# Patient Record
Sex: FEMALE | Race: OTHER | NOT HISPANIC OR LATINO
[De-identification: names, ages, dates, MRNs, and addresses within clinical notes are randomized per-mention and may not be internally consistent; named-entity substitution may affect disease eponyms.]

---

## 2017-02-06 ENCOUNTER — APPOINTMENT (OUTPATIENT)
Dept: CARDIOLOGY | Facility: CLINIC | Age: 82
End: 2017-02-06

## 2017-02-06 VITALS
WEIGHT: 121 LBS | OXYGEN SATURATION: 97 % | HEART RATE: 72 BPM | RESPIRATION RATE: 17 BRPM | DIASTOLIC BLOOD PRESSURE: 84 MMHG | TEMPERATURE: 97.9 F | SYSTOLIC BLOOD PRESSURE: 156 MMHG | BODY MASS INDEX: 22.86 KG/M2

## 2017-02-06 RX ORDER — DOCUSATE SODIUM 100 MG/1
100 CAPSULE ORAL
Qty: 90 | Refills: 5 | Status: ACTIVE | COMMUNITY
Start: 2017-02-06 | End: 1900-01-01

## 2017-02-22 ENCOUNTER — APPOINTMENT (OUTPATIENT)
Dept: CARDIOLOGY | Facility: CLINIC | Age: 82
End: 2017-02-22

## 2017-02-22 VITALS
HEART RATE: 70 BPM | SYSTOLIC BLOOD PRESSURE: 154 MMHG | RESPIRATION RATE: 18 BRPM | DIASTOLIC BLOOD PRESSURE: 79 MMHG | OXYGEN SATURATION: 95 % | TEMPERATURE: 97.4 F

## 2017-03-22 ENCOUNTER — APPOINTMENT (OUTPATIENT)
Dept: CARDIOLOGY | Facility: CLINIC | Age: 82
End: 2017-03-22

## 2017-03-27 ENCOUNTER — APPOINTMENT (OUTPATIENT)
Dept: CARDIOLOGY | Facility: CLINIC | Age: 82
End: 2017-03-27

## 2017-03-27 VITALS
HEART RATE: 68 BPM | DIASTOLIC BLOOD PRESSURE: 76 MMHG | RESPIRATION RATE: 18 BRPM | WEIGHT: 125 LBS | SYSTOLIC BLOOD PRESSURE: 138 MMHG | BODY MASS INDEX: 23.62 KG/M2 | OXYGEN SATURATION: 96 %

## 2017-06-05 ENCOUNTER — APPOINTMENT (OUTPATIENT)
Dept: CARDIOLOGY | Facility: CLINIC | Age: 82
End: 2017-06-05

## 2017-06-05 VITALS
OXYGEN SATURATION: 96 % | SYSTOLIC BLOOD PRESSURE: 134 MMHG | HEART RATE: 81 BPM | TEMPERATURE: 97.8 F | RESPIRATION RATE: 17 BRPM | BODY MASS INDEX: 24.19 KG/M2 | DIASTOLIC BLOOD PRESSURE: 77 MMHG | WEIGHT: 128 LBS

## 2017-06-06 ENCOUNTER — FORM ENCOUNTER (OUTPATIENT)
Age: 82
End: 2017-06-06

## 2017-06-09 ENCOUNTER — RESULT REVIEW (OUTPATIENT)
Age: 82
End: 2017-06-09

## 2017-07-07 ENCOUNTER — MEDICATION RENEWAL (OUTPATIENT)
Age: 82
End: 2017-07-07

## 2017-08-09 ENCOUNTER — MEDICATION RENEWAL (OUTPATIENT)
Age: 82
End: 2017-08-09

## 2017-08-10 ENCOUNTER — MEDICATION RENEWAL (OUTPATIENT)
Age: 82
End: 2017-08-10

## 2017-09-11 ENCOUNTER — APPOINTMENT (OUTPATIENT)
Dept: CARDIOLOGY | Facility: CLINIC | Age: 82
End: 2017-09-11
Payer: MEDICARE

## 2017-09-14 ENCOUNTER — NON-APPOINTMENT (OUTPATIENT)
Age: 82
End: 2017-09-14

## 2017-09-14 ENCOUNTER — APPOINTMENT (OUTPATIENT)
Dept: CARDIOLOGY | Facility: CLINIC | Age: 82
End: 2017-09-14
Payer: MEDICARE

## 2017-09-14 VITALS
DIASTOLIC BLOOD PRESSURE: 73 MMHG | SYSTOLIC BLOOD PRESSURE: 137 MMHG | TEMPERATURE: 98.4 F | OXYGEN SATURATION: 96 % | BODY MASS INDEX: 24.75 KG/M2 | HEART RATE: 73 BPM | RESPIRATION RATE: 17 BRPM | WEIGHT: 131 LBS

## 2017-09-14 PROCEDURE — 99214 OFFICE O/P EST MOD 30 MIN: CPT

## 2017-09-14 PROCEDURE — 93000 ELECTROCARDIOGRAM COMPLETE: CPT

## 2017-11-08 ENCOUNTER — APPOINTMENT (OUTPATIENT)
Dept: CARDIOLOGY | Facility: CLINIC | Age: 82
End: 2017-11-08
Payer: MEDICARE

## 2017-11-08 VITALS
DIASTOLIC BLOOD PRESSURE: 80 MMHG | RESPIRATION RATE: 18 BRPM | WEIGHT: 130 LBS | SYSTOLIC BLOOD PRESSURE: 145 MMHG | BODY MASS INDEX: 24.56 KG/M2 | HEART RATE: 71 BPM | OXYGEN SATURATION: 98 %

## 2017-11-08 PROCEDURE — 93000 ELECTROCARDIOGRAM COMPLETE: CPT

## 2017-11-08 PROCEDURE — 99214 OFFICE O/P EST MOD 30 MIN: CPT

## 2017-12-07 ENCOUNTER — APPOINTMENT (OUTPATIENT)
Dept: CARDIOLOGY | Facility: CLINIC | Age: 82
End: 2017-12-07
Payer: MEDICARE

## 2017-12-07 VITALS
TEMPERATURE: 98.3 F | BODY MASS INDEX: 24 KG/M2 | OXYGEN SATURATION: 96 % | HEART RATE: 74 BPM | SYSTOLIC BLOOD PRESSURE: 123 MMHG | WEIGHT: 127 LBS | RESPIRATION RATE: 18 BRPM | DIASTOLIC BLOOD PRESSURE: 73 MMHG

## 2017-12-07 PROCEDURE — 99214 OFFICE O/P EST MOD 30 MIN: CPT

## 2018-02-15 ENCOUNTER — APPOINTMENT (OUTPATIENT)
Dept: CARDIOLOGY | Facility: CLINIC | Age: 83
End: 2018-02-15
Payer: MEDICARE

## 2018-02-15 VITALS
HEART RATE: 77 BPM | WEIGHT: 127 LBS | BODY MASS INDEX: 24 KG/M2 | SYSTOLIC BLOOD PRESSURE: 147 MMHG | OXYGEN SATURATION: 97 % | DIASTOLIC BLOOD PRESSURE: 80 MMHG | TEMPERATURE: 97.4 F | RESPIRATION RATE: 18 BRPM

## 2018-02-15 PROCEDURE — 99214 OFFICE O/P EST MOD 30 MIN: CPT

## 2018-02-15 PROCEDURE — 93306 TTE W/DOPPLER COMPLETE: CPT

## 2018-05-17 ENCOUNTER — APPOINTMENT (OUTPATIENT)
Dept: CARDIOLOGY | Facility: CLINIC | Age: 83
End: 2018-05-17

## 2018-05-19 ENCOUNTER — APPOINTMENT (OUTPATIENT)
Dept: CARDIOLOGY | Facility: CLINIC | Age: 83
End: 2018-05-19
Payer: MEDICARE

## 2018-05-19 VITALS
RESPIRATION RATE: 18 BRPM | SYSTOLIC BLOOD PRESSURE: 145 MMHG | OXYGEN SATURATION: 96 % | BODY MASS INDEX: 24.75 KG/M2 | HEART RATE: 75 BPM | WEIGHT: 131 LBS | DIASTOLIC BLOOD PRESSURE: 77 MMHG

## 2018-05-19 PROCEDURE — 99214 OFFICE O/P EST MOD 30 MIN: CPT

## 2018-08-23 ENCOUNTER — NON-APPOINTMENT (OUTPATIENT)
Age: 83
End: 2018-08-23

## 2018-08-23 ENCOUNTER — APPOINTMENT (OUTPATIENT)
Dept: CARDIOLOGY | Facility: CLINIC | Age: 83
End: 2018-08-23
Payer: MEDICARE

## 2018-08-23 VITALS
RESPIRATION RATE: 16 BRPM | WEIGHT: 131 LBS | TEMPERATURE: 97.9 F | OXYGEN SATURATION: 98 % | HEIGHT: 61 IN | DIASTOLIC BLOOD PRESSURE: 65 MMHG | HEART RATE: 72 BPM | BODY MASS INDEX: 24.73 KG/M2 | SYSTOLIC BLOOD PRESSURE: 110 MMHG

## 2018-08-23 PROCEDURE — 99214 OFFICE O/P EST MOD 30 MIN: CPT

## 2018-08-23 PROCEDURE — 93000 ELECTROCARDIOGRAM COMPLETE: CPT

## 2018-09-29 ENCOUNTER — MOBILE ON CALL (OUTPATIENT)
Age: 83
End: 2018-09-29

## 2018-10-08 ENCOUNTER — APPOINTMENT (OUTPATIENT)
Dept: CARDIOLOGY | Facility: CLINIC | Age: 83
End: 2018-10-08

## 2018-10-11 ENCOUNTER — APPOINTMENT (OUTPATIENT)
Dept: CARDIOLOGY | Facility: CLINIC | Age: 83
End: 2018-10-11
Payer: MEDICARE

## 2018-10-11 VITALS
TEMPERATURE: 97.7 F | WEIGHT: 126 LBS | BODY MASS INDEX: 23.81 KG/M2 | HEART RATE: 84 BPM | DIASTOLIC BLOOD PRESSURE: 63 MMHG | SYSTOLIC BLOOD PRESSURE: 103 MMHG | OXYGEN SATURATION: 98 % | RESPIRATION RATE: 16 BRPM

## 2018-10-11 PROCEDURE — 99214 OFFICE O/P EST MOD 30 MIN: CPT

## 2018-10-11 PROCEDURE — 85610 PROTHROMBIN TIME: CPT | Mod: QW

## 2018-10-11 RX ORDER — APIXABAN 2.5 MG/1
2.5 TABLET, FILM COATED ORAL
Qty: 60 | Refills: 5 | Status: ACTIVE | COMMUNITY
Start: 2018-10-11 | End: 1900-01-01

## 2018-10-12 LAB
INR PPP: 1.9 RATIO
POCT-PROTHROMBIN TIME: 23 SECS
QUALITY CONTROL: YES

## 2018-10-25 ENCOUNTER — APPOINTMENT (OUTPATIENT)
Dept: CARDIOLOGY | Facility: CLINIC | Age: 83
End: 2018-10-25
Payer: MEDICARE

## 2018-10-25 VITALS
TEMPERATURE: 97.4 F | OXYGEN SATURATION: 98 % | SYSTOLIC BLOOD PRESSURE: 149 MMHG | DIASTOLIC BLOOD PRESSURE: 81 MMHG | RESPIRATION RATE: 16 BRPM | HEART RATE: 73 BPM | BODY MASS INDEX: 24.19 KG/M2 | WEIGHT: 128 LBS

## 2018-10-25 PROCEDURE — 99214 OFFICE O/P EST MOD 30 MIN: CPT

## 2018-11-08 ENCOUNTER — APPOINTMENT (OUTPATIENT)
Dept: CARDIOLOGY | Facility: CLINIC | Age: 83
End: 2018-11-08
Payer: MEDICARE

## 2018-11-08 ENCOUNTER — NON-APPOINTMENT (OUTPATIENT)
Age: 83
End: 2018-11-08

## 2018-11-08 VITALS
RESPIRATION RATE: 17 BRPM | TEMPERATURE: 97.6 F | WEIGHT: 127 LBS | DIASTOLIC BLOOD PRESSURE: 81 MMHG | HEART RATE: 79 BPM | BODY MASS INDEX: 24 KG/M2 | OXYGEN SATURATION: 98 % | SYSTOLIC BLOOD PRESSURE: 136 MMHG

## 2018-11-08 PROCEDURE — 93000 ELECTROCARDIOGRAM COMPLETE: CPT

## 2018-11-08 PROCEDURE — 99214 OFFICE O/P EST MOD 30 MIN: CPT

## 2018-11-15 ENCOUNTER — APPOINTMENT (OUTPATIENT)
Dept: CARDIOLOGY | Facility: CLINIC | Age: 83
End: 2018-11-15
Payer: MEDICARE

## 2018-11-15 VITALS
OXYGEN SATURATION: 95 % | TEMPERATURE: 97.6 F | BODY MASS INDEX: 24.75 KG/M2 | DIASTOLIC BLOOD PRESSURE: 79 MMHG | RESPIRATION RATE: 18 BRPM | HEART RATE: 73 BPM | WEIGHT: 131 LBS | SYSTOLIC BLOOD PRESSURE: 152 MMHG

## 2018-11-15 PROCEDURE — 99215 OFFICE O/P EST HI 40 MIN: CPT

## 2018-11-15 PROCEDURE — 93306 TTE W/DOPPLER COMPLETE: CPT

## 2018-11-26 ENCOUNTER — APPOINTMENT (OUTPATIENT)
Dept: CARDIOLOGY | Facility: CLINIC | Age: 83
End: 2018-11-26

## 2018-12-10 ENCOUNTER — APPOINTMENT (OUTPATIENT)
Dept: CARDIOLOGY | Facility: CLINIC | Age: 83
End: 2018-12-10
Payer: MEDICARE

## 2018-12-10 VITALS
TEMPERATURE: 98 F | OXYGEN SATURATION: 97 % | SYSTOLIC BLOOD PRESSURE: 152 MMHG | BODY MASS INDEX: 24.37 KG/M2 | DIASTOLIC BLOOD PRESSURE: 82 MMHG | RESPIRATION RATE: 17 BRPM | WEIGHT: 129 LBS | HEART RATE: 76 BPM

## 2018-12-10 PROCEDURE — 99214 OFFICE O/P EST MOD 30 MIN: CPT

## 2019-01-17 ENCOUNTER — APPOINTMENT (OUTPATIENT)
Dept: CARDIOLOGY | Facility: CLINIC | Age: 84
End: 2019-01-17
Payer: MEDICARE

## 2019-01-17 VITALS
HEART RATE: 72 BPM | DIASTOLIC BLOOD PRESSURE: 66 MMHG | BODY MASS INDEX: 23.81 KG/M2 | WEIGHT: 126 LBS | TEMPERATURE: 97.7 F | RESPIRATION RATE: 17 BRPM | SYSTOLIC BLOOD PRESSURE: 110 MMHG | OXYGEN SATURATION: 98 %

## 2019-01-17 PROCEDURE — 99214 OFFICE O/P EST MOD 30 MIN: CPT

## 2019-02-12 ENCOUNTER — APPOINTMENT (OUTPATIENT)
Dept: CARDIOLOGY | Facility: CLINIC | Age: 84
End: 2019-02-12
Payer: MEDICARE

## 2019-02-12 VITALS
HEART RATE: 58 BPM | OXYGEN SATURATION: 97 % | SYSTOLIC BLOOD PRESSURE: 145 MMHG | TEMPERATURE: 97.4 F | RESPIRATION RATE: 17 BRPM | WEIGHT: 122 LBS | DIASTOLIC BLOOD PRESSURE: 76 MMHG | BODY MASS INDEX: 23.05 KG/M2

## 2019-02-12 PROCEDURE — 99214 OFFICE O/P EST MOD 30 MIN: CPT

## 2019-02-12 RX ORDER — DRONEDARONE 400 MG/1
400 TABLET, FILM COATED ORAL TWICE DAILY
Qty: 60 | Refills: 5 | Status: DISCONTINUED | COMMUNITY
Start: 2018-11-08 | End: 2019-02-12

## 2019-02-12 RX ORDER — LOSARTAN POTASSIUM AND HYDROCHLOROTHIAZIDE 12.5; 5 MG/1; MG/1
50-12.5 TABLET ORAL
Qty: 30 | Refills: 5 | Status: DISCONTINUED | COMMUNITY
Start: 2017-02-22 | End: 2019-02-12

## 2019-02-27 ENCOUNTER — RESULT CHARGE (OUTPATIENT)
Age: 84
End: 2019-02-27

## 2019-03-01 ENCOUNTER — RESULT REVIEW (OUTPATIENT)
Age: 84
End: 2019-03-01

## 2019-03-03 RX ORDER — METOPROLOL SUCCINATE 25 MG/1
25 TABLET, EXTENDED RELEASE ORAL DAILY
Qty: 30 | Refills: 5 | Status: DISCONTINUED | COMMUNITY
Start: 2018-10-25 | End: 2019-03-03

## 2019-03-03 RX ORDER — GUAIFENESIN SYRUP AND DEXTROMETHORPHAN 100; 10 MG/5ML; MG/5ML
100-10 SYRUP ORAL
Qty: 1 | Refills: 0 | Status: DISCONTINUED | COMMUNITY
Start: 2017-12-07 | End: 2019-03-03

## 2019-03-03 RX ORDER — AZITHROMYCIN 250 MG/1
250 TABLET, FILM COATED ORAL
Qty: 6 | Refills: 0 | Status: DISCONTINUED | COMMUNITY
Start: 2017-12-07 | End: 2019-03-03

## 2019-03-03 RX ORDER — GUAIFENESIN 600 MG/1
600 TABLET, EXTENDED RELEASE ORAL
Qty: 60 | Refills: 5 | Status: DISCONTINUED | COMMUNITY
Start: 2017-02-06 | End: 2019-03-03

## 2019-03-03 NOTE — HISTORY OF PRESENT ILLNESS
[FreeTextEntry1] : 87-year-old female with CAD s/p LAD and Diag stents in 2009 at [facility] s/p 2 LAD stents in 9/2018, asthma, PASP of 72 mmHg following treadmill stress, PAF, HTN and HLD presents for followup.  Patient was last seen on 10/25/18.  I advised patient to increase Metoprolol ER to 75 mg daily.  She is on ASA 81 mg Plavix 75 mg and Atorvastatin for CAD.  She is on Eliquis 2.5 mg BID for PAF.  Patient reports palpitations.  Patient denies CP or SOB.

## 2019-03-03 NOTE — PHYSICAL EXAM
[General Appearance - Well Developed] : well developed [Normal Appearance] : normal appearance [Well Groomed] : well groomed [General Appearance - Well Nourished] : well nourished [No Deformities] : no deformities [General Appearance - In No Acute Distress] : no acute distress [Normal Conjunctiva] : the conjunctiva exhibited no abnormalities [Eyelids - No Xanthelasma] : the eyelids demonstrated no xanthelasmas [Normal Oral Mucosa] : normal oral mucosa [No Oral Pallor] : no oral pallor [No Oral Cyanosis] : no oral cyanosis [Normal Oropharynx] : normal oropharynx [Normal Jugular Venous A Waves Present] : normal jugular venous A waves present [Normal Jugular Venous V Waves Present] : normal jugular venous V waves present [No Jugular Venous Osullivan A Waves] : no jugular venous osullivan A waves [Respiration, Rhythm And Depth] : normal respiratory rhythm and effort [Exaggerated Use Of Accessory Muscles For Inspiration] : no accessory muscle use [Bibasilar Rales/Crackles] : bibasilar rales [Heart Sounds] : normal S1 and S2 [Arterial Pulses Normal] : the arterial pulses were normal [Edema] : no peripheral edema present [Irregularly Irregular] : the rhythm was irregularly irregular [Abdomen Soft] : soft [Abdomen Tenderness] : non-tender [Abdomen Mass (___ Cm)] : no abdominal mass palpated [FreeTextEntry1] : limited mobility.  [Nail Clubbing] : no clubbing of the fingernails [Cyanosis, Localized] : no localized cyanosis [Petechial Hemorrhages (___cm)] : no petechial hemorrhages [] : no ischemic changes [Oriented To Time, Place, And Person] : oriented to person, place, and time [Affect] : the affect was normal [Mood] : the mood was normal [No Anxiety] : not feeling anxious

## 2019-03-03 NOTE — DISCUSSION/SUMMARY
[FreeTextEntry1] : 87-year-old female with CAD [facility] s[facility]p 2 LAD stents in 9/2018, asthma, PASP of 72 mmHg following treadmill stress, PAF, HTN and HLD presents for followup.  Patient was last seen on 10/25/18.  I advised patient to increase Metoprolol ER to 75 mg daily.  She is on ASA 81 mg Plavix 75 mg and Atorvastatin for CAD.  She is on Eliquis 2.5 mg BID for PAF.  Patient reports palpitations.  Patient denies CP or SOB.  \par \par (1) CAD s/p LAD and Diag stents in 2009, s/p 2 LAD stents in 9/2018 - Patient has been stable.  I advised her to continue ASA, Plavix 75 mg, and Atorvastatin.\par \par (2) PAF - Patient is in AFIB today.  I advised patient to start Multaq 400 mg BID.  She should continue  Metoprolol ER 75 mg daily.  Her [id number] score is 5 (HTN, age>75, CAD, female gender).  She should continue Eliquis 2.5 mg BID for stroke prevention.  I will stop ASA or Plavix 6 months after stents.\par \par (3) HTN - Her BP was good today.  She should continue Metoprolol 75 mg.\par \par (4) AS - Patient underwent an echocardiogram on 2/15/18 and it showed normal LV function with moderate AS (1.0 cm2) and moderate pulmonary hypertension (57 mmHg).\par \par (5) Followup - 1 month.

## 2019-03-03 NOTE — REASON FOR VISIT
[Follow-Up - Clinic] : a clinic follow-up of [Atrial Fibrillation] : atrial fibrillation [Coronary Artery Disease] : coronary artery disease [Hypertension] : hypertension

## 2019-03-06 NOTE — HISTORY OF PRESENT ILLNESS
[FreeTextEntry1] : 87-year-old female with CAD s/p LAD and Diag stents in 2009 at [facility] s/p 2 LAD stents in 9/2018, asthma, PASP of 72 mmHg following treadmill stress, PAF, HTN and HLD presents for followup.  Patient was last seen on 11/8/18.  She was advised to to start Multaq 400 mg BID.  She is on ASA 81 mg Plavix 75 mg and Atorvastatin for CAD.  She is on Eliquis 2.5 mg BID and Metoprolol ER 75 mg for PAF.  Patient reports weakness.\par

## 2019-03-06 NOTE — HISTORY OF PRESENT ILLNESS
[FreeTextEntry1] : 88-year-old female with CAD s/p LAD and Diag stents in 2009 at [facility] s/p 2 LAD stents in 9/2018, severe AS, asthma, PAF, HTN and HLD presents for followup.  Patient was last seen on 12/10/18.  Patient underwent an echocardiogram on 11/15/18 and it showed normal LV function with severe  AS (0.9 cm2), moderate MR, mod-severe TR, and severe pulmonary hypertension (72 mmHg).  She is on ASA 81 mg Plavix 75 mg and Atorvastatin for CAD.  She is on Eliquis 2.5 mg BID, Amiodarone 200 mg QD, and Metoprolol ER 50 mg for PAF.  Patient reports occasional palpitations lasting seconds.  Patient denies CP.  Patient reports stable MOULTON.  Patient denies lightheadedness.

## 2019-03-06 NOTE — PHYSICAL EXAM
[General Appearance - Well Developed] : well developed [Normal Appearance] : normal appearance [Well Groomed] : well groomed [General Appearance - Well Nourished] : well nourished [No Deformities] : no deformities [General Appearance - In No Acute Distress] : no acute distress [Normal Conjunctiva] : the conjunctiva exhibited no abnormalities [Eyelids - No Xanthelasma] : the eyelids demonstrated no xanthelasmas [Normal Oral Mucosa] : normal oral mucosa [No Oral Pallor] : no oral pallor [No Oral Cyanosis] : no oral cyanosis [Normal Jugular Venous A Waves Present] : normal jugular venous A waves present [Normal Jugular Venous V Waves Present] : normal jugular venous V waves present [No Jugular Venous Osullivan A Waves] : no jugular venous osullivan A waves [Respiration, Rhythm And Depth] : normal respiratory rhythm and effort [Exaggerated Use Of Accessory Muscles For Inspiration] : no accessory muscle use [Auscultation Breath Sounds / Voice Sounds] : lungs were clear to auscultation bilaterally [Heart Rate And Rhythm] : heart rate and rhythm were normal [Heart Sounds] : normal S1 and S2 [Murmurs] : no murmurs present [Abdomen Soft] : soft [Abdomen Tenderness] : non-tender [Abdomen Mass (___ Cm)] : no abdominal mass palpated [Abnormal Walk] : normal gait [Gait - Sufficient For Exercise Testing] : the gait was sufficient for exercise testing [Nail Clubbing] : no clubbing of the fingernails [Cyanosis, Localized] : no localized cyanosis [Petechial Hemorrhages (___cm)] : no petechial hemorrhages [] : no ischemic changes [Oriented To Time, Place, And Person] : oriented to person, place, and time [Affect] : the affect was normal [Mood] : the mood was normal [No Anxiety] : not feeling anxious [Normal Oropharynx] : normal oropharynx [Bibasilar Rales/Crackles] : bibasilar rales [Arterial Pulses Normal] : the arterial pulses were normal [Edema] : no peripheral edema present [Regular-Premature Beats] : the rhythm was regular with premature beats [FreeTextEntry1] : limited mobility.

## 2019-03-06 NOTE — DISCUSSION/SUMMARY
[FreeTextEntry1] : 88-year-old female with CAD s/p LAD and Diag stents in 2009 at [facility] s/p 2 LAD stents in 9/2018, severe AS, asthma, PAF, HTN and HLD presents for followup.  Patient was last seen on 1/17/19.  She is on ASA 81 mg Plavix 75 mg and Atorvastatin for CAD.  She is on Eliquis 2.5 mg BID, Amiodarone 200 mg QD, and Metoprolol ER 50 mg for PAF.  On Sunday, patient had several episodes of dizziness with fuzzy vision.  Patient felt better when she lied down.  It felt a little like motion sickness. Patient denies other symptoms of stroke.\par \par (1) Dizziness - Patient underwent a brain MRI and it showed a stable old small stroke. No new stroke was noted.  Patient underwent a carotid Doppler and it showed no significant stenosis.  Patient was reassured.  Her dizziness is likely due to vestibular dysfunction.  \par \par (2) CAD s/p LAD and Diag stents in 2009, s/p 2 LAD stents in 9/2018 - Patient has been stable.  I advised her to continue ASA, Plavix 75 mg, and Atorvastatin.  I will stop Plavix in March 2018 (6 months post-stent).\par \par (3) PAF - Patient is now on Amiodarone 200 mg QD and Metoprolol ER 50 mg daily.  Her [id number] score is 5 (HTN, age>75, CAD, female gender).  She should continue Eliquis 2.5 mg BID for stroke prevention.  \par \par (4) HTN - Her BP was good today.  She should continue Metoprolol ER 50 mg. \par \par (5) AS - Patient underwent an echocardiogram on 11/15/18 and it showed normal LV function with severe  AS (0.9 cm2), moderate MR, mod-severe TR, and severe pulmonary hypertension (72 mmHg).\par \par (6) Followup - 1 month.

## 2019-03-06 NOTE — DISCUSSION/SUMMARY
[FreeTextEntry1] : 87-year-old female with CAD [facility] 2 LAD st[facility] in 9/2018, asthma, PASP of 72 mmHg following treadmill stress, PAF, HTN and HLD presents for followup.  Patient was last seen on 11/8/18.  She was advised to to start Multaq 400 mg BID.  She is on ASA 81 mg Plavix 75 mg and Atorvastatin for CAD.  She is on Eliquis 2.5 mg BID and Metoprolol ER 75 mg for PAF.  Patient reports weakness.\par \par (1) CAD s/p LAD and Diag stents in 2009, s/p 2 LAD stents in 9/2018 - Patient has been stable.  I advised her to continue ASA, Plavix 75 mg, and Atorvastatin.   I will stop ASA or Plavix 6 months after stents.\par \par (2) PAF - Patient remains symptomatic despite Multaq 400 mg BID.  I advised patient to see EP for possible ablation.  She should continue Metoprolol ER 75 mg daily.  Her [id number] score is 5 (HTN, age>75, CAD, female gender).  She should continue Eliquis 2.5 mg BID for stroke prevention. \par \par (3) HTN - Her BP was acceptable today.  She should continue Metoprolol 75 mg.\par \par (4) AS - Patient underwent an echocardiogram on 11/15/18 and it showed normal LV function with severe  AS (0.9 cm2), moderate MR, mod-severe TR, and severe pulmonary hypertension (72 mmHg).\par \par (5) Followup - pending EP evaluation; 1 month.

## 2019-03-06 NOTE — PHYSICAL EXAM
[General Appearance - Well Developed] : well developed [Normal Appearance] : normal appearance [Well Groomed] : well groomed [General Appearance - Well Nourished] : well nourished [No Deformities] : no deformities [General Appearance - In No Acute Distress] : no acute distress [Normal Conjunctiva] : the conjunctiva exhibited no abnormalities [Eyelids - No Xanthelasma] : the eyelids demonstrated no xanthelasmas [Normal Oral Mucosa] : normal oral mucosa [No Oral Pallor] : no oral pallor [No Oral Cyanosis] : no oral cyanosis [Normal Jugular Venous A Waves Present] : normal jugular venous A waves present [Normal Jugular Venous V Waves Present] : normal jugular venous V waves present [No Jugular Venous Osullivan A Waves] : no jugular venous osullivan A waves [Respiration, Rhythm And Depth] : normal respiratory rhythm and effort [Exaggerated Use Of Accessory Muscles For Inspiration] : no accessory muscle use [Auscultation Breath Sounds / Voice Sounds] : lungs were clear to auscultation bilaterally [Heart Rate And Rhythm] : heart rate and rhythm were normal [Heart Sounds] : normal S1 and S2 [Murmurs] : no murmurs present [Abdomen Soft] : soft [Abdomen Tenderness] : non-tender [Abdomen Mass (___ Cm)] : no abdominal mass palpated [Abnormal Walk] : normal gait [Gait - Sufficient For Exercise Testing] : the gait was sufficient for exercise testing [Nail Clubbing] : no clubbing of the fingernails [Cyanosis, Localized] : no localized cyanosis [Petechial Hemorrhages (___cm)] : no petechial hemorrhages [] : no ischemic changes [Oriented To Time, Place, And Person] : oriented to person, place, and time [Affect] : the affect was normal [Mood] : the mood was normal [No Anxiety] : not feeling anxious [Normal Oropharynx] : normal oropharynx [Bibasilar Rales/Crackles] : bibasilar rales [Arterial Pulses Normal] : the arterial pulses were normal [Edema] : no peripheral edema present [FreeTextEntry1] : limited mobility.

## 2019-03-06 NOTE — PHYSICAL EXAM
[General Appearance - Well Developed] : well developed [Normal Appearance] : normal appearance [Well Groomed] : well groomed [General Appearance - Well Nourished] : well nourished [No Deformities] : no deformities [General Appearance - In No Acute Distress] : no acute distress [Normal Conjunctiva] : the conjunctiva exhibited no abnormalities [Eyelids - No Xanthelasma] : the eyelids demonstrated no xanthelasmas [Normal Oral Mucosa] : normal oral mucosa [No Oral Pallor] : no oral pallor [No Oral Cyanosis] : no oral cyanosis [Normal Jugular Venous A Waves Present] : normal jugular venous A waves present [Normal Jugular Venous V Waves Present] : normal jugular venous V waves present [No Jugular Venous Osullivan A Waves] : no jugular venous osullivan A waves [Respiration, Rhythm And Depth] : normal respiratory rhythm and effort [Exaggerated Use Of Accessory Muscles For Inspiration] : no accessory muscle use [Heart Sounds] : normal S1 and S2 [Murmurs] : no murmurs present [Abdomen Soft] : soft [Abdomen Tenderness] : non-tender [Abdomen Mass (___ Cm)] : no abdominal mass palpated [Abnormal Walk] : normal gait [Gait - Sufficient For Exercise Testing] : the gait was sufficient for exercise testing [Nail Clubbing] : no clubbing of the fingernails [Cyanosis, Localized] : no localized cyanosis [Petechial Hemorrhages (___cm)] : no petechial hemorrhages [] : no ischemic changes [Oriented To Time, Place, And Person] : oriented to person, place, and time [Affect] : the affect was normal [Mood] : the mood was normal [No Anxiety] : not feeling anxious [Normal Oropharynx] : normal oropharynx [Bibasilar Rales/Crackles] : bibasilar rales [Arterial Pulses Normal] : the arterial pulses were normal [Edema] : no peripheral edema present [Regular-Premature Beats] : the rhythm was regular with premature beats [FreeTextEntry1] : limited mobility.

## 2019-03-06 NOTE — DISCUSSION/SUMMARY
[FreeTextEntry1] : 88-year-old female with CAD [facility] st[facility] in 9/2018, severe AS, asthma, PAF, HTN and HLD presents for followup.  Patient was last seen on 12/10/18.  Patient underwent an echocardiogram on 11/15/18 and it showed normal LV function with severe  AS (0.9 cm2), moderate MR, mod-severe TR, and severe pulmonary hypertension (72 mmHg).  She is on ASA 81 mg Plavix 75 mg and Atorvastatin for CAD.  She is on Eliquis 2.5 mg BID, Amiodarone 200 mg QD, and Metoprolol ER 50 mg for PAF.  Patient reports occasional palpitations lasting seconds.  Patient denies CP.  Patient reports stable MOULTON.  Patient denies lightheadedness.\par \par (1) CAD s/p LAD and Diag stents in 2009, s/p 2 LAD stents in 9/2018 - Patient has been stable.  I advised her to continue ASA, Plavix 75 mg, and Atorvastatin.  I will stop Plavix in March 2018 (6 months post-stent).\par \par (2) PAF - Patient is now on Amiodarone 200 mg QD and Metoprolol ER 50 mg daily.  Her [id number] score is 5 (HTN, age>75, CAD, female gender).  She should continue Eliquis 2.5 mg BID for stroke prevention.  \par \par (3) HTN - Her BP was good today.  She should continue Metoprolol ER 50 mg. \par \par (4) AS - Patient underwent an echocardiogram on 11/15/18 and it showed normal LV function with severe  AS (0.9 cm2), moderate MR, mod-severe TR, and severe pulmonary hypertension (72 mmHg).\par \par (5) Followup - 1 month.

## 2019-03-06 NOTE — PHYSICAL EXAM
[General Appearance - Well Developed] : well developed [Normal Appearance] : normal appearance [Well Groomed] : well groomed [General Appearance - Well Nourished] : well nourished [No Deformities] : no deformities [General Appearance - In No Acute Distress] : no acute distress [Normal Conjunctiva] : the conjunctiva exhibited no abnormalities [Eyelids - No Xanthelasma] : the eyelids demonstrated no xanthelasmas [Normal Oral Mucosa] : normal oral mucosa [No Oral Pallor] : no oral pallor [No Oral Cyanosis] : no oral cyanosis [Normal Oropharynx] : normal oropharynx [Normal Jugular Venous A Waves Present] : normal jugular venous A waves present [Normal Jugular Venous V Waves Present] : normal jugular venous V waves present [No Jugular Venous Osullivan A Waves] : no jugular venous osullivan A waves [Respiration, Rhythm And Depth] : normal respiratory rhythm and effort [Exaggerated Use Of Accessory Muscles For Inspiration] : no accessory muscle use [Bibasilar Rales/Crackles] : bibasilar rales [Heart Sounds] : normal S1 and S2 [Arterial Pulses Normal] : the arterial pulses were normal [Irregularly Irregular] : the rhythm was irregularly irregular [Abdomen Soft] : soft [Abdomen Tenderness] : non-tender [Abdomen Mass (___ Cm)] : no abdominal mass palpated [FreeTextEntry1] : limited mobility.  [Nail Clubbing] : no clubbing of the fingernails [Cyanosis, Localized] : no localized cyanosis [Petechial Hemorrhages (___cm)] : no petechial hemorrhages [] : no ischemic changes [Oriented To Time, Place, And Person] : oriented to person, place, and time [Affect] : the affect was normal [Mood] : the mood was normal [No Anxiety] : not feeling anxious

## 2019-03-06 NOTE — HISTORY OF PRESENT ILLNESS
[FreeTextEntry1] : 88-year-old female with CAD s/p LAD and Diag stents in 2009 at [facility] s/p 2 LAD stents in 9/2018, severe AS, asthma, PAF, HTN and HLD presents for followup.  Patient was last seen on 1/17/19.  She is on ASA 81 mg Plavix 75 mg and Atorvastatin for CAD.  She is on Eliquis 2.5 mg BID, Amiodarone 200 mg QD, and Metoprolol ER 50 mg for PAF.  On Sunday, patient had several episodes of dizziness with fuzzy vision.  Patient felt better when she lied down.  It felt a little like motion sickness. Patient denies other symptoms of stroke.

## 2019-03-06 NOTE — HISTORY OF PRESENT ILLNESS
[FreeTextEntry1] : 88-year-old female with CAD s/p LAD and Diag stents in 2009 at [facility] s/p 2 LAD stents in 9/2018, asthma, PASP of 72 mmHg following treadmill stress, PAF, HTN and HLD presents for followup.  Patient was last seen on 11/8/18.  She was advised to start Multaq 400 mg BID.  She is on ASA 81 mg Plavix 75 mg and Atorvastatin for CAD.  She is on Eliquis 2.5 mg BID and Metoprolol ER for PAF.  Patient is no longer taking Multaq 400 mg BID because of dizziness.  She saw EP Dr. Moreira and was started on Amiodarone 200 mg.  Atorvastatin dose was reduced to 20 mg and Metoprolol ER dose was reduced to 50 mg.  Patient has been stable.

## 2019-03-06 NOTE — DISCUSSION/SUMMARY
[FreeTextEntry1] : 88-year-old female with CAD [facility] 9/2018, asthma, PASP of 72 mmHg following treadmill stress, PAF, HTN and HLD presents for followup.  Patient was last seen on 11/8/18.  She was advised to start Multaq 400 mg BID.  She is on ASA 81 mg Plavix 75 mg and Atorvastatin for CAD.  She is on Eliquis 2.5 mg BID and Metoprolol ER for PAF.  Patient is no longer taking Multaq 400 mg BID because of dizziness.  She saw EP Dr. Moreira and was started on Amiodarone 200 mg.  Atorvastatin dose was reduced to 20 mg and Metoprolol ER dose was reduced to 50 mg.  Patient has been stable. \par \par (1) CAD s/p LAD and Diag stents in 2009, s/p 2 LAD stents in 9/2018 - Patient has been stable.  I advised her to continue ASA, Plavix 75 mg, and Atorvastatin.  I will stop Plavix in March 2018 (6 months post-stent).\par \par (2) PAF - Patient is now on Amiodarone 200 mg QD and Metoprolol ER 50 mg daily.  Her [id number] score is 5 (HTN, age>75, CAD, female gender).  She should continue Eliquis 2.5 mg BID for stroke prevention.  I will stop ASA or Plavix 6 months after stents.\par \par (3) HTN - Her BP was mildly elevated today.  She should continue Metoprolol ER 50 mg.  I will consider Losartan 25 mg if her BP remains elevated.\par \par (4) AS - Patient underwent an echocardiogram on 11/15/18 and it showed normal LV function with severe  AS (0.9 cm2), moderate MR, mod-severe TR, and severe pulmonary hypertension (72 mmHg).\par \par (5) Followup - 1 month.

## 2019-03-07 ENCOUNTER — APPOINTMENT (OUTPATIENT)
Dept: CARDIOLOGY | Facility: CLINIC | Age: 84
End: 2019-03-07
Payer: MEDICARE

## 2019-03-07 VITALS
BODY MASS INDEX: 23.62 KG/M2 | RESPIRATION RATE: 17 BRPM | HEART RATE: 63 BPM | WEIGHT: 125 LBS | OXYGEN SATURATION: 98 % | SYSTOLIC BLOOD PRESSURE: 156 MMHG | TEMPERATURE: 97.7 F | DIASTOLIC BLOOD PRESSURE: 67 MMHG

## 2019-03-07 PROCEDURE — 99214 OFFICE O/P EST MOD 30 MIN: CPT

## 2019-03-18 ENCOUNTER — APPOINTMENT (OUTPATIENT)
Dept: CARDIOLOGY | Facility: CLINIC | Age: 84
End: 2019-03-18
Payer: MEDICARE

## 2019-03-18 ENCOUNTER — NON-APPOINTMENT (OUTPATIENT)
Age: 84
End: 2019-03-18

## 2019-03-18 VITALS
HEART RATE: 60 BPM | DIASTOLIC BLOOD PRESSURE: 56 MMHG | OXYGEN SATURATION: 100 % | TEMPERATURE: 97.4 F | BODY MASS INDEX: 23.62 KG/M2 | WEIGHT: 125 LBS | RESPIRATION RATE: 18 BRPM | SYSTOLIC BLOOD PRESSURE: 95 MMHG

## 2019-03-18 VITALS — DIASTOLIC BLOOD PRESSURE: 60 MMHG | SYSTOLIC BLOOD PRESSURE: 160 MMHG

## 2019-03-18 PROCEDURE — 99214 OFFICE O/P EST MOD 30 MIN: CPT

## 2019-03-18 PROCEDURE — 93000 ELECTROCARDIOGRAM COMPLETE: CPT

## 2019-04-08 ENCOUNTER — INPATIENT (INPATIENT)
Facility: HOSPITAL | Age: 84
LOS: 2 days | Discharge: HOME | DRG: 243 | End: 2019-04-11
Attending: HOSPITALIST | Admitting: STUDENT IN AN ORGANIZED HEALTH CARE EDUCATION/TRAINING PROGRAM
Payer: MEDICARE

## 2019-04-08 VITALS
WEIGHT: 119.93 LBS | OXYGEN SATURATION: 97 % | HEIGHT: 62 IN | DIASTOLIC BLOOD PRESSURE: 56 MMHG | TEMPERATURE: 98 F | RESPIRATION RATE: 20 BRPM | HEART RATE: 105 BPM | SYSTOLIC BLOOD PRESSURE: 101 MMHG

## 2019-04-08 DIAGNOSIS — Z90.49 ACQUIRED ABSENCE OF OTHER SPECIFIED PARTS OF DIGESTIVE TRACT: Chronic | ICD-10-CM

## 2019-04-08 DIAGNOSIS — Z98.51 TUBAL LIGATION STATUS: Chronic | ICD-10-CM

## 2019-04-08 DIAGNOSIS — Z96.653 PRESENCE OF ARTIFICIAL KNEE JOINT, BILATERAL: Chronic | ICD-10-CM

## 2019-04-08 DIAGNOSIS — R00.1 BRADYCARDIA, UNSPECIFIED: ICD-10-CM

## 2019-04-08 DIAGNOSIS — Z98.49 CATARACT EXTRACTION STATUS, UNSPECIFIED EYE: Chronic | ICD-10-CM

## 2019-04-08 LAB
ALBUMIN SERPL ELPH-MCNC: 4.3 G/DL — SIGNIFICANT CHANGE UP (ref 3.3–5)
ALP SERPL-CCNC: 81 U/L — SIGNIFICANT CHANGE UP (ref 40–120)
ALT FLD-CCNC: 67 U/L — HIGH (ref 10–45)
ANION GAP SERPL CALC-SCNC: 15 MMOL/L — SIGNIFICANT CHANGE UP (ref 5–17)
APTT BLD: 36.9 SEC — HIGH (ref 27.5–36.3)
AST SERPL-CCNC: 78 U/L — HIGH (ref 10–40)
BASOPHILS # BLD AUTO: 0 K/UL — SIGNIFICANT CHANGE UP (ref 0–0.2)
BASOPHILS NFR BLD AUTO: 0.5 % — SIGNIFICANT CHANGE UP (ref 0–2)
BILIRUB SERPL-MCNC: 0.5 MG/DL — SIGNIFICANT CHANGE UP (ref 0.2–1.2)
BUN SERPL-MCNC: 33 MG/DL — HIGH (ref 7–23)
CALCIUM SERPL-MCNC: 10.6 MG/DL — HIGH (ref 8.4–10.5)
CHLORIDE SERPL-SCNC: 95 MMOL/L — LOW (ref 96–108)
CO2 SERPL-SCNC: 25 MMOL/L — SIGNIFICANT CHANGE UP (ref 22–31)
CREAT SERPL-MCNC: 1.83 MG/DL — HIGH (ref 0.5–1.3)
DIGOXIN SERPL-MCNC: <0.4 NG/ML — LOW (ref 0.8–2)
EOSINOPHIL # BLD AUTO: 0 K/UL — SIGNIFICANT CHANGE UP (ref 0–0.5)
EOSINOPHIL NFR BLD AUTO: 0.8 % — SIGNIFICANT CHANGE UP (ref 0–6)
GLUCOSE SERPL-MCNC: 123 MG/DL — HIGH (ref 70–99)
HCT VFR BLD CALC: 36.5 % — SIGNIFICANT CHANGE UP (ref 34.5–45)
HGB BLD-MCNC: 12.3 G/DL — SIGNIFICANT CHANGE UP (ref 11.5–15.5)
INR BLD: 1.25 RATIO — HIGH (ref 0.88–1.16)
LYMPHOCYTES # BLD AUTO: 1 K/UL — SIGNIFICANT CHANGE UP (ref 1–3.3)
LYMPHOCYTES # BLD AUTO: 17.4 % — SIGNIFICANT CHANGE UP (ref 13–44)
MCHC RBC-ENTMCNC: 31.6 PG — SIGNIFICANT CHANGE UP (ref 27–34)
MCHC RBC-ENTMCNC: 33.7 GM/DL — SIGNIFICANT CHANGE UP (ref 32–36)
MCV RBC AUTO: 93.8 FL — SIGNIFICANT CHANGE UP (ref 80–100)
MONOCYTES # BLD AUTO: 0.5 K/UL — SIGNIFICANT CHANGE UP (ref 0–0.9)
MONOCYTES NFR BLD AUTO: 8.3 % — SIGNIFICANT CHANGE UP (ref 2–14)
NEUTROPHILS # BLD AUTO: 4.1 K/UL — SIGNIFICANT CHANGE UP (ref 1.8–7.4)
NEUTROPHILS NFR BLD AUTO: 72.9 % — SIGNIFICANT CHANGE UP (ref 43–77)
PLATELET # BLD AUTO: 152 K/UL — SIGNIFICANT CHANGE UP (ref 150–400)
POTASSIUM SERPL-MCNC: 5 MMOL/L — SIGNIFICANT CHANGE UP (ref 3.5–5.3)
POTASSIUM SERPL-SCNC: 5 MMOL/L — SIGNIFICANT CHANGE UP (ref 3.5–5.3)
PROT SERPL-MCNC: 6.6 G/DL — SIGNIFICANT CHANGE UP (ref 6–8.3)
PROTHROM AB SERPL-ACNC: 14.5 SEC — HIGH (ref 10–12.9)
RBC # BLD: 3.9 M/UL — SIGNIFICANT CHANGE UP (ref 3.8–5.2)
RBC # FLD: 13.5 % — SIGNIFICANT CHANGE UP (ref 10.3–14.5)
SODIUM SERPL-SCNC: 135 MMOL/L — SIGNIFICANT CHANGE UP (ref 135–145)
TROPONIN T, HIGH SENSITIVITY RESULT: 15 NG/L — SIGNIFICANT CHANGE UP (ref 0–51)
TROPONIN T, HIGH SENSITIVITY RESULT: 19 NG/L — SIGNIFICANT CHANGE UP (ref 0–51)
WBC # BLD: 5.6 K/UL — SIGNIFICANT CHANGE UP (ref 3.8–10.5)
WBC # FLD AUTO: 5.6 K/UL — SIGNIFICANT CHANGE UP (ref 3.8–10.5)

## 2019-04-08 PROCEDURE — 71045 X-RAY EXAM CHEST 1 VIEW: CPT | Mod: 26

## 2019-04-08 PROCEDURE — 71045 X-RAY EXAM CHEST 1 VIEW: CPT | Mod: 26,77

## 2019-04-08 PROCEDURE — 99291 CRITICAL CARE FIRST HOUR: CPT | Mod: 25

## 2019-04-08 PROCEDURE — 36556 INSERT NON-TUNNEL CV CATH: CPT | Mod: 59

## 2019-04-08 PROCEDURE — 33210 INSERT ELECTRD/PM CATH SNGL: CPT

## 2019-04-08 PROCEDURE — 99223 1ST HOSP IP/OBS HIGH 75: CPT | Mod: GC

## 2019-04-08 PROCEDURE — 93010 ELECTROCARDIOGRAM REPORT: CPT

## 2019-04-08 RX ORDER — DOCUSATE SODIUM 100 MG
100 CAPSULE ORAL DAILY
Qty: 0 | Refills: 0 | Status: DISCONTINUED | OUTPATIENT
Start: 2019-04-08 | End: 2019-04-11

## 2019-04-08 RX ORDER — ASPIRIN/CALCIUM CARB/MAGNESIUM 324 MG
81 TABLET ORAL DAILY
Qty: 0 | Refills: 0 | Status: DISCONTINUED | OUTPATIENT
Start: 2019-04-08 | End: 2019-04-11

## 2019-04-08 RX ORDER — HEPARIN SODIUM 5000 [USP'U]/ML
INJECTION INTRAVENOUS; SUBCUTANEOUS
Qty: 25000 | Refills: 0 | Status: DISCONTINUED | OUTPATIENT
Start: 2019-04-08 | End: 2019-04-09

## 2019-04-08 RX ORDER — HEPARIN SODIUM 5000 [USP'U]/ML
3200 INJECTION INTRAVENOUS; SUBCUTANEOUS EVERY 6 HOURS
Qty: 0 | Refills: 0 | Status: DISCONTINUED | OUTPATIENT
Start: 2019-04-08 | End: 2019-04-09

## 2019-04-08 RX ORDER — SODIUM CHLORIDE 9 MG/ML
1000 INJECTION INTRAMUSCULAR; INTRAVENOUS; SUBCUTANEOUS ONCE
Qty: 0 | Refills: 0 | Status: COMPLETED | OUTPATIENT
Start: 2019-04-08 | End: 2019-04-08

## 2019-04-08 RX ORDER — SENNA PLUS 8.6 MG/1
1 TABLET ORAL DAILY
Qty: 0 | Refills: 0 | Status: DISCONTINUED | OUTPATIENT
Start: 2019-04-08 | End: 2019-04-08

## 2019-04-08 RX ORDER — ATORVASTATIN CALCIUM 80 MG/1
80 TABLET, FILM COATED ORAL AT BEDTIME
Qty: 0 | Refills: 0 | Status: DISCONTINUED | OUTPATIENT
Start: 2019-04-08 | End: 2019-04-11

## 2019-04-08 RX ORDER — HEPARIN SODIUM 5000 [USP'U]/ML
INJECTION INTRAVENOUS; SUBCUTANEOUS
Qty: 25000 | Refills: 0 | Status: DISCONTINUED | OUTPATIENT
Start: 2019-04-08 | End: 2019-04-08

## 2019-04-08 RX ORDER — HEPARIN SODIUM 5000 [USP'U]/ML
3200 INJECTION INTRAVENOUS; SUBCUTANEOUS ONCE
Qty: 0 | Refills: 0 | Status: DISCONTINUED | OUTPATIENT
Start: 2019-04-08 | End: 2019-04-09

## 2019-04-08 RX ORDER — SENNA PLUS 8.6 MG/1
3 TABLET ORAL DAILY
Qty: 0 | Refills: 0 | Status: DISCONTINUED | OUTPATIENT
Start: 2019-04-08 | End: 2019-04-11

## 2019-04-08 RX ORDER — CLOPIDOGREL BISULFATE 75 MG/1
75 TABLET, FILM COATED ORAL DAILY
Qty: 0 | Refills: 0 | Status: DISCONTINUED | OUTPATIENT
Start: 2019-04-08 | End: 2019-04-11

## 2019-04-08 RX ADMIN — ATORVASTATIN CALCIUM 80 MILLIGRAM(S): 80 TABLET, FILM COATED ORAL at 22:21

## 2019-04-08 RX ADMIN — SODIUM CHLORIDE 1000 MILLILITER(S): 9 INJECTION INTRAMUSCULAR; INTRAVENOUS; SUBCUTANEOUS at 15:45

## 2019-04-08 RX ADMIN — Medication 100 MILLIGRAM(S): at 22:21

## 2019-04-08 RX ADMIN — SENNA PLUS 3 TABLET(S): 8.6 TABLET ORAL at 22:21

## 2019-04-08 RX ADMIN — HEPARIN SODIUM 650 UNIT(S)/HR: 5000 INJECTION INTRAVENOUS; SUBCUTANEOUS at 22:28

## 2019-04-08 NOTE — CONSULT NOTE ADULT - SUBJECTIVE AND OBJECTIVE BOX
CHIEF COMPLAINT: Lightheaded    HISTORY OF PRESENT ILLNESS: 87 yo female w h/o CAD s/p 2 stent in 2018, afib, and TIA p/w intermittent episodes of lightheadedness for the past 2 months. Pt endorses that intermittently since February she has been having episodes of feeling dizzy with feeling almost like she would pass out. She denies any recent illnesses, fevers, sick contacts, nausea or vomiting. She states that these episodes happen suddenly and occur despite laying down. She endorses that over the past year she feels like she is having a harder time walking. She denies any chest pain or diaphoresis.    In the ED the patient was found to be bradycardic down to the 30s with a systolic BP in the 70s. She had a transvenous pacemaker placed.      Allergies    No Known Allergies    Intolerances    	    MEDICATIONS:  aspirin  chewable 81 milliGRAM(s) Oral daily  clopidogrel Tablet 75 milliGRAM(s) Oral daily  heparin  Infusion.  Unit(s)/Hr IV Continuous <Continuous>  heparin  Injectable 3200 Unit(s) IV Push once  heparin  Injectable 3200 Unit(s) IV Push every 6 hours PRN  heparin  Injectable 3200 Unit(s) IV Push every 6 hours PRN          docusate sodium 100 milliGRAM(s) Oral daily  senna 3 Tablet(s) Oral daily    atorvastatin 80 milliGRAM(s) Oral at bedtime        PAST MEDICAL & SURGICAL HISTORY:  Scoliosis  S/P cholecystectomy      FAMILY HISTORY:  No pertinent family history in first degree relatives      SOCIAL HISTORY:    Non-smoker      REVIEW OF SYSTEMS:  General: Lightheaded  Skin: no rashes.  Ophthalmologic: no blurred vision, no loss of vision. 	  ENT: no sore throat, rhinorrhea, sinus congestion.  Respiratory: no SOB, cough or wheeze.  Gastrointestinal:  no N/V/D, no melena/hematemesis/hematochezia.  Genitourinary: no dysuria/hesitancy or hematuria.  Musculoskeletal: no myalgias or arthralgias.  Neurological: no changes in vision or hearing, no lightheadedness/dizziness, no syncope/near syncope	  Psychiatric: no unusual stress/anxiety.   Hematology/Lymphatics: no unusual bleeding, bruising and no lymphadenopathy.  Endocrine: no unusual thirst.   All others negative except as stated above and in HPI.    PHYSICAL EXAM:  T(C): 36.5 (04-08-19 @ 21:00), Max: 36.8 (04-08-19 @ 19:15)  HR: 62 (04-08-19 @ 23:00) (34 - 105)  BP: 119/57 (04-08-19 @ 23:00) (72/36 - 164/68)  RR: 25 (04-08-19 @ 23:00) (16 - 25)  SpO2: 99% (04-08-19 @ 23:00) (96% - 99%)  Wt(kg): --  I&O's Summary    08 Apr 2019 07:01  -  08 Apr 2019 23:16  --------------------------------------------------------  IN: 66.5 mL / OUT: 350 mL / NET: -283.5 mL        Appearance: No distress	  HEENT:   Normal oral mucosa, PERRL, EOMI	  Lymphatic: No lymphadenopathy  Cardiovascular: Normal S1 S2, No JVD, 4/6 systolic murmur, No edema  Respiratory: Lungs clear to auscultation	  Psychiatry: A & O x 3, Mood & affect appropriate  Gastrointestinal:  Soft, Non-tender, + BS	  Skin: No rashes, No ecchymoses, No cyanosis	  Neurologic: Non-focal  Extremities: Normal range of motion, No clubbing, cyanosis or edema  Vascular: Peripheral pulses palpable 2+ bilaterally        LABS:	 	    CBC Full  -  ( 08 Apr 2019 15:53 )  WBC Count : 5.6 K/uL  Hemoglobin : 12.3 g/dL  Hematocrit : 36.5 %  Platelet Count - Automated : 152 K/uL  Mean Cell Volume : 93.8 fl  Mean Cell Hemoglobin : 31.6 pg  Mean Cell Hemoglobin Concentration : 33.7 gm/dL  Auto Neutrophil # : 4.1 K/uL  Auto Lymphocyte # : 1.0 K/uL  Auto Monocyte # : 0.5 K/uL  Auto Eosinophil # : 0.0 K/uL  Auto Basophil # : 0.0 K/uL  Auto Neutrophil % : 72.9 %  Auto Lymphocyte % : 17.4 %  Auto Monocyte % : 8.3 %  Auto Eosinophil % : 0.8 %  Auto Basophil % : 0.5 %    04-08    135  |  95<L>  |  33<H>  ----------------------------<  123<H>  5.0   |  25  |  1.83<H>    Ca    10.6<H>      08 Apr 2019 15:53    TPro  6.6  /  Alb  4.3  /  TBili  0.5  /  DBili  x   /  AST  78<H>  /  ALT  67<H>  /  AlkPhos  81  04-08      proBNP:   Lipid Profile:   HgA1c:   TSH:       CARDIAC MARKERS:            TELEMETRY: 	    ECG:  	A-fib HR 34  RADIOLOGY:  OTHER: 	    PREVIOUS DIAGNOSTIC TESTING:    [ ] Echocardiogram:   [ ]  Catheterization:  [ ] Stress Test:

## 2019-04-08 NOTE — ED PROCEDURE NOTE - CPROC ED INFUS LINE DETAIL1
The location was identified, and the area was draped and prepped./The guidewire was recovered./The catheter was placed using sterile technique./All lumen(s) aspirated and flushed without difficulty./Ultrasound guidance was used during placement.

## 2019-04-08 NOTE — ED PROVIDER NOTE - OBJECTIVE STATEMENT
87yo F hx CAD s/p 1 stent 1 year ago, afib, TIA, here with 1 month hx of lightheaded spells. In waiting room, found to have multiple syncopal episodes lasting for several minutes. Brought in to be evaluated and found to be bradycardic in 30s and hypotensive to 70s systolic but still mentating appropriate complaining of lightheadedness.    Cards: Dr. Humphrey

## 2019-04-08 NOTE — ED PROVIDER NOTE - NS ED ROS FT
CONSTITUTIONAL: No fevers, no chills  Eyes: no visual changes  Ears: no ear drainage, no ear pain  Nose: no nasal congestion  Mouth/Throat: no sore throat  CV: No chest pain, no palpitations  PULM: No SOB, no cough  GI: No n/v/d, no abd pain  : no dysuria, no hematuria  SKIN: no rashes.  NEURO: no headache, no focal weakness or numbness  PSYCHIATRIC: no known mental health issues.

## 2019-04-08 NOTE — ED PROVIDER NOTE - CRITICAL CARE PROVIDED
interpretation of diagnostic studies/consultation with other physicians/documentation/additional history taking/direct patient care (not related to procedure)/conducted a detailed discussion of DNR status/consult w/ pt's family directly relating to pts condition

## 2019-04-08 NOTE — ED PROCEDURE NOTE - PROCEDURE ADDITIONAL DETAILS
Pt pulse generator changed to demand mode ventricular  output set at 10 and rate 75 Good capture and sensing --Rice

## 2019-04-08 NOTE — ED PROVIDER NOTE - CARE PLAN
Principal Discharge DX:	Bradycardia Principal Discharge DX:	Symptomatic bradycardia  Secondary Diagnosis:	Syncope, unspecified syncope type

## 2019-04-08 NOTE — ED PROVIDER NOTE - PMH
Asthma    CAD (coronary artery disease)  coronary stents x 2, 2007  HLD (hyperlipidemia)    HTN (hypertension)    PAF (paroxysmal atrial fibrillation)    Scoliosis

## 2019-04-08 NOTE — PATIENT PROFILE ADULT - ABILITY TO HEAR (WITH HEARING AID OR HEARING APPLIANCE IF NORMALLY USED):
wears hearing aides (not with patient)/Mildly to Moderately Impaired: difficulty hearing in some environments or speaker may need to increase volume or speak distinctly

## 2019-04-08 NOTE — ED PROVIDER NOTE - PSH
H/O tubal ligation    History of bilateral knee replacement    History of cataract surgery  bilateral  History of cholecystectomy    S/P cholecystectomy

## 2019-04-08 NOTE — ED ADULT NURSE NOTE - OBJECTIVE STATEMENT
as per pt family, pt "has been feeling dizzy more often lately and has the feeling that she may fall. We would check her heart rate and it would be low but it was getting any better and the dizziness was getting worse. she also has been blacking out. In the waiting room she blacked out several times for a few seconds and would wake up again." pt Aox3 speaking in full complete sentences. Pt denies any chest pain, SOB, abd. pain, n/v/d, numbness/tingling at present. pt placed on cardiac monitor and HR 36 noted at present, with BP of 107/57.

## 2019-04-08 NOTE — H&P ADULT - ASSESSMENT
87yo F with a PMHx of CAD with a total of 4 stents in the past (2 in the past year and 2 in the past decade) p/w symptomatic bradycardia s/p transvenous pacer.    1) Neuro  - AAO*3 currently and with no acute issues.    2) Cardiac  - Symptomatic hypotension 2/2 bradycardia now s/p transvenous pacer.  - Will continue with aspirin, plavix, and statin currently given stents that were placed less than a year ago (10/1/18).  - Will hold metoprolol and amiodarone given sx. bradycardia.  - Will plan for eventual permanent pacemaker.  - Troponin negative and downtrending suggesting no ischemic event.  - Metoprolol decreased from 100mg BID to 50mg BID. Low suspicion for overdose.    3) GI  - Will continue a DASH/TLC diet.  - No other issues currently.    4) Renal  - No previous BUN/Creatinine to compare current. Unsure if CKD or NELY.  - Will continue to monitor at this time.    5) Heme  - No current abnormalities or issues.    6) ID  - Afebrile and without signs of infection.    7) DVT prophylaxis  - heparin ggt

## 2019-04-08 NOTE — H&P ADULT - NSHPPHYSICALEXAM_GEN_ALL_CORE
PHYSICAL EXAM:  GENERAL: NAD, well-developed  HEAD:  Atraumatic, Normocephalic  EYES: EOMI, PERRLA, conjunctiva and sclera clear  NECK: Supple, No JVD  CHEST/LUNG: Clear to auscultation bilaterally; No wheeze  HEART: 2/6 holosystolic murmur heard in all heart fields.  ABDOMEN: Soft, Nontender, Nondistended; Bowel sounds present  EXTREMITIES:  2+ Peripheral Pulses, No clubbing, cyanosis, or edema  PSYCH: AAOx3  NEUROLOGY: non-focal  SKIN: No rashes or lesions

## 2019-04-08 NOTE — ED PROVIDER NOTE - ATTENDING CONTRIBUTION TO CARE
I have seen and evaluated this patient with the resident.   I agree with the findings  unless other wise stated.  I have made appropriate changes in documentations where needed, After my face to face bedside evaluation, I am further  noting: Pt with recurrent episodes of syncopes last few days No fall no sz no active chest pain between attacks pt alert and coherent Heart irregular ranging from 30 to 50 lungs No renal failure Not on digoxin clear lungs abdomen soft non tender Pt has severe bradtcardia with syncopes and hypotension needs to raise heart rate likely tachy deneen syndrome will treat with external cardiac pacing while preparing for trans venous pacer - done to CCU for cardiac monitor and possible permanent pacer --coon

## 2019-04-08 NOTE — ED ADULT NURSE NOTE - CHPI ED NUR SYMPTOMS NEG
no back pain/no shortness of breath/no chills/no diaphoresis/no congestion/no nausea/no vomiting/no chest pain

## 2019-04-08 NOTE — H&P ADULT - HISTORY OF PRESENT ILLNESS
Pt is an 89yo F with a PMHx of CAD s/p 1 stent in 2018, afib, and TIA p/w intermittent episodes of lightheadedness for the past 1 month.    In the ED the patient was found to be bradycardic down to the 30s with a systolic BP in the 70s. She had a transvenous pacemaker placed along with a central line. Pt is an 89yo F with a PMHx of CAD s/p 2 stent in 2018, afib, and TIA p/w intermittent episodes of lightheadedness for the past 2 months. Pt endorses that intermittently since February she has been having episodes of feeling dizzy with feeling almost like she would pass out. She denies any recent illnesses, fevers, sick contacts, nausea or vomiting. She states that these episodes happen suddenly and occur despite laying down. She denies any history of walking in the woods. She endorses that over the past year she feels like she is having a harder time walking. She denies any chest pain or diaphoresis.    In the ED the patient was found to be bradycardic down to the 30s with a systolic BP in the 70s. She had a transvenous pacemaker placed along with a central line.

## 2019-04-08 NOTE — CONSULT NOTE ADULT - ASSESSMENT
89 yo female w h/o CAD s/p 2 stent in 2018, afib (on Metoprolol 50 daily, Amiodarone 200 daily), and TIA p/w intermittent episodes of lightheadedness for the past 2 months. In the ED the patient was found to be bradycardic down to the 30s with a systolic BP in the 70s. She had a transvenous pacemaker placed.    - A-fib with slow ventricular response on EKG  - Hemodynamically stable with TVP (rate 60, threshold 0.6)  - Hold home Metoprolol and Amiodarone  - Check TTE  - Obtain collateral from pt's cardiologist  - NPO for possible PPM tomorrow. Send coags, type and screen in AM.            Ar Arnold MD  Cardiology Fellow  [id number]

## 2019-04-08 NOTE — H&P ADULT - NSHPLABSRESULTS_GEN_ALL_CORE
(04-08 @ 15:53)                      12.3  5.6 )-----------( 152                 36.5    Neutrophils = 4.1 (72.9%)  Lymphocytes = 1.0 (17.4%)  Eosinophils = 0.0 (0.8%)  Basophils = 0.0 (0.5%)  Monocytes = 0.5 (8.3%)  Bands = --%    04-08    135  |  95<L>  |  33<H>  ----------------------------<  123<H>  5.0   |  25  |  1.83<H>    Ca    10.6<H>      08 Apr 2019 15:53    TPro  6.6  /  Alb  4.3  /  TBili  0.5  /  DBili  x   /  AST  78<H>  /  ALT  67<H>  /  AlkPhos  81  04-08    ( 08 Apr 2019 15:53 )   PT: 14.5 sec;   INR: 1.25 ratio;       PTT:36.9 sec

## 2019-04-08 NOTE — ED PROVIDER NOTE - PHYSICAL EXAMINATION
Gen: NAD  Head: NCAT  HEENT: PERRL, MMM, normal conjunctiva, anicteric, neck supple  Lung: CTAB, no adventitious sounds  CV: regular and bradycardic no murmurs  Abd: soft, NTND, no rebound or guarding, no CVAT  MSK: No edema, no visible deformities  Neuro: Moving all extremities grossly  Skin: Warm and dry, no evidence of rash  Psych: normal mood and affect Gen: NAD  Head: NCAT  HEENT: PERRL, MMM, normal conjunctiva, anicteric, neck supple  Lung: CTAB, no adventitious sounds  CV: regular and bradycardic no murmurs  Abd: soft, NTND, no rebound or guarding, no CVAT  MSK: No edema, no visible deformities  Neuro: Moving all extremities grossly  Skin: Warm and dry, no evidence of rash  Psych: normal mood and affect      Dr shaggy knowles

## 2019-04-08 NOTE — ED ADULT NURSE NOTE - NSIMPLEMENTINTERV_GEN_ALL_ED
Implemented All Fall with Harm Risk Interventions:  [place] to call system. Call bell, personal items and telephone within reach. Instruct patient to call for assistance. Room bathroom lighting operational. Non-slip footwear when patient is off stretcher. Physically safe environment: no spills, clutter or unnecessary equipment. Stretcher in lowest position, wheels locked, appropriate side rails in place. Provide visual cue, wrist band, yellow gown, etc. Monitor gait and stability. Monitor for mental status changes and reorient to person, place, and time. Review medications for side effects contributing to fall risk. Reinforce activity limits and safety measures with patient and family. Provide visual clues: red socks.

## 2019-04-08 NOTE — ED ADULT NURSE REASSESSMENT NOTE - NS ED NURSE REASSESS COMMENT FT1
19:10. Report received from JOSEFINA Aleman. Pt AAOx4, NAD, resp nonlabored, skin warm/dry, resting comfortably in bed with family at bedside. Paced rhythm on CM. VSS. Pt awaiting CCU bed. Safety maintained.

## 2019-04-09 DIAGNOSIS — I48.91 UNSPECIFIED ATRIAL FIBRILLATION: ICD-10-CM

## 2019-04-09 LAB
ALBUMIN SERPL ELPH-MCNC: 3.6 G/DL — SIGNIFICANT CHANGE UP (ref 3.3–5)
ALP SERPL-CCNC: 64 U/L — SIGNIFICANT CHANGE UP (ref 40–120)
ALT FLD-CCNC: 47 U/L — HIGH (ref 10–45)
ANION GAP SERPL CALC-SCNC: 14 MMOL/L — SIGNIFICANT CHANGE UP (ref 5–17)
APTT BLD: 101.6 SEC — HIGH (ref 27.5–36.3)
AST SERPL-CCNC: 32 U/L — SIGNIFICANT CHANGE UP (ref 10–40)
BASOPHILS # BLD AUTO: 0 K/UL — SIGNIFICANT CHANGE UP (ref 0–0.2)
BASOPHILS NFR BLD AUTO: 0.5 % — SIGNIFICANT CHANGE UP (ref 0–2)
BILIRUB SERPL-MCNC: 0.9 MG/DL — SIGNIFICANT CHANGE UP (ref 0.2–1.2)
BLD GP AB SCN SERPL QL: NEGATIVE — SIGNIFICANT CHANGE UP
BUN SERPL-MCNC: 25 MG/DL — HIGH (ref 7–23)
CALCIUM SERPL-MCNC: 9.8 MG/DL — SIGNIFICANT CHANGE UP (ref 8.4–10.5)
CHLORIDE SERPL-SCNC: 98 MMOL/L — SIGNIFICANT CHANGE UP (ref 96–108)
CO2 SERPL-SCNC: 25 MMOL/L — SIGNIFICANT CHANGE UP (ref 22–31)
CREAT SERPL-MCNC: 1.07 MG/DL — SIGNIFICANT CHANGE UP (ref 0.5–1.3)
EOSINOPHIL # BLD AUTO: 0.1 K/UL — SIGNIFICANT CHANGE UP (ref 0–0.5)
EOSINOPHIL NFR BLD AUTO: 2.9 % — SIGNIFICANT CHANGE UP (ref 0–6)
GLUCOSE SERPL-MCNC: 98 MG/DL — SIGNIFICANT CHANGE UP (ref 70–99)
HBA1C BLD-MCNC: 6.1 % — HIGH (ref 4–5.6)
HCT VFR BLD CALC: 34.2 % — LOW (ref 34.5–45)
HGB BLD-MCNC: 11.5 G/DL — SIGNIFICANT CHANGE UP (ref 11.5–15.5)
LYMPHOCYTES # BLD AUTO: 1.4 K/UL — SIGNIFICANT CHANGE UP (ref 1–3.3)
LYMPHOCYTES # BLD AUTO: 27.8 % — SIGNIFICANT CHANGE UP (ref 13–44)
MAGNESIUM SERPL-MCNC: 1.8 MG/DL — SIGNIFICANT CHANGE UP (ref 1.6–2.6)
MCHC RBC-ENTMCNC: 31.4 PG — SIGNIFICANT CHANGE UP (ref 27–34)
MCHC RBC-ENTMCNC: 33.7 GM/DL — SIGNIFICANT CHANGE UP (ref 32–36)
MCV RBC AUTO: 93.3 FL — SIGNIFICANT CHANGE UP (ref 80–100)
MONOCYTES # BLD AUTO: 0.5 K/UL — SIGNIFICANT CHANGE UP (ref 0–0.9)
MONOCYTES NFR BLD AUTO: 9.5 % — SIGNIFICANT CHANGE UP (ref 2–14)
NEUTROPHILS # BLD AUTO: 2.9 K/UL — SIGNIFICANT CHANGE UP (ref 1.8–7.4)
NEUTROPHILS NFR BLD AUTO: 59.3 % — SIGNIFICANT CHANGE UP (ref 43–77)
PHOSPHATE SERPL-MCNC: 2.5 MG/DL — SIGNIFICANT CHANGE UP (ref 2.5–4.5)
PLATELET # BLD AUTO: 123 K/UL — LOW (ref 150–400)
POTASSIUM SERPL-MCNC: 3.3 MMOL/L — LOW (ref 3.5–5.3)
POTASSIUM SERPL-SCNC: 3.3 MMOL/L — LOW (ref 3.5–5.3)
PROT SERPL-MCNC: 5.8 G/DL — LOW (ref 6–8.3)
RBC # BLD: 3.66 M/UL — LOW (ref 3.8–5.2)
RBC # FLD: 13.3 % — SIGNIFICANT CHANGE UP (ref 10.3–14.5)
RH IG SCN BLD-IMP: POSITIVE — SIGNIFICANT CHANGE UP
SODIUM SERPL-SCNC: 137 MMOL/L — SIGNIFICANT CHANGE UP (ref 135–145)
TSH SERPL-MCNC: 6.6 UIU/ML — HIGH (ref 0.27–4.2)
WBC # BLD: 5 K/UL — SIGNIFICANT CHANGE UP (ref 3.8–10.5)
WBC # FLD AUTO: 5 K/UL — SIGNIFICANT CHANGE UP (ref 3.8–10.5)

## 2019-04-09 PROCEDURE — 99291 CRITICAL CARE FIRST HOUR: CPT

## 2019-04-09 PROCEDURE — 93010 ELECTROCARDIOGRAM REPORT: CPT

## 2019-04-09 PROCEDURE — 99223 1ST HOSP IP/OBS HIGH 75: CPT | Mod: AI

## 2019-04-09 PROCEDURE — 33208 INSRT HEART PM ATRIAL & VENT: CPT | Mod: KX

## 2019-04-09 PROCEDURE — 71045 X-RAY EXAM CHEST 1 VIEW: CPT | Mod: 26

## 2019-04-09 PROCEDURE — 93306 TTE W/DOPPLER COMPLETE: CPT | Mod: 26

## 2019-04-09 PROCEDURE — 99152 MOD SED SAME PHYS/QHP 5/>YRS: CPT

## 2019-04-09 RX ORDER — HEPARIN SODIUM 5000 [USP'U]/ML
INJECTION INTRAVENOUS; SUBCUTANEOUS
Qty: 25000 | Refills: 0 | Status: DISCONTINUED | OUTPATIENT
Start: 2019-04-09 | End: 2019-04-10

## 2019-04-09 RX ORDER — CEFAZOLIN SODIUM 1 G
1000 VIAL (EA) INJECTION EVERY 8 HOURS
Qty: 0 | Refills: 0 | Status: COMPLETED | OUTPATIENT
Start: 2019-04-10 | End: 2019-04-10

## 2019-04-09 RX ORDER — HEPARIN SODIUM 5000 [USP'U]/ML
4000 INJECTION INTRAVENOUS; SUBCUTANEOUS EVERY 6 HOURS
Qty: 0 | Refills: 0 | Status: DISCONTINUED | OUTPATIENT
Start: 2019-04-09 | End: 2019-04-10

## 2019-04-09 RX ORDER — MAGNESIUM SULFATE 500 MG/ML
2 VIAL (ML) INJECTION ONCE
Qty: 0 | Refills: 0 | Status: COMPLETED | OUTPATIENT
Start: 2019-04-09 | End: 2019-04-09

## 2019-04-09 RX ORDER — HEPARIN SODIUM 5000 [USP'U]/ML
2000 INJECTION INTRAVENOUS; SUBCUTANEOUS EVERY 6 HOURS
Qty: 0 | Refills: 0 | Status: DISCONTINUED | OUTPATIENT
Start: 2019-04-09 | End: 2019-04-10

## 2019-04-09 RX ORDER — LOSARTAN POTASSIUM 100 MG/1
50 TABLET, FILM COATED ORAL DAILY
Qty: 0 | Refills: 0 | Status: DISCONTINUED | OUTPATIENT
Start: 2019-04-09 | End: 2019-04-11

## 2019-04-09 RX ORDER — CHLORHEXIDINE GLUCONATE 213 G/1000ML
1 SOLUTION TOPICAL
Qty: 0 | Refills: 0 | Status: DISCONTINUED | OUTPATIENT
Start: 2019-04-09 | End: 2019-04-11

## 2019-04-09 RX ORDER — POTASSIUM CHLORIDE 20 MEQ
40 PACKET (EA) ORAL EVERY 4 HOURS
Qty: 0 | Refills: 0 | Status: COMPLETED | OUTPATIENT
Start: 2019-04-09 | End: 2019-04-09

## 2019-04-09 RX ADMIN — Medication 100 MILLIGRAM(S): at 23:19

## 2019-04-09 RX ADMIN — Medication 40 MILLIEQUIVALENT(S): at 06:54

## 2019-04-09 RX ADMIN — CLOPIDOGREL BISULFATE 75 MILLIGRAM(S): 75 TABLET, FILM COATED ORAL at 13:03

## 2019-04-09 RX ADMIN — HEPARIN SODIUM 1000 UNIT(S)/HR: 5000 INJECTION INTRAVENOUS; SUBCUTANEOUS at 21:53

## 2019-04-09 RX ADMIN — Medication 50 GRAM(S): at 06:54

## 2019-04-09 RX ADMIN — Medication 81 MILLIGRAM(S): at 13:03

## 2019-04-09 RX ADMIN — HEPARIN SODIUM 500 UNIT(S)/HR: 5000 INJECTION INTRAVENOUS; SUBCUTANEOUS at 06:55

## 2019-04-09 RX ADMIN — HEPARIN SODIUM 0 UNIT(S)/HR: 5000 INJECTION INTRAVENOUS; SUBCUTANEOUS at 05:52

## 2019-04-09 RX ADMIN — ATORVASTATIN CALCIUM 80 MILLIGRAM(S): 80 TABLET, FILM COATED ORAL at 21:05

## 2019-04-09 RX ADMIN — CHLORHEXIDINE GLUCONATE 1 APPLICATION(S): 213 SOLUTION TOPICAL at 21:05

## 2019-04-09 RX ADMIN — LOSARTAN POTASSIUM 50 MILLIGRAM(S): 100 TABLET, FILM COATED ORAL at 19:49

## 2019-04-09 RX ADMIN — Medication 100 MILLIGRAM(S): at 13:03

## 2019-04-09 RX ADMIN — Medication 40 MILLIEQUIVALENT(S): at 13:02

## 2019-04-09 NOTE — PROGRESS NOTE ADULT - SUBJECTIVE AND OBJECTIVE BOX
HPI: V paced on telemetry over night via temporary wire     MEDICATIONS  (STANDING):  aspirin  chewable 81 milliGRAM(s) Oral daily  atorvastatin 80 milliGRAM(s) Oral at bedtime  clopidogrel Tablet 75 milliGRAM(s) Oral daily  docusate sodium 100 milliGRAM(s) Oral daily  senna 3 Tablet(s) Oral daily    Allergies No Known Allergies    REVIEW OF SYSTEM:    Constitutional: denies fever, chills, + fatigue  Neuro: denies headache, numbness, + weakness, dizziness   Resp: denies cough, wheezing, shortness of breath  CVS: denies chest pain, palpitations, leg swelling  GI: denies abdominal pain, nausea, vomiting, diarrhea   : denies dysuria, frequency, incontinence  Skin: denies itching, burning, rashes, or lesions   Msk: denies joint pain or swelling     Vital Signs Last 24 Hrs  T(C): 36.5 (08 Apr 2019 21:00), Max: 36.8 (08 Apr 2019 19:15)  T(F): 97.7 (08 Apr 2019 21:00), Max: 98.2 (08 Apr 2019 19:15)  HR: 61 (09 Apr 2019 13:00) (34 - 78)  BP: 122/71 (09 Apr 2019 13:00) (72/36 - 164/68)  BP(mean): 91 (09 Apr 2019 13:00) (73 - 91)  RR: 25 (09 Apr 2019 13:00) (15 - 33)  SpO2: 97% (09 Apr 2019 13:00) (96% - 100%)    Physical Exam:  General : well developed, well nourished,  and no acute distress  Neuro : Alert and oriented x 3, no focal deficits  HEENT : Sclera clear, no JVD, no Lymphadenopathy no carotid bruits, neck supple  Lungs: Clear to Ascultation, no wheezing , rales or rhonchi   Cardiovascular : + 1 +2, RRR, + YIMI 3/6   GI : abdomen soft, NT, ND, + BS   : no suprapubic tenderness  Extremities : No edema, , feet warm   Skin : warm dry     TELE: afib  V paced 60bpm   EKG:    LABS:                        11.5   5.0   )-----------( 123      ( 09 Apr 2019 05:32 )             34.2     04-09    137  |  98  |  25<H>  ----------------------------<  98  3.3<L>   |  25  |  1.07    Ca    9.8      09 Apr 2019 05:32  Phos  2.5     04-09  Mg     1.8     04-09    TPro  5.8<L>  /  Alb  3.6  /  TBili  0.9  /  DBili  x   /  AST  32  /  ALT  47<H>  /  AlkPhos  64  04-09  PT/INR - ( 08 Apr 2019 15:53 )   PT: 14.5 sec;   INR: 1.25 ratio    PTT - ( 09 Apr 2019 05:32 )  PTT:101.6 sec  RADIOLOGY & ADDITIONAL TESTS:  < from: TTE with Doppler (w/Cont) (04.09.19 @ 06:48) >  -----------------------------------------------------------------------  Dimensions:    Normal Values:  LA:     3.7    2.0 - 4.0 cm  Ao:     2.4    2.0 - 3.8 cm  SEPTUM: 0.9    0.6 - 1.2 cm  PWT:    1.2    0.6 - 1.1 cm  LVIDd:  3.83.0 - 5.6 cm  LVIDs:  2.1    1.8 - 4.0 cm  Derived variables:  LVMI: 82 g/m2  RWT: 0.63  EF (Visual Estimate): 75 %  Doppler Peak Velocity (m/sec): AoV=0.8    < end of copied text >  < from: TTE with Doppler (w/Cont) (04.09.19 @ 06:48) >  ------------------------------------------------------------------------  Observations:  Mitral Valve: Mitral annular calcification, otherwise  normal mitral valve.  Aortic Valve/Aorta: Aortic valve not well visualized;  appears calcified. Mild aortic regurgitation.  Normal aortic root size. (Ao: 2.4 cm at the sinuses of  Valsalva).  Left Atrium: Severely dilated left atrium.  LA volume index  = 53 cc/m2.  Left Ventricle: Endocardial visualization enhanced with  intravenous injection of Ultrasonic Enhancing Agent  (Definity). Hyperdynamic left ventricular systolic  function. Normal left ventricular internal dimensions and  wall thicknesses. Normal diastolic function  Right Heart: Normal right atrium. The right ventricle is  not well visualized. Normal tricuspid valve. Mild tricuspid  regurgitation. Normal pulmonic valve. Minimal pulmonic  regurgitation.  Pericardium/Pleura: Normal pericardium with no pericardial  effusion.  Hemodynamic: No evidence of pulmonary hypertension.  ------------------------------------------------------------------------  Conclusions:  Endocardial visualization enhanced with intravenous  injection of Ultrasonic Enhancing Agent (Definity).  Hyperdynamic left ventricular systolic function.  *** No previous Echo exam.

## 2019-04-09 NOTE — PROGRESS NOTE ADULT - ASSESSMENT
88 year old female with pmhx Asthma HTN, HLD, TIA, severe aortic stenosis, CAD s/p stents LAD and Diag, 2009 s/p 2 stent LAD in 9/2018, per Paul Humphrey MD cardiologist [phone] symptomatic Pafib was on metoprolol and Multag was seen by EP Dr Moreira in December 2018 and Multag was discontinued and Amiodarone was started. Eliquis for AC. p/w intermittent episodes of lightheadedness for the past 2 months. In the ED the patient was found to be bradycardic down to the 30s with a systolic BP in the 70s. S/p TVP placement. TTE EF 75%

## 2019-04-09 NOTE — PROGRESS NOTE ADULT - ASSESSMENT
87yo F with a PMHx of CAD with a total of 4 stents in the past (2 in the past year and 2 in the past decade) p/w symptomatic bradycardia s/p transvenous pacer.    1) Neuro  - AAO*3 currently and with no acute issues.    2) Cardiac  - Symptomatic hypotension 2/2 bradycardia now s/p transvenous pacer.  - Will continue with aspirin, plavix, and statin currently given stents that were placed less than a year ago (10/1/18).  - Will hold metoprolol and amiodarone given sx. bradycardia.  - Will plan for eventual permanent pacemaker.  - Troponin negative and downtrending suggesting no ischemic event.  - Metoprolol decreased from 100mg BID to 50mg BID. Low suspicion for overdose.    3) GI  - Will continue a DASH/TLC diet.  - No other issues currently.    4) Renal  - No previous BUN/Creatinine to compare current. Unsure if CKD or NELY.  - Will continue to monitor at this time.    5) Heme  - No current abnormalities or issues.    6) ID  - Afebrile and without signs of infection.    7) DVT prophylaxis  - heparin ggt 87yo F with a PMHx of CAD with a total of 4 stents in the past (2 in the past year and 2 in the past decade) p/w symptomatic bradycardia s/p transvenous pacer.    1) Neuro  - AAO*3 currently and with no acute issues.    2) Cardiac  - Symptomatic hypotension 2/2 bradycardia now s/p transvenous pacer.  - Will continue with aspirin, plavix, and statin currently given stents that were placed less than a year ago (10/1/18).  - Will hold metoprolol and amiodarone given sx. bradycardia.  - Will plan for eventual permanent pacemaker.  - Troponin negative and downtrending suggesting no ischemic event.  - Metoprolol decreased from 100mg BID to 50mg BID. Low suspicion for overdose.  - heparin gtt off @ 1000 for PPM    3) GI  - Will continue a DASH/TLC diet.  - No other issues currently.    4) Renal  - No previous BUN/Creatinine to compare current. Unsure if CKD or NELY.  - Will continue to monitor at this time.    5) Heme  - No current abnormalities or issues.    6) ID  - Afebrile and without signs of infection.    7) DVT prophylaxis  - heparin gtt off @ 1000 for PPM

## 2019-04-09 NOTE — PROGRESS NOTE ADULT - ATTENDING COMMENTS
Patient is seen and examined with fellow, NP and the CCU house-staff. I agree with the history, physical and the assessment and plan.  afib with slow ventricular response  NELY improving post the TVP  BB and amiodarone held  f/u with TTE  awaiting possible PPM

## 2019-04-09 NOTE — PROGRESS NOTE ADULT - SUBJECTIVE AND OBJECTIVE BOX
Admission date:  CHIEF COMPLAINT:  HPI:  Pt is an 89yo F with a PMHx of CAD s/p 2 stent in 2018, afib, and TIA p/w intermittent episodes of lightheadedness for the past 2 months. Pt endorses that intermittently since February she has been having episodes of feeling dizzy with feeling almost like she would pass out. She denies any recent illnesses, fevers, sick contacts, nausea or vomiting. She states that these episodes happen suddenly and occur despite laying down. She denies any history of walking in the woods. She endorses that over the past year she feels like she is having a harder time walking. She denies any chest pain or diaphoresis.    In the ED the patient was found to be bradycardic down to the 30s with a systolic BP in the 70s. She had a transvenous pacemaker placed along with a central line. (2019 19:00)    INTERVAL HISTORY:    REVIEW OF SYSTEMS:    CONSTITUTIONAL: No weakness, fevers or chills  EYES/ENT: No visual changes;  No vertigo or throat pain   NECK: No pain or stiffness  RESPIRATORY: No cough, wheezing, hemoptysis; No shortness of breath  CARDIOVASCULAR: No chest pain or palpitations  GASTROINTESTINAL: No abdominal or epigastric pain. No nausea, vomiting, or hematemesis; No diarrhea or constipation. No melena or hematochezia.  GENITOURINARY: No dysuria, frequency or hematuria  NEUROLOGICAL: No numbness or weakness  SKIN: No itching, rashes      MEDICATIONS  (STANDING):  aspirin  chewable 81 milliGRAM(s) Oral daily  atorvastatin 80 milliGRAM(s) Oral at bedtime  clopidogrel Tablet 75 milliGRAM(s) Oral daily  docusate sodium 100 milliGRAM(s) Oral daily  heparin  Infusion.  Unit(s)/Hr (6.5 mL/Hr) IV Continuous <Continuous>  heparin  Injectable 3200 Unit(s) IV Push once  potassium chloride    Tablet ER 40 milliEquivalent(s) Oral every 4 hours  senna 3 Tablet(s) Oral daily    MEDICATIONS  (PRN):  heparin  Injectable 3200 Unit(s) IV Push every 6 hours PRN For aPTT less than 40  heparin  Injectable 3200 Unit(s) IV Push every 6 hours PRN For aPTT less than 40      Objective:  ICU Vital Signs Last 24 Hrs  T(C): 36.5 (2019 21:00), Max: 36.8 (2019 19:15)  T(F): 97.7 (2019 21:00), Max: 98.2 (2019 19:15)  HR: 61 (2019 06:00) (34 - 105)  BP: 104/59 (2019 06:00) (72/36 - 164/68)  BP(mean): 76 (2019 06:00) (73 - 91)  ABP: --  ABP(mean): --  RR: 33 (2019 06:00) (15 - 33)  SpO2: 97% (2019 06:00) (96% - 100%)           @ 07:01  -   @ 07:00  --------------------------------------------------------  IN: 105.5 mL / OUT: 950 mL / NET: -844.5 mL      Daily Height in cm: 157.48 (2019 20:46)    Daily Weight in k.7 (2019 20:46)    PHYSICAL EXAM:    General: WN/WD NAD  Neurology: Awake, nonfocal, CABRERA x 4  Eyes: Scleras clear, PERRLA/ EOMI, Gross vision intact  ENT:Gross hearing intact, grossly patent pharynx, no stridor  Neck: Neck supple, trachea midline, No JVD,   Respiratory: CTA B/L, No wheezing, rales, rhonchi  CV: RRR, S1S2, no murmurs, rubs or gallops  Abdominal: Soft, NT, ND +BS,   Extremities: No edema, + peripheral pulses  Skin: No Rashes, Hematoma, Ecchymosis  Lymphatic: No Neck, axilla, groin LAD  Psych: Oriented x 3, normal affect  Incisions:   Tubes:      TELEMETRY:     EKG:     IMAGING:    Labs:                          11.5   5.0   )-----------( 123      ( 2019 05:32 )             34.2     04-09    137  |  98  |  25<H>  ----------------------------<  98  3.3<L>   |  25  |  1.07    Ca    9.8      2019 05:32  Phos  2.5     -  Mg     1.8     -    TPro  5.8<L>  /  Alb  3.6  /  TBili  0.9  /  DBili  x   /  AST  32  /  ALT  47<H>  /  AlkPhos  64  04-09    LIVER FUNCTIONS - ( 2019 05:32 )  Alb: 3.6 g/dL / Pro: 5.8 g/dL / ALK PHOS: 64 U/L / ALT: 47 U/L / AST: 32 U/L / GGT: x           PT/INR - ( 2019 15:53 )   PT: 14.5 sec;   INR: 1.25 ratio         PTT - ( 2019 05:32 )  PTT:101.6 sec        HEALTH ISSUES - PROBLEM Dx: Admission date:  CHIEF COMPLAINT:  HPI:  Pt is an 87yo F with a PMHx of CAD s/p 2 stent in 2018, afib, and TIA p/w intermittent episodes of lightheadedness for the past 2 months. Pt endorses that intermittently since February she has been having episodes of feeling dizzy with feeling almost like she would pass out. She denies any recent illnesses, fevers, sick contacts, nausea or vomiting. She states that these episodes happen suddenly and occur despite laying down. She denies any history of walking in the woods. She endorses that over the past year she feels like she is having a harder time walking. She denies any chest pain or diaphoresis.    In the ED the patient was found to be bradycardic down to the 30s with a systolic BP in the 70s. She had a transvenous pacemaker placed along with a central line. (2019 19:00)    INTERVAL HISTORY: Patient seen and examined, denies CP, dyspnea, orthopnea, PND, palpitations and able to lay flat. No acute events overnight.    REVIEW OF SYSTEMS:    CONSTITUTIONAL: No weakness, fevers or chills  EYES/ENT: No visual changes;  No vertigo or throat pain   NECK: No pain or stiffness  RESPIRATORY: No cough, wheezing, hemoptysis; No shortness of breath  CARDIOVASCULAR: No chest pain or palpitations  GASTROINTESTINAL: No abdominal or epigastric pain. No nausea, vomiting, or hematemesis; No diarrhea or constipation. No melena or hematochezia.  GENITOURINARY: No dysuria, frequency or hematuria  NEUROLOGICAL: No numbness or weakness  SKIN: No itching, rashes      MEDICATIONS  (STANDING):  aspirin  chewable 81 milliGRAM(s) Oral daily  atorvastatin 80 milliGRAM(s) Oral at bedtime  clopidogrel Tablet 75 milliGRAM(s) Oral daily  docusate sodium 100 milliGRAM(s) Oral daily  heparin  Infusion.  Unit(s)/Hr (6.5 mL/Hr) IV Continuous <Continuous>  heparin  Injectable 3200 Unit(s) IV Push once  potassium chloride    Tablet ER 40 milliEquivalent(s) Oral every 4 hours  senna 3 Tablet(s) Oral daily    MEDICATIONS  (PRN):  heparin  Injectable 3200 Unit(s) IV Push every 6 hours PRN For aPTT less than 40  heparin  Injectable 3200 Unit(s) IV Push every 6 hours PRN For aPTT less than 40      Objective:  ICU Vital Signs Last 24 Hrs  T(C): 36.5 (2019 21:00), Max: 36.8 (2019 19:15)  T(F): 97.7 (2019 21:00), Max: 98.2 (2019 19:15)  HR: 61 (2019 06:00) (34 - 105)  BP: 104/59 (2019 06:00) (72/36 - 164/68)  BP(mean): 76 (2019 06:00) (73 - 91)  ABP: --  ABP(mean): --  RR: 33 (2019 06:00) (15 - 33)  SpO2: 97% (2019 06:00) (96% - 100%)       @ 07:01  -   @ 07:00  --------------------------------------------------------  IN: 105.5 mL / OUT: 950 mL / NET: -844.5 mL      Daily Height in cm: 157.48 (2019 20:46)    Daily Weight in k.7 (2019 20:46)    PHYSICAL EXAM:    General: WN/WD NAD  Neurology: Awake, nonfocal, CABRERA x 4  Eyes: Scleras clear, PERRLA/ EOMI, Gross vision intact  ENT:Gross hearing intact, grossly patent pharynx, no stridor  Neck: Neck supple, trachea midline, No JVD,   Respiratory: CTA B/L, No wheezing, rales, rhonchi  CV: RRR, S1S2, no murmurs, rubs or gallops  Abdominal: Soft, NT, ND +BS,   Extremities: No edema, + peripheral pulses  Skin: No Rashes, Hematoma, Ecchymosis  Lymphatic: No Neck, axilla, groin LAD  Psych: Oriented x 3, normal affect  Line: right IJ Cordis TVP        TELEMETRY: SB / Paced    EKG:   < from: 12 Lead ECG (19 @ 21:16) >    Ventricular Rate 75 BPM    Atrial Rate 75 BPM    QRS Duration 75 ms    Q-T Interval 371 ms    QTC Calculation(Bezet) 414 ms    R Axis -2 degrees    T Axis 40 degrees    Diagnosis Line DEMAND PACEMAKER; INTERPRETATION IS BASED ON INTRINSIC RHYTHM  UNDETERMINED RHYTHM  OTHERWISE NORMAL ECG    < end of copied text >    IMAGING:    Labs:                          11.5   5.0   )-----------( 123      ( 2019 05:32 )             34.2         137  |  98  |  25<H>  ----------------------------<  98  3.3<L>   |  25  |  1.07    Ca    9.8      2019 05:32  Phos  2.5       Mg     1.8         TPro  5.8<L>  /  Alb  3.6  /  TBili  0.9  /  DBili  x   /  AST  32  /  ALT  47<H>  /  AlkPhos  64  -09    LIVER FUNCTIONS - ( 2019 05:32 )  Alb: 3.6 g/dL / Pro: 5.8 g/dL / ALK PHOS: 64 U/L / ALT: 47 U/L / AST: 32 U/L / GGT: x           PT/INR - ( 2019 15:53 )   PT: 14.5 sec;   INR: 1.25 ratio         PTT - ( 2019 05:32 )  PTT:101.6 sec        HEALTH ISSUES - PROBLEM Dx:

## 2019-04-09 NOTE — PROGRESS NOTE ADULT - PROBLEM SELECTOR PLAN 1
telemetry with afib V paced at 60, TVP (rate 60, threshold 0.6)  Keep K+>4,MG++>2  hold Amiodarone and BB , AVN blockers  NPO PPM today  discontinue heparin at 10am  type and screen sent this am  [id number]

## 2019-04-09 NOTE — CHART NOTE - NSCHARTNOTEFT_GEN_A_CORE
====================  CCU MIDNIGHT ROUNDS  ====================    ESME PARIKH  [id number]    ====================  SUMMARY: HPI:  Pt is an 87yo F with a PMHx of CAD s/p 2 stent in 2018, afib, and TIA p/w intermittent episodes of lightheadedness for the past 2 months. Pt endorses that intermittently since February she has been having episodes of feeling dizzy with feeling almost like she would pass out. She denies any recent illnesses, fevers, sick contacts, nausea or vomiting. She states that these episodes happen suddenly and occur despite laying down. She denies any history of walking in the woods. She endorses that over the past year she feels like she is having a harder time walking. She denies any chest pain or diaphoresis.    In the ED the patient was found to be bradycardic down to the 30s with a systolic BP in the 70s. She had a transvenous pacemaker placed along with a central line. (08 Apr 2019 19:00)    ====================        ====================  NEW EVENTS:  ====================  s/p PPM today, pacing at 60 bpm.      ====================  VITALS (Last 12 hrs):  ====================    T(C): 36.3 (04-09-19 @ 19:00), Max: 36.3 (04-09-19 @ 19:00)  HR: 60 (04-09-19 @ 20:00) (59 - 63)  BP: 129/62 (04-09-19 @ 20:00) (112/61 - 165/74)  BP(mean): 89 (04-09-19 @ 20:00) (80 - 106)  RR: 30 (04-09-19 @ 20:00) (16 - 34)  SpO2: 97% (04-09-19 @ 20:00) (96% - 99%)      TELEMETRY: Paced rhythm at 60 bpm      I&O's Summary    08 Apr 2019 07:01  -  09 Apr 2019 07:00  --------------------------------------------------------  IN: 105.5 mL / OUT: 950 mL / NET: -844.5 mL    09 Apr 2019 07:01  -  09 Apr 2019 21:25  --------------------------------------------------------  IN: 140 mL / OUT: 400 mL / NET: -260 mL        ====================  PLAN:  ====================    #AF w/ SVR  - s/p PPM, now SR paced at 60 bpm  - resumed heparin gtt, will restart Eliquis 4/11 AM as per EP note  - PA/Lat CXR in AM  - Cont ASA, plavix, lipitor, losartan    Alphonso Aguiar, CCU PA-C  #[phone]

## 2019-04-10 LAB
ALBUMIN SERPL ELPH-MCNC: 3.7 G/DL — SIGNIFICANT CHANGE UP (ref 3.3–5)
ALBUMIN SERPL ELPH-MCNC: 3.9 G/DL — SIGNIFICANT CHANGE UP (ref 3.3–5)
ALP SERPL-CCNC: 69 U/L — SIGNIFICANT CHANGE UP (ref 40–120)
ALP SERPL-CCNC: 76 U/L — SIGNIFICANT CHANGE UP (ref 40–120)
ALT FLD-CCNC: 24 U/L — SIGNIFICANT CHANGE UP (ref 10–45)
ALT FLD-CCNC: 36 U/L — SIGNIFICANT CHANGE UP (ref 10–45)
ANION GAP SERPL CALC-SCNC: 10 MMOL/L — SIGNIFICANT CHANGE UP (ref 5–17)
ANION GAP SERPL CALC-SCNC: 11 MMOL/L — SIGNIFICANT CHANGE UP (ref 5–17)
APTT BLD: 197.9 SEC — CRITICAL HIGH (ref 27.5–36.3)
APTT BLD: 30.3 SEC — SIGNIFICANT CHANGE UP (ref 27.5–36.3)
AST SERPL-CCNC: 23 U/L — SIGNIFICANT CHANGE UP (ref 10–40)
AST SERPL-CCNC: 31 U/L — SIGNIFICANT CHANGE UP (ref 10–40)
BASOPHILS # BLD AUTO: 0 K/UL — SIGNIFICANT CHANGE UP (ref 0–0.2)
BASOPHILS # BLD AUTO: 0.1 K/UL — SIGNIFICANT CHANGE UP (ref 0–0.2)
BASOPHILS NFR BLD AUTO: 0 % — SIGNIFICANT CHANGE UP (ref 0–2)
BASOPHILS NFR BLD AUTO: 0.7 % — SIGNIFICANT CHANGE UP (ref 0–2)
BILIRUB SERPL-MCNC: 0.4 MG/DL — SIGNIFICANT CHANGE UP (ref 0.2–1.2)
BILIRUB SERPL-MCNC: 0.7 MG/DL — SIGNIFICANT CHANGE UP (ref 0.2–1.2)
BUN SERPL-MCNC: 29 MG/DL — HIGH (ref 7–23)
BUN SERPL-MCNC: 30 MG/DL — HIGH (ref 7–23)
CALCIUM SERPL-MCNC: 9.5 MG/DL — SIGNIFICANT CHANGE UP (ref 8.4–10.5)
CALCIUM SERPL-MCNC: 9.5 MG/DL — SIGNIFICANT CHANGE UP (ref 8.4–10.5)
CHLORIDE SERPL-SCNC: 100 MMOL/L — SIGNIFICANT CHANGE UP (ref 96–108)
CHLORIDE SERPL-SCNC: 100 MMOL/L — SIGNIFICANT CHANGE UP (ref 96–108)
CO2 SERPL-SCNC: 25 MMOL/L — SIGNIFICANT CHANGE UP (ref 22–31)
CO2 SERPL-SCNC: 25 MMOL/L — SIGNIFICANT CHANGE UP (ref 22–31)
CREAT SERPL-MCNC: 1.18 MG/DL — SIGNIFICANT CHANGE UP (ref 0.5–1.3)
CREAT SERPL-MCNC: 1.2 MG/DL — SIGNIFICANT CHANGE UP (ref 0.5–1.3)
EOSINOPHIL # BLD AUTO: 0 K/UL — SIGNIFICANT CHANGE UP (ref 0–0.5)
EOSINOPHIL # BLD AUTO: 0.1 K/UL — SIGNIFICANT CHANGE UP (ref 0–0.5)
EOSINOPHIL NFR BLD AUTO: 0.5 % — SIGNIFICANT CHANGE UP (ref 0–6)
EOSINOPHIL NFR BLD AUTO: 1.1 % — SIGNIFICANT CHANGE UP (ref 0–6)
GLUCOSE SERPL-MCNC: 119 MG/DL — HIGH (ref 70–99)
GLUCOSE SERPL-MCNC: 142 MG/DL — HIGH (ref 70–99)
HCT VFR BLD CALC: 34.5 % — SIGNIFICANT CHANGE UP (ref 34.5–45)
HCT VFR BLD CALC: 37 % — SIGNIFICANT CHANGE UP (ref 34.5–45)
HGB BLD-MCNC: 11.9 G/DL — SIGNIFICANT CHANGE UP (ref 11.5–15.5)
HGB BLD-MCNC: 12.3 G/DL — SIGNIFICANT CHANGE UP (ref 11.5–15.5)
LYMPHOCYTES # BLD AUTO: 0.9 K/UL — LOW (ref 1–3.3)
LYMPHOCYTES # BLD AUTO: 1 K/UL — SIGNIFICANT CHANGE UP (ref 1–3.3)
LYMPHOCYTES # BLD AUTO: 12.7 % — LOW (ref 13–44)
LYMPHOCYTES # BLD AUTO: 13.1 % — SIGNIFICANT CHANGE UP (ref 13–44)
MAGNESIUM SERPL-MCNC: 2 MG/DL — SIGNIFICANT CHANGE UP (ref 1.6–2.6)
MAGNESIUM SERPL-MCNC: 2 MG/DL — SIGNIFICANT CHANGE UP (ref 1.6–2.6)
MCHC RBC-ENTMCNC: 31.1 PG — SIGNIFICANT CHANGE UP (ref 27–34)
MCHC RBC-ENTMCNC: 32.3 PG — SIGNIFICANT CHANGE UP (ref 27–34)
MCHC RBC-ENTMCNC: 33.2 GM/DL — SIGNIFICANT CHANGE UP (ref 32–36)
MCHC RBC-ENTMCNC: 34.4 GM/DL — SIGNIFICANT CHANGE UP (ref 32–36)
MCV RBC AUTO: 93.6 FL — SIGNIFICANT CHANGE UP (ref 80–100)
MCV RBC AUTO: 93.8 FL — SIGNIFICANT CHANGE UP (ref 80–100)
MONOCYTES # BLD AUTO: 0.6 K/UL — SIGNIFICANT CHANGE UP (ref 0–0.9)
MONOCYTES # BLD AUTO: 0.7 K/UL — SIGNIFICANT CHANGE UP (ref 0–0.9)
MONOCYTES NFR BLD AUTO: 8.8 % — SIGNIFICANT CHANGE UP (ref 2–14)
MONOCYTES NFR BLD AUTO: 8.9 % — SIGNIFICANT CHANGE UP (ref 2–14)
NEUTROPHILS # BLD AUTO: 5.3 K/UL — SIGNIFICANT CHANGE UP (ref 1.8–7.4)
NEUTROPHILS # BLD AUTO: 5.8 K/UL — SIGNIFICANT CHANGE UP (ref 1.8–7.4)
NEUTROPHILS NFR BLD AUTO: 76.3 % — SIGNIFICANT CHANGE UP (ref 43–77)
NEUTROPHILS NFR BLD AUTO: 78 % — HIGH (ref 43–77)
PHOSPHATE SERPL-MCNC: 2.1 MG/DL — LOW (ref 2.5–4.5)
PHOSPHATE SERPL-MCNC: 2.6 MG/DL — SIGNIFICANT CHANGE UP (ref 2.5–4.5)
PLATELET # BLD AUTO: 119 K/UL — LOW (ref 150–400)
PLATELET # BLD AUTO: 135 K/UL — LOW (ref 150–400)
POTASSIUM SERPL-MCNC: 4.4 MMOL/L — SIGNIFICANT CHANGE UP (ref 3.5–5.3)
POTASSIUM SERPL-MCNC: 4.4 MMOL/L — SIGNIFICANT CHANGE UP (ref 3.5–5.3)
POTASSIUM SERPL-SCNC: 4.4 MMOL/L — SIGNIFICANT CHANGE UP (ref 3.5–5.3)
POTASSIUM SERPL-SCNC: 4.4 MMOL/L — SIGNIFICANT CHANGE UP (ref 3.5–5.3)
PROT SERPL-MCNC: 6 G/DL — SIGNIFICANT CHANGE UP (ref 6–8.3)
PROT SERPL-MCNC: 6 G/DL — SIGNIFICANT CHANGE UP (ref 6–8.3)
RBC # BLD: 3.68 M/UL — LOW (ref 3.8–5.2)
RBC # BLD: 3.95 M/UL — SIGNIFICANT CHANGE UP (ref 3.8–5.2)
RBC # FLD: 13.2 % — SIGNIFICANT CHANGE UP (ref 10.3–14.5)
RBC # FLD: 13.3 % — SIGNIFICANT CHANGE UP (ref 10.3–14.5)
SODIUM SERPL-SCNC: 135 MMOL/L — SIGNIFICANT CHANGE UP (ref 135–145)
SODIUM SERPL-SCNC: 136 MMOL/L — SIGNIFICANT CHANGE UP (ref 135–145)
WBC # BLD: 6.8 K/UL — SIGNIFICANT CHANGE UP (ref 3.8–10.5)
WBC # BLD: 7.6 K/UL — SIGNIFICANT CHANGE UP (ref 3.8–10.5)
WBC # FLD AUTO: 6.8 K/UL — SIGNIFICANT CHANGE UP (ref 3.8–10.5)
WBC # FLD AUTO: 7.6 K/UL — SIGNIFICANT CHANGE UP (ref 3.8–10.5)

## 2019-04-10 PROCEDURE — 71046 X-RAY EXAM CHEST 2 VIEWS: CPT | Mod: 26

## 2019-04-10 PROCEDURE — 99233 SBSQ HOSP IP/OBS HIGH 50: CPT | Mod: GC

## 2019-04-10 PROCEDURE — 93010 ELECTROCARDIOGRAM REPORT: CPT

## 2019-04-10 RX ORDER — CEFAZOLIN SODIUM 1 G
1000 VIAL (EA) INJECTION EVERY 8 HOURS
Qty: 0 | Refills: 0 | Status: COMPLETED | OUTPATIENT
Start: 2019-04-10 | End: 2019-04-10

## 2019-04-10 RX ORDER — ONDANSETRON 8 MG/1
4 TABLET, FILM COATED ORAL ONCE
Qty: 0 | Refills: 0 | Status: COMPLETED | OUTPATIENT
Start: 2019-04-10 | End: 2019-04-10

## 2019-04-10 RX ORDER — ACETAMINOPHEN 500 MG
1000 TABLET ORAL ONCE
Qty: 0 | Refills: 0 | Status: DISCONTINUED | OUTPATIENT
Start: 2019-04-10 | End: 2019-04-11

## 2019-04-10 RX ORDER — ACETAMINOPHEN 500 MG
1000 TABLET ORAL ONCE
Qty: 0 | Refills: 0 | Status: COMPLETED | OUTPATIENT
Start: 2019-04-10 | End: 2019-04-10

## 2019-04-10 RX ADMIN — Medication 400 MILLIGRAM(S): at 07:59

## 2019-04-10 RX ADMIN — CHLORHEXIDINE GLUCONATE 1 APPLICATION(S): 213 SOLUTION TOPICAL at 23:29

## 2019-04-10 RX ADMIN — Medication 100 MILLIGRAM(S): at 07:31

## 2019-04-10 RX ADMIN — Medication 100 MILLIGRAM(S): at 11:33

## 2019-04-10 RX ADMIN — Medication 100 MILLIGRAM(S): at 14:15

## 2019-04-10 RX ADMIN — HEPARIN SODIUM 0 UNIT(S)/HR: 5000 INJECTION INTRAVENOUS; SUBCUTANEOUS at 03:43

## 2019-04-10 RX ADMIN — HEPARIN SODIUM 800 UNIT(S)/HR: 5000 INJECTION INTRAVENOUS; SUBCUTANEOUS at 04:46

## 2019-04-10 RX ADMIN — SENNA PLUS 3 TABLET(S): 8.6 TABLET ORAL at 11:34

## 2019-04-10 RX ADMIN — ONDANSETRON 4 MILLIGRAM(S): 8 TABLET, FILM COATED ORAL at 14:16

## 2019-04-10 RX ADMIN — CLOPIDOGREL BISULFATE 75 MILLIGRAM(S): 75 TABLET, FILM COATED ORAL at 11:34

## 2019-04-10 RX ADMIN — Medication 81 MILLIGRAM(S): at 11:34

## 2019-04-10 RX ADMIN — Medication 1000 MILLIGRAM(S): at 09:01

## 2019-04-10 RX ADMIN — ATORVASTATIN CALCIUM 80 MILLIGRAM(S): 80 TABLET, FILM COATED ORAL at 21:19

## 2019-04-10 RX ADMIN — LOSARTAN POTASSIUM 50 MILLIGRAM(S): 100 TABLET, FILM COATED ORAL at 05:16

## 2019-04-10 NOTE — DIETITIAN INITIAL EVALUATION ADULT. - OTHER INFO
Pt seen for Length of Stay on [facility] [place] [facility]. Pt reports  pounds. Endorses recent weight loss of about 5 pounds, which is consistent with current weight 120.3 pounds (4/8). Pt denies chewing/swallowing issues, nausea/vomiting, or diarrhea/constipation. Reports last BM today. NKFA. Denies micronutrient supplementation. Pt currently on DASH/TLC diet. Reports consuming ~50% of breakfast tray this morning. Pt agreeable to review components of DASH/TLC diet.

## 2019-04-10 NOTE — PROGRESS NOTE ADULT - ASSESSMENT
88 year old female with pmhx Asthma HTN, HLD, TIA, severe aortic stenosis, CAD s/p stents LAD and Diag, 2009 s/p 2 stent LAD in 9/2018, per Paul Humphrey MD cardiologist [phone] symptomatic Pafib was on metoprolol and Multag was seen by EP Dr Moreira in December 2018 and Multag was discontinued and Amiodarone was started. Eliquis for AC. p/w intermittent episodes of lightheadedness for the past 2 months. In the ED the patient was found to be bradycardic down to the 30s with a systolic BP in the 70s. S/p TVP placement. TTE EF 75%

## 2019-04-10 NOTE — PROGRESS NOTE ADULT - SUBJECTIVE AND OBJECTIVE BOX
Admission date:  CHIEF COMPLAINT:  HPI:  Pt is an 89yo F with a PMHx of CAD s/p 2 stent in 2018, afib, and TIA p/w intermittent episodes of lightheadedness for the past 2 months. Pt endorses that intermittently since February she has been having episodes of feeling dizzy with feeling almost like she would pass out. She denies any recent illnesses, fevers, sick contacts, nausea or vomiting. She states that these episodes happen suddenly and occur despite laying down. She denies any history of walking in the woods. She endorses that over the past year she feels like she is having a harder time walking. She denies any chest pain or diaphoresis.    In the ED the patient was found to be bradycardic down to the 30s with a systolic BP in the 70s. She had a transvenous pacemaker placed along with a central line. (08 Apr 2019 19:00)    INTERVAL HISTORY: Patient seen and examined, denies CP, dyspnea, orthopnea, PND, palpitations and able to lay flat. No acute events overnight. c/o pain at intervention site, analgesics offered.  REVIEW OF SYSTEMS:    CONSTITUTIONAL: No weakness, fevers or chills  EYES/ENT: No visual changes;  No vertigo or throat pain   NECK: No pain or stiffness  RESPIRATORY: No cough, wheezing, hemoptysis; No shortness of breath  CARDIOVASCULAR: No chest pain or palpitations  GASTROINTESTINAL: No abdominal or epigastric pain. No nausea, vomiting, or hematemesis; No diarrhea or constipation. No melena or hematochezia.  GENITOURINARY: No dysuria, frequency or hematuria  NEUROLOGICAL: No numbness or weakness  SKIN: No itching, rashes; left ACW incision c/d/i      MEDICATIONS  (STANDING):  aspirin  chewable 81 milliGRAM(s) Oral daily  atorvastatin 80 milliGRAM(s) Oral at bedtime  ceFAZolin   IVPB 1000 milliGRAM(s) IV Intermittent every 8 hours  chlorhexidine 4% Liquid 1 Application(s) Topical <User Schedule>  clopidogrel Tablet 75 milliGRAM(s) Oral daily  docusate sodium 100 milliGRAM(s) Oral daily  losartan 50 milliGRAM(s) Oral daily  senna 3 Tablet(s) Oral daily    MEDICATIONS  (PRN):      Objective:  ICU Vital Signs Last 24 Hrs  T(C): 36.8 (10 Apr 2019 05:00), Max: 36.8 (10 Apr 2019 05:00)  T(F): 98.3 (10 Apr 2019 05:00), Max: 98.3 (10 Apr 2019 05:00)  HR: 63 (10 Apr 2019 06:00) (59 - 66)  BP: 126/57 (10 Apr 2019 06:00) (100/51 - 165/74)  BP(mean): 82 (10 Apr 2019 06:00) (70 - 106)  ABP: --  ABP(mean): --  RR: 23 (10 Apr 2019 06:00) (16 - 39)  SpO2: 95% (10 Apr 2019 06:00) (95% - 99%)          04-09 @ 07:01  -  04-10 @ 07:00  --------------------------------------------------------  IN: 266 mL / OUT: 900 mL / NET: -634 mL    Daily       PHYSICAL EXAM:  General: WN/WD NAD  Neurology: Awake, nonfocal, CABRERA x 4  Eyes: Scleras clear, PERRLA/ EOMI, Gross vision intact  ENT:Gross hearing intact, grossly patent pharynx, no stridor  Neck: Neck supple, trachea midline, No JVD,   Respiratory: CTA B/L, No wheezing, rales, rhonchi  CV: RRR, S1S2, no murmurs, rubs or gallops  Abdominal: Soft, NT, ND +BS,   Extremities: No edema, + peripheral pulses  Skin: No Rashes, Hematoma, Ecchymosis  Lymphatic: No Neck, axilla, groin LAD  Psych: Oriented x 3, normal affect  Incisions: left ACW incision c/d/i        TELEMETRY:     EKG:   < from: 12 Lead ECG (04.08.19 @ 21:16) >  Ventricular Rate 75 BPM    Atrial Rate 75 BPM    QRS Duration 75 ms    Q-T Interval 371 ms    QTC Calculation(Bezet) 414 ms    R Axis -2 degrees    T Axis 40 degrees    Diagnosis Line DEMAND PACEMAKER; INTERPRETATION IS BASED ON INTRINSIC RHYTHM  UNDETERMINED RHYTHM  OTHERWISE NORMAL ECG    < end of copied text >    IMAGING:  < from: TTE with Doppler (w/Cont) (04.09.19 @ 06:48) >  Conclusions: EF 75%  Endocardial visualization enhanced with intravenous  injection of Ultrasonic Enhancing Agent (Definity).  Hyperdynamic left ventricular systolic function.  *** No previous Echo exam.    < end of copied text >    Labs:                          12.3   7.6   )-----------( 135      ( 10 Apr 2019 03:15 )             37.0     04-10    136  |  100  |  30<H>  ----------------------------<  142<H>  4.4   |  25  |  1.20    Ca    9.5      10 Apr 2019 03:15  Phos  2.1     04-10  Mg     2.0     04-10    TPro  6.0  /  Alb  3.7  /  TBili  0.4  /  DBili  x   /  AST  31  /  ALT  36  /  AlkPhos  76  04-10    LIVER FUNCTIONS - ( 10 Apr 2019 03:15 )  Alb: 3.7 g/dL / Pro: 6.0 g/dL / ALK PHOS: 76 U/L / ALT: 36 U/L / AST: 31 U/L / GGT: x           PT/INR - ( 08 Apr 2019 15:53 )   PT: 14.5 sec;   INR: 1.25 ratio         PTT - ( 10 Apr 2019 03:16 )  PTT:197.9 sec        HEALTH ISSUES - PROBLEM Dx:  Atrial fibrillation with slow ventricular response: Atrial fibrillation with slow ventricular response

## 2019-04-10 NOTE — DIETITIAN INITIAL EVALUATION ADULT. - NS AS NUTRI INTERV MEALS SNACK
Recommend continue current diet order of DASH/TLC. RD to add Health Shake twice daily to increase and optimize po intake. Encourage po intake and provide feeding assistance as needed. Obtain/honor preferences within diet restrictions. Monitor tolerance to diet prescription, nutritional intake, weight trends, labs and skin integrity. RD to remain available and follow up. Recommend liberalize diet to Low Sodium in setting of decreased appetite and intake. RD to add Health Shake twice daily to increase and optimize po intake. Encourage po intake and provide feeding assistance as needed. Obtain/honor preferences within diet restrictions. Monitor tolerance to diet prescription, nutritional intake, weight trends, labs and skin integrity. RD to remain available and follow up.

## 2019-04-10 NOTE — DIETITIAN INITIAL EVALUATION ADULT. - ENERGY NEEDS
Ht: 62 inches, Wt: 120.3 pounds (4/8), BMI: 22.0 kg/m2, IBW: 110 pounds (+/-10%), %IBW: 109%  Skin per nursing documentation: no pressure ulcers, left chest wall incision s/p PPM 4/9  Edema per nursing documentation: none noted  Other pertinent information: Pt is a 88 year old female with MPH of CAD s/p multiple stents (most recently  in 2018), afib, TIA with intermittent episodes of lightheadedness. Presents with symptomatic bradycardia. S/p transvenous pacer. On 4/9 PPM placed.

## 2019-04-10 NOTE — CHART NOTE - NSCHARTNOTEFT_GEN_A_CORE
MAR BRIEF ACCEPT NOTE    Patient was signed out to me and is already off CCU on medicine floors with no transfer note. Will review case and attempting to reach CCU2 team for clarification.    Abhishek Eason, PGY-3  -[phone] MAR BRIEF ACCEPT NOTE    Patient was signed out to me and is already off CCU on medicine floors with no transfer note. Will review case and attempting to reach CCU2 team for clarification.    In short, patient is a 88 year old female with pmhx Asthma HTN, HLD, TIA, severe aortic stenosis, CAD s/p stents LAD and Diag, 2009 s/p 2 stent LAD in 9/2018, symptomatic Pafib was on metoprolol and Multag was seen by EP Dr Moreira in December 2018 and Multag was discontinued and Amiodarone was started, on Eliquis for AC. She presented with intermittent episodes of lightheadedness for the past 2 months. She was bradycardic in the ED to the 30s with a systolic BP in the 70s. She is now s/p TVP placement then s/p STJ Abbott dual chamber PPM implant 4/9/19, doing well. Developed hematoma over PPM site with pressure dressing applied. Holding Eliquis until cleared to resume by EP.    [ ] f/u EP regarding resuming a/c. Tentative plan to resume heparin gtt 4/11 but will need EP clearance.   [ ] monitor hematoma site with pressure dressing applied.   [ ] vitals to q4h      Abhishek Eason, PGY-3  -[phone]

## 2019-04-10 NOTE — PROGRESS NOTE ADULT - SUBJECTIVE AND OBJECTIVE BOX
HPI: mild pocket hematoma     MEDICATIONS  (STANDING):  aspirin  chewable 81 milliGRAM(s) Oral daily  atorvastatin 80 milliGRAM(s) Oral at bedtime  ceFAZolin   IVPB 1000 milliGRAM(s) IV Intermittent every 8 hours  chlorhexidine 4% Liquid 1 Application(s) Topical <User Schedule>  clopidogrel Tablet 75 milliGRAM(s) Oral daily  docusate sodium 100 milliGRAM(s) Oral daily  losartan 50 milliGRAM(s) Oral daily  senna 3 Tablet(s) Oral daily    Allergies No Known Allergies    REVIEW OF SYSTEM:    Constitutional: denies fever, chills, + fatigue  Neuro: denies headache, numbness, + weakness, +dizziness with position change  Resp: denies cough, wheezing, shortness of breath  CVS: denies chest pain, palpitations, leg swelling  GI: denies abdominal pain, nausea, vomiting, diarrhea   : denies dysuria, frequency, incontinence  Skin: denies itching, burning, rashes, or lesions   Msk: implant pocket tenderness     Vital Signs Last 24 Hrs  T(C): 36.4 (10 Apr 2019 07:00), Max: 36.8 (10 Apr 2019 05:00)  T(F): 97.6 (10 Apr 2019 07:00), Max: 98.3 (10 Apr 2019 05:00)  HR: 60 (10 Apr 2019 12:00) (59 - 71)  BP: 118/56 (10 Apr 2019 12:00) (100/51 - 165/74)  BP(mean): 80 (10 Apr 2019 12:00) (70 - 106)  RR: 17 (10 Apr 2019 12:00) (17 - 39)  SpO2: 96% (10 Apr 2019 12:00) (95% - 99%)    Physical Exam:  General : well developed, well nourished,  and no acute distress  Neuro : Alert and oriented x 3, no focal deficits  HEENT : Sclera clear,  EOMI neck supple no JVD,  Lungs: Clear to Ascultation, no wheezing , rales or rhonchi   Cardiovascular : + 1 +2, RRR, no murmurs, no rubs  GI : abdomen soft, NT, ND, + BS   : no suprapubic tenderness  Extremities : No edema, + 2 DP and +2 PT, feet warm   Skin : Left infraclavicular implant site intact, small soft hematoma present  no active  bleeding,  no ecchymosis,     TELE:  EKG:    LABS:                        12.3   7.6   )-----------( 135      ( 10 Apr 2019 03:15 )             37.0     04-10    136  |  100  |  30<H>  ----------------------------<  142<H>  4.4   |  25  |  1.20    Ca    9.5      10 Apr 2019 03:15  Phos  2.1     04-10  Mg     2.0     04-10    TPro  6.0  /  Alb  3.7  /  TBili  0.4  /  DBili  x   /  AST  31  /  ALT  36  /  AlkPhos  76  04-10  PT/INR - ( 08 Apr 2019 15:53 )   PT: 14.5 sec;   INR: 1.25 ratio    PTT - ( 10 Apr 2019 03:16 )  PTT:197.9 sec    RADIOLOGY & ADDITIONAL TESTS:  Dimensions:    Normal Values:  LA:     3.7    2.0 - 4.0 cm  Ao:     2.4    2.0 - 3.8 cm  SEPTUM: 0.9    0.6 - 1.2 cm  PWT:    1.2    0.6 - 1.1 cm  LVIDd:  3.83.0 - 5.6 cm  LVIDs:  2.1    1.8 - 4.0 cm  Derived variables:  LVMI: 82 g/m2  RWT: 0.63  EF (Visual Estimate): 75 %  Doppler Peak Velocity (m/sec): AoV=0.8    < end of copied text >  < from: TTE with Doppler (w/Cont) (04.09.19 @ 06:48) >  ------------------------------------------------------------------------  Observations:  Mitral Valve: Mitral annular calcification, otherwise  normal mitral valve.  Aortic Valve/Aorta: Aortic valve not well visualized;  appears calcified. Mild aortic regurgitation.  Normal aortic root size. (Ao: 2.4 cm at the sinuses of  Valsalva).  Left Atrium: Severely dilated left atrium.  LA volume index  = 53 cc/m2.  Left Ventricle: Endocardial visualization enhanced with  intravenous injection of Ultrasonic Enhancing Agent  (Definity). Hyperdynamic left ventricular systolic  function. Normal left ventricular internal dimensions and  wall thicknesses. Normal diastolic function  Right Heart: Normal right atrium. The right ventricle is  not well visualized. Normal tricuspid valve. Mild tricuspid  regurgitation. Normal pulmonic valve. Minimal pulmonic  regurgitation.  Pericardium/Pleura: Normal pericardium with no pericardial  effusion.  Hemodynamic: No evidence of pulmonary hypertension.  ------------------------------------------------------------------------  Conclusions:  Endocardial visualization enhanced with intravenous  injection of Ultrasonic Enhancing Agent (Definity).  Hyperdynamic left ventricular systolic function.  *** No previous Echo exam.

## 2019-04-10 NOTE — PROGRESS NOTE ADULT - ATTENDING COMMENTS
Patient is seen and examined with fellow, NP and the CCU house-staff. I agree with the history, physical and the assessment and plan.  s/p PPM  awaiting CXR PA/lateral

## 2019-04-10 NOTE — DIETITIAN INITIAL EVALUATION ADULT. - ORAL INTAKE PTA
fair/Pt reports fair appetite and intake PTA. States recently decreased intake secondary to decreased appetite x1 month. Typical foods include chicken, homemade soups, vegetables, noodles and rice.

## 2019-04-10 NOTE — PROGRESS NOTE ADULT - ASSESSMENT
89yo F with a PMHx of CAD with a total of 4 stents in the past (2 in the past year and 2 in the past decade) p/w symptomatic bradycardia s/p transvenous pacer.    1) Neuro  - AAO*3 currently and with no acute issues.    2) Cardiac  - Symptomatic hypotension 2/2 bradycardia now s/p ppm 4/9  - Will continue with aspirin, plavix, and statin currently given stents that were placed less than a year ago (10/1/18).  - Troponin negative and downtrending suggesting no ischemic event.  - Metoprolol decreased from 100mg BID to 50mg BID. Low suspicion for overdose.  - heparin gtt once PTT is therapeutic and restart NOAC 4/11    3) GI  - Will continue a DASH/TLC diet.  - No other issues currently.    4) Renal  - No previous BUN/Creatinine to compare current. Unsure if CKD or NELY.  - Will continue to monitor at this time.    5) Heme  - No current abnormalities or issues.    6) ID  - Afebrile and without signs of infection.    7) DVT prophylaxis  -PAS 87yo F with a PMHx of CAD with a total of 4 stents in the past (2 in the past year and 2 in the past decade) p/w symptomatic bradycardia s/p transvenous pacer.    1) Neuro  - AAO*3 currently and with no acute issues.    2) Cardiac  - Symptomatic hypotension 2/2 bradycardia now s/p ppm 4/9  - Will continue with aspirin, plavix, and statin currently given stents that were placed less than a year ago (10/1/18).  - Troponin negative and downtrending suggesting no ischemic event.  - Metoprolol decreased from 100mg BID to 50mg BID. Low suspicion for overdose.  - heparin gtt d/c'd will restart NOAC 4/11    3) GI  - Will continue a DASH/TLC diet.  - No other issues currently.    4) Renal  - No previous BUN/Creatinine to compare current. Unsure if CKD or NELY.  - Will continue to monitor at this time.    5) Heme  - No current abnormalities or issues.    6) ID  - Afebrile and without signs of infection.    7) DVT prophylaxis  -PAS

## 2019-04-10 NOTE — DIETITIAN INITIAL EVALUATION ADULT. - ADHERENCE
fair/Pt denies following certain therapeutic diet. Pt admits to cooking with salt. Noted recent HgbA1c 6.1% (4/10), which is in prediabetes range, no history of DM.

## 2019-04-10 NOTE — DIETITIAN INITIAL EVALUATION ADULT. - NS AS NUTRI INTERV ED CONTENT
Reviewed components of DASH/TLC diet. Discussed choosing lean protein choices, foods high in sodium and alternatives, using spices in lieu of salt when cooking, increasing fruit and vegetable intake. Reinforced importance of po intake; emphasized consuming small, frequent meals to aid with decreased appetite. Briefly discussed avoiding sugar-sweetened beverages and watching carbohydrate portion sizes. Reviewed components of healthy diet. Discussed choosing lean protein choices, foods high in sodium and alternatives, using spices in lieu of salt when cooking, increasing fruit and vegetable intake. Reinforced importance of po intake; emphasized consuming small, frequent meals to aid with decreased appetite. Briefly discussed avoiding sugar-sweetened beverages and watching carbohydrate portion sizes.

## 2019-04-10 NOTE — PROGRESS NOTE ADULT - PROBLEM SELECTOR PLAN 1
s/p STJ Abbott dual chamber PPM implant 4/9/19  post device implant teaching done with patient  device checked by representative and paired with home monitor   chest xray PA/LAT check lead tip placement   Keep K+>4,MG++>2  monitor implant hematoma, pressure dressing applied   check EKG post PPM   dispo when cleared for discharge home follow up in [facility] for device and wound check appointment on 4/23 at 8:25 am s/p DALY Abbott dual chamber PPM implant 4/9/19  post device implant teaching done with patient  device checked by representative and paired with home monitor   chest xray PA/LAT check lead tip placement   Keep K+>4,MG++>2  monitor implant hematoma, pressure dressing applied   check EKG post PPM   dispo when cleared for discharge home follow up in EP linic for device and wound check appointment on 4/23 at 8:25 am    addendum 1700  assessment of implant pocket reveals small firm hematoma slightly larger, no active bleeding PPT normalized. Device teaching done with daughter at the bedside  Tylenol for mild pain, percocet x 1 for mod pain prn  pressure dressing reapplied to implant pocket   monitor for bleeding to site  plans to restart Eliquis after evaluation implant hematoma by EP team  NO HEPARIN PRODUCTS   [phone] s/p DALY Abbott dual chamber PPM implant 4/9/19  post device implant teaching done with patient  device checked by representative and paired with home monitor   chest xray PA/LAT check lead tip placement   Keep K+>4,MG++>2  monitor implant hematoma, pressure dressing applied   check EKG post PPM   dispo when cleared for discharge home follow up in EP linic for device and wound check appointment on 4/23 at 8:25 am    addendum 1700  assessment of implant pocket reveals small firm hematoma slightly larger, no active bleeding PTT normalized. Device teaching done with daughter at the bedside  Tylenol for mild pain, percocet x 1 for mod pain prn  pressure dressing reapplied to implant pocket   monitor for bleeding to site  plans to restart Eliquis after evaluation implant hematoma by EP team  NO HEPARIN PRODUCTS   [phone]

## 2019-04-11 ENCOUNTER — TRANSCRIPTION ENCOUNTER (OUTPATIENT)
Age: 84
End: 2019-04-11

## 2019-04-11 VITALS — DIASTOLIC BLOOD PRESSURE: 63 MMHG | HEART RATE: 72 BPM | SYSTOLIC BLOOD PRESSURE: 120 MMHG

## 2019-04-11 LAB
ALBUMIN SERPL ELPH-MCNC: 3.7 G/DL — SIGNIFICANT CHANGE UP (ref 3.3–5)
ALP SERPL-CCNC: 61 U/L — SIGNIFICANT CHANGE UP (ref 40–120)
ALT FLD-CCNC: 17 U/L — SIGNIFICANT CHANGE UP (ref 10–45)
ANION GAP SERPL CALC-SCNC: 11 MMOL/L — SIGNIFICANT CHANGE UP (ref 5–17)
APTT BLD: 31 SEC — SIGNIFICANT CHANGE UP (ref 27.5–36.3)
AST SERPL-CCNC: 20 U/L — SIGNIFICANT CHANGE UP (ref 10–40)
BASOPHILS # BLD AUTO: 0 K/UL — SIGNIFICANT CHANGE UP (ref 0–0.2)
BASOPHILS NFR BLD AUTO: 0.2 % — SIGNIFICANT CHANGE UP (ref 0–2)
BILIRUB SERPL-MCNC: 0.7 MG/DL — SIGNIFICANT CHANGE UP (ref 0.2–1.2)
BUN SERPL-MCNC: 22 MG/DL — SIGNIFICANT CHANGE UP (ref 7–23)
CALCIUM SERPL-MCNC: 9.3 MG/DL — SIGNIFICANT CHANGE UP (ref 8.4–10.5)
CHLORIDE SERPL-SCNC: 100 MMOL/L — SIGNIFICANT CHANGE UP (ref 96–108)
CO2 SERPL-SCNC: 24 MMOL/L — SIGNIFICANT CHANGE UP (ref 22–31)
CREAT SERPL-MCNC: 1.18 MG/DL — SIGNIFICANT CHANGE UP (ref 0.5–1.3)
EOSINOPHIL # BLD AUTO: 0.1 K/UL — SIGNIFICANT CHANGE UP (ref 0–0.5)
EOSINOPHIL NFR BLD AUTO: 1 % — SIGNIFICANT CHANGE UP (ref 0–6)
GLUCOSE SERPL-MCNC: 112 MG/DL — HIGH (ref 70–99)
HCT VFR BLD CALC: 33.6 % — LOW (ref 34.5–45)
HGB BLD-MCNC: 11.4 G/DL — LOW (ref 11.5–15.5)
LYMPHOCYTES # BLD AUTO: 0.9 K/UL — LOW (ref 1–3.3)
LYMPHOCYTES # BLD AUTO: 15.4 % — SIGNIFICANT CHANGE UP (ref 13–44)
MAGNESIUM SERPL-MCNC: 1.9 MG/DL — SIGNIFICANT CHANGE UP (ref 1.6–2.6)
MCHC RBC-ENTMCNC: 32.2 PG — SIGNIFICANT CHANGE UP (ref 27–34)
MCHC RBC-ENTMCNC: 34 GM/DL — SIGNIFICANT CHANGE UP (ref 32–36)
MCV RBC AUTO: 94.7 FL — SIGNIFICANT CHANGE UP (ref 80–100)
MONOCYTES # BLD AUTO: 0.5 K/UL — SIGNIFICANT CHANGE UP (ref 0–0.9)
MONOCYTES NFR BLD AUTO: 8.9 % — SIGNIFICANT CHANGE UP (ref 2–14)
NEUTROPHILS # BLD AUTO: 4.5 K/UL — SIGNIFICANT CHANGE UP (ref 1.8–7.4)
NEUTROPHILS NFR BLD AUTO: 74.4 % — SIGNIFICANT CHANGE UP (ref 43–77)
PHOSPHATE SERPL-MCNC: 2.3 MG/DL — LOW (ref 2.5–4.5)
PLATELET # BLD AUTO: 109 K/UL — LOW (ref 150–400)
POTASSIUM SERPL-MCNC: 4.5 MMOL/L — SIGNIFICANT CHANGE UP (ref 3.5–5.3)
POTASSIUM SERPL-SCNC: 4.5 MMOL/L — SIGNIFICANT CHANGE UP (ref 3.5–5.3)
PROT SERPL-MCNC: 5.9 G/DL — LOW (ref 6–8.3)
RBC # BLD: 3.55 M/UL — LOW (ref 3.8–5.2)
RBC # FLD: 13.3 % — SIGNIFICANT CHANGE UP (ref 10.3–14.5)
SODIUM SERPL-SCNC: 135 MMOL/L — SIGNIFICANT CHANGE UP (ref 135–145)
WBC # BLD: 6.1 K/UL — SIGNIFICANT CHANGE UP (ref 3.8–10.5)
WBC # FLD AUTO: 6.1 K/UL — SIGNIFICANT CHANGE UP (ref 3.8–10.5)

## 2019-04-11 PROCEDURE — 96376 TX/PRO/DX INJ SAME DRUG ADON: CPT | Mod: XU

## 2019-04-11 PROCEDURE — C1769: CPT

## 2019-04-11 PROCEDURE — C1892: CPT

## 2019-04-11 PROCEDURE — 96374 THER/PROPH/DIAG INJ IV PUSH: CPT | Mod: XU

## 2019-04-11 PROCEDURE — 96375 TX/PRO/DX INJ NEW DRUG ADDON: CPT | Mod: XU

## 2019-04-11 PROCEDURE — 93005 ELECTROCARDIOGRAM TRACING: CPT

## 2019-04-11 PROCEDURE — 33208 INSRT HEART PM ATRIAL & VENT: CPT

## 2019-04-11 PROCEDURE — 99153 MOD SED SAME PHYS/QHP EA: CPT

## 2019-04-11 PROCEDURE — 33210 INSERT ELECTRD/PM CATH SNGL: CPT

## 2019-04-11 PROCEDURE — 83735 ASSAY OF MAGNESIUM: CPT

## 2019-04-11 PROCEDURE — 85027 COMPLETE CBC AUTOMATED: CPT

## 2019-04-11 PROCEDURE — C1751: CPT

## 2019-04-11 PROCEDURE — 85610 PROTHROMBIN TIME: CPT

## 2019-04-11 PROCEDURE — C8929: CPT

## 2019-04-11 PROCEDURE — 71045 X-RAY EXAM CHEST 1 VIEW: CPT

## 2019-04-11 PROCEDURE — 84484 ASSAY OF TROPONIN QUANT: CPT

## 2019-04-11 PROCEDURE — 99239 HOSP IP/OBS DSCHRG MGMT >30: CPT

## 2019-04-11 PROCEDURE — 86850 RBC ANTIBODY SCREEN: CPT

## 2019-04-11 PROCEDURE — C1898: CPT

## 2019-04-11 PROCEDURE — 86901 BLOOD TYPING SEROLOGIC RH(D): CPT

## 2019-04-11 PROCEDURE — 84100 ASSAY OF PHOSPHORUS: CPT

## 2019-04-11 PROCEDURE — 80053 COMPREHEN METABOLIC PANEL: CPT

## 2019-04-11 PROCEDURE — 36555 INSERT NON-TUNNEL CV CATH: CPT | Mod: XU

## 2019-04-11 PROCEDURE — 83036 HEMOGLOBIN GLYCOSYLATED A1C: CPT

## 2019-04-11 PROCEDURE — 84443 ASSAY THYROID STIM HORMONE: CPT

## 2019-04-11 PROCEDURE — 85730 THROMBOPLASTIN TIME PARTIAL: CPT

## 2019-04-11 PROCEDURE — 80162 ASSAY OF DIGOXIN TOTAL: CPT

## 2019-04-11 PROCEDURE — 86900 BLOOD TYPING SEROLOGIC ABO: CPT

## 2019-04-11 PROCEDURE — C1785: CPT

## 2019-04-11 PROCEDURE — 99291 CRITICAL CARE FIRST HOUR: CPT | Mod: 25

## 2019-04-11 PROCEDURE — 71046 X-RAY EXAM CHEST 2 VIEWS: CPT

## 2019-04-11 RX ORDER — SODIUM,POTASSIUM PHOSPHATES 278-250MG
1 POWDER IN PACKET (EA) ORAL
Qty: 0 | Refills: 0 | Status: DISCONTINUED | OUTPATIENT
Start: 2019-04-11 | End: 2019-04-11

## 2019-04-11 RX ORDER — CLOPIDOGREL BISULFATE 75 MG/1
1 TABLET, FILM COATED ORAL
Qty: 0 | Refills: 0 | COMMUNITY

## 2019-04-11 RX ORDER — DOCUSATE SODIUM 100 MG
1 CAPSULE ORAL
Qty: 0 | Refills: 0 | COMMUNITY
Start: 2019-04-11

## 2019-04-11 RX ORDER — ACETAMINOPHEN 500 MG
650 TABLET ORAL ONCE
Qty: 0 | Refills: 0 | Status: COMPLETED | OUTPATIENT
Start: 2019-04-11 | End: 2019-04-11

## 2019-04-11 RX ORDER — ASPIRIN/CALCIUM CARB/MAGNESIUM 324 MG
1 TABLET ORAL
Qty: 0 | Refills: 0 | COMMUNITY

## 2019-04-11 RX ORDER — ATORVASTATIN CALCIUM 80 MG/1
1 TABLET, FILM COATED ORAL
Qty: 0 | Refills: 0 | COMMUNITY

## 2019-04-11 RX ORDER — SENNA PLUS 8.6 MG/1
3 TABLET ORAL
Qty: 0 | Refills: 0 | COMMUNITY

## 2019-04-11 RX ORDER — DOCUSATE SODIUM 100 MG
1 CAPSULE ORAL
Qty: 0 | Refills: 0 | COMMUNITY

## 2019-04-11 RX ORDER — SODIUM,POTASSIUM PHOSPHATES 278-250MG
1 POWDER IN PACKET (EA) ORAL
Qty: 8 | Refills: 0
Start: 2019-04-11 | End: 2019-04-12

## 2019-04-11 RX ORDER — METOPROLOL TARTRATE 50 MG
1 TABLET ORAL
Qty: 0 | Refills: 0 | COMMUNITY

## 2019-04-11 RX ORDER — AMIODARONE HYDROCHLORIDE 400 MG/1
1 TABLET ORAL
Qty: 0 | Refills: 0 | COMMUNITY

## 2019-04-11 RX ORDER — SENNA PLUS 8.6 MG/1
3 TABLET ORAL
Qty: 0 | Refills: 0 | COMMUNITY
Start: 2019-04-11

## 2019-04-11 RX ORDER — LOSARTAN POTASSIUM 100 MG/1
1 TABLET, FILM COATED ORAL
Qty: 30 | Refills: 0
Start: 2019-04-11 | End: 2019-05-10

## 2019-04-11 RX ORDER — ACETAMINOPHEN 500 MG
100 TABLET ORAL
Qty: 0 | Refills: 0 | COMMUNITY
Start: 2019-04-11

## 2019-04-11 RX ORDER — APIXABAN 2.5 MG/1
2.5 TABLET, FILM COATED ORAL EVERY 12 HOURS
Qty: 0 | Refills: 0 | Status: DISCONTINUED | OUTPATIENT
Start: 2019-04-11 | End: 2019-04-11

## 2019-04-11 RX ADMIN — Medication 100 MILLIGRAM(S): at 12:51

## 2019-04-11 RX ADMIN — Medication 650 MILLIGRAM(S): at 06:30

## 2019-04-11 RX ADMIN — LOSARTAN POTASSIUM 50 MILLIGRAM(S): 100 TABLET, FILM COATED ORAL at 05:55

## 2019-04-11 RX ADMIN — Medication 650 MILLIGRAM(S): at 06:04

## 2019-04-11 RX ADMIN — Medication 81 MILLIGRAM(S): at 12:51

## 2019-04-11 NOTE — DISCHARGE NOTE PROVIDER - CARE PROVIDER_API CALL
Alisha Humphrey)  Cardiovascular Disease; Nuclear Cardiology  [address] [place] [address], [place] [address]  Phone: [phone]  Fax: [phone]  Follow Up Time: 1 week    Cardiac Electrophysiology, [address] [address]  Phone: [phone]  Fax: (   )    -  Follow Up Time: 1 week    Dr. Abel, [facility] Doctor  Phone: (   )    -  Fax: (   )    -  Follow Up Time: 1 week

## 2019-04-11 NOTE — DISCHARGE NOTE PROVIDER - CARE PROVIDERS DIRECT ADDRESSES
[email or web address]_Unknown,DirectAddress_Unknown

## 2019-04-11 NOTE — PROGRESS NOTE ADULT - ASSESSMENT
88 year old female with pmhx Asthma HTN, HLD, TIA, severe aortic stenosis, CAD s/p stents LAD and Diag, 2009 s/p 2 stent LAD in 9/2018, per Paul Humphrey MD cardiologist [phone] symptomatic Pafib was on metoprolol and Multag was seen by EP Dr Moreira in December 2018 and Multag was discontinued and Amiodarone was started. Eliquis for AC. p/w intermittent episodes of lightheadedness for the past 2 months. In the ED the patient was found to be bradycardic down to the 30s with a systolic BP in the 70s. S/p TVP placement. TTE EF 75% s/p STJ PPM 4/9/19

## 2019-04-11 NOTE — DISCHARGE NOTE PROVIDER - NSDCCPCAREPLAN_GEN_ALL_CORE_FT
PRINCIPAL DISCHARGE DIAGNOSIS  Diagnosis: Symptomatic bradycardia  Assessment and Plan of Treatment: Do not lift/push/pull or exercise Left arm.  Please keep left side of patient dry.  Follow the directions that you received about arm use and mobility, driving, sex & sling use.Your pacemaker can send an electrical signal to your heart when it is necessary.  Call your doctor if you feel dizzy, lightheaded, shortness of breath, confused, more tired, faint you will follow up with your pacemaker doctor regularly to check your pacemaker.  Your pacemaker battery usually will last 5 - 8 years.      SECONDARY DISCHARGE DIAGNOSES  Diagnosis: Atrial fibrillation with slow ventricular response  Assessment and Plan of Treatment: Atrial fibrillation is a common heart rhythm problem which increases the risk of stroke and heat attack.  It helps if you control your blood pressure, avoid alcohol, cut down on caffeine, get treatment for your thyroid if it is overactive, and perform moderate exercise in consultation with your Primary Care Provider.  Call your doctor if you experience chest tightness/pain, lightheadedness, loss of consciousness, shortness of breath (especially with exercise), feel your heart racing or beating unusually, frequent or abnormal bleeding.  It is important to take all your heart medications as prescribed.    Diagnosis: CAD (coronary artery disease)  Assessment and Plan of Treatment: Coronary artery disease is a condition where the arteries the supply the heart muscle get clogged with fatty deposits & puts you at risk for a heart attack.  Call your doctor if you have any new pain, pressure, or discomfort in the center of your chest, pain, tingling or discomfort in arms, back, neck, jaw, or stomach, shortness of breath, nausea, vomiting, burping or heartburn, sweating, cold and clammy skin, racing or abnormal heartbeat for more than 10 minutes or if they keep coming & going.  Call 911 and do not try to get to hospital by car.  You can help yourself with lifestyle changes (quitting smoking if you smoke), eat lots of fruits & vegetables & low fat dairy products, not a lot of meat & fatty foods, walk or some form of physical activity most days of the week, lose weight if you are overweight.  Take your cardiac medication as prescribed to lower cholesterol, to lower blood pressure, and control your blood sugar.

## 2019-04-11 NOTE — DISCHARGE NOTE PROVIDER - PROVIDER TOKENS
PROVIDER:[TOKEN:[[id number]],FOLLOWUP:[1 week]],FREE:[LAST:[Cardiac Electrophysiology],FIRST:[[facility]],PHONE:[phone]],FAX:[(   )    -],ADDRESS:[[address] [place] [address]],FOLLOWUP:[1 week]],FREE:[LAST:[Dr. Abel],FIRST:[[facility] Doctor],PHONE:[(   )    -],FAX:[(   )    -],FOLLOWUP:[1 week]]

## 2019-04-11 NOTE — DISCHARGE NOTE PROVIDER - NSDCFUADDAPPT_GEN_ALL_CORE_FT
Follow up with your Primary Care Doctor within 1 week.  Follow up with your Cardiologist within 1 week - let him know that your Losartan dose was decreased to 25mG - have your blood pressure checked in the office.  Follow up in the EP Clinic at [facility] on 4/23 at 8:25 am for wound and device check.

## 2019-04-11 NOTE — DISCHARGE NOTE PROVIDER - NSDCCAREPROVSEEN_GEN_ALL_CORE_FT
[facility] Medicine, Advance PracticeTeam  [facility] Cardiac Electrophysiology, Team  Rosa Mcbride Evelina

## 2019-04-11 NOTE — DISCHARGE NOTE PROVIDER - HOSPITAL COURSE
In short, patient is a 88 year old female with pmhx Asthma HTN, HLD, TIA, severe aortic stenosis, CAD s/p stents LAD and Diag, 2009 s/p 2 stent LAD in 9/2018, symptomatic Pafib was on metoprolol and Multag was seen by EP Dr Moreira in December 2018 and Multag was discontinued and Amiodarone was started, on Eliquis for AC. She presented with intermittent episodes of lightheadedness for the past 2 months. She was bradycardic in the ED to the 30s with a systolic BP in the 70s. She is now s/p TVP placement then s/p STJ Abbott dual chamber PPM implant 4/9/19.  Device was checked by representative and paired with home monitor.  TTE EF 75%.  Developed a small hematoma over PPM site with pressure dressing applied. Hematoma was re-evaluated by EP - pressure dressing removed no further enlargement noted - area soft.  Eliquis was held until EP cleared it to resume.  BB and amiodarone have been resumed, per recommendation of EP, and patient was cleared for discharge to home.  Patient will follow up with Primary Care and Cardiology within 1 wee, and with EP on 4/23 for a wound and device check. In short, patient is a 88 year old female with pmhx Asthma HTN, HLD, TIA, severe aortic stenosis, CAD s/p stents LAD and Diag, 2009 s/p 2 stent LAD in 9/2018, symptomatic Pafib was on metoprolol and Multag was seen by EP Dr Moreira in December 2018 and Multag was discontinued and Amiodarone was started, on Eliquis for AC. She presented with intermittent episodes of lightheadedness for the past 2 months. She was bradycardic in the ED to the 30s with a systolic BP in the 70s. She is now s/p TVP placement then s/p STJ Abbott dual chamber PPM implant 4/9/19.  Device was checked by representative and paired with home monitor.  TTE EF 75%.  Developed a small hematoma over PPM site with pressure dressing applied. Hematoma was re-evaluated by EP - pressure dressing removed no further enlargement noted - area soft.  Eliquis was held until EP cleared it to resume.  BB and amiodarone have been resumed, per recommendation of EP, and patient was cleared for discharge to home.  Patient will follow up with Primary Care and Cardiology within 1 week, and with EP on 4/23 for a wound and device check.

## 2019-04-11 NOTE — CHART NOTE - NSCHARTNOTEFT_GEN_A_CORE
patient seen and examined. pain at ppm site improved with tylenol     no hematoma apprecated at PPM site - some soft tissue swelling and tenderness   Lungs CTA b/l patient seen and examined. pain at ppm site improved with tylenol     no hematoma apprecated at PPM site - some soft tissue swelling and tenderness   Lungs CTA b/l    bradycardia - s/p ppm   ?small hematoma post procedure - d/w EP - okay to discharge on metoprolol and amiodarone  will lower losartan to 25mg qd as patient bp intermittently slightly low.     discharge time - 42 minutes  Dr. M. Luke  [facility] Hospitalist  [phone]

## 2019-04-11 NOTE — PROGRESS NOTE ADULT - PROBLEM SELECTOR PLAN 1
s/p STJ Abbott dual chamber PPM implant 4/9/19  post device implant teaching reinforced with patient  Keep K+>4,MG++>2  monitor implant hematoma, pressure dressing removed no further enlargement soft today    resume Eliquis today  dispo cleared from EP perspective  for discharge home follow up in EP lin for device and wound check appointment on 4/23 at 8:25 am s/p STJERARDO Abbott dual chamber PPM implant 4/9/19  post device implant teaching reinforced with patient  Keep K+>4,MG++>2  monitor implant hematoma, pressure dressing removed no further enlargement soft today    resume Eliquis today  can resume Amiodarone for rhythm control   can resume metoprolol rate control and CAD   dispo cleared from EP perspective  for discharge home follow up in EP lin for device and wound check appointment on 4/23 at 8:25 am

## 2019-04-11 NOTE — DISCHARGE NOTE PROVIDER - NSFOLLOWUPCLINICS_GEN_ALL_ED_FT
[facility]  Cardiology  [address] [place] [id number]  Phone: [phone]  Fax:   Follow Up Time:

## 2019-04-11 NOTE — PROGRESS NOTE ADULT - SUBJECTIVE AND OBJECTIVE BOX
HPI: no over night events no further enlargement of implant site     MEDICATIONS  (STANDING):  acetaminophen  IVPB .. 1000 milliGRAM(s) IV Intermittent once  aspirin  chewable 81 milliGRAM(s) Oral daily  atorvastatin 80 milliGRAM(s) Oral at bedtime  chlorhexidine 4% Liquid 1 Application(s) Topical <User Schedule>  clopidogrel Tablet 75 milliGRAM(s) Oral daily  docusate sodium 100 milliGRAM(s) Oral daily  losartan 50 milliGRAM(s) Oral daily  senna 3 Tablet(s) Oral daily    Allergies No Known Allergies    REVIEW OF SYSTEM:    Constitutional: denies fever, chills, + fatigue  Neuro: denies headache, numbness, + weakness, no dizziness  Resp: denies cough, wheezing, shortness of breath  CVS: denies chest pain, palpitations, leg swelling  GI: denies abdominal pain, nausea, vomiting, diarrhea   : denies dysuria, frequency, incontinence  Skin: denies itching, burning, rashes, or lesions   Msk: implant pocket tenderness     Vital Signs Last 24 Hrs  T(C): 36.4 (11 Apr 2019 04:07), Max: 36.7 (10 Apr 2019 18:08)  T(F): 97.5 (11 Apr 2019 04:07), Max: 98.1 (10 Apr 2019 21:05)  HR: 64 (11 Apr 2019 04:07) (60 - 84)  BP: 151/71 (11 Apr 2019 04:07) (105/60 - 151/71)  BP(mean): 106 (10 Apr 2019 16:00) (80 - 106)  RR: 18 (11 Apr 2019 04:07) (17 - 34)  SpO2: 97% (11 Apr 2019 04:07) (95% - 99%)    Physical Exam:  General : well developed, well nourished,  and no acute distress  Neuro : Alert and oriented x 3, no focal deficits  HEENT : Sclera clear,  EOMI neck supple no JVD,  Lungs: Clear to Ascultation, no wheezing , rales or rhonchi   Cardiovascular : + 1 +2, RRR, no murmurs, no rubs  GI : abdomen soft, NT, ND, + BS   : no suprapubic tenderness  Extremities : No edema, + 2 DP and +2 PT, feet warm   Skin : Left infraclavicular implant site intact, small soft hematoma present  no active  bleeding,  +  ecchymosis to dilcia wound area and left axiliary area no bleeding     TELE: SR 60 -70's  no ectopy   EKG:< from: 12 Lead ECG (04.10.19 @ 07:22) >  Ventricular Rate 62 BPM    Atrial Rate 62 BPM    P-R Interval 160 ms    QRS Duration 74 ms    Q-T Interval 422 ms    QTC Calculation(Bezet) 428 ms    P Axis 80 degrees    R Axis -5 degrees    T Axis 44 degrees    Diagnosis Line NORMAL SINUS RHYTHM  NORMAL ECG    Confirmed by MD CORNELIUS ANDREW ([id number]) on 4/10/2019 9:20:11 PM    LABS:                        11.4   6.1   )-----------( 109      ( 11 Apr 2019 06:07 )             33.6     04-11    135  |  100  |  22  ----------------------------<  112<H>  4.5   |  24  |  1.18    Ca    9.3      11 Apr 2019 06:07  Phos  2.3     04-11  Mg     1.9     04-11    TPro  5.9<L>  /  Alb  3.7  /  TBili  0.7  /  DBili  x   /  AST  20  /  ALT  17  /  AlkPhos  61  04-11    PTT - ( 11 Apr 2019 06:07 )  PTT:31.0 sec  RADIOLOGY & ADDITIONAL TESTS:  < from: TTE with Doppler (w/Cont) (04.09.19 @ 06:48) >  Dimensions:    Normal Values:  LA:     3.7    2.0 - 4.0 cm  Ao:     2.4    2.0 - 3.8 cm  SEPTUM: 0.9    0.6 - 1.2 cm  PWT:    1.2    0.6 - 1.1 cm  LVIDd:  3.83.0 - 5.6 cm  LVIDs:  2.1    1.8 - 4.0 cm  Derived variables:  LVMI: 82 g/m2  RWT: 0.63  EF (Visual Estimate): 75 %  Doppler Peak Velocity (m/sec): AoV=0.8    < from: TTE with Doppler (w/Cont) (04.09.19 @ 06:48) >  Conclusions:  Endocardial visualization enhanced with intravenous  injection of Ultrasonic Enhancing Agent (Definity).  Hyperdynamic left ventricular systolic function.  *** No previous Echo exam.

## 2019-04-11 NOTE — DISCHARGE NOTE NURSING/CASE MANAGEMENT/SOCIAL WORK - NSDCFUADDAPPT_GEN_ALL_CORE_FT
Follow up with your Primary Care Doctor within 1 week.  Follow up with your Cardiologist within 1 week - let him know that your Losartan dose was decreased to 25mG - have your blood pressure checked in the office.  Follow up in the EP Clinic at [facility] on 4/23 at 8:25 am for wound and device check.

## 2019-04-17 NOTE — DISCUSSION/SUMMARY
[FreeTextEntry1] : 88-year-old female with [facility]2018, severe AS, asthma, PAF, HTN and HLD presents for followup.  Patient was last seen on 2/12/19 for dizziness.  Patient underwent a brain MRI and it showed a stable old small stroke. No new stroke was noted.  Patient underwent a carotid Doppler and it showed no significant stenosis.  Her dizziness was felt to be due to vestibular dysfunction.  She is on ASA 81 mg Plavix 75 mg and Atorvastatin for CAD.  She is on Eliquis 2.5 mg BID, Amiodarone 200 mg QD, and Metoprolol ER 50 mg for PAF.  Patient is feeling ok, not dizzy now.  She reports SOB on exertion.  Patient denies CP.  \par \par (1) Dizziness - Patient has been stable.  Her dizziness may be due to vestibular dysfunction.  \par \par (2) CAD s/p LAD and Diag stents in 2009, s/p 2 LAD stents in 9/2018 - Patient has been stable.  She may stop Plavix 75 mg now (6-month post-stent on Eliquis).  I advised her to continue ASA and Atorvastatin.  \par \par (3) PAF - Patient is stable on Amiodarone 200 mg QD and Metoprolol ER 50 mg daily.  Her [id number] score is 5 (HTN, age>75, CAD, female gender).  She should continue Eliquis 2.5 mg BID for stroke prevention.  \par \par (4) HTN - Her BP was mildly elevated today.  I advised patient to start HCTZ 12.5 mg QOD.  She should continue Metoprolol ER 50 mg. \par \par (5) AS - Patient underwent an echocardiogram on 11/15/18 and it showed normal LV function with severe  AS (0.9 cm2), moderate MR, mod-severe TR, and severe pulmonary hypertension (72 mmHg).\par \par (6) Followup - 1 month.

## 2019-04-17 NOTE — HISTORY OF PRESENT ILLNESS
[FreeTextEntry1] : 88-year-old female with CAD s/p LAD and Diag stents in 2009 at [facility] s/p 2 LAD stents in 9/2018, severe AS, asthma, PAF, HTN and HLD presents for followup.  Patient was last seen on 3/7/19.  She was advised to stop Plavix 75 mg (6-month post-stent on Eliquis).  She was advised to start HCTZ 12.5 mg for elevated BP and SOB.  Patient reports feeling dizzy 7-8 times since last night.  Her vision was fuzzy.  She felt nauseous on the bus coming to the office.  Patient denies CP.  Patient denies palpitations.  Patient reports MOULTON.

## 2019-04-17 NOTE — REASON FOR VISIT
[Follow-Up - Clinic] : a clinic follow-up of [Atrial Fibrillation] : atrial fibrillation [Hypertension] : hypertension [Coronary Artery Disease] : coronary artery disease

## 2019-04-17 NOTE — HISTORY OF PRESENT ILLNESS
[FreeTextEntry1] : 88-year-old female with CAD s/p LAD and Diag stents in 2009 at [facility] s/p 2 LAD stents in 9/2018, severe AS, asthma, PAF, HTN and HLD presents for followup.  Patient was last seen on 2/12/19 for dizziness.  Patient underwent a brain MRI and it showed a stable old small stroke. No new stroke was noted.  Patient underwent a carotid Doppler and it showed no significant stenosis.  Her dizziness was felt to be due to vestibular dysfunction.  She is on ASA 81 mg Plavix 75 mg and Atorvastatin for CAD.  She is on Eliquis 2.5 mg BID, Amiodarone 200 mg QD, and Metoprolol ER 50 mg for PAF.  Patient is feeling ok, not dizzy now.  She reports SOB on exertion.  Patient denies CP.

## 2019-04-17 NOTE — DISCUSSION/SUMMARY
[FreeTextEntry1] : 88-year-old female with CAD s/p LAD and Diag stents in 2009 at [facility] s/p 2 LAD stents in 9/2018, severe AS, asthma, PAF, HTN and HLD presents for followup.  Patient was last seen on 3/7/19.  She was advised to stop Plavix 75 mg (6-month post-stent on Eliquis).  She was advised to start HCTZ 12.5 mg for elevated BP and SOB.  Patient reports feeling dizzy 7-8 times since last night.  Her vision was fuzzy.  She felt nauseous on the bus coming to the office.  Patient denies CP.  Patient denies palpitations.  Patient reports MOULTON.\par \par (1) Dizziness - Her dizziness may be due to vestibular dysfunction.  I advised patient to followup with neurology.\par \par (2) CAD s/p LAD and Diag stents in 2009, s/p 2 LAD stents in 9/2018 - Patient has been stable.  She is off Plavix 75 mg now (6-month post-stent on Eliquis).  I advised her to continue ASA and Atorvastatin.  \par \par (3) PAF - Patient is stable on Amiodarone 200 mg QD and Metoprolol ER 50 mg daily.  Her [id number] score is 5 (HTN, age>75, CAD, female gender).  She should continue Eliquis 2.5 mg BID for stroke prevention.  \par \par (4) HTN - Her BP was mildly elevated today.  I advised patient to continue HCTZ 12.5 mg QOD and  Metoprolol ER 50 mg. \par \par (5) AS - Patient underwent an echocardiogram on 11/15/18 and it showed normal LV function with severe  AS (0.9 cm2), moderate MR, mod-severe TR, and severe pulmonary hypertension (72 mmHg).\par \par (6) Followup - 1 month.

## 2019-04-17 NOTE — PHYSICAL EXAM
[General Appearance - Well Developed] : well developed [Normal Appearance] : normal appearance [Well Groomed] : well groomed [General Appearance - Well Nourished] : well nourished [No Deformities] : no deformities [General Appearance - In No Acute Distress] : no acute distress [Normal Conjunctiva] : the conjunctiva exhibited no abnormalities [Eyelids - No Xanthelasma] : the eyelids demonstrated no xanthelasmas [Normal Oral Mucosa] : normal oral mucosa [No Oral Pallor] : no oral pallor [No Oral Cyanosis] : no oral cyanosis [Normal Jugular Venous A Waves Present] : normal jugular venous A waves present [Normal Jugular Venous V Waves Present] : normal jugular venous V waves present [No Jugular Venous Osullivan A Waves] : no jugular venous osullivna A waves [Respiration, Rhythm And Depth] : normal respiratory rhythm and effort [Exaggerated Use Of Accessory Muscles For Inspiration] : no accessory muscle use [Auscultation Breath Sounds / Voice Sounds] : lungs were clear to auscultation bilaterally [Heart Rate And Rhythm] : heart rate and rhythm were normal [Heart Sounds] : normal S1 and S2 [Murmurs] : no murmurs present [Abdomen Soft] : soft [Abdomen Tenderness] : non-tender [Abdomen Mass (___ Cm)] : no abdominal mass palpated [Abnormal Walk] : normal gait [Gait - Sufficient For Exercise Testing] : the gait was sufficient for exercise testing [Nail Clubbing] : no clubbing of the fingernails [Cyanosis, Localized] : no localized cyanosis [Petechial Hemorrhages (___cm)] : no petechial hemorrhages [] : no ischemic changes [Oriented To Time, Place, And Person] : oriented to person, place, and time [Affect] : the affect was normal [Mood] : the mood was normal [No Anxiety] : not feeling anxious [Normal Oropharynx] : normal oropharynx [Bibasilar Rales/Crackles] : bibasilar rales [Arterial Pulses Normal] : the arterial pulses were normal [Regular-Premature Beats] : the rhythm was regular with premature beats [FreeTextEntry1] : limited mobility.

## 2019-04-17 NOTE — PHYSICAL EXAM
[General Appearance - Well Developed] : well developed [Normal Appearance] : normal appearance [Well Groomed] : well groomed [General Appearance - Well Nourished] : well nourished [No Deformities] : no deformities [General Appearance - In No Acute Distress] : no acute distress [Normal Conjunctiva] : the conjunctiva exhibited no abnormalities [Eyelids - No Xanthelasma] : the eyelids demonstrated no xanthelasmas [Normal Oral Mucosa] : normal oral mucosa [No Oral Pallor] : no oral pallor [No Oral Cyanosis] : no oral cyanosis [Normal Oropharynx] : normal oropharynx [Normal Jugular Venous A Waves Present] : normal jugular venous A waves present [Normal Jugular Venous V Waves Present] : normal jugular venous V waves present [No Jugular Venous Osullivan A Waves] : no jugular venous osullivan A waves [Respiration, Rhythm And Depth] : normal respiratory rhythm and effort [Exaggerated Use Of Accessory Muscles For Inspiration] : no accessory muscle use [Auscultation Breath Sounds / Voice Sounds] : lungs were clear to auscultation bilaterally [Bibasilar Rales/Crackles] : bibasilar rales [Heart Rate And Rhythm] : heart rate and rhythm were normal [Heart Sounds] : normal S1 and S2 [Murmurs] : no murmurs present [Arterial Pulses Normal] : the arterial pulses were normal [Regular-Premature Beats] : the rhythm was regular with premature beats [Abdomen Soft] : soft [Abdomen Tenderness] : non-tender [Abdomen Mass (___ Cm)] : no abdominal mass palpated [Abnormal Walk] : normal gait [Gait - Sufficient For Exercise Testing] : the gait was sufficient for exercise testing [Nail Clubbing] : no clubbing of the fingernails [Cyanosis, Localized] : no localized cyanosis [Petechial Hemorrhages (___cm)] : no petechial hemorrhages [] : no ischemic changes [Oriented To Time, Place, And Person] : oriented to person, place, and time [Affect] : the affect was normal [Mood] : the mood was normal [No Anxiety] : not feeling anxious [FreeTextEntry1] : limited mobility.

## 2019-04-18 ENCOUNTER — APPOINTMENT (OUTPATIENT)
Dept: CARDIOLOGY | Facility: CLINIC | Age: 84
End: 2019-04-18
Payer: MEDICARE

## 2019-04-18 VITALS
SYSTOLIC BLOOD PRESSURE: 117 MMHG | HEART RATE: 67 BPM | WEIGHT: 120 LBS | BODY MASS INDEX: 22.67 KG/M2 | TEMPERATURE: 97.4 F | DIASTOLIC BLOOD PRESSURE: 71 MMHG | OXYGEN SATURATION: 98 % | RESPIRATION RATE: 18 BRPM

## 2019-04-18 PROBLEM — M41.9 SCOLIOSIS, UNSPECIFIED: Chronic | Status: ACTIVE | Noted: 2019-04-08

## 2019-04-18 PROCEDURE — 99214 OFFICE O/P EST MOD 30 MIN: CPT

## 2019-04-22 ENCOUNTER — APPOINTMENT (OUTPATIENT)
Dept: ELECTROPHYSIOLOGY | Facility: CLINIC | Age: 84
End: 2019-04-22
Payer: MEDICARE

## 2019-04-22 VITALS
DIASTOLIC BLOOD PRESSURE: 89 MMHG | HEIGHT: 61 IN | SYSTOLIC BLOOD PRESSURE: 152 MMHG | WEIGHT: 120 LBS | OXYGEN SATURATION: 98 % | BODY MASS INDEX: 22.66 KG/M2 | HEART RATE: 66 BPM

## 2019-04-22 PROCEDURE — 99024 POSTOP FOLLOW-UP VISIT: CPT

## 2019-04-22 PROCEDURE — 93280 PM DEVICE PROGR EVAL DUAL: CPT

## 2019-05-08 NOTE — PHYSICAL EXAM
[General Appearance - Well Developed] : well developed [Normal Appearance] : normal appearance [General Appearance - Well Nourished] : well nourished [Well Groomed] : well groomed [No Deformities] : no deformities [General Appearance - In No Acute Distress] : no acute distress [Normal Conjunctiva] : the conjunctiva exhibited no abnormalities [Eyelids - No Xanthelasma] : the eyelids demonstrated no xanthelasmas [Normal Oral Mucosa] : normal oral mucosa [No Oral Cyanosis] : no oral cyanosis [No Oral Pallor] : no oral pallor [Normal Oropharynx] : normal oropharynx [Normal Jugular Venous A Waves Present] : normal jugular venous A waves present [Normal Jugular Venous V Waves Present] : normal jugular venous V waves present [No Jugular Venous Osullivan A Waves] : no jugular venous osullivan A waves [Respiration, Rhythm And Depth] : normal respiratory rhythm and effort [Exaggerated Use Of Accessory Muscles For Inspiration] : no accessory muscle use [Auscultation Breath Sounds / Voice Sounds] : lungs were clear to auscultation bilaterally [Bibasilar Rales/Crackles] : bibasilar rales [Heart Rate And Rhythm] : heart rate and rhythm were normal [Heart Sounds] : normal S1 and S2 [Murmurs] : no murmurs present [Arterial Pulses Normal] : the arterial pulses were normal [Regular-Premature Beats] : the rhythm was regular with premature beats [Abdomen Tenderness] : non-tender [Abdomen Soft] : soft [Abdomen Mass (___ Cm)] : no abdominal mass palpated [Nail Clubbing] : no clubbing of the fingernails [Cyanosis, Localized] : no localized cyanosis [Petechial Hemorrhages (___cm)] : no petechial hemorrhages [] : no ischemic changes [Oriented To Time, Place, And Person] : oriented to person, place, and time [Affect] : the affect was normal [Mood] : the mood was normal [No Anxiety] : not feeling anxious [FreeTextEntry1] : limited mobility.

## 2019-05-08 NOTE — HISTORY OF PRESENT ILLNESS
[FreeTextEntry1] : 88-year-old female with CAD s/p LAD and Diag stents in 2009 at [facility] s/p 2 LAD stents in 9/2018, severe AS, asthma, PAF, HTN and HLD presents for followup.  Patient was last seen on 3/18/19.  Patient was subsequently admitted to [facility] with complete heart block requiring a PPM.  She still reports occasional dizziness.  She is no longer on ASA 81 mg.  She is on Atorvastatin for CAD.  She is on Amiodarone 200 mg QD and Metoprolol ER 50 mg for PAF.  She is on Eliquis 2.5 mg BID for stroke prevention.  She is on HCTZ 12.5 mg QOD for HTN.

## 2019-05-08 NOTE — DISCUSSION/SUMMARY
[FreeTextEntry1] : 88-year-old female with CAD s/p LAD and Diag stents in 2009 at [facility] s/p 2 LAD stents in 9/2018, severe AS, asthma, PAF, HTN and HLD presents for followup.  Patient was last seen on 3/18/19.  Patient was subsequently admitted to [facility] with complete heart block requiring a PPM.  She still reports occasional dizziness.  She is no longer on ASA 81 mg.  She is on Atorvastatin for CAD.  She is on Amiodarone 200 mg QD and Metoprolol ER 50 mg for PAF.  She is on Eliquis 2.5 mg BID for stroke prevention.  She is on HCTZ 12.5 mg QOD for HTN.\par \par (1) CHB s/p PPM - Patient is stable.  I advised patient to followup with EP.\par \par (2) Dizziness - Her dizziness may be due to vestibular dysfunction or low BP.  I advised patient to stop HCTZ 12.5 mg.\par \par (3) CAD s/p LAD and Diag stents in 2009, s/p 2 LAD stents in 9/2018 - She is off ASA 81 mg.  I advised patient to start Plavix 75 mg (8-months post-stent on Eliquis).  I advised her to continue Atorvastatin.  \par \par (4) PAF - Patient is stable on Amiodarone 200 mg QD and Metoprolol ER 50 mg daily.  Her [id number] score is 5 (HTN, age>75, CAD, female gender).  She should continue Eliquis 2.5 mg BID for stroke prevention.  \par \par (5) AS - Patient underwent an echocardiogram on 11/15/18 and it showed normal LV function with severe  AS (0.9 cm2), moderate MR, mod-severe TR, and severe pulmonary hypertension (72 mmHg).\par \par (6) Followup - 1 month.

## 2019-05-09 ENCOUNTER — APPOINTMENT (OUTPATIENT)
Dept: CARDIOLOGY | Facility: CLINIC | Age: 84
End: 2019-05-09
Payer: MEDICARE

## 2019-05-09 VITALS
DIASTOLIC BLOOD PRESSURE: 78 MMHG | TEMPERATURE: 97.6 F | BODY MASS INDEX: 23.43 KG/M2 | HEART RATE: 71 BPM | WEIGHT: 124 LBS | OXYGEN SATURATION: 97 % | SYSTOLIC BLOOD PRESSURE: 136 MMHG | RESPIRATION RATE: 17 BRPM

## 2019-05-09 DIAGNOSIS — R42 DIZZINESS AND GIDDINESS: ICD-10-CM

## 2019-05-09 PROCEDURE — 99214 OFFICE O/P EST MOD 30 MIN: CPT

## 2019-05-09 RX ORDER — NITROGLYCERIN 0.4 MG/1
0.4 TABLET SUBLINGUAL
Qty: 1 | Refills: 0 | Status: ACTIVE | COMMUNITY
Start: 2019-05-09 | End: 1900-01-01

## 2019-05-11 PROBLEM — R42 DIZZINESS: Status: ACTIVE | Noted: 2019-02-19

## 2019-05-16 NOTE — HISTORY OF PRESENT ILLNESS
[FreeTextEntry1] : Patient feels better, she is less dizzy. Patient denies CP, SOB, palpitations. Patient has no more palpitations since the pacemaker was placed. Her appetite is okay. She has no bleeding issues. Patient cannot be on Amiordarone or long term, will discuss in 2 months. \par \par 88-year-old female with CAD s/p LAD and Diag stents in 2009 at [facility] s/p 2 LAD stents in 9/2018, severe AS, asthma, PAF, HTN and HLD presents for followup.  Patient was last seen on 4/18/19.   I advised patient to stop HCTZ 12.5 mg for low BP.   I advised patient to start Plavix 75 mg (over ASA; safer with Eliquis) and continue Atorvastatin for CAD.  He is on Eliquis 2.5 mg BID,  Amiodarone 200 mg QD and Metoprolol ER 50 mg daily for PAF. \par \par (1) CHB s/p PPM - Patient is stable.  I advised patient to followup with EP.\par \par (2) Dizziness - Her dizziness may be due to vestibular dysfunction or low BP.  I advised patient to stop HCTZ 12.5 mg.\par \par (3) CAD s/p LAD and Diag stents in 2009, s/p 2 LAD stents in 9/2018 - She is off ASA 81 mg.  I advised patient to start Plavix 75 mg (8-months post-stent on Eliquis).  I advised her to continue Atorvastatin.  \par \par (4) PAF - Patient is stable on Amiodarone 200 mg QD and Metoprolol ER 50 mg daily.  Her [id number] score is 5 (HTN, age>75, CAD, female gender).  She should continue Eliquis 2.5 mg BID for stroke prevention.  \par \par (5) AS - Patient underwent an echocardiogram on 11/15/18 and it showed normal LV function with severe  AS (0.9 cm2), moderate MR, mod-severe TR, and severe pulmonary hypertension (72 mmHg).\par \par (6) Followup - 1 month.\par \par 3/18/19.  Patient was subsequently admitted to [facility] with complete heart block requiring a PPM.  She still reports occasional dizziness.  She is no longer on ASA 81 mg.  She is on Atorvastatin for CAD.  She is on Amiodarone 200 mg QD and Metoprolol ER 50 mg for PAF.  She is on Eliquis 2.5 mg BID for stroke prevention.  She is on HCTZ 12.5 mg QOD for HTN.

## 2019-06-27 ENCOUNTER — APPOINTMENT (OUTPATIENT)
Dept: CARDIOLOGY | Facility: CLINIC | Age: 84
End: 2019-06-27
Payer: MEDICAID

## 2019-06-27 ENCOUNTER — NON-APPOINTMENT (OUTPATIENT)
Age: 84
End: 2019-06-27

## 2019-06-27 VITALS
BODY MASS INDEX: 23.43 KG/M2 | RESPIRATION RATE: 17 BRPM | HEART RATE: 74 BPM | DIASTOLIC BLOOD PRESSURE: 70 MMHG | SYSTOLIC BLOOD PRESSURE: 132 MMHG | WEIGHT: 124 LBS | OXYGEN SATURATION: 99 % | TEMPERATURE: 97.7 F

## 2019-06-27 DIAGNOSIS — J34.89 OTHER SPECIFIED DISORDERS OF NOSE AND NASAL SINUSES: ICD-10-CM

## 2019-06-27 PROCEDURE — 93000 ELECTROCARDIOGRAM COMPLETE: CPT

## 2019-06-27 PROCEDURE — 99214 OFFICE O/P EST MOD 30 MIN: CPT

## 2019-06-27 NOTE — PHYSICAL EXAM
[General Appearance - Well Nourished] : well nourished [Normal Appearance] : normal appearance [General Appearance - Well Developed] : well developed [Well Groomed] : well groomed [No Deformities] : no deformities [Normal Conjunctiva] : the conjunctiva exhibited no abnormalities [General Appearance - In No Acute Distress] : no acute distress [Normal Oral Mucosa] : normal oral mucosa [Eyelids - No Xanthelasma] : the eyelids demonstrated no xanthelasmas [Normal Oropharynx] : normal oropharynx [No Oral Pallor] : no oral pallor [No Oral Cyanosis] : no oral cyanosis [Normal Jugular Venous A Waves Present] : normal jugular venous A waves present [No Jugular Venous Osullivan A Waves] : no jugular venous osullivan A waves [Normal Jugular Venous V Waves Present] : normal jugular venous V waves present [Exaggerated Use Of Accessory Muscles For Inspiration] : no accessory muscle use [Respiration, Rhythm And Depth] : normal respiratory rhythm and effort [Auscultation Breath Sounds / Voice Sounds] : lungs were clear to auscultation bilaterally [Bibasilar Rales/Crackles] : bibasilar rales [Heart Rate And Rhythm] : heart rate and rhythm were normal [Heart Sounds] : normal S1 and S2 [Murmurs] : no murmurs present [Arterial Pulses Normal] : the arterial pulses were normal [Regular-Premature Beats] : the rhythm was regular with premature beats [Abdomen Tenderness] : non-tender [Abdomen Soft] : soft [Abdomen Mass (___ Cm)] : no abdominal mass palpated [FreeTextEntry1] : limited mobility.  [Cyanosis, Localized] : no localized cyanosis [] : no ischemic changes [Nail Clubbing] : no clubbing of the fingernails [Petechial Hemorrhages (___cm)] : no petechial hemorrhages [Oriented To Time, Place, And Person] : oriented to person, place, and time [Mood] : the mood was normal [Affect] : the affect was normal [No Anxiety] : not feeling anxious

## 2019-06-27 NOTE — HISTORY OF PRESENT ILLNESS
[FreeTextEntry1] : Patient is feeling ok. She denies dizziness and gets occasional palpitations and SOB only when she is rushing. She has some back pain and her head. She was found to have thyroid issues and needs to see endocrinologist. It may be a side effect of Amiodarone.

## 2019-08-02 ENCOUNTER — OTHER (OUTPATIENT)
Age: 84
End: 2019-08-02

## 2019-09-13 ENCOUNTER — APPOINTMENT (OUTPATIENT)
Dept: ELECTROPHYSIOLOGY | Facility: CLINIC | Age: 84
End: 2019-09-13

## 2019-09-26 ENCOUNTER — APPOINTMENT (OUTPATIENT)
Dept: CARDIOLOGY | Facility: CLINIC | Age: 84
End: 2019-09-26
Payer: MEDICAID

## 2019-09-26 VITALS
WEIGHT: 121 LBS | TEMPERATURE: 98.1 F | HEART RATE: 59 BPM | OXYGEN SATURATION: 99 % | DIASTOLIC BLOOD PRESSURE: 76 MMHG | SYSTOLIC BLOOD PRESSURE: 138 MMHG | RESPIRATION RATE: 17 BRPM | BODY MASS INDEX: 22.86 KG/M2

## 2019-09-26 PROCEDURE — 99214 OFFICE O/P EST MOD 30 MIN: CPT

## 2019-09-26 NOTE — HISTORY OF PRESENT ILLNESS
[FreeTextEntry1] : Patient reports palpitations and SOB with exertion. Patient denies CP. She reports that she is not dizzy right now. Patient has not seen endocrinologist for hyperthyroid and has not have any blood draw since last visit. Patient may consider TAVR for her AS. I advised patient to try melatonin for insomnia. FU In 3 months.

## 2019-09-26 NOTE — PHYSICAL EXAM
[General Appearance - Well Developed] : well developed [Well Groomed] : well groomed [Normal Appearance] : normal appearance [General Appearance - Well Nourished] : well nourished [General Appearance - In No Acute Distress] : no acute distress [No Deformities] : no deformities [Eyelids - No Xanthelasma] : the eyelids demonstrated no xanthelasmas [Normal Conjunctiva] : the conjunctiva exhibited no abnormalities [Normal Oral Mucosa] : normal oral mucosa [No Oral Pallor] : no oral pallor [No Oral Cyanosis] : no oral cyanosis [Normal Oropharynx] : normal oropharynx [Normal Jugular Venous A Waves Present] : normal jugular venous A waves present [No Jugular Venous Osullivan A Waves] : no jugular venous osullivan A waves [Normal Jugular Venous V Waves Present] : normal jugular venous V waves present [Respiration, Rhythm And Depth] : normal respiratory rhythm and effort [Exaggerated Use Of Accessory Muscles For Inspiration] : no accessory muscle use [Auscultation Breath Sounds / Voice Sounds] : lungs were clear to auscultation bilaterally [Bibasilar Rales/Crackles] : bibasilar rales [Heart Sounds] : normal S1 and S2 [Heart Rate And Rhythm] : heart rate and rhythm were normal [Arterial Pulses Normal] : the arterial pulses were normal [Murmurs] : no murmurs present [Regular-Premature Beats] : the rhythm was regular with premature beats [Abdomen Soft] : soft [Abdomen Tenderness] : non-tender [Abdomen Mass (___ Cm)] : no abdominal mass palpated [FreeTextEntry1] : limited mobility.  [Nail Clubbing] : no clubbing of the fingernails [Cyanosis, Localized] : no localized cyanosis [] : no ischemic changes [Petechial Hemorrhages (___cm)] : no petechial hemorrhages [Oriented To Time, Place, And Person] : oriented to person, place, and time [Affect] : the affect was normal [Mood] : the mood was normal [No Anxiety] : not feeling anxious

## 2019-11-18 ENCOUNTER — APPOINTMENT (OUTPATIENT)
Dept: CARDIOLOGY | Facility: CLINIC | Age: 84
End: 2019-11-18
Payer: MEDICAID

## 2019-11-18 ENCOUNTER — APPOINTMENT (OUTPATIENT)
Dept: UROLOGY | Facility: CLINIC | Age: 84
End: 2019-11-18
Payer: MEDICAID

## 2019-11-18 ENCOUNTER — APPOINTMENT (OUTPATIENT)
Age: 84
End: 2019-11-18

## 2019-11-18 VITALS
HEART RATE: 67 BPM | DIASTOLIC BLOOD PRESSURE: 90 MMHG | BODY MASS INDEX: 22.67 KG/M2 | WEIGHT: 120 LBS | OXYGEN SATURATION: 98 % | SYSTOLIC BLOOD PRESSURE: 160 MMHG | TEMPERATURE: 97.7 F | RESPIRATION RATE: 17 BRPM

## 2019-11-18 LAB
BILIRUB UR QL STRIP: NORMAL
CLARITY UR: CLEAR
COLLECTION METHOD: NORMAL
GLUCOSE UR-MCNC: NEGATIVE
HCG UR QL: 0.2 EU/DL
HGB UR QL STRIP.AUTO: NORMAL
KETONES UR-MCNC: NEGATIVE
LEUKOCYTE ESTERASE UR QL STRIP: NEGATIVE
NITRITE UR QL STRIP: NEGATIVE
PH UR STRIP: 6
PROT UR STRIP-MCNC: 100
SP GR UR STRIP: >=1.03

## 2019-11-18 PROCEDURE — 99203 OFFICE O/P NEW LOW 30 MIN: CPT | Mod: 25

## 2019-11-18 PROCEDURE — 81003 URINALYSIS AUTO W/O SCOPE: CPT | Mod: QW

## 2019-11-18 PROCEDURE — 99214 OFFICE O/P EST MOD 30 MIN: CPT

## 2019-11-18 PROCEDURE — 51798 US URINE CAPACITY MEASURE: CPT

## 2019-11-18 NOTE — ASSESSMENT
[FreeTextEntry1] : Patient is a 87 yo F who presents with urinary frequency and nocturia.\par \par Udip today showed high spec grav, neg for gluc, nit/LE\par Will r/o polyuria with 24 hr urine testing\par Suspect symptoms are multifactorial - related to age, diuretic use.\par Given her age and comorbidities, would not be inclined to give mirabegron (due to BP) or anticholinergics.\par F/u for renal ULT

## 2019-11-18 NOTE — HISTORY OF PRESENT ILLNESS
[FreeTextEntry1] : Patient is 89 yo F who presents for increased nocturia and urinary frequency.  She reports many years of nocturia x3, more recently nocturia x5.  This has occurred over past 2 wks.  She denies any dysuria, gross hematuria, incontinence. \par At baseline she states she has urinary frequency, q1 hr at baseline.\par She denies hx of UTI.

## 2019-11-18 NOTE — PHYSICAL EXAM
[General Appearance - Well Developed] : well developed [General Appearance - Well Nourished] : well nourished [Normal Appearance] : normal appearance [General Appearance - In No Acute Distress] : no acute distress [Well Groomed] : well groomed [Edema] : no peripheral edema [Respiration, Rhythm And Depth] : normal respiratory rhythm and effort [Exaggerated Use Of Accessory Muscles For Inspiration] : no accessory muscle use [Abdomen Soft] : soft [Abdomen Tenderness] : non-tender [Costovertebral Angle Tenderness] : no ~M costovertebral angle tenderness [Urinary Bladder Findings] : the bladder was normal on palpation [Normal Station and Gait] : the gait and station were normal for the patient's age [] : no rash [Oriented To Time, Place, And Person] : oriented to person, place, and time [No Focal Deficits] : no focal deficits [Affect] : the affect was normal [Not Anxious] : not anxious [Mood] : the mood was normal

## 2019-11-21 ENCOUNTER — APPOINTMENT (OUTPATIENT)
Age: 84
End: 2019-11-21

## 2019-11-21 LAB
ANION GAP SERPL CALC-SCNC: 13 MMOL/L
APPEARANCE: ABNORMAL
BACTERIA UR CULT: NORMAL
BACTERIA: NEGATIVE
BILIRUBIN URINE: NEGATIVE
BLOOD URINE: NORMAL
BUN SERPL-MCNC: 27 MG/DL
CALCIUM OXALATE CRYSTALS: ABNORMAL
CALCIUM SERPL-MCNC: 10.4 MG/DL
CHLORIDE SERPL-SCNC: 100 MMOL/L
CO2 SERPL-SCNC: 28 MMOL/L
COLOR: YELLOW
CREAT SERPL-MCNC: 1.44 MG/DL
GLUCOSE QUALITATIVE U: NEGATIVE
GLUCOSE SERPL-MCNC: 111 MG/DL
HYALINE CASTS: 0 /LPF
KETONES URINE: NEGATIVE
LEUKOCYTE ESTERASE URINE: NEGATIVE
MICROSCOPIC-UA: NORMAL
NITRITE URINE: NEGATIVE
PH URINE: 6
POTASSIUM SERPL-SCNC: 5.1 MMOL/L
PROTEIN URINE: ABNORMAL
RED BLOOD CELLS URINE: 6 /HPF
SODIUM SERPL-SCNC: 141 MMOL/L
SPECIFIC GRAVITY URINE: 1.03
SQUAMOUS EPITHELIAL CELLS: 4 /HPF
UROBILINOGEN URINE: NORMAL
WHITE BLOOD CELLS URINE: 5 /HPF

## 2019-11-25 ENCOUNTER — APPOINTMENT (OUTPATIENT)
Dept: UROLOGY | Facility: CLINIC | Age: 84
End: 2019-11-25
Payer: MEDICAID

## 2019-11-25 ENCOUNTER — APPOINTMENT (OUTPATIENT)
Age: 84
End: 2019-11-25

## 2019-11-25 DIAGNOSIS — R35.1 NOCTURIA: ICD-10-CM

## 2019-11-25 LAB
CHLORIDE UR-SCNC: 126 MMOL/24HR
CREAT 24H UR-MCNC: 0.9 G/24 H
CREAT ?TM UR-MCNC: 72 MG/DL
GLUCOSE CALCULATION: <25 MG/24 H
OSMOLALITY 24H UR: 727 MOSM/KG
POTASSIUM 24H UR-MRATE: 54.6 MMOL/24HR
POTASSIUM ?TM SUB UR QN: 43.7 MMOL/L
PROT ?TM UR-MCNC: 24 HR
SODIUM 24H UR-SCNC: 98 MMOL/L
SODIUM 24H UR-SRATE: 122 MMOL/24HR
SPECIMEN VOL 24H UR: 1250 ML
U CL: 101 MMOL/L
U GLUC: <2 MG/DL
U UREA: 815 MG/DL
UUN 24H UR-MRATE: 10.2 G/24H

## 2019-11-25 PROCEDURE — 76775 US EXAM ABDO BACK WALL LIM: CPT

## 2019-11-25 PROCEDURE — 99213 OFFICE O/P EST LOW 20 MIN: CPT | Mod: 25

## 2019-11-25 NOTE — ASSESSMENT
[FreeTextEntry1] : Patient is a 87 yo F who presents with urinary frequency and nocturia.\par \par No evidence for UTI and 24 hr urine testing negative for polyuria\par Suspect symptoms are multifactorial - related to age, diuretic use.\par Given her age and comorbidities, would not be inclined to give mirabegron (due to BP) or anticholinergics.\par D/w pt that perhaps a nephrologist may be willing to consider DDAVP rx for pt.\par She will see her PCP for a referral to nephrologist\par F/u PRN\par \par

## 2019-11-25 NOTE — PHYSICAL EXAM
[General Appearance - Well Developed] : well developed [General Appearance - Well Nourished] : well nourished [Normal Appearance] : normal appearance [Well Groomed] : well groomed [Oriented To Time, Place, And Person] : oriented to person, place, and time [General Appearance - In No Acute Distress] : no acute distress [Mood] : the mood was normal [Not Anxious] : not anxious [Affect] : the affect was normal

## 2020-01-01 ENCOUNTER — APPOINTMENT (OUTPATIENT)
Dept: CARDIOLOGY | Facility: CLINIC | Age: 85
End: 2020-01-01
Payer: MEDICAID

## 2020-01-01 ENCOUNTER — NON-APPOINTMENT (OUTPATIENT)
Age: 85
End: 2020-01-01

## 2020-01-01 ENCOUNTER — APPOINTMENT (OUTPATIENT)
Dept: CARDIOLOGY | Facility: CLINIC | Age: 85
End: 2020-01-01

## 2020-01-01 VITALS
TEMPERATURE: 97.3 F | HEART RATE: 77 BPM | RESPIRATION RATE: 17 BRPM | SYSTOLIC BLOOD PRESSURE: 108 MMHG | BODY MASS INDEX: 22.3 KG/M2 | DIASTOLIC BLOOD PRESSURE: 63 MMHG | WEIGHT: 118 LBS | OXYGEN SATURATION: 97 %

## 2020-01-01 DIAGNOSIS — I48.0 PAROXYSMAL ATRIAL FIBRILLATION: ICD-10-CM

## 2020-01-01 DIAGNOSIS — Z95.0 PRESENCE OF CARDIAC PACEMAKER: ICD-10-CM

## 2020-01-01 PROCEDURE — 93000 ELECTROCARDIOGRAM COMPLETE: CPT

## 2020-01-01 PROCEDURE — 99214 OFFICE O/P EST MOD 30 MIN: CPT

## 2020-07-12 PROBLEM — Z95.0 PRESENCE OF CARDIAC PACEMAKER: Status: ACTIVE | Noted: 2019-04-18

## 2020-07-26 NOTE — REASON FOR VISIT
[FreeTextEntry1] : Patient wants to stop Amiodarone 200 mg.  I have no objection but warned pt that AFIB will likely return.\par Patient denies CP.  Patient reports MOULTON stable, occasional palpitations lasting 1 min.Patient reports cough, has h/o pulm nodules in 2018, check chest CT. I advised patient to continue Plavix 75 mg Eliquis 2.5 mg BID Metoprolol ER 50 mg. Patient had stopped taking  diuretics, which is okay, her BP is good. Fu in 3 months.

## 2020-07-26 NOTE — PHYSICAL EXAM
[Normal Appearance] : normal appearance [General Appearance - Well Developed] : well developed [General Appearance - Well Nourished] : well nourished [Well Groomed] : well groomed [No Deformities] : no deformities [Normal Conjunctiva] : the conjunctiva exhibited no abnormalities [General Appearance - In No Acute Distress] : no acute distress [Eyelids - No Xanthelasma] : the eyelids demonstrated no xanthelasmas [Normal Oral Mucosa] : normal oral mucosa [No Oral Cyanosis] : no oral cyanosis [No Oral Pallor] : no oral pallor [Normal Oropharynx] : normal oropharynx [Normal Jugular Venous A Waves Present] : normal jugular venous A waves present [No Jugular Venous Osullivan A Waves] : no jugular venous osullivan A waves [Normal Jugular Venous V Waves Present] : normal jugular venous V waves present [Exaggerated Use Of Accessory Muscles For Inspiration] : no accessory muscle use [Respiration, Rhythm And Depth] : normal respiratory rhythm and effort [Bibasilar Rales/Crackles] : bibasilar rales [Auscultation Breath Sounds / Voice Sounds] : lungs were clear to auscultation bilaterally [Heart Rate And Rhythm] : heart rate and rhythm were normal [Arterial Pulses Normal] : the arterial pulses were normal [Heart Sounds] : normal S1 and S2 [Murmurs] : no murmurs present [Regular-Premature Beats] : the rhythm was regular with premature beats [Abdomen Soft] : soft [Abdomen Tenderness] : non-tender [FreeTextEntry1] : limited mobility.  [Nail Clubbing] : no clubbing of the fingernails [Abdomen Mass (___ Cm)] : no abdominal mass palpated [Oriented To Time, Place, And Person] : oriented to person, place, and time [Cyanosis, Localized] : no localized cyanosis [Petechial Hemorrhages (___cm)] : no petechial hemorrhages [] : no ischemic changes [No Anxiety] : not feeling anxious [Mood] : the mood was normal [Affect] : the affect was normal

## 2021-01-01 ENCOUNTER — RESULT REVIEW (OUTPATIENT)
Age: 86
End: 2021-01-01

## 2021-01-01 ENCOUNTER — INPATIENT (INPATIENT)
Facility: HOSPITAL | Age: 86
LOS: 19 days | Discharge: HOME HEALTH | DRG: 286 | End: 2021-04-20
Attending: INTERNAL MEDICINE | Admitting: STUDENT IN AN ORGANIZED HEALTH CARE EDUCATION/TRAINING PROGRAM
Payer: COMMERCIAL

## 2021-01-01 ENCOUNTER — APPOINTMENT (OUTPATIENT)
Dept: CARDIOTHORACIC SURGERY | Facility: CLINIC | Age: 86
End: 2021-01-01
Payer: MEDICARE

## 2021-01-01 ENCOUNTER — INPATIENT (INPATIENT)
Facility: HOSPITAL | Age: 86
LOS: 5 days | Discharge: HOME HEALTH | End: 2021-05-05
Attending: THORACIC SURGERY (CARDIOTHORACIC VASCULAR SURGERY) | Admitting: THORACIC SURGERY (CARDIOTHORACIC VASCULAR SURGERY)
Payer: MEDICARE

## 2021-01-01 ENCOUNTER — TRANSCRIPTION ENCOUNTER (OUTPATIENT)
Age: 86
End: 2021-01-01

## 2021-01-01 ENCOUNTER — APPOINTMENT (OUTPATIENT)
Dept: CARDIOLOGY | Facility: CLINIC | Age: 86
End: 2021-01-01

## 2021-01-01 ENCOUNTER — APPOINTMENT (OUTPATIENT)
Dept: HEMATOLOGY ONCOLOGY | Facility: CLINIC | Age: 86
End: 2021-01-01

## 2021-01-01 ENCOUNTER — NON-APPOINTMENT (OUTPATIENT)
Age: 86
End: 2021-01-01

## 2021-01-01 ENCOUNTER — APPOINTMENT (OUTPATIENT)
Dept: THORACIC SURGERY | Facility: CLINIC | Age: 86
End: 2021-01-01
Payer: MEDICARE

## 2021-01-01 ENCOUNTER — APPOINTMENT (OUTPATIENT)
Dept: THORACIC SURGERY | Facility: HOSPITAL | Age: 86
End: 2021-01-01

## 2021-01-01 ENCOUNTER — APPOINTMENT (OUTPATIENT)
Dept: CARDIOTHORACIC SURGERY | Facility: CLINIC | Age: 86
End: 2021-01-01

## 2021-01-01 ENCOUNTER — APPOINTMENT (OUTPATIENT)
Dept: CARDIOLOGY | Facility: CLINIC | Age: 86
End: 2021-01-01
Payer: MEDICARE

## 2021-01-01 ENCOUNTER — OUTPATIENT (OUTPATIENT)
Dept: OUTPATIENT SERVICES | Facility: HOSPITAL | Age: 86
LOS: 1 days | Discharge: HOME | End: 2021-01-01

## 2021-01-01 ENCOUNTER — APPOINTMENT (OUTPATIENT)
Age: 86
End: 2021-01-01
Payer: MEDICARE

## 2021-01-01 ENCOUNTER — INPATIENT (INPATIENT)
Facility: HOSPITAL | Age: 86
LOS: 3 days | DRG: 291 | End: 2021-05-11
Attending: INTERNAL MEDICINE | Admitting: INTERNAL MEDICINE
Payer: MEDICARE

## 2021-01-01 ENCOUNTER — APPOINTMENT (OUTPATIENT)
Dept: CARDIOTHORACIC SURGERY | Facility: HOSPITAL | Age: 86
End: 2021-01-01

## 2021-01-01 VITALS
TEMPERATURE: 98 F | SYSTOLIC BLOOD PRESSURE: 112 MMHG | HEART RATE: 70 BPM | OXYGEN SATURATION: 98 % | DIASTOLIC BLOOD PRESSURE: 47 MMHG | RESPIRATION RATE: 18 BRPM

## 2021-01-01 VITALS
DIASTOLIC BLOOD PRESSURE: 51 MMHG | OXYGEN SATURATION: 97 % | HEIGHT: 62 IN | SYSTOLIC BLOOD PRESSURE: 123 MMHG | RESPIRATION RATE: 18 BRPM | TEMPERATURE: 98 F | HEART RATE: 72 BPM

## 2021-01-01 VITALS
DIASTOLIC BLOOD PRESSURE: 81 MMHG | WEIGHT: 110 LBS | RESPIRATION RATE: 17 BRPM | BODY MASS INDEX: 20.78 KG/M2 | OXYGEN SATURATION: 95 % | HEART RATE: 63 BPM | TEMPERATURE: 97.5 F | SYSTOLIC BLOOD PRESSURE: 150 MMHG

## 2021-01-01 VITALS
TEMPERATURE: 97 F | HEART RATE: 65 BPM | DIASTOLIC BLOOD PRESSURE: 51 MMHG | RESPIRATION RATE: 17 BRPM | SYSTOLIC BLOOD PRESSURE: 96 MMHG | OXYGEN SATURATION: 94 %

## 2021-01-01 VITALS
HEART RATE: 60 BPM | TEMPERATURE: 98 F | RESPIRATION RATE: 18 BRPM | DIASTOLIC BLOOD PRESSURE: 78 MMHG | OXYGEN SATURATION: 96 % | SYSTOLIC BLOOD PRESSURE: 152 MMHG | HEIGHT: 62 IN | WEIGHT: 110.89 LBS

## 2021-01-01 VITALS — OXYGEN SATURATION: 95 % | HEART RATE: 90 BPM

## 2021-01-01 VITALS — HEART RATE: 78 BPM | OXYGEN SATURATION: 99 %

## 2021-01-01 DIAGNOSIS — R06.02 SHORTNESS OF BREATH: ICD-10-CM

## 2021-01-01 DIAGNOSIS — I35.0 NONRHEUMATIC AORTIC (VALVE) STENOSIS: ICD-10-CM

## 2021-01-01 DIAGNOSIS — Z98.49 CATARACT EXTRACTION STATUS, UNSPECIFIED EYE: Chronic | ICD-10-CM

## 2021-01-01 DIAGNOSIS — Z90.49 ACQUIRED ABSENCE OF OTHER SPECIFIED PARTS OF DIGESTIVE TRACT: Chronic | ICD-10-CM

## 2021-01-01 DIAGNOSIS — R07.9 CHEST PAIN, UNSPECIFIED: ICD-10-CM

## 2021-01-01 DIAGNOSIS — C79.9 SECONDARY MALIGNANT NEOPLASM OF UNSPECIFIED SITE: ICD-10-CM

## 2021-01-01 DIAGNOSIS — I48.0 PAROXYSMAL ATRIAL FIBRILLATION: ICD-10-CM

## 2021-01-01 DIAGNOSIS — D64.9 ANEMIA, UNSPECIFIED: ICD-10-CM

## 2021-01-01 DIAGNOSIS — Z96.653 PRESENCE OF ARTIFICIAL KNEE JOINT, BILATERAL: Chronic | ICD-10-CM

## 2021-01-01 DIAGNOSIS — J90 PLEURAL EFFUSION, NOT ELSEWHERE CLASSIFIED: ICD-10-CM

## 2021-01-01 DIAGNOSIS — J91.0 MALIGNANT PLEURAL EFFUSION: ICD-10-CM

## 2021-01-01 DIAGNOSIS — E78.5 HYPERLIPIDEMIA, UNSPECIFIED: ICD-10-CM

## 2021-01-01 DIAGNOSIS — I95.1 ORTHOSTATIC HYPOTENSION: ICD-10-CM

## 2021-01-01 DIAGNOSIS — C25.9 SECONDARY MALIGNANT NEOPLASM OF UNSPECIFIED SITE: ICD-10-CM

## 2021-01-01 DIAGNOSIS — I25.10 ATHEROSCLEROTIC HEART DISEASE OF NATIVE CORONARY ARTERY WITHOUT ANGINA PECTORIS: ICD-10-CM

## 2021-01-01 DIAGNOSIS — Z98.51 TUBAL LIGATION STATUS: Chronic | ICD-10-CM

## 2021-01-01 DIAGNOSIS — Z87.898 PERSONAL HISTORY OF OTHER SPECIFIED CONDITIONS: ICD-10-CM

## 2021-01-01 DIAGNOSIS — Z71.89 OTHER SPECIFIED COUNSELING: ICD-10-CM

## 2021-01-01 DIAGNOSIS — N18.32 CHRONIC KIDNEY DISEASE, STAGE 3B: ICD-10-CM

## 2021-01-01 DIAGNOSIS — R11.2 NAUSEA WITH VOMITING, UNSPECIFIED: ICD-10-CM

## 2021-01-01 DIAGNOSIS — I10 ESSENTIAL (PRIMARY) HYPERTENSION: ICD-10-CM

## 2021-01-01 DIAGNOSIS — E03.9 HYPOTHYROIDISM, UNSPECIFIED: ICD-10-CM

## 2021-01-01 DIAGNOSIS — Z86.39 PERSONAL HISTORY OF OTHER ENDOCRINE, NUTRITIONAL AND METABOLIC DISEASE: ICD-10-CM

## 2021-01-01 DIAGNOSIS — Z98.890 OTHER SPECIFIED POSTPROCEDURAL STATES: Chronic | ICD-10-CM

## 2021-01-01 DIAGNOSIS — K59.00 CONSTIPATION, UNSPECIFIED: ICD-10-CM

## 2021-01-01 DIAGNOSIS — C25.9 MALIGNANT NEOPLASM OF PANCREAS, UNSPECIFIED: ICD-10-CM

## 2021-01-01 DIAGNOSIS — Z86.79 PERSONAL HISTORY OF OTHER DISEASES OF THE CIRCULATORY SYSTEM: ICD-10-CM

## 2021-01-01 DIAGNOSIS — I25.10 ATHEROSCLEROTIC HEART DISEASE OF NATIVE CORONARY ARTERY W/OUT ANGINA PECTORIS: ICD-10-CM

## 2021-01-01 DIAGNOSIS — Z87.09 PERSONAL HISTORY OF OTHER DISEASES OF THE RESPIRATORY SYSTEM: ICD-10-CM

## 2021-01-01 DIAGNOSIS — Z01.810 ENCOUNTER FOR PREPROCEDURAL CARDIOVASCULAR EXAMINATION: ICD-10-CM

## 2021-01-01 DIAGNOSIS — I72.3 ANEURYSM OF ILIAC ARTERY: ICD-10-CM

## 2021-01-01 DIAGNOSIS — Z95.5 PRESENCE OF CORONARY ANGIOPLASTY IMPLANT AND GRAFT: Chronic | ICD-10-CM

## 2021-01-01 DIAGNOSIS — J81.0 ACUTE PULMONARY EDEMA: ICD-10-CM

## 2021-01-01 DIAGNOSIS — Z29.9 ENCOUNTER FOR PROPHYLACTIC MEASURES, UNSPECIFIED: ICD-10-CM

## 2021-01-01 DIAGNOSIS — G89.3 NEOPLASM RELATED PAIN (ACUTE) (CHRONIC): ICD-10-CM

## 2021-01-01 DIAGNOSIS — Q89.09 CONGENITAL MALFORMATIONS OF SPLEEN: ICD-10-CM

## 2021-01-01 DIAGNOSIS — I48.20 CHRONIC ATRIAL FIBRILLATION, UNSPECIFIED: ICD-10-CM

## 2021-01-01 DIAGNOSIS — E27.8 OTHER SPECIFIED DISORDERS OF ADRENAL GLAND: ICD-10-CM

## 2021-01-01 DIAGNOSIS — Z95.820 PERIPHERAL VASCULAR ANGIOPLASTY STATUS WITH IMPLANTS AND GRAFTS: ICD-10-CM

## 2021-01-01 DIAGNOSIS — N17.9 ACUTE KIDNEY FAILURE, UNSPECIFIED: ICD-10-CM

## 2021-01-01 DIAGNOSIS — I48.91 UNSPECIFIED ATRIAL FIBRILLATION: ICD-10-CM

## 2021-01-01 DIAGNOSIS — M41.9 SCOLIOSIS, UNSPECIFIED: ICD-10-CM

## 2021-01-01 DIAGNOSIS — Z95.0 PRESENCE OF CARDIAC PACEMAKER: Chronic | ICD-10-CM

## 2021-01-01 DIAGNOSIS — G45.9 TRANSIENT CEREBRAL ISCHEMIC ATTACK, UNSPECIFIED: ICD-10-CM

## 2021-01-01 LAB
A1C WITH ESTIMATED AVERAGE GLUCOSE RESULT: 6.3 % — HIGH (ref 4–5.6)
ALBUMIN FLD-MCNC: 2.8 G/DL — SIGNIFICANT CHANGE UP
ALBUMIN SERPL ELPH-MCNC: 2.4 G/DL — LOW (ref 3.3–5)
ALBUMIN SERPL ELPH-MCNC: 2.5 G/DL — LOW (ref 3.3–5)
ALBUMIN SERPL ELPH-MCNC: 2.8 G/DL — LOW (ref 3.3–5)
ALBUMIN SERPL ELPH-MCNC: 3 G/DL — LOW (ref 3.3–5)
ALBUMIN SERPL ELPH-MCNC: 3 G/DL — LOW (ref 3.3–5)
ALBUMIN SERPL ELPH-MCNC: 3.5 G/DL — SIGNIFICANT CHANGE UP (ref 3.3–5)
ALBUMIN SERPL ELPH-MCNC: 3.6 G/DL — SIGNIFICANT CHANGE UP (ref 3.3–5)
ALBUMIN SERPL ELPH-MCNC: 3.6 G/DL — SIGNIFICANT CHANGE UP (ref 3.3–5)
ALBUMIN SERPL ELPH-MCNC: 3.8 G/DL — SIGNIFICANT CHANGE UP (ref 3.3–5)
ALBUMIN SERPL ELPH-MCNC: 3.9 G/DL — SIGNIFICANT CHANGE UP (ref 3.3–5)
ALBUMIN SERPL ELPH-MCNC: 4.1 G/DL — SIGNIFICANT CHANGE UP (ref 3.3–5)
ALBUMIN SERPL ELPH-MCNC: 4.1 G/DL — SIGNIFICANT CHANGE UP (ref 3.3–5)
ALBUMIN SERPL ELPH-MCNC: 4.2 G/DL — SIGNIFICANT CHANGE UP (ref 3.3–5)
ALDOST SERPL-MCNC: 6.1 NG/DL — SIGNIFICANT CHANGE UP
ALP SERPL-CCNC: 110 U/L — SIGNIFICANT CHANGE UP (ref 40–120)
ALP SERPL-CCNC: 74 U/L — SIGNIFICANT CHANGE UP (ref 40–120)
ALP SERPL-CCNC: 75 U/L — SIGNIFICANT CHANGE UP (ref 40–120)
ALP SERPL-CCNC: 77 U/L — SIGNIFICANT CHANGE UP (ref 40–120)
ALP SERPL-CCNC: 78 U/L — SIGNIFICANT CHANGE UP (ref 40–120)
ALP SERPL-CCNC: 80 U/L — SIGNIFICANT CHANGE UP (ref 40–120)
ALP SERPL-CCNC: 82 U/L — SIGNIFICANT CHANGE UP (ref 40–120)
ALP SERPL-CCNC: 82 U/L — SIGNIFICANT CHANGE UP (ref 40–120)
ALP SERPL-CCNC: 83 U/L — SIGNIFICANT CHANGE UP (ref 40–120)
ALP SERPL-CCNC: 86 U/L — SIGNIFICANT CHANGE UP (ref 40–120)
ALP SERPL-CCNC: 88 U/L — SIGNIFICANT CHANGE UP (ref 40–120)
ALP SERPL-CCNC: 96 U/L — SIGNIFICANT CHANGE UP (ref 40–120)
ALP SERPL-CCNC: 99 U/L — SIGNIFICANT CHANGE UP (ref 40–120)
ALT FLD-CCNC: 11 U/L — SIGNIFICANT CHANGE UP (ref 10–45)
ALT FLD-CCNC: 14 U/L — SIGNIFICANT CHANGE UP (ref 10–45)
ALT FLD-CCNC: 17 U/L — SIGNIFICANT CHANGE UP (ref 10–45)
ALT FLD-CCNC: 22 U/L — SIGNIFICANT CHANGE UP (ref 10–45)
ALT FLD-CCNC: 22 U/L — SIGNIFICANT CHANGE UP (ref 4–33)
ALT FLD-CCNC: 23 U/L — SIGNIFICANT CHANGE UP (ref 10–45)
ALT FLD-CCNC: 24 U/L — SIGNIFICANT CHANGE UP (ref 10–45)
ALT FLD-CCNC: 24 U/L — SIGNIFICANT CHANGE UP (ref 4–33)
ALT FLD-CCNC: 25 U/L — SIGNIFICANT CHANGE UP (ref 4–33)
ALT FLD-CCNC: 29 U/L — SIGNIFICANT CHANGE UP (ref 10–45)
ALT FLD-CCNC: 30 U/L — SIGNIFICANT CHANGE UP (ref 10–45)
ALT FLD-CCNC: 32 U/L — SIGNIFICANT CHANGE UP (ref 10–45)
ALT FLD-CCNC: 36 U/L — HIGH (ref 4–33)
ALT FLD-CCNC: 36 U/L — SIGNIFICANT CHANGE UP (ref 10–45)
ALT FLD-CCNC: 42 U/L — SIGNIFICANT CHANGE UP (ref 10–45)
ALT FLD-CCNC: 56 U/L — HIGH (ref 10–45)
ALT FLD-CCNC: 63 U/L — HIGH (ref 10–45)
AMYLASE P1 CFR SERPL: 61 U/L — SIGNIFICANT CHANGE UP (ref 25–125)
ANION GAP SERPL CALC-SCNC: 10 MMOL/L — SIGNIFICANT CHANGE UP (ref 5–17)
ANION GAP SERPL CALC-SCNC: 11 MMOL/L — SIGNIFICANT CHANGE UP (ref 5–17)
ANION GAP SERPL CALC-SCNC: 11 MMOL/L — SIGNIFICANT CHANGE UP (ref 7–14)
ANION GAP SERPL CALC-SCNC: 12 MMOL/L — SIGNIFICANT CHANGE UP (ref 5–17)
ANION GAP SERPL CALC-SCNC: 13 MMOL/L — SIGNIFICANT CHANGE UP (ref 5–17)
ANION GAP SERPL CALC-SCNC: 14 MMOL/L — SIGNIFICANT CHANGE UP (ref 5–17)
ANION GAP SERPL CALC-SCNC: 15 MMOL/L — HIGH (ref 7–14)
ANION GAP SERPL CALC-SCNC: 16 MMOL/L — HIGH (ref 7–14)
ANION GAP SERPL CALC-SCNC: 6 MMOL/L — SIGNIFICANT CHANGE UP (ref 5–17)
ANION GAP SERPL CALC-SCNC: 7 MMOL/L — SIGNIFICANT CHANGE UP (ref 5–17)
ANION GAP SERPL CALC-SCNC: 7 MMOL/L — SIGNIFICANT CHANGE UP (ref 5–17)
ANION GAP SERPL CALC-SCNC: 8 MMOL/L — SIGNIFICANT CHANGE UP (ref 5–17)
ANION GAP SERPL CALC-SCNC: 8 MMOL/L — SIGNIFICANT CHANGE UP (ref 5–17)
ANION GAP SERPL CALC-SCNC: 8 MMOL/L — SIGNIFICANT CHANGE UP (ref 7–14)
ANION GAP SERPL CALC-SCNC: 9 MMOL/L — SIGNIFICANT CHANGE UP (ref 7–14)
APPEARANCE UR: CLEAR — SIGNIFICANT CHANGE UP
APTT BLD: 104.3 SEC — HIGH (ref 27.5–35.5)
APTT BLD: 185.9 SEC — CRITICAL HIGH (ref 27.5–35.5)
APTT BLD: 187 SEC — CRITICAL HIGH (ref 27.5–35.5)
APTT BLD: 28.7 SEC — SIGNIFICANT CHANGE UP (ref 27.5–35.5)
APTT BLD: 29.6 SEC — SIGNIFICANT CHANGE UP (ref 27.5–35.5)
APTT BLD: 31.9 SEC — SIGNIFICANT CHANGE UP (ref 27.5–35.5)
APTT BLD: 33 SEC — SIGNIFICANT CHANGE UP (ref 27–36.3)
APTT BLD: 38.2 SEC — HIGH (ref 27.5–35.5)
APTT BLD: 43.8 SEC — HIGH (ref 27.5–35.5)
APTT BLD: 45.4 SEC — HIGH (ref 27.5–35.5)
APTT BLD: 46.9 SEC — HIGH (ref 27.5–35.5)
APTT BLD: 49.7 SEC — HIGH (ref 27.5–35.5)
APTT BLD: 53.9 SEC — HIGH (ref 27.5–35.5)
APTT BLD: 58.3 SEC — HIGH (ref 27.5–35.5)
APTT BLD: 59 SEC — HIGH (ref 27.5–35.5)
APTT BLD: 59.2 SEC — HIGH (ref 27.5–35.5)
APTT BLD: 59.9 SEC — HIGH (ref 27.5–35.5)
APTT BLD: 61 SEC — HIGH (ref 27.5–35.5)
APTT BLD: 61.6 SEC — HIGH (ref 27.5–35.5)
APTT BLD: 62.8 SEC — HIGH (ref 27.5–35.5)
APTT BLD: 63.3 SEC — HIGH (ref 27.5–35.5)
APTT BLD: 65.9 SEC — HIGH (ref 27.5–35.5)
APTT BLD: 66 SEC — HIGH (ref 27.5–35.5)
APTT BLD: 66 SEC — HIGH (ref 27.5–35.5)
APTT BLD: 67.3 SEC — HIGH (ref 27.5–35.5)
APTT BLD: 67.9 SEC — HIGH (ref 27.5–35.5)
APTT BLD: 69.2 SEC — HIGH (ref 27.5–35.5)
APTT BLD: 70 SEC — HIGH (ref 27.5–35.5)
APTT BLD: 72.5 SEC — HIGH (ref 27.5–35.5)
APTT BLD: 73 SEC — HIGH (ref 27.5–35.5)
APTT BLD: 73.9 SEC — HIGH (ref 27.5–35.5)
APTT BLD: 74.3 SEC — HIGH (ref 27.5–35.5)
APTT BLD: 74.7 SEC — HIGH (ref 27.5–35.5)
APTT BLD: 76.5 SEC — HIGH (ref 27.5–35.5)
APTT BLD: 77.5 SEC — HIGH (ref 27.5–35.5)
APTT BLD: 79.5 SEC — HIGH (ref 27.5–35.5)
APTT BLD: 79.8 SEC — HIGH (ref 27.5–35.5)
APTT BLD: 84.7 SEC — HIGH (ref 27.5–35.5)
APTT BLD: 89.9 SEC — HIGH (ref 27.5–35.5)
APTT BLD: 93.4 SEC — HIGH (ref 27.5–35.5)
APTT BLD: 93.6 SEC — HIGH (ref 27.5–35.5)
AST SERPL-CCNC: 14 U/L — SIGNIFICANT CHANGE UP (ref 10–40)
AST SERPL-CCNC: 19 U/L — SIGNIFICANT CHANGE UP (ref 4–32)
AST SERPL-CCNC: 23 U/L — SIGNIFICANT CHANGE UP (ref 10–40)
AST SERPL-CCNC: 24 U/L — SIGNIFICANT CHANGE UP (ref 10–40)
AST SERPL-CCNC: 24 U/L — SIGNIFICANT CHANGE UP (ref 4–32)
AST SERPL-CCNC: 26 U/L — SIGNIFICANT CHANGE UP (ref 10–40)
AST SERPL-CCNC: 28 U/L — SIGNIFICANT CHANGE UP (ref 4–32)
AST SERPL-CCNC: 29 U/L — SIGNIFICANT CHANGE UP (ref 10–40)
AST SERPL-CCNC: 29 U/L — SIGNIFICANT CHANGE UP (ref 4–32)
AST SERPL-CCNC: 32 U/L — SIGNIFICANT CHANGE UP (ref 10–40)
AST SERPL-CCNC: 34 U/L — SIGNIFICANT CHANGE UP (ref 10–40)
AST SERPL-CCNC: 37 U/L — SIGNIFICANT CHANGE UP (ref 10–40)
AST SERPL-CCNC: 42 U/L — HIGH (ref 10–40)
AST SERPL-CCNC: 44 U/L — HIGH (ref 10–40)
B PERT IGG+IGM PNL SER: ABNORMAL
BACTERIA # UR AUTO: NEGATIVE /HPF — SIGNIFICANT CHANGE UP
BACTERIA # UR AUTO: NEGATIVE — SIGNIFICANT CHANGE UP
BASOPHILS # BLD AUTO: 0.01 K/UL — SIGNIFICANT CHANGE UP (ref 0–0.2)
BASOPHILS # BLD AUTO: 0.02 K/UL — SIGNIFICANT CHANGE UP (ref 0–0.2)
BASOPHILS # BLD AUTO: 0.03 K/UL — SIGNIFICANT CHANGE UP (ref 0–0.2)
BASOPHILS # BLD AUTO: 0.04 K/UL — SIGNIFICANT CHANGE UP (ref 0–0.2)
BASOPHILS NFR BLD AUTO: 0.2 % — SIGNIFICANT CHANGE UP (ref 0–2)
BASOPHILS NFR BLD AUTO: 0.3 % — SIGNIFICANT CHANGE UP (ref 0–2)
BASOPHILS NFR BLD AUTO: 0.4 % — SIGNIFICANT CHANGE UP (ref 0–2)
BASOPHILS NFR BLD AUTO: 0.4 % — SIGNIFICANT CHANGE UP (ref 0–2)
BILIRUB DIRECT SERPL-MCNC: 0.2 MG/DL — SIGNIFICANT CHANGE UP (ref 0–0.2)
BILIRUB INDIRECT FLD-MCNC: 0.6 MG/DL — SIGNIFICANT CHANGE UP (ref 0.2–1)
BILIRUB SERPL-MCNC: 0.4 MG/DL — SIGNIFICANT CHANGE UP (ref 0.2–1.2)
BILIRUB SERPL-MCNC: 0.5 MG/DL — SIGNIFICANT CHANGE UP (ref 0.2–1.2)
BILIRUB SERPL-MCNC: 0.6 MG/DL — SIGNIFICANT CHANGE UP (ref 0.2–1.2)
BILIRUB SERPL-MCNC: 0.6 MG/DL — SIGNIFICANT CHANGE UP (ref 0.2–1.2)
BILIRUB SERPL-MCNC: 0.7 MG/DL — SIGNIFICANT CHANGE UP (ref 0.2–1.2)
BILIRUB SERPL-MCNC: 0.8 MG/DL — SIGNIFICANT CHANGE UP (ref 0.2–1.2)
BILIRUB SERPL-MCNC: 0.9 MG/DL — SIGNIFICANT CHANGE UP (ref 0.2–1.2)
BILIRUB SERPL-MCNC: 0.9 MG/DL — SIGNIFICANT CHANGE UP (ref 0.2–1.2)
BILIRUB UR-MCNC: NEGATIVE — SIGNIFICANT CHANGE UP
BLD GP AB SCN SERPL QL: NEGATIVE — SIGNIFICANT CHANGE UP
BLOOD GAS ARTERIAL - LYTES,HGB,ICA,LACT RESULT: SIGNIFICANT CHANGE UP
BLOOD GAS ARTERIAL COMPREHENSIVE RESULT: SIGNIFICANT CHANGE UP
BLOOD GAS ARTERIAL COMPREHENSIVE WITH CREATININE RESULT: SIGNIFICANT CHANGE UP
BLOOD GAS ARTERIAL COMPREHENSIVE WITH CREATININE RESULT: SIGNIFICANT CHANGE UP
BUN SERPL-MCNC: 15 MG/DL — SIGNIFICANT CHANGE UP (ref 7–23)
BUN SERPL-MCNC: 16 MG/DL — SIGNIFICANT CHANGE UP (ref 7–23)
BUN SERPL-MCNC: 18 MG/DL — SIGNIFICANT CHANGE UP (ref 7–23)
BUN SERPL-MCNC: 20 MG/DL — SIGNIFICANT CHANGE UP (ref 7–23)
BUN SERPL-MCNC: 21 MG/DL — SIGNIFICANT CHANGE UP (ref 7–23)
BUN SERPL-MCNC: 21 MG/DL — SIGNIFICANT CHANGE UP (ref 7–23)
BUN SERPL-MCNC: 22 MG/DL — SIGNIFICANT CHANGE UP (ref 7–23)
BUN SERPL-MCNC: 24 MG/DL — HIGH (ref 7–23)
BUN SERPL-MCNC: 27 MG/DL — HIGH (ref 7–23)
BUN SERPL-MCNC: 27 MG/DL — HIGH (ref 7–23)
BUN SERPL-MCNC: 28 MG/DL — HIGH (ref 7–23)
BUN SERPL-MCNC: 29 MG/DL — HIGH (ref 7–23)
BUN SERPL-MCNC: 30 MG/DL — HIGH (ref 7–23)
BUN SERPL-MCNC: 32 MG/DL — HIGH (ref 7–23)
BUN SERPL-MCNC: 32 MG/DL — HIGH (ref 7–23)
BUN SERPL-MCNC: 33 MG/DL — HIGH (ref 7–23)
BUN SERPL-MCNC: 34 MG/DL — HIGH (ref 7–23)
BUN SERPL-MCNC: 36 MG/DL — HIGH (ref 7–23)
BUN SERPL-MCNC: 49 MG/DL — HIGH (ref 7–23)
CALCIUM SERPL-MCNC: 10 MG/DL — SIGNIFICANT CHANGE UP (ref 8.4–10.5)
CALCIUM SERPL-MCNC: 8.2 MG/DL — LOW (ref 8.4–10.5)
CALCIUM SERPL-MCNC: 8.2 MG/DL — LOW (ref 8.4–10.5)
CALCIUM SERPL-MCNC: 8.6 MG/DL — SIGNIFICANT CHANGE UP (ref 8.4–10.5)
CALCIUM SERPL-MCNC: 8.7 MG/DL — SIGNIFICANT CHANGE UP (ref 8.4–10.5)
CALCIUM SERPL-MCNC: 8.8 MG/DL — SIGNIFICANT CHANGE UP (ref 8.4–10.5)
CALCIUM SERPL-MCNC: 8.9 MG/DL — SIGNIFICANT CHANGE UP (ref 8.4–10.5)
CALCIUM SERPL-MCNC: 8.9 MG/DL — SIGNIFICANT CHANGE UP (ref 8.4–10.5)
CALCIUM SERPL-MCNC: 9 MG/DL — SIGNIFICANT CHANGE UP (ref 8.4–10.5)
CALCIUM SERPL-MCNC: 9 MG/DL — SIGNIFICANT CHANGE UP (ref 8.4–10.5)
CALCIUM SERPL-MCNC: 9.2 MG/DL — SIGNIFICANT CHANGE UP (ref 8.4–10.5)
CALCIUM SERPL-MCNC: 9.3 MG/DL — SIGNIFICANT CHANGE UP (ref 8.4–10.5)
CALCIUM SERPL-MCNC: 9.4 MG/DL — SIGNIFICANT CHANGE UP (ref 8.4–10.5)
CALCIUM SERPL-MCNC: 9.5 MG/DL — SIGNIFICANT CHANGE UP (ref 8.4–10.5)
CALCIUM SERPL-MCNC: 9.6 MG/DL — SIGNIFICANT CHANGE UP (ref 8.4–10.5)
CALCIUM SERPL-MCNC: 9.7 MG/DL — SIGNIFICANT CHANGE UP (ref 8.4–10.5)
CALCIUM SERPL-MCNC: 9.7 MG/DL — SIGNIFICANT CHANGE UP (ref 8.4–10.5)
CALCIUM SERPL-MCNC: 9.9 MG/DL — SIGNIFICANT CHANGE UP (ref 8.4–10.5)
CALCIUM SERPL-MCNC: 9.9 MG/DL — SIGNIFICANT CHANGE UP (ref 8.4–10.5)
CANCER AG19-9 SERPL-ACNC: 6 U/ML — SIGNIFICANT CHANGE UP
CEA SERPL-MCNC: 145 NG/ML — HIGH (ref 0–3.8)
CGA FLD-MCNC: 44.3 NG/ML — SIGNIFICANT CHANGE UP (ref 0–101.8)
CHLORIDE SERPL-SCNC: 100 MMOL/L — SIGNIFICANT CHANGE UP (ref 96–108)
CHLORIDE SERPL-SCNC: 101 MMOL/L — SIGNIFICANT CHANGE UP (ref 96–108)
CHLORIDE SERPL-SCNC: 103 MMOL/L — SIGNIFICANT CHANGE UP (ref 96–108)
CHLORIDE SERPL-SCNC: 105 MMOL/L — SIGNIFICANT CHANGE UP (ref 96–108)
CHLORIDE SERPL-SCNC: 92 MMOL/L — LOW (ref 98–107)
CHLORIDE SERPL-SCNC: 94 MMOL/L — LOW (ref 96–108)
CHLORIDE SERPL-SCNC: 94 MMOL/L — LOW (ref 98–107)
CHLORIDE SERPL-SCNC: 95 MMOL/L — LOW (ref 96–108)
CHLORIDE SERPL-SCNC: 96 MMOL/L — LOW (ref 98–107)
CHLORIDE SERPL-SCNC: 96 MMOL/L — SIGNIFICANT CHANGE UP (ref 96–108)
CHLORIDE SERPL-SCNC: 97 MMOL/L — SIGNIFICANT CHANGE UP (ref 96–108)
CHLORIDE SERPL-SCNC: 97 MMOL/L — SIGNIFICANT CHANGE UP (ref 96–108)
CHLORIDE SERPL-SCNC: 98 MMOL/L — SIGNIFICANT CHANGE UP (ref 96–108)
CHLORIDE SERPL-SCNC: 98 MMOL/L — SIGNIFICANT CHANGE UP (ref 98–107)
CHLORIDE SERPL-SCNC: 99 MMOL/L — SIGNIFICANT CHANGE UP (ref 96–108)
CHLORIDE SERPL-SCNC: 99 MMOL/L — SIGNIFICANT CHANGE UP (ref 96–108)
CHLORIDE SERPL-SCNC: 99 MMOL/L — SIGNIFICANT CHANGE UP (ref 98–107)
CK MB CFR SERPL CALC: 1.4 NG/ML — SIGNIFICANT CHANGE UP (ref 0–3.8)
CK SERPL-CCNC: 49 U/L — SIGNIFICANT CHANGE UP (ref 25–170)
CO2 BLDA-SCNC: 29 MMOL/L — SIGNIFICANT CHANGE UP (ref 22–30)
CO2 SERPL-SCNC: 21 MMOL/L — LOW (ref 22–31)
CO2 SERPL-SCNC: 21 MMOL/L — LOW (ref 22–31)
CO2 SERPL-SCNC: 22 MMOL/L — SIGNIFICANT CHANGE UP (ref 22–31)
CO2 SERPL-SCNC: 23 MMOL/L — SIGNIFICANT CHANGE UP (ref 22–31)
CO2 SERPL-SCNC: 24 MMOL/L — SIGNIFICANT CHANGE UP (ref 22–31)
CO2 SERPL-SCNC: 25 MMOL/L — SIGNIFICANT CHANGE UP (ref 22–31)
CO2 SERPL-SCNC: 26 MMOL/L — SIGNIFICANT CHANGE UP (ref 22–31)
CO2 SERPL-SCNC: 27 MMOL/L — SIGNIFICANT CHANGE UP (ref 22–31)
CO2 SERPL-SCNC: 27 MMOL/L — SIGNIFICANT CHANGE UP (ref 22–31)
CO2 SERPL-SCNC: 29 MMOL/L — SIGNIFICANT CHANGE UP (ref 22–31)
CO2 SERPL-SCNC: 33 MMOL/L — HIGH (ref 22–31)
CO2 SERPL-SCNC: 33 MMOL/L — HIGH (ref 22–31)
COLOR FLD: SIGNIFICANT CHANGE UP
COLOR SPEC: SIGNIFICANT CHANGE UP
COLOR SPEC: YELLOW — SIGNIFICANT CHANGE UP
COLOR SPEC: YELLOW — SIGNIFICANT CHANGE UP
COMMENT - FLUIDS: SIGNIFICANT CHANGE UP
CORTIS AM PEAK SERPL-MCNC: 23.9 UG/DL — HIGH (ref 6–18.4)
COVID-19 SPIKE DOMAIN AB INTERP: POSITIVE
COVID-19 SPIKE DOMAIN ANTIBODY RESULT: 55.5 U/ML — HIGH
COVID-19 SPIKE DOMAIN ANTIBODY RESULT: >250 U/ML — HIGH
COVID-19 SPIKE DOMAIN ANTIBODY RESULT: >250 U/ML — HIGH
CREAT SERPL-MCNC: 1.05 MG/DL — SIGNIFICANT CHANGE UP (ref 0.5–1.3)
CREAT SERPL-MCNC: 1.12 MG/DL — SIGNIFICANT CHANGE UP (ref 0.5–1.3)
CREAT SERPL-MCNC: 1.15 MG/DL — SIGNIFICANT CHANGE UP (ref 0.5–1.3)
CREAT SERPL-MCNC: 1.15 MG/DL — SIGNIFICANT CHANGE UP (ref 0.5–1.3)
CREAT SERPL-MCNC: 1.19 MG/DL — SIGNIFICANT CHANGE UP (ref 0.5–1.3)
CREAT SERPL-MCNC: 1.21 MG/DL — SIGNIFICANT CHANGE UP (ref 0.5–1.3)
CREAT SERPL-MCNC: 1.26 MG/DL — SIGNIFICANT CHANGE UP (ref 0.5–1.3)
CREAT SERPL-MCNC: 1.26 MG/DL — SIGNIFICANT CHANGE UP (ref 0.5–1.3)
CREAT SERPL-MCNC: 1.27 MG/DL — SIGNIFICANT CHANGE UP (ref 0.5–1.3)
CREAT SERPL-MCNC: 1.27 MG/DL — SIGNIFICANT CHANGE UP (ref 0.5–1.3)
CREAT SERPL-MCNC: 1.31 MG/DL — HIGH (ref 0.5–1.3)
CREAT SERPL-MCNC: 1.32 MG/DL — HIGH (ref 0.5–1.3)
CREAT SERPL-MCNC: 1.35 MG/DL — HIGH (ref 0.5–1.3)
CREAT SERPL-MCNC: 1.35 MG/DL — HIGH (ref 0.5–1.3)
CREAT SERPL-MCNC: 1.36 MG/DL — HIGH (ref 0.5–1.3)
CREAT SERPL-MCNC: 1.36 MG/DL — HIGH (ref 0.5–1.3)
CREAT SERPL-MCNC: 1.37 MG/DL — HIGH (ref 0.5–1.3)
CREAT SERPL-MCNC: 1.38 MG/DL — HIGH (ref 0.5–1.3)
CREAT SERPL-MCNC: 1.4 MG/DL — HIGH (ref 0.5–1.3)
CREAT SERPL-MCNC: 1.42 MG/DL — HIGH (ref 0.5–1.3)
CREAT SERPL-MCNC: 1.42 MG/DL — HIGH (ref 0.5–1.3)
CREAT SERPL-MCNC: 1.44 MG/DL — HIGH (ref 0.5–1.3)
CREAT SERPL-MCNC: 1.45 MG/DL — HIGH (ref 0.5–1.3)
CREAT SERPL-MCNC: 1.49 MG/DL — HIGH (ref 0.5–1.3)
CREAT SERPL-MCNC: 1.61 MG/DL — HIGH (ref 0.5–1.3)
CREAT SERPL-MCNC: 1.61 MG/DL — HIGH (ref 0.5–1.3)
CREAT SERPL-MCNC: 1.62 MG/DL — HIGH (ref 0.5–1.3)
CREAT SERPL-MCNC: 1.67 MG/DL — HIGH (ref 0.5–1.3)
CREAT SERPL-MCNC: 1.7 MG/DL — HIGH (ref 0.5–1.3)
CREAT SERPL-MCNC: 1.86 MG/DL — HIGH (ref 0.5–1.3)
CRP SERPL-MCNC: 10 MG/L — HIGH (ref 0–4)
CULTURE RESULTS: SIGNIFICANT CHANGE UP
DIFF PNL FLD: ABNORMAL
DIFF PNL FLD: NEGATIVE — SIGNIFICANT CHANGE UP
DIFF PNL FLD: NEGATIVE — SIGNIFICANT CHANGE UP
EOSINOPHIL # BLD AUTO: 0.01 K/UL — SIGNIFICANT CHANGE UP (ref 0–0.5)
EOSINOPHIL # BLD AUTO: 0.02 K/UL — SIGNIFICANT CHANGE UP (ref 0–0.5)
EOSINOPHIL # BLD AUTO: 0.02 K/UL — SIGNIFICANT CHANGE UP (ref 0–0.5)
EOSINOPHIL # BLD AUTO: 0.05 K/UL — SIGNIFICANT CHANGE UP (ref 0–0.5)
EOSINOPHIL # BLD AUTO: 0.08 K/UL — SIGNIFICANT CHANGE UP (ref 0–0.5)
EOSINOPHIL # BLD AUTO: 0.09 K/UL — SIGNIFICANT CHANGE UP (ref 0–0.5)
EOSINOPHIL # BLD AUTO: 0.11 K/UL — SIGNIFICANT CHANGE UP (ref 0–0.5)
EOSINOPHIL # BLD AUTO: 0.18 K/UL — SIGNIFICANT CHANGE UP (ref 0–0.5)
EOSINOPHIL NFR BLD AUTO: 0.1 % — SIGNIFICANT CHANGE UP (ref 0–6)
EOSINOPHIL NFR BLD AUTO: 0.2 % — SIGNIFICANT CHANGE UP (ref 0–6)
EOSINOPHIL NFR BLD AUTO: 0.2 % — SIGNIFICANT CHANGE UP (ref 0–6)
EOSINOPHIL NFR BLD AUTO: 0.8 % — SIGNIFICANT CHANGE UP (ref 0–6)
EOSINOPHIL NFR BLD AUTO: 0.9 % — SIGNIFICANT CHANGE UP (ref 0–6)
EOSINOPHIL NFR BLD AUTO: 1.6 % — SIGNIFICANT CHANGE UP (ref 0–6)
EOSINOPHIL NFR BLD AUTO: 1.9 % — SIGNIFICANT CHANGE UP (ref 0–6)
EOSINOPHIL NFR BLD AUTO: 2.1 % — SIGNIFICANT CHANGE UP (ref 0–6)
EPI CELLS # UR: 2 /HPF — SIGNIFICANT CHANGE UP
EPI CELLS # UR: SIGNIFICANT CHANGE UP
ERYTHROCYTE [SEDIMENTATION RATE] IN BLOOD: 19 MM/HR — SIGNIFICANT CHANGE UP (ref 0–20)
ESTIMATED AVERAGE GLUCOSE: 134 MG/DL — HIGH (ref 68–114)
FIBRINOGEN PPP-MCNC: 628 MG/DL — HIGH (ref 290–520)
FLUAV AG NPH QL: SIGNIFICANT CHANGE UP
FLUBV AG NPH QL: SIGNIFICANT CHANGE UP
FLUID INTAKE SUBSTANCE CLASS: SIGNIFICANT CHANGE UP
FLUID SEGMENTED GRANULOCYTES: 6 % — SIGNIFICANT CHANGE UP
GAS PNL BLDA: SIGNIFICANT CHANGE UP
GLUCOSE BLDC GLUCOMTR-MCNC: 144 MG/DL — HIGH (ref 70–99)
GLUCOSE BLDC GLUCOMTR-MCNC: 189 MG/DL — HIGH (ref 70–99)
GLUCOSE FLD-MCNC: 151 MG/DL — SIGNIFICANT CHANGE UP
GLUCOSE SERPL-MCNC: 101 MG/DL — HIGH (ref 70–99)
GLUCOSE SERPL-MCNC: 103 MG/DL — HIGH (ref 70–99)
GLUCOSE SERPL-MCNC: 105 MG/DL — HIGH (ref 70–99)
GLUCOSE SERPL-MCNC: 110 MG/DL — HIGH (ref 70–99)
GLUCOSE SERPL-MCNC: 111 MG/DL — HIGH (ref 70–99)
GLUCOSE SERPL-MCNC: 112 MG/DL — HIGH (ref 70–99)
GLUCOSE SERPL-MCNC: 114 MG/DL — HIGH (ref 70–99)
GLUCOSE SERPL-MCNC: 115 MG/DL — HIGH (ref 70–99)
GLUCOSE SERPL-MCNC: 115 MG/DL — HIGH (ref 70–99)
GLUCOSE SERPL-MCNC: 116 MG/DL — HIGH (ref 70–99)
GLUCOSE SERPL-MCNC: 117 MG/DL — HIGH (ref 70–99)
GLUCOSE SERPL-MCNC: 117 MG/DL — HIGH (ref 70–99)
GLUCOSE SERPL-MCNC: 120 MG/DL — HIGH (ref 70–99)
GLUCOSE SERPL-MCNC: 121 MG/DL — HIGH (ref 70–99)
GLUCOSE SERPL-MCNC: 123 MG/DL — HIGH (ref 70–99)
GLUCOSE SERPL-MCNC: 123 MG/DL — HIGH (ref 70–99)
GLUCOSE SERPL-MCNC: 124 MG/DL — HIGH (ref 70–99)
GLUCOSE SERPL-MCNC: 125 MG/DL — HIGH (ref 70–99)
GLUCOSE SERPL-MCNC: 125 MG/DL — HIGH (ref 70–99)
GLUCOSE SERPL-MCNC: 126 MG/DL — HIGH (ref 70–99)
GLUCOSE SERPL-MCNC: 133 MG/DL — HIGH (ref 70–99)
GLUCOSE SERPL-MCNC: 136 MG/DL — HIGH (ref 70–99)
GLUCOSE SERPL-MCNC: 143 MG/DL — HIGH (ref 70–99)
GLUCOSE SERPL-MCNC: 153 MG/DL — HIGH (ref 70–99)
GLUCOSE SERPL-MCNC: 94 MG/DL — SIGNIFICANT CHANGE UP (ref 70–99)
GLUCOSE SERPL-MCNC: 95 MG/DL — SIGNIFICANT CHANGE UP (ref 70–99)
GLUCOSE UR QL: NEGATIVE — SIGNIFICANT CHANGE UP
GRAM STN FLD: SIGNIFICANT CHANGE UP
HCT VFR BLD CALC: 25.8 % — LOW (ref 34.5–45)
HCT VFR BLD CALC: 26.6 % — LOW (ref 34.5–45)
HCT VFR BLD CALC: 26.7 % — LOW (ref 34.5–45)
HCT VFR BLD CALC: 26.7 % — LOW (ref 34.5–45)
HCT VFR BLD CALC: 27 % — LOW (ref 34.5–45)
HCT VFR BLD CALC: 27.1 % — LOW (ref 34.5–45)
HCT VFR BLD CALC: 27.8 % — LOW (ref 34.5–45)
HCT VFR BLD CALC: 27.8 % — LOW (ref 34.5–45)
HCT VFR BLD CALC: 28.1 % — LOW (ref 34.5–45)
HCT VFR BLD CALC: 28.1 % — LOW (ref 34.5–45)
HCT VFR BLD CALC: 28.3 % — LOW (ref 34.5–45)
HCT VFR BLD CALC: 28.4 % — LOW (ref 34.5–45)
HCT VFR BLD CALC: 28.6 % — LOW (ref 34.5–45)
HCT VFR BLD CALC: 28.8 % — LOW (ref 34.5–45)
HCT VFR BLD CALC: 29 % — LOW (ref 34.5–45)
HCT VFR BLD CALC: 29 % — LOW (ref 34.5–45)
HCT VFR BLD CALC: 29.1 % — LOW (ref 34.5–45)
HCT VFR BLD CALC: 29.4 % — LOW (ref 34.5–45)
HCT VFR BLD CALC: 29.5 % — LOW (ref 34.5–45)
HCT VFR BLD CALC: 29.5 % — LOW (ref 34.5–45)
HCT VFR BLD CALC: 30.3 % — LOW (ref 34.5–45)
HCT VFR BLD CALC: 30.3 % — LOW (ref 34.5–45)
HCT VFR BLD CALC: 30.5 % — LOW (ref 34.5–45)
HCT VFR BLD CALC: 30.6 % — LOW (ref 34.5–45)
HCT VFR BLD CALC: 30.6 % — LOW (ref 34.5–45)
HCT VFR BLD CALC: 30.7 % — LOW (ref 34.5–45)
HCT VFR BLD CALC: 31.1 % — LOW (ref 34.5–45)
HCT VFR BLD CALC: 31.1 % — LOW (ref 34.5–45)
HCT VFR BLD CALC: 31.2 % — LOW (ref 34.5–45)
HCT VFR BLD CALC: 31.5 % — LOW (ref 34.5–45)
HCT VFR BLD CALC: 31.8 % — LOW (ref 34.5–45)
HCT VFR BLD CALC: 31.9 % — LOW (ref 34.5–45)
HCT VFR BLD CALC: 31.9 % — LOW (ref 34.5–45)
HCT VFR BLD CALC: 32.6 % — LOW (ref 34.5–45)
HCT VFR BLD CALC: 33 % — LOW (ref 34.5–45)
HGB BLD-MCNC: 10 G/DL — LOW (ref 11.5–15.5)
HGB BLD-MCNC: 10.3 G/DL — LOW (ref 11.5–15.5)
HGB BLD-MCNC: 10.4 G/DL — LOW (ref 11.5–15.5)
HGB BLD-MCNC: 10.4 G/DL — LOW (ref 11.5–15.5)
HGB BLD-MCNC: 10.5 G/DL — LOW (ref 11.5–15.5)
HGB BLD-MCNC: 8.2 G/DL — LOW (ref 11.5–15.5)
HGB BLD-MCNC: 8.5 G/DL — LOW (ref 11.5–15.5)
HGB BLD-MCNC: 8.7 G/DL — LOW (ref 11.5–15.5)
HGB BLD-MCNC: 8.8 G/DL — LOW (ref 11.5–15.5)
HGB BLD-MCNC: 8.8 G/DL — LOW (ref 11.5–15.5)
HGB BLD-MCNC: 8.9 G/DL — LOW (ref 11.5–15.5)
HGB BLD-MCNC: 9 G/DL — LOW (ref 11.5–15.5)
HGB BLD-MCNC: 9.1 G/DL — LOW (ref 11.5–15.5)
HGB BLD-MCNC: 9.1 G/DL — LOW (ref 11.5–15.5)
HGB BLD-MCNC: 9.2 G/DL — LOW (ref 11.5–15.5)
HGB BLD-MCNC: 9.3 G/DL — LOW (ref 11.5–15.5)
HGB BLD-MCNC: 9.4 G/DL — LOW (ref 11.5–15.5)
HGB BLD-MCNC: 9.6 G/DL — LOW (ref 11.5–15.5)
HGB BLD-MCNC: 9.7 G/DL — LOW (ref 11.5–15.5)
HGB BLD-MCNC: 9.8 G/DL — LOW (ref 11.5–15.5)
HGB BLD-MCNC: 9.9 G/DL — LOW (ref 11.5–15.5)
HGB BLD-MCNC: 9.9 G/DL — LOW (ref 11.5–15.5)
HOROWITZ INDEX BLDA+IHG-RTO: SIGNIFICANT CHANGE UP
HYALINE CASTS # UR AUTO: 1 /LPF — SIGNIFICANT CHANGE UP (ref 0–2)
IANC: 8.02 K/UL — SIGNIFICANT CHANGE UP (ref 1.5–8.5)
IMM GRANULOCYTES NFR BLD AUTO: 0.4 % — SIGNIFICANT CHANGE UP (ref 0–1.5)
IMM GRANULOCYTES NFR BLD AUTO: 0.5 % — SIGNIFICANT CHANGE UP (ref 0–1.5)
IMM GRANULOCYTES NFR BLD AUTO: 0.6 % — SIGNIFICANT CHANGE UP (ref 0–1.5)
IMM GRANULOCYTES NFR BLD AUTO: 0.7 % — SIGNIFICANT CHANGE UP (ref 0–1.5)
INR BLD: 0.95 RATIO — SIGNIFICANT CHANGE UP (ref 0.88–1.16)
INR BLD: 1.11 RATIO — SIGNIFICANT CHANGE UP (ref 0.88–1.16)
INR BLD: 1.13 RATIO — SIGNIFICANT CHANGE UP (ref 0.88–1.16)
INR BLD: 1.16 RATIO — SIGNIFICANT CHANGE UP (ref 0.88–1.16)
INR BLD: 1.18 RATIO — HIGH (ref 0.88–1.16)
INR BLD: 1.35 RATIO — HIGH (ref 0.88–1.16)
IRON SATN MFR SERPL: 14 % — SIGNIFICANT CHANGE UP (ref 14–50)
IRON SATN MFR SERPL: 33 UG/DL — SIGNIFICANT CHANGE UP (ref 30–160)
KETONES UR-MCNC: ABNORMAL
KETONES UR-MCNC: NEGATIVE — SIGNIFICANT CHANGE UP
KETONES UR-MCNC: NEGATIVE — SIGNIFICANT CHANGE UP
LACTATE SERPL-SCNC: 1 MMOL/L — SIGNIFICANT CHANGE UP (ref 0.7–2)
LEUKOCYTE ESTERASE UR-ACNC: NEGATIVE — SIGNIFICANT CHANGE UP
LIDOCAIN IGE QN: 25 U/L — SIGNIFICANT CHANGE UP (ref 7–60)
LYMPHOCYTES # BLD AUTO: 0.44 K/UL — LOW (ref 1–3.3)
LYMPHOCYTES # BLD AUTO: 0.45 K/UL — LOW (ref 1–3.3)
LYMPHOCYTES # BLD AUTO: 0.58 K/UL — LOW (ref 1–3.3)
LYMPHOCYTES # BLD AUTO: 0.58 K/UL — LOW (ref 1–3.3)
LYMPHOCYTES # BLD AUTO: 0.63 K/UL — LOW (ref 1–3.3)
LYMPHOCYTES # BLD AUTO: 1.02 K/UL — SIGNIFICANT CHANGE UP (ref 1–3.3)
LYMPHOCYTES # BLD AUTO: 1.03 K/UL — SIGNIFICANT CHANGE UP (ref 1–3.3)
LYMPHOCYTES # BLD AUTO: 1.12 K/UL — SIGNIFICANT CHANGE UP (ref 1–3.3)
LYMPHOCYTES # BLD AUTO: 18.1 % — SIGNIFICANT CHANGE UP (ref 13–44)
LYMPHOCYTES # BLD AUTO: 18.4 % — SIGNIFICANT CHANGE UP (ref 13–44)
LYMPHOCYTES # BLD AUTO: 19.5 % — SIGNIFICANT CHANGE UP (ref 13–44)
LYMPHOCYTES # BLD AUTO: 3.6 % — LOW (ref 13–44)
LYMPHOCYTES # BLD AUTO: 4.7 % — LOW (ref 13–44)
LYMPHOCYTES # BLD AUTO: 5.9 % — LOW (ref 13–44)
LYMPHOCYTES # BLD AUTO: 6.6 % — LOW (ref 13–44)
LYMPHOCYTES # BLD AUTO: 6.9 % — LOW (ref 13–44)
LYMPHOCYTES # FLD: 67 % — SIGNIFICANT CHANGE UP
MAGNESIUM SERPL-MCNC: 1.9 MG/DL — SIGNIFICANT CHANGE UP (ref 1.6–2.6)
MAGNESIUM SERPL-MCNC: 1.9 MG/DL — SIGNIFICANT CHANGE UP (ref 1.6–2.6)
MAGNESIUM SERPL-MCNC: 2 MG/DL — SIGNIFICANT CHANGE UP (ref 1.6–2.6)
MAGNESIUM SERPL-MCNC: 2.1 MG/DL — SIGNIFICANT CHANGE UP (ref 1.6–2.6)
MAGNESIUM SERPL-MCNC: 2.1 MG/DL — SIGNIFICANT CHANGE UP (ref 1.6–2.6)
MCHC RBC-ENTMCNC: 29.6 PG — SIGNIFICANT CHANGE UP (ref 27–34)
MCHC RBC-ENTMCNC: 29.6 PG — SIGNIFICANT CHANGE UP (ref 27–34)
MCHC RBC-ENTMCNC: 29.7 PG — SIGNIFICANT CHANGE UP (ref 27–34)
MCHC RBC-ENTMCNC: 29.7 PG — SIGNIFICANT CHANGE UP (ref 27–34)
MCHC RBC-ENTMCNC: 29.8 PG — SIGNIFICANT CHANGE UP (ref 27–34)
MCHC RBC-ENTMCNC: 29.8 PG — SIGNIFICANT CHANGE UP (ref 27–34)
MCHC RBC-ENTMCNC: 29.9 PG — SIGNIFICANT CHANGE UP (ref 27–34)
MCHC RBC-ENTMCNC: 30 PG — SIGNIFICANT CHANGE UP (ref 27–34)
MCHC RBC-ENTMCNC: 30.1 PG — SIGNIFICANT CHANGE UP (ref 27–34)
MCHC RBC-ENTMCNC: 30.1 PG — SIGNIFICANT CHANGE UP (ref 27–34)
MCHC RBC-ENTMCNC: 30.2 GM/DL — LOW (ref 32–36)
MCHC RBC-ENTMCNC: 30.2 PG — SIGNIFICANT CHANGE UP (ref 27–34)
MCHC RBC-ENTMCNC: 30.3 PG — SIGNIFICANT CHANGE UP (ref 27–34)
MCHC RBC-ENTMCNC: 30.4 PG — SIGNIFICANT CHANGE UP (ref 27–34)
MCHC RBC-ENTMCNC: 30.5 PG — SIGNIFICANT CHANGE UP (ref 27–34)
MCHC RBC-ENTMCNC: 30.5 PG — SIGNIFICANT CHANGE UP (ref 27–34)
MCHC RBC-ENTMCNC: 30.6 PG — SIGNIFICANT CHANGE UP (ref 27–34)
MCHC RBC-ENTMCNC: 30.7 PG — SIGNIFICANT CHANGE UP (ref 27–34)
MCHC RBC-ENTMCNC: 30.9 GM/DL — LOW (ref 32–36)
MCHC RBC-ENTMCNC: 31.3 GM/DL — LOW (ref 32–36)
MCHC RBC-ENTMCNC: 31.5 GM/DL — LOW (ref 32–36)
MCHC RBC-ENTMCNC: 31.6 GM/DL — LOW (ref 32–36)
MCHC RBC-ENTMCNC: 31.7 GM/DL — LOW (ref 32–36)
MCHC RBC-ENTMCNC: 31.8 GM/DL — LOW (ref 32–36)
MCHC RBC-ENTMCNC: 31.8 GM/DL — LOW (ref 32–36)
MCHC RBC-ENTMCNC: 32 GM/DL — SIGNIFICANT CHANGE UP (ref 32–36)
MCHC RBC-ENTMCNC: 32.1 GM/DL — SIGNIFICANT CHANGE UP (ref 32–36)
MCHC RBC-ENTMCNC: 32.2 GM/DL — SIGNIFICANT CHANGE UP (ref 32–36)
MCHC RBC-ENTMCNC: 32.2 GM/DL — SIGNIFICANT CHANGE UP (ref 32–36)
MCHC RBC-ENTMCNC: 32.3 GM/DL — SIGNIFICANT CHANGE UP (ref 32–36)
MCHC RBC-ENTMCNC: 32.4 GM/DL — SIGNIFICANT CHANGE UP (ref 32–36)
MCHC RBC-ENTMCNC: 32.5 GM/DL — SIGNIFICANT CHANGE UP (ref 32–36)
MCHC RBC-ENTMCNC: 32.5 GM/DL — SIGNIFICANT CHANGE UP (ref 32–36)
MCHC RBC-ENTMCNC: 32.6 GM/DL — SIGNIFICANT CHANGE UP (ref 32–36)
MCHC RBC-ENTMCNC: 32.7 GM/DL — SIGNIFICANT CHANGE UP (ref 32–36)
MCHC RBC-ENTMCNC: 32.7 GM/DL — SIGNIFICANT CHANGE UP (ref 32–36)
MCHC RBC-ENTMCNC: 33 GM/DL — SIGNIFICANT CHANGE UP (ref 32–36)
MCHC RBC-ENTMCNC: 33.1 GM/DL — SIGNIFICANT CHANGE UP (ref 32–36)
MCV RBC AUTO: 101.3 FL — HIGH (ref 80–100)
MCV RBC AUTO: 91.8 FL — SIGNIFICANT CHANGE UP (ref 80–100)
MCV RBC AUTO: 92.2 FL — SIGNIFICANT CHANGE UP (ref 80–100)
MCV RBC AUTO: 92.6 FL — SIGNIFICANT CHANGE UP (ref 80–100)
MCV RBC AUTO: 92.7 FL — SIGNIFICANT CHANGE UP (ref 80–100)
MCV RBC AUTO: 93 FL — SIGNIFICANT CHANGE UP (ref 80–100)
MCV RBC AUTO: 93.1 FL — SIGNIFICANT CHANGE UP (ref 80–100)
MCV RBC AUTO: 93.3 FL — SIGNIFICANT CHANGE UP (ref 80–100)
MCV RBC AUTO: 93.3 FL — SIGNIFICANT CHANGE UP (ref 80–100)
MCV RBC AUTO: 93.5 FL — SIGNIFICANT CHANGE UP (ref 80–100)
MCV RBC AUTO: 93.5 FL — SIGNIFICANT CHANGE UP (ref 80–100)
MCV RBC AUTO: 93.6 FL — SIGNIFICANT CHANGE UP (ref 80–100)
MCV RBC AUTO: 93.7 FL — SIGNIFICANT CHANGE UP (ref 80–100)
MCV RBC AUTO: 93.7 FL — SIGNIFICANT CHANGE UP (ref 80–100)
MCV RBC AUTO: 93.8 FL — SIGNIFICANT CHANGE UP (ref 80–100)
MCV RBC AUTO: 93.9 FL — SIGNIFICANT CHANGE UP (ref 80–100)
MCV RBC AUTO: 94 FL — SIGNIFICANT CHANGE UP (ref 80–100)
MCV RBC AUTO: 94 FL — SIGNIFICANT CHANGE UP (ref 80–100)
MCV RBC AUTO: 94.1 FL — SIGNIFICANT CHANGE UP (ref 80–100)
MCV RBC AUTO: 94.2 FL — SIGNIFICANT CHANGE UP (ref 80–100)
MCV RBC AUTO: 94.3 FL — SIGNIFICANT CHANGE UP (ref 80–100)
MCV RBC AUTO: 94.4 FL — SIGNIFICANT CHANGE UP (ref 80–100)
MCV RBC AUTO: 94.5 FL — SIGNIFICANT CHANGE UP (ref 80–100)
MCV RBC AUTO: 94.9 FL — SIGNIFICANT CHANGE UP (ref 80–100)
MCV RBC AUTO: 95.1 FL — SIGNIFICANT CHANGE UP (ref 80–100)
MCV RBC AUTO: 95.2 FL — SIGNIFICANT CHANGE UP (ref 80–100)
MCV RBC AUTO: 95.6 FL — SIGNIFICANT CHANGE UP (ref 80–100)
MCV RBC AUTO: 95.7 FL — SIGNIFICANT CHANGE UP (ref 80–100)
MCV RBC AUTO: 96 FL — SIGNIFICANT CHANGE UP (ref 80–100)
MCV RBC AUTO: 97.2 FL — SIGNIFICANT CHANGE UP (ref 80–100)
MCV RBC AUTO: 98.7 FL — SIGNIFICANT CHANGE UP (ref 80–100)
MESOTHL CELL # FLD: 21 % — SIGNIFICANT CHANGE UP
METANEPHRINE, PL: 42.5 PG/ML — SIGNIFICANT CHANGE UP (ref 0–88)
MONOCYTES # BLD AUTO: 0.49 K/UL — SIGNIFICANT CHANGE UP (ref 0–0.9)
MONOCYTES # BLD AUTO: 0.69 K/UL — SIGNIFICANT CHANGE UP (ref 0–0.9)
MONOCYTES # BLD AUTO: 0.73 K/UL — SIGNIFICANT CHANGE UP (ref 0–0.9)
MONOCYTES # BLD AUTO: 0.74 K/UL — SIGNIFICANT CHANGE UP (ref 0–0.9)
MONOCYTES # BLD AUTO: 0.81 K/UL — SIGNIFICANT CHANGE UP (ref 0–0.9)
MONOCYTES # BLD AUTO: 1 K/UL — HIGH (ref 0–0.9)
MONOCYTES # BLD AUTO: 1 K/UL — HIGH (ref 0–0.9)
MONOCYTES # BLD AUTO: 1.01 K/UL — HIGH (ref 0–0.9)
MONOCYTES NFR BLD AUTO: 10.2 % — SIGNIFICANT CHANGE UP (ref 2–14)
MONOCYTES NFR BLD AUTO: 10.5 % — SIGNIFICANT CHANGE UP (ref 2–14)
MONOCYTES NFR BLD AUTO: 12 % — SIGNIFICANT CHANGE UP (ref 2–14)
MONOCYTES NFR BLD AUTO: 13.2 % — SIGNIFICANT CHANGE UP (ref 2–14)
MONOCYTES NFR BLD AUTO: 8.2 % — SIGNIFICANT CHANGE UP (ref 2–14)
MONOCYTES NFR BLD AUTO: 8.5 % — SIGNIFICANT CHANGE UP (ref 2–14)
MONOCYTES NFR BLD AUTO: 8.6 % — SIGNIFICANT CHANGE UP (ref 2–14)
MONOCYTES NFR BLD AUTO: 8.8 % — SIGNIFICANT CHANGE UP (ref 2–14)
MONOS+MACROS # FLD: 3 % — SIGNIFICANT CHANGE UP
MRSA PCR RESULT.: SIGNIFICANT CHANGE UP
NEUTROPHILS # BLD AUTO: 10.73 K/UL — HIGH (ref 1.8–7.4)
NEUTROPHILS # BLD AUTO: 3.72 K/UL — SIGNIFICANT CHANGE UP (ref 1.8–7.4)
NEUTROPHILS # BLD AUTO: 3.79 K/UL — SIGNIFICANT CHANGE UP (ref 1.8–7.4)
NEUTROPHILS # BLD AUTO: 4.01 K/UL — SIGNIFICANT CHANGE UP (ref 1.8–7.4)
NEUTROPHILS # BLD AUTO: 6.83 K/UL — SIGNIFICANT CHANGE UP (ref 1.8–7.4)
NEUTROPHILS # BLD AUTO: 8.02 K/UL — HIGH (ref 1.8–7.4)
NEUTROPHILS # BLD AUTO: 8.04 K/UL — HIGH (ref 1.8–7.4)
NEUTROPHILS # BLD AUTO: 8.07 K/UL — HIGH (ref 1.8–7.4)
NEUTROPHILS NFR BLD AUTO: 66.1 % — SIGNIFICANT CHANGE UP (ref 43–77)
NEUTROPHILS NFR BLD AUTO: 66.9 % — SIGNIFICANT CHANGE UP (ref 43–77)
NEUTROPHILS NFR BLD AUTO: 70.5 % — SIGNIFICANT CHANGE UP (ref 43–77)
NEUTROPHILS NFR BLD AUTO: 81.3 % — HIGH (ref 43–77)
NEUTROPHILS NFR BLD AUTO: 82.4 % — HIGH (ref 43–77)
NEUTROPHILS NFR BLD AUTO: 83.9 % — HIGH (ref 43–77)
NEUTROPHILS NFR BLD AUTO: 84.1 % — HIGH (ref 43–77)
NEUTROPHILS NFR BLD AUTO: 87.2 % — HIGH (ref 43–77)
NITRITE UR-MCNC: NEGATIVE — SIGNIFICANT CHANGE UP
NON-GYNECOLOGICAL CYTOLOGY STUDY: SIGNIFICANT CHANGE UP
NORMETANEPHRINE, PL: 54.4 PG/ML — SIGNIFICANT CHANGE UP (ref 0–191.8)
NRBC # BLD: 0 /100 WBCS — SIGNIFICANT CHANGE UP
NRBC # BLD: 0 /100 WBCS — SIGNIFICANT CHANGE UP (ref 0–0)
NRBC # FLD: 0 K/UL — SIGNIFICANT CHANGE UP
NT-PROBNP SERPL-SCNC: 1820 PG/ML — HIGH (ref 0–300)
OB PNL STL: NEGATIVE — SIGNIFICANT CHANGE UP
OTHER CELLS FLD MANUAL: 3 % — SIGNIFICANT CHANGE UP
PCO2 BLDA: 59 MMHG — HIGH (ref 32–46)
PH BLDA: 7.28 — LOW (ref 7.35–7.45)
PH UR: 6 — SIGNIFICANT CHANGE UP (ref 5–8)
PH UR: 6 — SIGNIFICANT CHANGE UP (ref 5–8)
PH UR: 6.5 — SIGNIFICANT CHANGE UP (ref 5–8)
PHOSPHATE SERPL-MCNC: 2.3 MG/DL — LOW (ref 2.5–4.5)
PHOSPHATE SERPL-MCNC: 2.6 MG/DL — SIGNIFICANT CHANGE UP (ref 2.5–4.5)
PHOSPHATE SERPL-MCNC: 3.2 MG/DL — SIGNIFICANT CHANGE UP (ref 2.5–4.5)
PHOSPHATE SERPL-MCNC: 3.5 MG/DL — SIGNIFICANT CHANGE UP (ref 2.5–4.5)
PLATELET # BLD AUTO: 131 K/UL — LOW (ref 150–400)
PLATELET # BLD AUTO: 132 K/UL — LOW (ref 150–400)
PLATELET # BLD AUTO: 135 K/UL — LOW (ref 150–400)
PLATELET # BLD AUTO: 136 K/UL — LOW (ref 150–400)
PLATELET # BLD AUTO: 139 K/UL — LOW (ref 150–400)
PLATELET # BLD AUTO: 153 K/UL — SIGNIFICANT CHANGE UP (ref 150–400)
PLATELET # BLD AUTO: 159 K/UL — SIGNIFICANT CHANGE UP (ref 150–400)
PLATELET # BLD AUTO: 161 K/UL — SIGNIFICANT CHANGE UP (ref 150–400)
PLATELET # BLD AUTO: 162 K/UL — SIGNIFICANT CHANGE UP (ref 150–400)
PLATELET # BLD AUTO: 165 K/UL — SIGNIFICANT CHANGE UP (ref 150–400)
PLATELET # BLD AUTO: 167 K/UL — SIGNIFICANT CHANGE UP (ref 150–400)
PLATELET # BLD AUTO: 169 K/UL — SIGNIFICANT CHANGE UP (ref 150–400)
PLATELET # BLD AUTO: 178 K/UL — SIGNIFICANT CHANGE UP (ref 150–400)
PLATELET # BLD AUTO: 178 K/UL — SIGNIFICANT CHANGE UP (ref 150–400)
PLATELET # BLD AUTO: 181 K/UL — SIGNIFICANT CHANGE UP (ref 150–400)
PLATELET # BLD AUTO: 185 K/UL — SIGNIFICANT CHANGE UP (ref 150–400)
PLATELET # BLD AUTO: 185 K/UL — SIGNIFICANT CHANGE UP (ref 150–400)
PLATELET # BLD AUTO: 188 K/UL — SIGNIFICANT CHANGE UP (ref 150–400)
PLATELET # BLD AUTO: 190 K/UL — SIGNIFICANT CHANGE UP (ref 150–400)
PLATELET # BLD AUTO: 192 K/UL — SIGNIFICANT CHANGE UP (ref 150–400)
PLATELET # BLD AUTO: 193 K/UL — SIGNIFICANT CHANGE UP (ref 150–400)
PLATELET # BLD AUTO: 197 K/UL — SIGNIFICANT CHANGE UP (ref 150–400)
PLATELET # BLD AUTO: 199 K/UL — SIGNIFICANT CHANGE UP (ref 150–400)
PLATELET # BLD AUTO: 201 K/UL — SIGNIFICANT CHANGE UP (ref 150–400)
PLATELET # BLD AUTO: 205 K/UL — SIGNIFICANT CHANGE UP (ref 150–400)
PLATELET # BLD AUTO: 206 K/UL — SIGNIFICANT CHANGE UP (ref 150–400)
PLATELET # BLD AUTO: 208 K/UL — SIGNIFICANT CHANGE UP (ref 150–400)
PLATELET # BLD AUTO: 208 K/UL — SIGNIFICANT CHANGE UP (ref 150–400)
PLATELET # BLD AUTO: 209 K/UL — SIGNIFICANT CHANGE UP (ref 150–400)
PLATELET # BLD AUTO: 209 K/UL — SIGNIFICANT CHANGE UP (ref 150–400)
PLATELET # BLD AUTO: 214 K/UL — SIGNIFICANT CHANGE UP (ref 150–400)
PLATELET # BLD AUTO: 215 K/UL — SIGNIFICANT CHANGE UP (ref 150–400)
PLATELET # BLD AUTO: 216 K/UL — SIGNIFICANT CHANGE UP (ref 150–400)
PLATELET # BLD AUTO: 241 K/UL — SIGNIFICANT CHANGE UP (ref 150–400)
PLATELET # BLD AUTO: 272 K/UL — SIGNIFICANT CHANGE UP (ref 150–400)
PO2 BLDA: 137 MMHG — HIGH (ref 74–108)
POTASSIUM SERPL-MCNC: 3.3 MMOL/L — LOW (ref 3.5–5.3)
POTASSIUM SERPL-MCNC: 3.7 MMOL/L — SIGNIFICANT CHANGE UP (ref 3.5–5.3)
POTASSIUM SERPL-MCNC: 3.8 MMOL/L — SIGNIFICANT CHANGE UP (ref 3.5–5.3)
POTASSIUM SERPL-MCNC: 3.9 MMOL/L — SIGNIFICANT CHANGE UP (ref 3.5–5.3)
POTASSIUM SERPL-MCNC: 4 MMOL/L — SIGNIFICANT CHANGE UP (ref 3.5–5.3)
POTASSIUM SERPL-MCNC: 4.1 MMOL/L — SIGNIFICANT CHANGE UP (ref 3.5–5.3)
POTASSIUM SERPL-MCNC: 4.2 MMOL/L — SIGNIFICANT CHANGE UP (ref 3.5–5.3)
POTASSIUM SERPL-MCNC: 4.3 MMOL/L — SIGNIFICANT CHANGE UP (ref 3.5–5.3)
POTASSIUM SERPL-MCNC: 4.3 MMOL/L — SIGNIFICANT CHANGE UP (ref 3.5–5.3)
POTASSIUM SERPL-MCNC: 4.4 MMOL/L — SIGNIFICANT CHANGE UP (ref 3.5–5.3)
POTASSIUM SERPL-MCNC: 4.5 MMOL/L — SIGNIFICANT CHANGE UP (ref 3.5–5.3)
POTASSIUM SERPL-MCNC: 4.5 MMOL/L — SIGNIFICANT CHANGE UP (ref 3.5–5.3)
POTASSIUM SERPL-MCNC: 4.6 MMOL/L — SIGNIFICANT CHANGE UP (ref 3.5–5.3)
POTASSIUM SERPL-MCNC: 5.2 MMOL/L — SIGNIFICANT CHANGE UP (ref 3.5–5.3)
POTASSIUM SERPL-MCNC: 5.3 MMOL/L — SIGNIFICANT CHANGE UP (ref 3.5–5.3)
POTASSIUM SERPL-MCNC: 5.4 MMOL/L — HIGH (ref 3.5–5.3)
POTASSIUM SERPL-SCNC: 3.3 MMOL/L — LOW (ref 3.5–5.3)
POTASSIUM SERPL-SCNC: 3.7 MMOL/L — SIGNIFICANT CHANGE UP (ref 3.5–5.3)
POTASSIUM SERPL-SCNC: 3.8 MMOL/L — SIGNIFICANT CHANGE UP (ref 3.5–5.3)
POTASSIUM SERPL-SCNC: 3.9 MMOL/L — SIGNIFICANT CHANGE UP (ref 3.5–5.3)
POTASSIUM SERPL-SCNC: 4 MMOL/L — SIGNIFICANT CHANGE UP (ref 3.5–5.3)
POTASSIUM SERPL-SCNC: 4.1 MMOL/L — SIGNIFICANT CHANGE UP (ref 3.5–5.3)
POTASSIUM SERPL-SCNC: 4.2 MMOL/L — SIGNIFICANT CHANGE UP (ref 3.5–5.3)
POTASSIUM SERPL-SCNC: 4.3 MMOL/L — SIGNIFICANT CHANGE UP (ref 3.5–5.3)
POTASSIUM SERPL-SCNC: 4.3 MMOL/L — SIGNIFICANT CHANGE UP (ref 3.5–5.3)
POTASSIUM SERPL-SCNC: 4.4 MMOL/L — SIGNIFICANT CHANGE UP (ref 3.5–5.3)
POTASSIUM SERPL-SCNC: 4.5 MMOL/L — SIGNIFICANT CHANGE UP (ref 3.5–5.3)
POTASSIUM SERPL-SCNC: 4.5 MMOL/L — SIGNIFICANT CHANGE UP (ref 3.5–5.3)
POTASSIUM SERPL-SCNC: 4.6 MMOL/L — SIGNIFICANT CHANGE UP (ref 3.5–5.3)
POTASSIUM SERPL-SCNC: 5.2 MMOL/L — SIGNIFICANT CHANGE UP (ref 3.5–5.3)
POTASSIUM SERPL-SCNC: 5.3 MMOL/L — SIGNIFICANT CHANGE UP (ref 3.5–5.3)
POTASSIUM SERPL-SCNC: 5.4 MMOL/L — HIGH (ref 3.5–5.3)
PREALB SERPL-MCNC: 18 MG/DL — LOW (ref 20–40)
PROT FLD-MCNC: 3.7 G/DL — SIGNIFICANT CHANGE UP
PROT SERPL-MCNC: 5.3 G/DL — LOW (ref 6–8.3)
PROT SERPL-MCNC: 5.5 G/DL — LOW (ref 6–8.3)
PROT SERPL-MCNC: 5.5 G/DL — LOW (ref 6–8.3)
PROT SERPL-MCNC: 5.6 G/DL — LOW (ref 6–8.3)
PROT SERPL-MCNC: 5.7 G/DL — LOW (ref 6–8.3)
PROT SERPL-MCNC: 5.8 G/DL — LOW (ref 6–8.3)
PROT SERPL-MCNC: 5.9 G/DL — LOW (ref 6–8.3)
PROT SERPL-MCNC: 5.9 G/DL — LOW (ref 6–8.3)
PROT SERPL-MCNC: 6 G/DL — SIGNIFICANT CHANGE UP (ref 6–8.3)
PROT SERPL-MCNC: 6.1 G/DL — SIGNIFICANT CHANGE UP (ref 6–8.3)
PROT SERPL-MCNC: 6.1 G/DL — SIGNIFICANT CHANGE UP (ref 6–8.3)
PROT SERPL-MCNC: 6.2 G/DL — SIGNIFICANT CHANGE UP (ref 6–8.3)
PROT SERPL-MCNC: 6.2 G/DL — SIGNIFICANT CHANGE UP (ref 6–8.3)
PROT SERPL-MCNC: 6.3 G/DL — SIGNIFICANT CHANGE UP (ref 6–8.3)
PROT SERPL-MCNC: 6.4 G/DL — SIGNIFICANT CHANGE UP (ref 6–8.3)
PROT SERPL-MCNC: 6.7 G/DL — SIGNIFICANT CHANGE UP (ref 6–8.3)
PROT SERPL-MCNC: 6.8 G/DL — SIGNIFICANT CHANGE UP (ref 6–8.3)
PROT UR-MCNC: 30 MG/DL
PROT UR-MCNC: ABNORMAL
PROT UR-MCNC: NEGATIVE — SIGNIFICANT CHANGE UP
PROTHROM AB SERPL-ACNC: 11.4 SEC — SIGNIFICANT CHANGE UP (ref 10.6–13.6)
PROTHROM AB SERPL-ACNC: 13.3 SEC — SIGNIFICANT CHANGE UP (ref 10.6–13.6)
PROTHROM AB SERPL-ACNC: 13.4 SEC — SIGNIFICANT CHANGE UP (ref 10.6–13.6)
PROTHROM AB SERPL-ACNC: 13.5 SEC — SIGNIFICANT CHANGE UP (ref 10.6–13.6)
PROTHROM AB SERPL-ACNC: 13.8 SEC — HIGH (ref 10.6–13.6)
PROTHROM AB SERPL-ACNC: 16.1 SEC — HIGH (ref 10.6–13.6)
RAPID RVP RESULT: SIGNIFICANT CHANGE UP
RBC # BLD: 2.75 M/UL — LOW (ref 3.8–5.2)
RBC # BLD: 2.84 M/UL — LOW (ref 3.8–5.2)
RBC # BLD: 2.85 M/UL — LOW (ref 3.8–5.2)
RBC # BLD: 2.86 M/UL — LOW (ref 3.8–5.2)
RBC # BLD: 2.86 M/UL — LOW (ref 3.8–5.2)
RBC # BLD: 2.88 M/UL — LOW (ref 3.8–5.2)
RBC # BLD: 2.93 M/UL — LOW (ref 3.8–5.2)
RBC # BLD: 2.97 M/UL — LOW (ref 3.8–5.2)
RBC # BLD: 2.98 M/UL — LOW (ref 3.8–5.2)
RBC # BLD: 3.01 M/UL — LOW (ref 3.8–5.2)
RBC # BLD: 3.02 M/UL — LOW (ref 3.8–5.2)
RBC # BLD: 3.06 M/UL — LOW (ref 3.8–5.2)
RBC # BLD: 3.06 M/UL — LOW (ref 3.8–5.2)
RBC # BLD: 3.08 M/UL — LOW (ref 3.8–5.2)
RBC # BLD: 3.08 M/UL — LOW (ref 3.8–5.2)
RBC # BLD: 3.09 M/UL — LOW (ref 3.8–5.2)
RBC # BLD: 3.1 M/UL — LOW (ref 3.8–5.2)
RBC # BLD: 3.11 M/UL — LOW (ref 3.8–5.2)
RBC # BLD: 3.11 M/UL — LOW (ref 3.8–5.2)
RBC # BLD: 3.13 M/UL — LOW (ref 3.8–5.2)
RBC # BLD: 3.14 M/UL — LOW (ref 3.8–5.2)
RBC # BLD: 3.15 M/UL — LOW (ref 3.8–5.2)
RBC # BLD: 3.2 M/UL — LOW (ref 3.8–5.2)
RBC # BLD: 3.21 M/UL — LOW (ref 3.8–5.2)
RBC # BLD: 3.23 M/UL — LOW (ref 3.8–5.2)
RBC # BLD: 3.23 M/UL — LOW (ref 3.8–5.2)
RBC # BLD: 3.25 M/UL — LOW (ref 3.8–5.2)
RBC # BLD: 3.25 M/UL — LOW (ref 3.8–5.2)
RBC # BLD: 3.27 M/UL — LOW (ref 3.8–5.2)
RBC # BLD: 3.28 M/UL — LOW (ref 3.8–5.2)
RBC # BLD: 3.37 M/UL — LOW (ref 3.8–5.2)
RBC # BLD: 3.4 M/UL — LOW (ref 3.8–5.2)
RBC # BLD: 3.42 M/UL — LOW (ref 3.8–5.2)
RBC # BLD: 3.5 M/UL — LOW (ref 3.8–5.2)
RBC # BLD: 3.51 M/UL — LOW (ref 3.8–5.2)
RBC # FLD: 14 % — SIGNIFICANT CHANGE UP (ref 10.3–14.5)
RBC # FLD: 14.1 % — SIGNIFICANT CHANGE UP (ref 10.3–14.5)
RBC # FLD: 14.1 % — SIGNIFICANT CHANGE UP (ref 10.3–14.5)
RBC # FLD: 14.2 % — SIGNIFICANT CHANGE UP (ref 10.3–14.5)
RBC # FLD: 14.3 % — SIGNIFICANT CHANGE UP (ref 10.3–14.5)
RBC # FLD: 14.4 % — SIGNIFICANT CHANGE UP (ref 10.3–14.5)
RBC # FLD: 14.5 % — SIGNIFICANT CHANGE UP (ref 10.3–14.5)
RBC # FLD: 14.6 % — HIGH (ref 10.3–14.5)
RBC # FLD: 14.7 % — HIGH (ref 10.3–14.5)
RBC # FLD: 14.8 % — HIGH (ref 10.3–14.5)
RBC # FLD: 14.9 % — HIGH (ref 10.3–14.5)
RBC # FLD: 15 % — HIGH (ref 10.3–14.5)
RBC # FLD: 15.1 % — HIGH (ref 10.3–14.5)
RBC CASTS # UR COMP ASSIST: 2 /HPF — SIGNIFICANT CHANGE UP (ref 0–4)
RBC CASTS # UR COMP ASSIST: SIGNIFICANT CHANGE UP /HPF (ref 0–4)
RCV VOL RI: 4417 /UL — HIGH (ref 0–0)
RH IG SCN BLD-IMP: POSITIVE — SIGNIFICANT CHANGE UP
S AUREUS DNA NOSE QL NAA+PROBE: SIGNIFICANT CHANGE UP
SAO2 % BLDA: 98 % — HIGH (ref 92–96)
SARS-COV-2 IGG+IGM SERPL QL IA: 55.5 U/ML — HIGH
SARS-COV-2 IGG+IGM SERPL QL IA: >250 U/ML — HIGH
SARS-COV-2 IGG+IGM SERPL QL IA: >250 U/ML — HIGH
SARS-COV-2 IGG+IGM SERPL QL IA: POSITIVE
SARS-COV-2 RNA SPEC QL NAA+PROBE: SIGNIFICANT CHANGE UP
SODIUM SERPL-SCNC: 127 MMOL/L — LOW (ref 135–145)
SODIUM SERPL-SCNC: 129 MMOL/L — LOW (ref 135–145)
SODIUM SERPL-SCNC: 129 MMOL/L — LOW (ref 135–145)
SODIUM SERPL-SCNC: 130 MMOL/L — LOW (ref 135–145)
SODIUM SERPL-SCNC: 131 MMOL/L — LOW (ref 135–145)
SODIUM SERPL-SCNC: 132 MMOL/L — LOW (ref 135–145)
SODIUM SERPL-SCNC: 133 MMOL/L — LOW (ref 135–145)
SODIUM SERPL-SCNC: 134 MMOL/L — LOW (ref 135–145)
SODIUM SERPL-SCNC: 135 MMOL/L — SIGNIFICANT CHANGE UP (ref 135–145)
SODIUM SERPL-SCNC: 135 MMOL/L — SIGNIFICANT CHANGE UP (ref 135–145)
SODIUM SERPL-SCNC: 136 MMOL/L — SIGNIFICANT CHANGE UP (ref 135–145)
SODIUM SERPL-SCNC: 137 MMOL/L — SIGNIFICANT CHANGE UP (ref 135–145)
SODIUM SERPL-SCNC: 138 MMOL/L — SIGNIFICANT CHANGE UP (ref 135–145)
SODIUM SERPL-SCNC: 139 MMOL/L — SIGNIFICANT CHANGE UP (ref 135–145)
SODIUM SERPL-SCNC: 139 MMOL/L — SIGNIFICANT CHANGE UP (ref 135–145)
SP GR SPEC: 1.01 — LOW (ref 1.01–1.02)
SP GR SPEC: 1.02 — SIGNIFICANT CHANGE UP (ref 1.01–1.02)
SP GR SPEC: 1.02 — SIGNIFICANT CHANGE UP (ref 1.01–1.02)
SPECIMEN SOURCE: SIGNIFICANT CHANGE UP
T3 SERPL-MCNC: 80 NG/DL — SIGNIFICANT CHANGE UP (ref 80–200)
T4 AB SER-ACNC: 6.9 UG/DL — SIGNIFICANT CHANGE UP (ref 4.6–12)
T4 FREE SERPL-MCNC: 0.8 NG/DL — LOW (ref 0.9–1.8)
T4 FREE SERPL-MCNC: 1 NG/DL — SIGNIFICANT CHANGE UP (ref 0.9–1.8)
T4 FREE SERPL-MCNC: 1.1 NG/DL — SIGNIFICANT CHANGE UP (ref 0.9–1.8)
T4 FREE SERPL-MCNC: 1.2 NG/DL — SIGNIFICANT CHANGE UP (ref 0.9–1.8)
TIBC SERPL-MCNC: 232 UG/DL — SIGNIFICANT CHANGE UP (ref 220–430)
TOTAL NUCLEATED CELL COUNT, BODY FLUID: 217 /UL — SIGNIFICANT CHANGE UP
TROPONIN I SERPL-MCNC: 0.04 NG/ML — SIGNIFICANT CHANGE UP (ref 0.02–0.06)
TROPONIN T, HIGH SENSITIVITY RESULT: 21 NG/L — SIGNIFICANT CHANGE UP (ref 0–51)
TROPONIN T, HIGH SENSITIVITY RESULT: 24 NG/L — SIGNIFICANT CHANGE UP (ref 0–51)
TROPONIN T, HIGH SENSITIVITY RESULT: 26 NG/L — SIGNIFICANT CHANGE UP (ref 0–51)
TROPONIN T, HIGH SENSITIVITY RESULT: 28 NG/L — SIGNIFICANT CHANGE UP
TSH SERPL-MCNC: 27.3 UIU/ML — HIGH (ref 0.27–4.2)
TSH SERPL-MCNC: 5.77 UIU/ML — HIGH (ref 0.27–4.2)
TSH SERPL-MCNC: 7.44 UIU/ML — HIGH (ref 0.27–4.2)
TUBE TYPE: SIGNIFICANT CHANGE UP
UIBC SERPL-MCNC: 199 UG/DL — SIGNIFICANT CHANGE UP (ref 110–370)
UROBILINOGEN FLD QL: NEGATIVE — SIGNIFICANT CHANGE UP
WBC # BLD: 10.14 K/UL — SIGNIFICANT CHANGE UP (ref 3.8–10.5)
WBC # BLD: 10.74 K/UL — HIGH (ref 3.8–10.5)
WBC # BLD: 11.7 K/UL — HIGH (ref 3.8–10.5)
WBC # BLD: 12.31 K/UL — HIGH (ref 3.8–10.5)
WBC # BLD: 14.04 K/UL — HIGH (ref 3.8–10.5)
WBC # BLD: 14.22 K/UL — HIGH (ref 3.8–10.5)
WBC # BLD: 15.12 K/UL — HIGH (ref 3.8–10.5)
WBC # BLD: 4.41 K/UL — SIGNIFICANT CHANGE UP (ref 3.8–10.5)
WBC # BLD: 4.66 K/UL — SIGNIFICANT CHANGE UP (ref 3.8–10.5)
WBC # BLD: 4.8 K/UL — SIGNIFICANT CHANGE UP (ref 3.8–10.5)
WBC # BLD: 5.55 K/UL — SIGNIFICANT CHANGE UP (ref 3.8–10.5)
WBC # BLD: 5.69 K/UL — SIGNIFICANT CHANGE UP (ref 3.8–10.5)
WBC # BLD: 5.74 K/UL — SIGNIFICANT CHANGE UP (ref 3.8–10.5)
WBC # BLD: 5.96 K/UL — SIGNIFICANT CHANGE UP (ref 3.8–10.5)
WBC # BLD: 6.17 K/UL — SIGNIFICANT CHANGE UP (ref 3.8–10.5)
WBC # BLD: 6.43 K/UL — SIGNIFICANT CHANGE UP (ref 3.8–10.5)
WBC # BLD: 6.57 K/UL — SIGNIFICANT CHANGE UP (ref 3.8–10.5)
WBC # BLD: 7.1 K/UL — SIGNIFICANT CHANGE UP (ref 3.8–10.5)
WBC # BLD: 7.33 K/UL — SIGNIFICANT CHANGE UP (ref 3.8–10.5)
WBC # BLD: 7.47 K/UL — SIGNIFICANT CHANGE UP (ref 3.8–10.5)
WBC # BLD: 7.7 K/UL — SIGNIFICANT CHANGE UP (ref 3.8–10.5)
WBC # BLD: 8.07 K/UL — SIGNIFICANT CHANGE UP (ref 3.8–10.5)
WBC # BLD: 8.14 K/UL — SIGNIFICANT CHANGE UP (ref 3.8–10.5)
WBC # BLD: 8.32 K/UL — SIGNIFICANT CHANGE UP (ref 3.8–10.5)
WBC # BLD: 8.34 K/UL — SIGNIFICANT CHANGE UP (ref 3.8–10.5)
WBC # BLD: 8.41 K/UL — SIGNIFICANT CHANGE UP (ref 3.8–10.5)
WBC # BLD: 8.5 K/UL — SIGNIFICANT CHANGE UP (ref 3.8–10.5)
WBC # BLD: 8.81 K/UL — SIGNIFICANT CHANGE UP (ref 3.8–10.5)
WBC # BLD: 8.86 K/UL — SIGNIFICANT CHANGE UP (ref 3.8–10.5)
WBC # BLD: 9.13 K/UL — SIGNIFICANT CHANGE UP (ref 3.8–10.5)
WBC # BLD: 9.18 K/UL — SIGNIFICANT CHANGE UP (ref 3.8–10.5)
WBC # BLD: 9.28 K/UL — SIGNIFICANT CHANGE UP (ref 3.8–10.5)
WBC # BLD: 9.56 K/UL — SIGNIFICANT CHANGE UP (ref 3.8–10.5)
WBC # BLD: 9.56 K/UL — SIGNIFICANT CHANGE UP (ref 3.8–10.5)
WBC # BLD: 9.8 K/UL — SIGNIFICANT CHANGE UP (ref 3.8–10.5)
WBC # FLD AUTO: 10.14 K/UL — SIGNIFICANT CHANGE UP (ref 3.8–10.5)
WBC # FLD AUTO: 10.74 K/UL — HIGH (ref 3.8–10.5)
WBC # FLD AUTO: 11.7 K/UL — HIGH (ref 3.8–10.5)
WBC # FLD AUTO: 12.31 K/UL — HIGH (ref 3.8–10.5)
WBC # FLD AUTO: 14.04 K/UL — HIGH (ref 3.8–10.5)
WBC # FLD AUTO: 14.22 K/UL — HIGH (ref 3.8–10.5)
WBC # FLD AUTO: 15.12 K/UL — HIGH (ref 3.8–10.5)
WBC # FLD AUTO: 4.41 K/UL — SIGNIFICANT CHANGE UP (ref 3.8–10.5)
WBC # FLD AUTO: 4.66 K/UL — SIGNIFICANT CHANGE UP (ref 3.8–10.5)
WBC # FLD AUTO: 4.8 K/UL — SIGNIFICANT CHANGE UP (ref 3.8–10.5)
WBC # FLD AUTO: 5.55 K/UL — SIGNIFICANT CHANGE UP (ref 3.8–10.5)
WBC # FLD AUTO: 5.69 K/UL — SIGNIFICANT CHANGE UP (ref 3.8–10.5)
WBC # FLD AUTO: 5.74 K/UL — SIGNIFICANT CHANGE UP (ref 3.8–10.5)
WBC # FLD AUTO: 5.96 K/UL — SIGNIFICANT CHANGE UP (ref 3.8–10.5)
WBC # FLD AUTO: 6.17 K/UL — SIGNIFICANT CHANGE UP (ref 3.8–10.5)
WBC # FLD AUTO: 6.43 K/UL — SIGNIFICANT CHANGE UP (ref 3.8–10.5)
WBC # FLD AUTO: 6.57 K/UL — SIGNIFICANT CHANGE UP (ref 3.8–10.5)
WBC # FLD AUTO: 7.1 K/UL — SIGNIFICANT CHANGE UP (ref 3.8–10.5)
WBC # FLD AUTO: 7.33 K/UL — SIGNIFICANT CHANGE UP (ref 3.8–10.5)
WBC # FLD AUTO: 7.47 K/UL — SIGNIFICANT CHANGE UP (ref 3.8–10.5)
WBC # FLD AUTO: 7.7 K/UL — SIGNIFICANT CHANGE UP (ref 3.8–10.5)
WBC # FLD AUTO: 8.07 K/UL — SIGNIFICANT CHANGE UP (ref 3.8–10.5)
WBC # FLD AUTO: 8.14 K/UL — SIGNIFICANT CHANGE UP (ref 3.8–10.5)
WBC # FLD AUTO: 8.32 K/UL — SIGNIFICANT CHANGE UP (ref 3.8–10.5)
WBC # FLD AUTO: 8.34 K/UL — SIGNIFICANT CHANGE UP (ref 3.8–10.5)
WBC # FLD AUTO: 8.41 K/UL — SIGNIFICANT CHANGE UP (ref 3.8–10.5)
WBC # FLD AUTO: 8.5 K/UL — SIGNIFICANT CHANGE UP (ref 3.8–10.5)
WBC # FLD AUTO: 8.81 K/UL — SIGNIFICANT CHANGE UP (ref 3.8–10.5)
WBC # FLD AUTO: 8.86 K/UL — SIGNIFICANT CHANGE UP (ref 3.8–10.5)
WBC # FLD AUTO: 9.13 K/UL — SIGNIFICANT CHANGE UP (ref 3.8–10.5)
WBC # FLD AUTO: 9.18 K/UL — SIGNIFICANT CHANGE UP (ref 3.8–10.5)
WBC # FLD AUTO: 9.28 K/UL — SIGNIFICANT CHANGE UP (ref 3.8–10.5)
WBC # FLD AUTO: 9.56 K/UL — SIGNIFICANT CHANGE UP (ref 3.8–10.5)
WBC # FLD AUTO: 9.56 K/UL — SIGNIFICANT CHANGE UP (ref 3.8–10.5)
WBC # FLD AUTO: 9.8 K/UL — SIGNIFICANT CHANGE UP (ref 3.8–10.5)
WBC UR QL: 3 /HPF — SIGNIFICANT CHANGE UP (ref 0–5)
WBC UR QL: NEGATIVE /HPF — SIGNIFICANT CHANGE UP (ref 0–5)

## 2021-01-01 PROCEDURE — 82553 CREATINE MB FRACTION: CPT

## 2021-01-01 PROCEDURE — 93880 EXTRACRANIAL BILAT STUDY: CPT

## 2021-01-01 PROCEDURE — 99233 SBSQ HOSP IP/OBS HIGH 50: CPT | Mod: 57

## 2021-01-01 PROCEDURE — 71250 CT THORAX DX C-: CPT | Mod: 26

## 2021-01-01 PROCEDURE — 93458 L HRT ARTERY/VENTRICLE ANGIO: CPT | Mod: 26

## 2021-01-01 PROCEDURE — G0426: CPT | Mod: 95

## 2021-01-01 PROCEDURE — 99214 OFFICE O/P EST MOD 30 MIN: CPT

## 2021-01-01 PROCEDURE — 80048 BASIC METABOLIC PNL TOTAL CA: CPT

## 2021-01-01 PROCEDURE — 86850 RBC ANTIBODY SCREEN: CPT

## 2021-01-01 PROCEDURE — 99232 SBSQ HOSP IP/OBS MODERATE 35: CPT

## 2021-01-01 PROCEDURE — 99215 OFFICE O/P EST HI 40 MIN: CPT | Mod: 95

## 2021-01-01 PROCEDURE — 71045 X-RAY EXAM CHEST 1 VIEW: CPT | Mod: 26

## 2021-01-01 PROCEDURE — 99291 CRITICAL CARE FIRST HOUR: CPT

## 2021-01-01 PROCEDURE — 80053 COMPREHEN METABOLIC PANEL: CPT

## 2021-01-01 PROCEDURE — 99233 SBSQ HOSP IP/OBS HIGH 50: CPT

## 2021-01-01 PROCEDURE — 86316 IMMUNOASSAY TUMOR OTHER: CPT

## 2021-01-01 PROCEDURE — 84439 ASSAY OF FREE THYROXINE: CPT

## 2021-01-01 PROCEDURE — 86769 SARS-COV-2 COVID-19 ANTIBODY: CPT

## 2021-01-01 PROCEDURE — 93005 ELECTROCARDIOGRAM TRACING: CPT

## 2021-01-01 PROCEDURE — 99223 1ST HOSP IP/OBS HIGH 75: CPT

## 2021-01-01 PROCEDURE — 86301 IMMUNOASSAY TUMOR CA 19-9: CPT

## 2021-01-01 PROCEDURE — 85730 THROMBOPLASTIN TIME PARTIAL: CPT

## 2021-01-01 PROCEDURE — 84100 ASSAY OF PHOSPHORUS: CPT

## 2021-01-01 PROCEDURE — 89051 BODY FLUID CELL COUNT: CPT

## 2021-01-01 PROCEDURE — 88360 TUMOR IMMUNOHISTOCHEM/MANUAL: CPT | Mod: 26

## 2021-01-01 PROCEDURE — U0003: CPT

## 2021-01-01 PROCEDURE — 85027 COMPLETE CBC AUTOMATED: CPT

## 2021-01-01 PROCEDURE — 36415 COLL VENOUS BLD VENIPUNCTURE: CPT

## 2021-01-01 PROCEDURE — 97530 THERAPEUTIC ACTIVITIES: CPT

## 2021-01-01 PROCEDURE — 75574 CT ANGIO HRT W/3D IMAGE: CPT | Mod: 26

## 2021-01-01 PROCEDURE — 99231 SBSQ HOSP IP/OBS SF/LOW 25: CPT

## 2021-01-01 PROCEDURE — 84443 ASSAY THYROID STIM HORMONE: CPT

## 2021-01-01 PROCEDURE — 99223 1ST HOSP IP/OBS HIGH 75: CPT | Mod: GC

## 2021-01-01 PROCEDURE — 99233 SBSQ HOSP IP/OBS HIGH 50: CPT | Mod: 25

## 2021-01-01 PROCEDURE — 99291 CRITICAL CARE FIRST HOUR: CPT | Mod: 25

## 2021-01-01 PROCEDURE — 94010 BREATHING CAPACITY TEST: CPT

## 2021-01-01 PROCEDURE — 85610 PROTHROMBIN TIME: CPT

## 2021-01-01 PROCEDURE — 87040 BLOOD CULTURE FOR BACTERIA: CPT

## 2021-01-01 PROCEDURE — 93458 L HRT ARTERY/VENTRICLE ANGIO: CPT

## 2021-01-01 PROCEDURE — 73090 X-RAY EXAM OF FOREARM: CPT

## 2021-01-01 PROCEDURE — 75574 CT ANGIO HRT W/3D IMAGE: CPT

## 2021-01-01 PROCEDURE — 82962 GLUCOSE BLOOD TEST: CPT

## 2021-01-01 PROCEDURE — 84134 ASSAY OF PREALBUMIN: CPT

## 2021-01-01 PROCEDURE — 84484 ASSAY OF TROPONIN QUANT: CPT

## 2021-01-01 PROCEDURE — 97161 PT EVAL LOW COMPLEX 20 MIN: CPT

## 2021-01-01 PROCEDURE — 73090 X-RAY EXAM OF FOREARM: CPT | Mod: 26,LT

## 2021-01-01 PROCEDURE — 82550 ASSAY OF CK (CPK): CPT

## 2021-01-01 PROCEDURE — 71260 CT THORAX DX C+: CPT | Mod: 26

## 2021-01-01 PROCEDURE — 32550 INSERT PLEURAL CATH: CPT | Mod: RT

## 2021-01-01 PROCEDURE — 83605 ASSAY OF LACTIC ACID: CPT

## 2021-01-01 PROCEDURE — 85652 RBC SED RATE AUTOMATED: CPT

## 2021-01-01 PROCEDURE — 99285 EMERGENCY DEPT VISIT HI MDM: CPT

## 2021-01-01 PROCEDURE — 97116 GAIT TRAINING THERAPY: CPT

## 2021-01-01 PROCEDURE — 88305 TISSUE EXAM BY PATHOLOGIST: CPT

## 2021-01-01 PROCEDURE — 71045 X-RAY EXAM CHEST 1 VIEW: CPT | Mod: 26,76

## 2021-01-01 PROCEDURE — 83835 ASSAY OF METANEPHRINES: CPT

## 2021-01-01 PROCEDURE — 86901 BLOOD TYPING SEROLOGIC RH(D): CPT

## 2021-01-01 PROCEDURE — 97110 THERAPEUTIC EXERCISES: CPT

## 2021-01-01 PROCEDURE — 94010 BREATHING CAPACITY TEST: CPT | Mod: 26

## 2021-01-01 PROCEDURE — 74018 RADEX ABDOMEN 1 VIEW: CPT | Mod: 26

## 2021-01-01 PROCEDURE — 93306 TTE W/DOPPLER COMPLETE: CPT | Mod: 26

## 2021-01-01 PROCEDURE — 87637 SARSCOV2&INF A&B&RSV AMP PRB: CPT

## 2021-01-01 PROCEDURE — 99222 1ST HOSP IP/OBS MODERATE 55: CPT

## 2021-01-01 PROCEDURE — 99232 SBSQ HOSP IP/OBS MODERATE 35: CPT | Mod: GC

## 2021-01-01 PROCEDURE — 84157 ASSAY OF PROTEIN OTHER: CPT

## 2021-01-01 PROCEDURE — 87086 URINE CULTURE/COLONY COUNT: CPT

## 2021-01-01 PROCEDURE — 86900 BLOOD TYPING SEROLOGIC ABO: CPT

## 2021-01-01 PROCEDURE — 88112 CYTOPATH CELL ENHANCE TECH: CPT

## 2021-01-01 PROCEDURE — 81001 URINALYSIS AUTO W/SCOPE: CPT

## 2021-01-01 PROCEDURE — 32551 INSERTION OF CHEST TUBE: CPT | Mod: RT

## 2021-01-01 PROCEDURE — 85025 COMPLETE CBC W/AUTO DIFF WBC: CPT

## 2021-01-01 PROCEDURE — 99285 EMERGENCY DEPT VISIT HI MDM: CPT | Mod: 25

## 2021-01-01 PROCEDURE — C1894: CPT

## 2021-01-01 PROCEDURE — 93000 ELECTROCARDIOGRAM COMPLETE: CPT

## 2021-01-01 PROCEDURE — 82042 OTHER SOURCE ALBUMIN QUAN EA: CPT

## 2021-01-01 PROCEDURE — 83880 ASSAY OF NATRIURETIC PEPTIDE: CPT

## 2021-01-01 PROCEDURE — 71260 CT THORAX DX C+: CPT

## 2021-01-01 PROCEDURE — 83690 ASSAY OF LIPASE: CPT

## 2021-01-01 PROCEDURE — 99239 HOSP IP/OBS DSCHRG MGMT >30: CPT

## 2021-01-01 PROCEDURE — 93010 ELECTROCARDIOGRAM REPORT: CPT

## 2021-01-01 PROCEDURE — 83735 ASSAY OF MAGNESIUM: CPT

## 2021-01-01 PROCEDURE — 82945 GLUCOSE OTHER FLUID: CPT

## 2021-01-01 PROCEDURE — 0225U NFCT DS DNA&RNA 21 SARSCOV2: CPT

## 2021-01-01 PROCEDURE — 84436 ASSAY OF TOTAL THYROXINE: CPT

## 2021-01-01 PROCEDURE — 82533 TOTAL CORTISOL: CPT

## 2021-01-01 PROCEDURE — 82803 BLOOD GASES ANY COMBINATION: CPT

## 2021-01-01 PROCEDURE — C1887: CPT

## 2021-01-01 PROCEDURE — 88112 CYTOPATH CELL ENHANCE TECH: CPT | Mod: 26

## 2021-01-01 PROCEDURE — 99204 OFFICE O/P NEW MOD 45 MIN: CPT | Mod: 95

## 2021-01-01 PROCEDURE — 83540 ASSAY OF IRON: CPT

## 2021-01-01 PROCEDURE — 36620 INSERTION CATHETER ARTERY: CPT

## 2021-01-01 PROCEDURE — 88360 TUMOR IMMUNOHISTOCHEM/MANUAL: CPT

## 2021-01-01 PROCEDURE — 82088 ASSAY OF ALDOSTERONE: CPT

## 2021-01-01 PROCEDURE — 81003 URINALYSIS AUTO W/O SCOPE: CPT

## 2021-01-01 PROCEDURE — 96374 THER/PROPH/DIAG INJ IV PUSH: CPT

## 2021-01-01 PROCEDURE — 99497 ADVNCD CARE PLAN 30 MIN: CPT | Mod: 25

## 2021-01-01 PROCEDURE — 88305 TISSUE EXAM BY PATHOLOGIST: CPT | Mod: 26

## 2021-01-01 PROCEDURE — U0005: CPT

## 2021-01-01 PROCEDURE — 74230 X-RAY XM SWLNG FUNCJ C+: CPT | Mod: 26

## 2021-01-01 PROCEDURE — 83036 HEMOGLOBIN GLYCOSYLATED A1C: CPT

## 2021-01-01 PROCEDURE — 84480 ASSAY TRIIODOTHYRONINE (T3): CPT

## 2021-01-01 PROCEDURE — 87640 STAPH A DNA AMP PROBE: CPT

## 2021-01-01 PROCEDURE — 99072 ADDL SUPL MATRL&STAF TM PHE: CPT

## 2021-01-01 PROCEDURE — 71045 X-RAY EXAM CHEST 1 VIEW: CPT

## 2021-01-01 PROCEDURE — 86923 COMPATIBILITY TEST ELECTRIC: CPT

## 2021-01-01 PROCEDURE — 93306 TTE W/DOPPLER COMPLETE: CPT

## 2021-01-01 PROCEDURE — 71250 CT THORAX DX C-: CPT

## 2021-01-01 PROCEDURE — 88342 IMHCHEM/IMCYTCHM 1ST ANTB: CPT

## 2021-01-01 PROCEDURE — 82150 ASSAY OF AMYLASE: CPT

## 2021-01-01 PROCEDURE — 86140 C-REACTIVE PROTEIN: CPT

## 2021-01-01 PROCEDURE — 99291 CRITICAL CARE FIRST HOUR: CPT | Mod: CS

## 2021-01-01 PROCEDURE — 93880 EXTRACRANIAL BILAT STUDY: CPT | Mod: 26

## 2021-01-01 PROCEDURE — 74177 CT ABD & PELVIS W/CONTRAST: CPT | Mod: 26

## 2021-01-01 PROCEDURE — 87635 SARS-COV-2 COVID-19 AMP PRB: CPT

## 2021-01-01 PROCEDURE — 93010 ELECTROCARDIOGRAM REPORT: CPT | Mod: 76

## 2021-01-01 PROCEDURE — 93970 EXTREMITY STUDY: CPT | Mod: 26

## 2021-01-01 PROCEDURE — 88341 IMHCHEM/IMCYTCHM EA ADD ANTB: CPT

## 2021-01-01 PROCEDURE — 87641 MR-STAPH DNA AMP PROBE: CPT

## 2021-01-01 PROCEDURE — C1769: CPT

## 2021-01-01 PROCEDURE — 88342 IMHCHEM/IMCYTCHM 1ST ANTB: CPT | Mod: 26,59

## 2021-01-01 PROCEDURE — 85384 FIBRINOGEN ACTIVITY: CPT

## 2021-01-01 PROCEDURE — 82378 CARCINOEMBRYONIC ANTIGEN: CPT

## 2021-01-01 PROCEDURE — 80076 HEPATIC FUNCTION PANEL: CPT

## 2021-01-01 PROCEDURE — 83550 IRON BINDING TEST: CPT

## 2021-01-01 PROCEDURE — 71045 X-RAY EXAM CHEST 1 VIEW: CPT | Mod: 26,77

## 2021-01-01 PROCEDURE — 88341 IMHCHEM/IMCYTCHM EA ADD ANTB: CPT | Mod: 26,59

## 2021-01-01 PROCEDURE — 74018 RADEX ABDOMEN 1 VIEW: CPT

## 2021-01-01 PROCEDURE — 93970 EXTREMITY STUDY: CPT

## 2021-01-01 PROCEDURE — 74177 CT ABD & PELVIS W/CONTRAST: CPT

## 2021-01-01 RX ORDER — SODIUM CHLORIDE 9 MG/ML
250 INJECTION INTRAMUSCULAR; INTRAVENOUS; SUBCUTANEOUS ONCE
Refills: 0 | Status: COMPLETED | OUTPATIENT
Start: 2021-01-01 | End: 2021-01-01

## 2021-01-01 RX ORDER — APIXABAN 2.5 MG/1
2.5 TABLET, FILM COATED ORAL EVERY 12 HOURS
Refills: 0 | Status: DISCONTINUED | OUTPATIENT
Start: 2021-01-01 | End: 2021-01-01

## 2021-01-01 RX ORDER — LIDOCAINE 4 G/100G
1 CREAM TOPICAL DAILY
Refills: 0 | Status: DISCONTINUED | OUTPATIENT
Start: 2021-01-01 | End: 2021-01-01

## 2021-01-01 RX ORDER — SENNA PLUS 8.6 MG/1
2 TABLET ORAL
Qty: 0 | Refills: 0 | DISCHARGE

## 2021-01-01 RX ORDER — ASPIRIN/CALCIUM CARB/MAGNESIUM 324 MG
81 TABLET ORAL DAILY
Refills: 0 | Status: DISCONTINUED | OUTPATIENT
Start: 2021-01-01 | End: 2021-01-01

## 2021-01-01 RX ORDER — HEPARIN SODIUM 5000 [USP'U]/ML
750 INJECTION INTRAVENOUS; SUBCUTANEOUS
Qty: 25000 | Refills: 0 | Status: DISCONTINUED | OUTPATIENT
Start: 2021-01-01 | End: 2021-01-01

## 2021-01-01 RX ORDER — LEVOTHYROXINE SODIUM 125 MCG
1 TABLET ORAL
Qty: 0 | Refills: 0 | DISCHARGE
Start: 2021-01-01

## 2021-01-01 RX ORDER — ATORVASTATIN CALCIUM 80 MG/1
20 TABLET, FILM COATED ORAL AT BEDTIME
Refills: 0 | Status: DISCONTINUED | OUTPATIENT
Start: 2021-01-01 | End: 2021-01-01

## 2021-01-01 RX ORDER — APIXABAN 2.5 MG/1
1 TABLET, FILM COATED ORAL
Qty: 0 | Refills: 0 | DISCHARGE

## 2021-01-01 RX ORDER — SENNA PLUS 8.6 MG/1
2 TABLET ORAL AT BEDTIME
Refills: 0 | Status: DISCONTINUED | OUTPATIENT
Start: 2021-01-01 | End: 2021-01-01

## 2021-01-01 RX ORDER — FERROUS SULFATE 325(65) MG
1 TABLET ORAL
Qty: 0 | Refills: 0 | DISCHARGE

## 2021-01-01 RX ORDER — FAMOTIDINE 10 MG/ML
1 INJECTION INTRAVENOUS
Qty: 0 | Refills: 0 | DISCHARGE

## 2021-01-01 RX ORDER — FERROUS SULFATE 325(65) MG
325 TABLET ORAL DAILY
Refills: 0 | Status: DISCONTINUED | OUTPATIENT
Start: 2021-01-01 | End: 2021-01-01

## 2021-01-01 RX ORDER — AMLODIPINE BESYLATE 2.5 MG/1
5 TABLET ORAL DAILY
Refills: 0 | Status: DISCONTINUED | OUTPATIENT
Start: 2021-01-01 | End: 2021-01-01

## 2021-01-01 RX ORDER — MORPHINE SULFATE 10 MG/5ML
10 SOLUTION ORAL
Qty: 1 | Refills: 0 | Status: ACTIVE | COMMUNITY
Start: 2021-01-01 | End: 1900-01-01

## 2021-01-01 RX ORDER — LIDOCAINE 4 G/100G
1 CREAM TOPICAL
Qty: 10 | Refills: 0
Start: 2021-01-01 | End: 2021-01-01

## 2021-01-01 RX ORDER — SODIUM CHLORIDE 9 MG/ML
1000 INJECTION INTRAMUSCULAR; INTRAVENOUS; SUBCUTANEOUS
Refills: 0 | Status: DISCONTINUED | OUTPATIENT
Start: 2021-01-01 | End: 2021-01-01

## 2021-01-01 RX ORDER — CHLORHEXIDINE GLUCONATE 213 G/1000ML
1 SOLUTION TOPICAL ONCE
Refills: 0 | Status: COMPLETED | OUTPATIENT
Start: 2021-01-01 | End: 2021-01-01

## 2021-01-01 RX ORDER — AMIODARONE HYDROCHLORIDE 200 MG/1
200 TABLET ORAL
Qty: 90 | Refills: 1 | Status: ACTIVE | COMMUNITY
Start: 2019-08-02 | End: 1900-01-01

## 2021-01-01 RX ORDER — MEGESTROL ACETATE 40 MG/ML
20 SUSPENSION ORAL DAILY
Refills: 0 | Status: DISCONTINUED | OUTPATIENT
Start: 2021-01-01 | End: 2021-01-01

## 2021-01-01 RX ORDER — FUROSEMIDE 40 MG
40 TABLET ORAL ONCE
Refills: 0 | Status: COMPLETED | OUTPATIENT
Start: 2021-01-01 | End: 2021-01-01

## 2021-01-01 RX ORDER — OXYCODONE HYDROCHLORIDE 5 MG/1
5 TABLET ORAL ONCE
Refills: 0 | Status: DISCONTINUED | OUTPATIENT
Start: 2021-01-01 | End: 2021-01-01

## 2021-01-01 RX ORDER — HEPARIN SODIUM 5000 [USP'U]/ML
600 INJECTION INTRAVENOUS; SUBCUTANEOUS
Qty: 25000 | Refills: 0 | Status: DISCONTINUED | OUTPATIENT
Start: 2021-01-01 | End: 2021-01-01

## 2021-01-01 RX ORDER — OXYCODONE HYDROCHLORIDE 5 MG/1
5 TABLET ORAL EVERY 4 HOURS
Refills: 0 | Status: DISCONTINUED | OUTPATIENT
Start: 2021-01-01 | End: 2021-01-01

## 2021-01-01 RX ORDER — POLYETHYLENE GLYCOL 3350 17 G/17G
17 POWDER, FOR SOLUTION ORAL DAILY
Refills: 0 | Status: DISCONTINUED | OUTPATIENT
Start: 2021-01-01 | End: 2021-01-01

## 2021-01-01 RX ORDER — ACETAMINOPHEN 500 MG
2 TABLET ORAL
Qty: 0 | Refills: 0 | DISCHARGE

## 2021-01-01 RX ORDER — GLUCOSAMINE HCL/CHONDROITIN SU 500-400 MG
3 CAPSULE ORAL
Qty: 30 | Refills: 5 | Status: DISCONTINUED | COMMUNITY
Start: 2019-09-26 | End: 2021-01-01

## 2021-01-01 RX ORDER — AMIODARONE HYDROCHLORIDE 400 MG/1
200 TABLET ORAL DAILY
Refills: 0 | Status: DISCONTINUED | OUTPATIENT
Start: 2021-01-01 | End: 2021-01-01

## 2021-01-01 RX ORDER — ACETAMINOPHEN 500 MG
700 TABLET ORAL ONCE
Refills: 0 | Status: DISCONTINUED | OUTPATIENT
Start: 2021-01-01 | End: 2021-01-01

## 2021-01-01 RX ORDER — HEPARIN SODIUM 5000 [USP'U]/ML
700 INJECTION INTRAVENOUS; SUBCUTANEOUS
Qty: 25000 | Refills: 0 | Status: DISCONTINUED | OUTPATIENT
Start: 2021-01-01 | End: 2021-01-01

## 2021-01-01 RX ORDER — SENNA PLUS 8.6 MG/1
1 TABLET ORAL
Qty: 0 | Refills: 0 | DISCHARGE

## 2021-01-01 RX ORDER — ONDANSETRON 8 MG/1
4 TABLET, FILM COATED ORAL ONCE
Refills: 0 | Status: COMPLETED | OUTPATIENT
Start: 2021-01-01 | End: 2021-01-01

## 2021-01-01 RX ORDER — SODIUM CHLORIDE 9 MG/ML
4 INJECTION INTRAMUSCULAR; INTRAVENOUS; SUBCUTANEOUS EVERY 6 HOURS
Refills: 0 | Status: DISCONTINUED | OUTPATIENT
Start: 2021-01-01 | End: 2021-01-01

## 2021-01-01 RX ORDER — LEVOTHYROXINE SODIUM 125 MCG
100 TABLET ORAL DAILY
Refills: 0 | Status: DISCONTINUED | OUTPATIENT
Start: 2021-01-01 | End: 2021-01-01

## 2021-01-01 RX ORDER — METOPROLOL TARTRATE 50 MG
12.5 TABLET ORAL DAILY
Refills: 0 | Status: DISCONTINUED | OUTPATIENT
Start: 2021-01-01 | End: 2021-01-01

## 2021-01-01 RX ORDER — HYDROCHLOROTHIAZIDE 12.5 MG/1
12.5 CAPSULE ORAL
Qty: 15 | Refills: 5 | Status: DISCONTINUED | COMMUNITY
Start: 2019-03-07 | End: 2021-01-01

## 2021-01-01 RX ORDER — UREA 15 G
15 POWDER IN PACKET (EA) ORAL ONCE
Refills: 0 | Status: COMPLETED | OUTPATIENT
Start: 2021-01-01 | End: 2021-01-01

## 2021-01-01 RX ORDER — ONDANSETRON 8 MG/1
4 TABLET, FILM COATED ORAL EVERY 6 HOURS
Refills: 0 | Status: DISCONTINUED | OUTPATIENT
Start: 2021-01-01 | End: 2021-01-01

## 2021-01-01 RX ORDER — CHLORHEXIDINE GLUCONATE 213 G/1000ML
1 SOLUTION TOPICAL
Refills: 0 | Status: DISCONTINUED | OUTPATIENT
Start: 2021-01-01 | End: 2021-01-01

## 2021-01-01 RX ORDER — ALBUTEROL 90 UG/1
2.5 AEROSOL, METERED ORAL EVERY 6 HOURS
Refills: 0 | Status: DISCONTINUED | OUTPATIENT
Start: 2021-01-01 | End: 2021-01-01

## 2021-01-01 RX ORDER — POTASSIUM CHLORIDE 20 MEQ
10 PACKET (EA) ORAL
Refills: 0 | Status: COMPLETED | OUTPATIENT
Start: 2021-01-01 | End: 2021-01-01

## 2021-01-01 RX ORDER — ALPRAZOLAM 0.25 MG
0.5 TABLET ORAL EVERY 8 HOURS
Refills: 0 | Status: DISCONTINUED | OUTPATIENT
Start: 2021-01-01 | End: 2021-01-01

## 2021-01-01 RX ORDER — LEVOTHYROXINE SODIUM 125 MCG
1 TABLET ORAL
Qty: 0 | Refills: 0 | DISCHARGE

## 2021-01-01 RX ORDER — METOPROLOL TARTRATE 50 MG
25 TABLET ORAL DAILY
Refills: 0 | Status: DISCONTINUED | OUTPATIENT
Start: 2021-01-01 | End: 2021-01-01

## 2021-01-01 RX ORDER — ACETAMINOPHEN 500 MG
700 TABLET ORAL ONCE
Refills: 0 | Status: COMPLETED | OUTPATIENT
Start: 2021-01-01 | End: 2021-01-01

## 2021-01-01 RX ORDER — AMIODARONE HYDROCHLORIDE 400 MG/1
1 TABLET ORAL
Qty: 0 | Refills: 0 | DISCHARGE

## 2021-01-01 RX ORDER — ACETAMINOPHEN 500 MG
750 TABLET ORAL ONCE
Refills: 0 | Status: COMPLETED | OUTPATIENT
Start: 2021-01-01 | End: 2021-01-01

## 2021-01-01 RX ORDER — CEFUROXIME AXETIL 250 MG
1500 TABLET ORAL ONCE
Refills: 0 | Status: COMPLETED | OUTPATIENT
Start: 2021-01-01 | End: 2021-01-01

## 2021-01-01 RX ORDER — ASPIRIN/CALCIUM CARB/MAGNESIUM 324 MG
1 TABLET ORAL
Qty: 0 | Refills: 0 | DISCHARGE

## 2021-01-01 RX ORDER — LEVOTHYROXINE SODIUM 125 MCG
75 TABLET ORAL
Refills: 0 | Status: DISCONTINUED | OUTPATIENT
Start: 2021-01-01 | End: 2021-01-01

## 2021-01-01 RX ORDER — LEVOTHYROXINE SODIUM 125 MCG
50 TABLET ORAL AT BEDTIME
Refills: 0 | Status: DISCONTINUED | OUTPATIENT
Start: 2021-01-01 | End: 2021-01-01

## 2021-01-01 RX ORDER — SODIUM CHLORIDE 9 MG/ML
2 INJECTION INTRAMUSCULAR; INTRAVENOUS; SUBCUTANEOUS
Refills: 0 | Status: DISCONTINUED | OUTPATIENT
Start: 2021-01-01 | End: 2021-01-01

## 2021-01-01 RX ORDER — AMLODIPINE BESYLATE 2.5 MG/1
1 TABLET ORAL
Qty: 0 | Refills: 0 | DISCHARGE

## 2021-01-01 RX ORDER — ACETAMINOPHEN 500 MG
2 TABLET ORAL
Qty: 18 | Refills: 0
Start: 2021-01-01 | End: 2021-01-01

## 2021-01-01 RX ORDER — CLOPIDOGREL BISULFATE 75 MG/1
75 TABLET, FILM COATED ORAL DAILY
Qty: 30 | Refills: 5 | Status: DISCONTINUED | COMMUNITY
Start: 2018-10-11 | End: 2021-01-01

## 2021-01-01 RX ORDER — OXYCODONE HYDROCHLORIDE 5 MG/1
10 TABLET ORAL EVERY 4 HOURS
Refills: 0 | Status: DISCONTINUED | OUTPATIENT
Start: 2021-01-01 | End: 2021-01-01

## 2021-01-01 RX ORDER — DRONABINOL 2.5 MG
2.5 CAPSULE ORAL EVERY 6 HOURS
Refills: 0 | Status: DISCONTINUED | OUTPATIENT
Start: 2021-01-01 | End: 2021-01-01

## 2021-01-01 RX ORDER — METOPROLOL TARTRATE 50 MG
50 TABLET ORAL DAILY
Refills: 0 | Status: DISCONTINUED | OUTPATIENT
Start: 2021-01-01 | End: 2021-01-01

## 2021-01-01 RX ORDER — ACETAMINOPHEN 500 MG
650 TABLET ORAL EVERY 6 HOURS
Refills: 0 | Status: DISCONTINUED | OUTPATIENT
Start: 2021-01-01 | End: 2021-01-01

## 2021-01-01 RX ORDER — HEPARIN SODIUM 5000 [USP'U]/ML
550 INJECTION INTRAVENOUS; SUBCUTANEOUS
Qty: 25000 | Refills: 0 | Status: DISCONTINUED | OUTPATIENT
Start: 2021-01-01 | End: 2021-01-01

## 2021-01-01 RX ORDER — DILTIAZEM HYDROCHLORIDE 30 MG/1
30 TABLET ORAL
Qty: 60 | Refills: 5 | Status: DISCONTINUED | COMMUNITY
Start: 2021-01-01 | End: 2021-01-01

## 2021-01-01 RX ORDER — FUROSEMIDE 40 MG
40 TABLET ORAL ONCE
Refills: 0 | Status: DISCONTINUED | OUTPATIENT
Start: 2021-01-01 | End: 2021-01-01

## 2021-01-01 RX ORDER — LIDOCAINE 4 G/100G
1 CREAM TOPICAL ONCE
Refills: 0 | Status: COMPLETED | OUTPATIENT
Start: 2021-01-01 | End: 2021-01-01

## 2021-01-01 RX ORDER — METOPROLOL TARTRATE 50 MG
0.5 TABLET ORAL
Qty: 0 | Refills: 0 | DISCHARGE

## 2021-01-01 RX ORDER — ALBUTEROL 90 UG/1
1 AEROSOL, METERED ORAL
Qty: 1 | Refills: 0
Start: 2021-01-01 | End: 2021-05-19

## 2021-01-01 RX ORDER — HEPARIN SODIUM 5000 [USP'U]/ML
2500 INJECTION INTRAVENOUS; SUBCUTANEOUS EVERY 12 HOURS
Refills: 0 | Status: DISCONTINUED | OUTPATIENT
Start: 2021-01-01 | End: 2021-01-01

## 2021-01-01 RX ORDER — SUCRALFATE 1 G/1
1 TABLET ORAL
Qty: 30 | Refills: 0 | Status: DISCONTINUED | COMMUNITY
Start: 2018-05-19 | End: 2021-01-01

## 2021-01-01 RX ORDER — AMLODIPINE BESYLATE 2.5 MG/1
5 TABLET ORAL ONCE
Refills: 0 | Status: COMPLETED | OUTPATIENT
Start: 2021-01-01 | End: 2021-01-01

## 2021-01-01 RX ORDER — DORNASE ALFA 1 MG/ML
2.5 SOLUTION RESPIRATORY (INHALATION) DAILY
Refills: 0 | Status: DISCONTINUED | OUTPATIENT
Start: 2021-01-01 | End: 2021-01-01

## 2021-01-01 RX ORDER — ONDANSETRON 8 MG/1
8 TABLET, FILM COATED ORAL THREE TIMES A DAY
Refills: 0 | Status: DISCONTINUED | OUTPATIENT
Start: 2021-01-01 | End: 2021-01-01

## 2021-01-01 RX ORDER — HEPARIN SODIUM 5000 [USP'U]/ML
1000 INJECTION INTRAVENOUS; SUBCUTANEOUS
Qty: 25000 | Refills: 0 | Status: DISCONTINUED | OUTPATIENT
Start: 2021-01-01 | End: 2021-01-01

## 2021-01-01 RX ORDER — SORBITOL SOLUTION 70 %
30 SOLUTION, ORAL MISCELLANEOUS ONCE
Refills: 0 | Status: COMPLETED | OUTPATIENT
Start: 2021-01-01 | End: 2021-01-01

## 2021-01-01 RX ORDER — ERGOCALCIFEROL 1.25 MG/1
1 CAPSULE ORAL
Qty: 0 | Refills: 0 | DISCHARGE

## 2021-01-01 RX ORDER — POLYETHYLENE GLYCOL 3350 17 G/17G
17 POWDER, FOR SOLUTION ORAL ONCE
Refills: 0 | Status: COMPLETED | OUTPATIENT
Start: 2021-01-01 | End: 2021-01-01

## 2021-01-01 RX ORDER — ALBUTEROL 90 UG/1
1 AEROSOL, METERED ORAL EVERY 4 HOURS
Refills: 0 | Status: DISCONTINUED | OUTPATIENT
Start: 2021-01-01 | End: 2021-01-01

## 2021-01-01 RX ORDER — METOPROLOL TARTRATE 50 MG
0.5 TABLET ORAL
Qty: 15 | Refills: 0
Start: 2021-01-01 | End: 2021-05-19

## 2021-01-01 RX ORDER — CHLORHEXIDINE GLUCONATE 213 G/1000ML
5 SOLUTION TOPICAL ONCE
Refills: 0 | Status: COMPLETED | OUTPATIENT
Start: 2021-01-01 | End: 2021-01-01

## 2021-01-01 RX ORDER — LEVOTHYROXINE SODIUM 125 MCG
50 TABLET ORAL
Refills: 0 | Status: DISCONTINUED | OUTPATIENT
Start: 2021-01-01 | End: 2021-01-01

## 2021-01-01 RX ORDER — METOCLOPRAMIDE 10 MG/1
10 TABLET ORAL
Qty: 45 | Refills: 3 | Status: ACTIVE | COMMUNITY
Start: 2021-01-01 | End: 1900-01-01

## 2021-01-01 RX ORDER — LEVOTHYROXINE SODIUM 125 MCG
1 TABLET ORAL
Qty: 30 | Refills: 0
Start: 2021-01-01 | End: 2021-05-19

## 2021-01-01 RX ORDER — ONDANSETRON 8 MG/1
1 TABLET, FILM COATED ORAL
Qty: 0 | Refills: 0 | DISCHARGE

## 2021-01-01 RX ORDER — AMLODIPINE BESYLATE 2.5 MG/1
10 TABLET ORAL DAILY
Refills: 0 | Status: DISCONTINUED | OUTPATIENT
Start: 2021-01-01 | End: 2021-01-01

## 2021-01-01 RX ORDER — METOPROLOL TARTRATE 25 MG/1
25 TABLET, FILM COATED ORAL
Qty: 135 | Refills: 3 | Status: ACTIVE | COMMUNITY
Start: 2021-01-01

## 2021-01-01 RX ORDER — ATORVASTATIN CALCIUM 80 MG/1
1 TABLET, FILM COATED ORAL
Qty: 0 | Refills: 0 | DISCHARGE

## 2021-01-01 RX ORDER — HYDROMORPHONE HYDROCHLORIDE 2 MG/ML
0.5 INJECTION INTRAMUSCULAR; INTRAVENOUS; SUBCUTANEOUS
Refills: 0 | Status: DISCONTINUED | OUTPATIENT
Start: 2021-01-01 | End: 2021-01-01

## 2021-01-01 RX ORDER — SODIUM CHLORIDE 9 MG/ML
1000 INJECTION, SOLUTION INTRAVENOUS
Refills: 0 | Status: DISCONTINUED | OUTPATIENT
Start: 2021-01-01 | End: 2021-01-01

## 2021-01-01 RX ORDER — POLYETHYLENE GLYCOL 3350 17 G/17G
17 POWDER, FOR SOLUTION ORAL
Refills: 0 | Status: DISCONTINUED | OUTPATIENT
Start: 2021-01-01 | End: 2021-01-01

## 2021-01-01 RX ORDER — PIPERACILLIN AND TAZOBACTAM 4; .5 G/20ML; G/20ML
3.38 INJECTION, POWDER, LYOPHILIZED, FOR SOLUTION INTRAVENOUS EVERY 12 HOURS
Refills: 0 | Status: DISCONTINUED | OUTPATIENT
Start: 2021-01-01 | End: 2021-01-01

## 2021-01-01 RX ORDER — NITROGLYCERIN 6.5 MG
1 CAPSULE, EXTENDED RELEASE ORAL ONCE
Refills: 0 | Status: COMPLETED | OUTPATIENT
Start: 2021-01-01 | End: 2021-01-01

## 2021-01-01 RX ORDER — IPRATROPIUM BROMIDE 21 MCG
2 AEROSOL, SPRAY (ML) NASAL
Qty: 0 | Refills: 0 | DISCHARGE

## 2021-01-01 RX ORDER — SODIUM CHLORIDE 9 MG/ML
3 INJECTION INTRAMUSCULAR; INTRAVENOUS; SUBCUTANEOUS EVERY 8 HOURS
Refills: 0 | Status: DISCONTINUED | OUTPATIENT
Start: 2021-01-01 | End: 2021-01-01

## 2021-01-01 RX ORDER — SENNA PLUS 8.6 MG/1
2 TABLET ORAL
Qty: 60 | Refills: 0
Start: 2021-01-01 | End: 2021-05-19

## 2021-01-01 RX ORDER — ALENDRONATE SODIUM 70 MG/1
1 TABLET ORAL
Qty: 0 | Refills: 0 | DISCHARGE

## 2021-01-01 RX ORDER — LEVOTHYROXINE SODIUM 125 MCG
75 TABLET ORAL DAILY
Refills: 0 | Status: DISCONTINUED | OUTPATIENT
Start: 2021-01-01 | End: 2021-01-01

## 2021-01-01 RX ORDER — LEVOTHYROXINE SODIUM 0.1 MG/1
100 TABLET ORAL DAILY
Refills: 0 | Status: ACTIVE | COMMUNITY
Start: 2021-01-01

## 2021-01-01 RX ORDER — PIPERACILLIN AND TAZOBACTAM 4; .5 G/20ML; G/20ML
3.38 INJECTION, POWDER, LYOPHILIZED, FOR SOLUTION INTRAVENOUS ONCE
Refills: 0 | Status: COMPLETED | OUTPATIENT
Start: 2021-01-01 | End: 2021-01-01

## 2021-01-01 RX ORDER — ACETAMINOPHEN 500 MG
650 TABLET ORAL ONCE
Refills: 0 | Status: COMPLETED | OUTPATIENT
Start: 2021-01-01 | End: 2021-01-01

## 2021-01-01 RX ORDER — HYDROMORPHONE HYDROCHLORIDE 2 MG/ML
0.5 INJECTION INTRAMUSCULAR; INTRAVENOUS; SUBCUTANEOUS EVERY 4 HOURS
Refills: 0 | Status: DISCONTINUED | OUTPATIENT
Start: 2021-01-01 | End: 2021-01-01

## 2021-01-01 RX ORDER — CLOPIDOGREL BISULFATE 75 MG/1
1 TABLET, FILM COATED ORAL
Qty: 0 | Refills: 0 | DISCHARGE

## 2021-01-01 RX ORDER — MAGNESIUM SULFATE 500 MG/ML
1 VIAL (ML) INJECTION ONCE
Refills: 0 | Status: COMPLETED | OUTPATIENT
Start: 2021-01-01 | End: 2021-01-01

## 2021-01-01 RX ORDER — METOPROLOL TARTRATE 50 MG
1 TABLET ORAL
Qty: 0 | Refills: 0 | DISCHARGE

## 2021-01-01 RX ORDER — HYDROMORPHONE HYDROCHLORIDE 2 MG/ML
0.25 INJECTION INTRAMUSCULAR; INTRAVENOUS; SUBCUTANEOUS
Refills: 0 | Status: DISCONTINUED | OUTPATIENT
Start: 2021-01-01 | End: 2021-01-01

## 2021-01-01 RX ORDER — AMLODIPINE BESYLATE 5 MG/1
5 TABLET ORAL DAILY
Qty: 30 | Refills: 0 | Status: ACTIVE | COMMUNITY
Start: 2021-01-01

## 2021-01-01 RX ORDER — SODIUM CHLORIDE 9 MG/ML
500 INJECTION INTRAMUSCULAR; INTRAVENOUS; SUBCUTANEOUS ONCE
Refills: 0 | Status: COMPLETED | OUTPATIENT
Start: 2021-01-01 | End: 2021-01-01

## 2021-01-01 RX ORDER — TOLVAPTAN 15 MG/1
15 TABLET ORAL ONCE
Refills: 0 | Status: COMPLETED | OUTPATIENT
Start: 2021-01-01 | End: 2021-01-01

## 2021-01-01 RX ORDER — LOSARTAN POTASSIUM 100 MG/1
1 TABLET, FILM COATED ORAL
Qty: 0 | Refills: 0 | DISCHARGE

## 2021-01-01 RX ADMIN — SENNA PLUS 2 TABLET(S): 8.6 TABLET ORAL at 21:36

## 2021-01-01 RX ADMIN — Medication 25 MILLIGRAM(S): at 05:00

## 2021-01-01 RX ADMIN — Medication 750 MILLIGRAM(S): at 01:12

## 2021-01-01 RX ADMIN — SENNA PLUS 2 TABLET(S): 8.6 TABLET ORAL at 21:38

## 2021-01-01 RX ADMIN — Medication 81 MILLIGRAM(S): at 11:08

## 2021-01-01 RX ADMIN — AMLODIPINE BESYLATE 10 MILLIGRAM(S): 2.5 TABLET ORAL at 05:27

## 2021-01-01 RX ADMIN — Medication 75 MICROGRAM(S): at 05:56

## 2021-01-01 RX ADMIN — Medication 100 MICROGRAM(S): at 06:33

## 2021-01-01 RX ADMIN — SODIUM CHLORIDE 3 MILLILITER(S): 9 INJECTION INTRAMUSCULAR; INTRAVENOUS; SUBCUTANEOUS at 06:38

## 2021-01-01 RX ADMIN — Medication 30 MILLILITER(S): at 11:34

## 2021-01-01 RX ADMIN — HEPARIN SODIUM 2500 UNIT(S): 5000 INJECTION INTRAVENOUS; SUBCUTANEOUS at 16:55

## 2021-01-01 RX ADMIN — SODIUM CHLORIDE 3 MILLILITER(S): 9 INJECTION INTRAMUSCULAR; INTRAVENOUS; SUBCUTANEOUS at 13:49

## 2021-01-01 RX ADMIN — ATORVASTATIN CALCIUM 20 MILLIGRAM(S): 80 TABLET, FILM COATED ORAL at 21:50

## 2021-01-01 RX ADMIN — POLYETHYLENE GLYCOL 3350 17 GRAM(S): 17 POWDER, FOR SOLUTION ORAL at 11:34

## 2021-01-01 RX ADMIN — Medication 325 MILLIGRAM(S): at 12:54

## 2021-01-01 RX ADMIN — Medication 2.5 MILLIGRAM(S): at 23:21

## 2021-01-01 RX ADMIN — AMLODIPINE BESYLATE 10 MILLIGRAM(S): 2.5 TABLET ORAL at 05:07

## 2021-01-01 RX ADMIN — SENNA PLUS 2 TABLET(S): 8.6 TABLET ORAL at 21:08

## 2021-01-01 RX ADMIN — HEPARIN SODIUM 2500 UNIT(S): 5000 INJECTION INTRAVENOUS; SUBCUTANEOUS at 06:20

## 2021-01-01 RX ADMIN — Medication 325 MILLIGRAM(S): at 12:00

## 2021-01-01 RX ADMIN — AMLODIPINE BESYLATE 5 MILLIGRAM(S): 2.5 TABLET ORAL at 16:39

## 2021-01-01 RX ADMIN — SODIUM CHLORIDE 3 MILLILITER(S): 9 INJECTION INTRAMUSCULAR; INTRAVENOUS; SUBCUTANEOUS at 16:21

## 2021-01-01 RX ADMIN — AMIODARONE HYDROCHLORIDE 200 MILLIGRAM(S): 400 TABLET ORAL at 05:00

## 2021-01-01 RX ADMIN — Medication 50 MILLIGRAM(S): at 05:54

## 2021-01-01 RX ADMIN — Medication 63.75 MILLIMOLE(S): at 19:37

## 2021-01-01 RX ADMIN — CHLORHEXIDINE GLUCONATE 1 APPLICATION(S): 213 SOLUTION TOPICAL at 08:00

## 2021-01-01 RX ADMIN — SENNA PLUS 2 TABLET(S): 8.6 TABLET ORAL at 21:40

## 2021-01-01 RX ADMIN — SENNA PLUS 2 TABLET(S): 8.6 TABLET ORAL at 21:02

## 2021-01-01 RX ADMIN — PIPERACILLIN AND TAZOBACTAM 200 GRAM(S): 4; .5 INJECTION, POWDER, LYOPHILIZED, FOR SOLUTION INTRAVENOUS at 18:03

## 2021-01-01 RX ADMIN — Medication 25 MILLIGRAM(S): at 06:16

## 2021-01-01 RX ADMIN — SODIUM CHLORIDE 3 MILLILITER(S): 9 INJECTION INTRAMUSCULAR; INTRAVENOUS; SUBCUTANEOUS at 21:08

## 2021-01-01 RX ADMIN — Medication 100 MICROGRAM(S): at 06:19

## 2021-01-01 RX ADMIN — AMIODARONE HYDROCHLORIDE 200 MILLIGRAM(S): 400 TABLET ORAL at 06:34

## 2021-01-01 RX ADMIN — PIPERACILLIN AND TAZOBACTAM 25 GRAM(S): 4; .5 INJECTION, POWDER, LYOPHILIZED, FOR SOLUTION INTRAVENOUS at 17:21

## 2021-01-01 RX ADMIN — POLYETHYLENE GLYCOL 3350 17 GRAM(S): 17 POWDER, FOR SOLUTION ORAL at 15:18

## 2021-01-01 RX ADMIN — Medication 100 MICROGRAM(S): at 06:03

## 2021-01-01 RX ADMIN — ONDANSETRON 4 MILLIGRAM(S): 8 TABLET, FILM COATED ORAL at 16:45

## 2021-01-01 RX ADMIN — Medication 12.5 MILLIGRAM(S): at 06:33

## 2021-01-01 RX ADMIN — Medication 100 MICROGRAM(S): at 05:25

## 2021-01-01 RX ADMIN — SODIUM CHLORIDE 3 MILLILITER(S): 9 INJECTION INTRAMUSCULAR; INTRAVENOUS; SUBCUTANEOUS at 11:39

## 2021-01-01 RX ADMIN — HEPARIN SODIUM 2500 UNIT(S): 5000 INJECTION INTRAVENOUS; SUBCUTANEOUS at 19:39

## 2021-01-01 RX ADMIN — Medication 81 MILLIGRAM(S): at 12:24

## 2021-01-01 RX ADMIN — AMIODARONE HYDROCHLORIDE 200 MILLIGRAM(S): 400 TABLET ORAL at 06:24

## 2021-01-01 RX ADMIN — SODIUM CHLORIDE 3 MILLILITER(S): 9 INJECTION INTRAMUSCULAR; INTRAVENOUS; SUBCUTANEOUS at 21:18

## 2021-01-01 RX ADMIN — Medication 280 MILLIGRAM(S): at 22:10

## 2021-01-01 RX ADMIN — SENNA PLUS 2 TABLET(S): 8.6 TABLET ORAL at 21:03

## 2021-01-01 RX ADMIN — Medication 650 MILLIGRAM(S): at 05:31

## 2021-01-01 RX ADMIN — Medication 81 MILLIGRAM(S): at 12:46

## 2021-01-01 RX ADMIN — Medication 1 ENEMA: at 07:30

## 2021-01-01 RX ADMIN — Medication 100 MICROGRAM(S): at 05:00

## 2021-01-01 RX ADMIN — AMIODARONE HYDROCHLORIDE 200 MILLIGRAM(S): 400 TABLET ORAL at 06:20

## 2021-01-01 RX ADMIN — SODIUM CHLORIDE 3 MILLILITER(S): 9 INJECTION INTRAMUSCULAR; INTRAVENOUS; SUBCUTANEOUS at 21:23

## 2021-01-01 RX ADMIN — Medication 325 MILLIGRAM(S): at 11:46

## 2021-01-01 RX ADMIN — AMIODARONE HYDROCHLORIDE 200 MILLIGRAM(S): 400 TABLET ORAL at 05:16

## 2021-01-01 RX ADMIN — HEPARIN SODIUM 6 UNIT(S)/HR: 5000 INJECTION INTRAVENOUS; SUBCUTANEOUS at 08:30

## 2021-01-01 RX ADMIN — Medication 25 MILLIGRAM(S): at 06:19

## 2021-01-01 RX ADMIN — Medication 25 MILLIGRAM(S): at 05:55

## 2021-01-01 RX ADMIN — SODIUM CHLORIDE 3 MILLILITER(S): 9 INJECTION INTRAMUSCULAR; INTRAVENOUS; SUBCUTANEOUS at 22:00

## 2021-01-01 RX ADMIN — LIDOCAINE 1 PATCH: 4 CREAM TOPICAL at 13:26

## 2021-01-01 RX ADMIN — HEPARIN SODIUM 8 UNIT(S)/HR: 5000 INJECTION INTRAVENOUS; SUBCUTANEOUS at 22:05

## 2021-01-01 RX ADMIN — Medication 50 MILLIGRAM(S): at 05:24

## 2021-01-01 RX ADMIN — ONDANSETRON 4 MILLIGRAM(S): 8 TABLET, FILM COATED ORAL at 10:00

## 2021-01-01 RX ADMIN — SODIUM CHLORIDE 50 MILLILITER(S): 9 INJECTION INTRAMUSCULAR; INTRAVENOUS; SUBCUTANEOUS at 09:29

## 2021-01-01 RX ADMIN — SODIUM CHLORIDE 3 MILLILITER(S): 9 INJECTION INTRAMUSCULAR; INTRAVENOUS; SUBCUTANEOUS at 06:22

## 2021-01-01 RX ADMIN — PIPERACILLIN AND TAZOBACTAM 25 GRAM(S): 4; .5 INJECTION, POWDER, LYOPHILIZED, FOR SOLUTION INTRAVENOUS at 17:51

## 2021-01-01 RX ADMIN — Medication 12.5 MILLIGRAM(S): at 05:53

## 2021-01-01 RX ADMIN — HEPARIN SODIUM 5.5 UNIT(S)/HR: 5000 INJECTION INTRAVENOUS; SUBCUTANEOUS at 06:18

## 2021-01-01 RX ADMIN — Medication 100 MILLIEQUIVALENT(S): at 08:00

## 2021-01-01 RX ADMIN — HEPARIN SODIUM 7 UNIT(S)/HR: 5000 INJECTION INTRAVENOUS; SUBCUTANEOUS at 08:04

## 2021-01-01 RX ADMIN — HEPARIN SODIUM 6 UNIT(S)/HR: 5000 INJECTION INTRAVENOUS; SUBCUTANEOUS at 17:57

## 2021-01-01 RX ADMIN — SENNA PLUS 2 TABLET(S): 8.6 TABLET ORAL at 21:28

## 2021-01-01 RX ADMIN — ONDANSETRON 4 MILLIGRAM(S): 8 TABLET, FILM COATED ORAL at 14:15

## 2021-01-01 RX ADMIN — AMIODARONE HYDROCHLORIDE 200 MILLIGRAM(S): 400 TABLET ORAL at 05:56

## 2021-01-01 RX ADMIN — AMIODARONE HYDROCHLORIDE 200 MILLIGRAM(S): 400 TABLET ORAL at 06:23

## 2021-01-01 RX ADMIN — Medication 12.5 MILLIGRAM(S): at 05:10

## 2021-01-01 RX ADMIN — SODIUM CHLORIDE 3 MILLILITER(S): 9 INJECTION INTRAMUSCULAR; INTRAVENOUS; SUBCUTANEOUS at 12:17

## 2021-01-01 RX ADMIN — HEPARIN SODIUM 7 UNIT(S)/HR: 5000 INJECTION INTRAVENOUS; SUBCUTANEOUS at 18:49

## 2021-01-01 RX ADMIN — AMLODIPINE BESYLATE 10 MILLIGRAM(S): 2.5 TABLET ORAL at 05:51

## 2021-01-01 RX ADMIN — SODIUM CHLORIDE 3 MILLILITER(S): 9 INJECTION INTRAMUSCULAR; INTRAVENOUS; SUBCUTANEOUS at 13:44

## 2021-01-01 RX ADMIN — HYDROMORPHONE HYDROCHLORIDE 0.5 MILLIGRAM(S): 2 INJECTION INTRAMUSCULAR; INTRAVENOUS; SUBCUTANEOUS at 00:00

## 2021-01-01 RX ADMIN — Medication 100 GRAM(S): at 18:10

## 2021-01-01 RX ADMIN — AMLODIPINE BESYLATE 5 MILLIGRAM(S): 2.5 TABLET ORAL at 06:16

## 2021-01-01 RX ADMIN — MEGESTROL ACETATE 20 MILLIGRAM(S): 40 SUSPENSION ORAL at 12:33

## 2021-01-01 RX ADMIN — SODIUM CHLORIDE 3 MILLILITER(S): 9 INJECTION INTRAMUSCULAR; INTRAVENOUS; SUBCUTANEOUS at 06:40

## 2021-01-01 RX ADMIN — SENNA PLUS 2 TABLET(S): 8.6 TABLET ORAL at 21:16

## 2021-01-01 RX ADMIN — SODIUM CHLORIDE 3 MILLILITER(S): 9 INJECTION INTRAMUSCULAR; INTRAVENOUS; SUBCUTANEOUS at 19:51

## 2021-01-01 RX ADMIN — Medication 50 MICROGRAM(S): at 11:07

## 2021-01-01 RX ADMIN — Medication 25 MILLIGRAM(S): at 08:15

## 2021-01-01 RX ADMIN — OXYCODONE HYDROCHLORIDE 5 MILLIGRAM(S): 5 TABLET ORAL at 08:15

## 2021-01-01 RX ADMIN — SODIUM CHLORIDE 3 MILLILITER(S): 9 INJECTION INTRAMUSCULAR; INTRAVENOUS; SUBCUTANEOUS at 05:30

## 2021-01-01 RX ADMIN — ATORVASTATIN CALCIUM 20 MILLIGRAM(S): 80 TABLET, FILM COATED ORAL at 21:05

## 2021-01-01 RX ADMIN — Medication 650 MILLIGRAM(S): at 02:00

## 2021-01-01 RX ADMIN — AMLODIPINE BESYLATE 10 MILLIGRAM(S): 2.5 TABLET ORAL at 05:54

## 2021-01-01 RX ADMIN — Medication 100 MICROGRAM(S): at 06:22

## 2021-01-01 RX ADMIN — SODIUM CHLORIDE 3 MILLILITER(S): 9 INJECTION INTRAMUSCULAR; INTRAVENOUS; SUBCUTANEOUS at 13:16

## 2021-01-01 RX ADMIN — Medication 50 MILLIGRAM(S): at 05:50

## 2021-01-01 RX ADMIN — ATORVASTATIN CALCIUM 20 MILLIGRAM(S): 80 TABLET, FILM COATED ORAL at 22:41

## 2021-01-01 RX ADMIN — SODIUM CHLORIDE 3 MILLILITER(S): 9 INJECTION INTRAMUSCULAR; INTRAVENOUS; SUBCUTANEOUS at 21:20

## 2021-01-01 RX ADMIN — APIXABAN 2.5 MILLIGRAM(S): 2.5 TABLET, FILM COATED ORAL at 17:14

## 2021-01-01 RX ADMIN — Medication 81 MILLIGRAM(S): at 11:46

## 2021-01-01 RX ADMIN — Medication 81 MILLIGRAM(S): at 12:42

## 2021-01-01 RX ADMIN — CHLORHEXIDINE GLUCONATE 5 MILLILITER(S): 213 SOLUTION TOPICAL at 08:00

## 2021-01-01 RX ADMIN — ATORVASTATIN CALCIUM 20 MILLIGRAM(S): 80 TABLET, FILM COATED ORAL at 21:02

## 2021-01-01 RX ADMIN — Medication 100 MICROGRAM(S): at 06:20

## 2021-01-01 RX ADMIN — SODIUM CHLORIDE 3 MILLILITER(S): 9 INJECTION INTRAMUSCULAR; INTRAVENOUS; SUBCUTANEOUS at 06:35

## 2021-01-01 RX ADMIN — LIDOCAINE 1 PATCH: 4 CREAM TOPICAL at 23:53

## 2021-01-01 RX ADMIN — SODIUM CHLORIDE 3 MILLILITER(S): 9 INJECTION INTRAMUSCULAR; INTRAVENOUS; SUBCUTANEOUS at 21:44

## 2021-01-01 RX ADMIN — Medication 650 MILLIGRAM(S): at 06:31

## 2021-01-01 RX ADMIN — Medication 40 MILLIGRAM(S): at 22:25

## 2021-01-01 RX ADMIN — HEPARIN SODIUM 10 UNIT(S)/HR: 5000 INJECTION INTRAVENOUS; SUBCUTANEOUS at 13:49

## 2021-01-01 RX ADMIN — ATORVASTATIN CALCIUM 20 MILLIGRAM(S): 80 TABLET, FILM COATED ORAL at 21:30

## 2021-01-01 RX ADMIN — SODIUM CHLORIDE 500 MILLILITER(S): 9 INJECTION INTRAMUSCULAR; INTRAVENOUS; SUBCUTANEOUS at 11:00

## 2021-01-01 RX ADMIN — SENNA PLUS 2 TABLET(S): 8.6 TABLET ORAL at 21:48

## 2021-01-01 RX ADMIN — AMLODIPINE BESYLATE 5 MILLIGRAM(S): 2.5 TABLET ORAL at 05:11

## 2021-01-01 RX ADMIN — ATORVASTATIN CALCIUM 20 MILLIGRAM(S): 80 TABLET, FILM COATED ORAL at 22:05

## 2021-01-01 RX ADMIN — SODIUM CHLORIDE 4 MILLILITER(S): 9 INJECTION INTRAMUSCULAR; INTRAVENOUS; SUBCUTANEOUS at 10:08

## 2021-01-01 RX ADMIN — SODIUM CHLORIDE 50 MILLILITER(S): 9 INJECTION INTRAMUSCULAR; INTRAVENOUS; SUBCUTANEOUS at 15:58

## 2021-01-01 RX ADMIN — SODIUM CHLORIDE 3 MILLILITER(S): 9 INJECTION INTRAMUSCULAR; INTRAVENOUS; SUBCUTANEOUS at 13:58

## 2021-01-01 RX ADMIN — AMLODIPINE BESYLATE 5 MILLIGRAM(S): 2.5 TABLET ORAL at 17:35

## 2021-01-01 RX ADMIN — HEPARIN SODIUM 6 UNIT(S)/HR: 5000 INJECTION INTRAVENOUS; SUBCUTANEOUS at 08:59

## 2021-01-01 RX ADMIN — SODIUM CHLORIDE 3 MILLILITER(S): 9 INJECTION INTRAMUSCULAR; INTRAVENOUS; SUBCUTANEOUS at 13:55

## 2021-01-01 RX ADMIN — AMIODARONE HYDROCHLORIDE 200 MILLIGRAM(S): 400 TABLET ORAL at 06:04

## 2021-01-01 RX ADMIN — Medication 700 MILLIGRAM(S): at 22:40

## 2021-01-01 RX ADMIN — SODIUM CHLORIDE 3 MILLILITER(S): 9 INJECTION INTRAMUSCULAR; INTRAVENOUS; SUBCUTANEOUS at 22:45

## 2021-01-01 RX ADMIN — POLYETHYLENE GLYCOL 3350 17 GRAM(S): 17 POWDER, FOR SOLUTION ORAL at 17:44

## 2021-01-01 RX ADMIN — Medication 100 MICROGRAM(S): at 05:40

## 2021-01-01 RX ADMIN — SENNA PLUS 2 TABLET(S): 8.6 TABLET ORAL at 21:21

## 2021-01-01 RX ADMIN — SENNA PLUS 2 TABLET(S): 8.6 TABLET ORAL at 19:57

## 2021-01-01 RX ADMIN — SODIUM CHLORIDE 3 MILLILITER(S): 9 INJECTION INTRAMUSCULAR; INTRAVENOUS; SUBCUTANEOUS at 14:37

## 2021-01-01 RX ADMIN — SODIUM CHLORIDE 50 MILLILITER(S): 9 INJECTION INTRAMUSCULAR; INTRAVENOUS; SUBCUTANEOUS at 20:00

## 2021-01-01 RX ADMIN — Medication 650 MILLIGRAM(S): at 06:19

## 2021-01-01 RX ADMIN — AMIODARONE HYDROCHLORIDE 200 MILLIGRAM(S): 400 TABLET ORAL at 09:23

## 2021-01-01 RX ADMIN — SODIUM CHLORIDE 3 MILLILITER(S): 9 INJECTION INTRAMUSCULAR; INTRAVENOUS; SUBCUTANEOUS at 06:54

## 2021-01-01 RX ADMIN — HEPARIN SODIUM 2500 UNIT(S): 5000 INJECTION INTRAVENOUS; SUBCUTANEOUS at 05:54

## 2021-01-01 RX ADMIN — Medication 75 MICROGRAM(S): at 06:19

## 2021-01-01 RX ADMIN — SODIUM CHLORIDE 3 MILLILITER(S): 9 INJECTION INTRAMUSCULAR; INTRAVENOUS; SUBCUTANEOUS at 05:27

## 2021-01-01 RX ADMIN — HEPARIN SODIUM 2500 UNIT(S): 5000 INJECTION INTRAVENOUS; SUBCUTANEOUS at 06:04

## 2021-01-01 RX ADMIN — SODIUM CHLORIDE 3 MILLILITER(S): 9 INJECTION INTRAMUSCULAR; INTRAVENOUS; SUBCUTANEOUS at 14:44

## 2021-01-01 RX ADMIN — AMIODARONE HYDROCHLORIDE 200 MILLIGRAM(S): 400 TABLET ORAL at 06:19

## 2021-01-01 RX ADMIN — Medication 2.5 MILLIGRAM(S): at 12:33

## 2021-01-01 RX ADMIN — AMLODIPINE BESYLATE 5 MILLIGRAM(S): 2.5 TABLET ORAL at 05:00

## 2021-01-01 RX ADMIN — HEPARIN SODIUM 2500 UNIT(S): 5000 INJECTION INTRAVENOUS; SUBCUTANEOUS at 05:11

## 2021-01-01 RX ADMIN — HEPARIN SODIUM 2500 UNIT(S): 5000 INJECTION INTRAVENOUS; SUBCUTANEOUS at 06:06

## 2021-01-01 RX ADMIN — Medication 325 MILLIGRAM(S): at 12:05

## 2021-01-01 RX ADMIN — Medication 650 MILLIGRAM(S): at 23:20

## 2021-01-01 RX ADMIN — Medication 650 MILLIGRAM(S): at 21:47

## 2021-01-01 RX ADMIN — Medication 81 MILLIGRAM(S): at 11:25

## 2021-01-01 RX ADMIN — HEPARIN SODIUM 2500 UNIT(S): 5000 INJECTION INTRAVENOUS; SUBCUTANEOUS at 17:01

## 2021-01-01 RX ADMIN — Medication 81 MILLIGRAM(S): at 11:36

## 2021-01-01 RX ADMIN — Medication 325 MILLIGRAM(S): at 11:34

## 2021-01-01 RX ADMIN — OXYCODONE HYDROCHLORIDE 5 MILLIGRAM(S): 5 TABLET ORAL at 09:00

## 2021-01-01 RX ADMIN — Medication 100 MILLIEQUIVALENT(S): at 06:43

## 2021-01-01 RX ADMIN — Medication 0.5 MILLIGRAM(S): at 13:16

## 2021-01-01 RX ADMIN — Medication 10 MILLIGRAM(S): at 10:01

## 2021-01-01 RX ADMIN — POLYETHYLENE GLYCOL 3350 17 GRAM(S): 17 POWDER, FOR SOLUTION ORAL at 08:14

## 2021-01-01 RX ADMIN — AMIODARONE HYDROCHLORIDE 200 MILLIGRAM(S): 400 TABLET ORAL at 05:54

## 2021-01-01 RX ADMIN — SENNA PLUS 2 TABLET(S): 8.6 TABLET ORAL at 21:51

## 2021-01-01 RX ADMIN — HEPARIN SODIUM 6 UNIT(S)/HR: 5000 INJECTION INTRAVENOUS; SUBCUTANEOUS at 11:46

## 2021-01-01 RX ADMIN — Medication 81 MILLIGRAM(S): at 12:16

## 2021-01-01 RX ADMIN — LIDOCAINE 1 PATCH: 4 CREAM TOPICAL at 03:13

## 2021-01-01 RX ADMIN — SODIUM CHLORIDE 3 MILLILITER(S): 9 INJECTION INTRAMUSCULAR; INTRAVENOUS; SUBCUTANEOUS at 13:47

## 2021-01-01 RX ADMIN — ATORVASTATIN CALCIUM 20 MILLIGRAM(S): 80 TABLET, FILM COATED ORAL at 21:39

## 2021-01-01 RX ADMIN — SENNA PLUS 2 TABLET(S): 8.6 TABLET ORAL at 23:21

## 2021-01-01 RX ADMIN — SODIUM CHLORIDE 3 MILLILITER(S): 9 INJECTION INTRAMUSCULAR; INTRAVENOUS; SUBCUTANEOUS at 06:34

## 2021-01-01 RX ADMIN — Medication 325 MILLIGRAM(S): at 14:44

## 2021-01-01 RX ADMIN — AMLODIPINE BESYLATE 5 MILLIGRAM(S): 2.5 TABLET ORAL at 06:03

## 2021-01-01 RX ADMIN — SODIUM CHLORIDE 3 MILLILITER(S): 9 INJECTION INTRAMUSCULAR; INTRAVENOUS; SUBCUTANEOUS at 06:29

## 2021-01-01 RX ADMIN — HEPARIN SODIUM 5.5 UNIT(S)/HR: 5000 INJECTION INTRAVENOUS; SUBCUTANEOUS at 00:43

## 2021-01-01 RX ADMIN — Medication 650 MILLIGRAM(S): at 01:25

## 2021-01-01 RX ADMIN — Medication 2.5 MILLIGRAM(S): at 05:10

## 2021-01-01 RX ADMIN — SODIUM CHLORIDE 3 MILLILITER(S): 9 INJECTION INTRAMUSCULAR; INTRAVENOUS; SUBCUTANEOUS at 13:26

## 2021-01-01 RX ADMIN — SODIUM CHLORIDE 3 MILLILITER(S): 9 INJECTION INTRAMUSCULAR; INTRAVENOUS; SUBCUTANEOUS at 13:46

## 2021-01-01 RX ADMIN — DORNASE ALFA 2.5 MILLIGRAM(S): 1 SOLUTION RESPIRATORY (INHALATION) at 10:06

## 2021-01-01 RX ADMIN — SODIUM CHLORIDE 50 MILLILITER(S): 9 INJECTION, SOLUTION INTRAVENOUS at 19:38

## 2021-01-01 RX ADMIN — Medication 650 MILLIGRAM(S): at 23:50

## 2021-01-01 RX ADMIN — SODIUM CHLORIDE 3 MILLILITER(S): 9 INJECTION INTRAMUSCULAR; INTRAVENOUS; SUBCUTANEOUS at 05:59

## 2021-01-01 RX ADMIN — Medication 325 MILLIGRAM(S): at 12:12

## 2021-01-01 RX ADMIN — Medication 81 MILLIGRAM(S): at 14:30

## 2021-01-01 RX ADMIN — Medication 100 MICROGRAM(S): at 06:06

## 2021-01-01 RX ADMIN — SODIUM CHLORIDE 3 MILLILITER(S): 9 INJECTION INTRAMUSCULAR; INTRAVENOUS; SUBCUTANEOUS at 22:30

## 2021-01-01 RX ADMIN — SODIUM CHLORIDE 3 MILLILITER(S): 9 INJECTION INTRAMUSCULAR; INTRAVENOUS; SUBCUTANEOUS at 14:31

## 2021-01-01 RX ADMIN — Medication 100 MILLIEQUIVALENT(S): at 09:24

## 2021-01-01 RX ADMIN — Medication 12.5 MILLIGRAM(S): at 05:25

## 2021-01-01 RX ADMIN — APIXABAN 2.5 MILLIGRAM(S): 2.5 TABLET, FILM COATED ORAL at 16:22

## 2021-01-01 RX ADMIN — Medication 50 MILLIGRAM(S): at 06:24

## 2021-01-01 RX ADMIN — Medication 81 MILLIGRAM(S): at 14:44

## 2021-01-01 RX ADMIN — HEPARIN SODIUM 7.5 UNIT(S)/HR: 5000 INJECTION INTRAVENOUS; SUBCUTANEOUS at 18:10

## 2021-01-01 RX ADMIN — APIXABAN 2.5 MILLIGRAM(S): 2.5 TABLET, FILM COATED ORAL at 06:16

## 2021-01-01 RX ADMIN — POLYETHYLENE GLYCOL 3350 17 GRAM(S): 17 POWDER, FOR SOLUTION ORAL at 12:13

## 2021-01-01 RX ADMIN — Medication 650 MILLIGRAM(S): at 22:17

## 2021-01-01 RX ADMIN — Medication 1 INCH(S): at 01:00

## 2021-01-01 RX ADMIN — Medication 25 MILLIGRAM(S): at 10:23

## 2021-01-01 RX ADMIN — SENNA PLUS 2 TABLET(S): 8.6 TABLET ORAL at 21:39

## 2021-01-01 RX ADMIN — Medication 40 MILLIGRAM(S): at 12:42

## 2021-01-01 RX ADMIN — ATORVASTATIN CALCIUM 20 MILLIGRAM(S): 80 TABLET, FILM COATED ORAL at 23:21

## 2021-01-01 RX ADMIN — HEPARIN SODIUM 6 UNIT(S)/HR: 5000 INJECTION INTRAVENOUS; SUBCUTANEOUS at 10:35

## 2021-01-01 RX ADMIN — ATORVASTATIN CALCIUM 20 MILLIGRAM(S): 80 TABLET, FILM COATED ORAL at 21:12

## 2021-01-01 RX ADMIN — HEPARIN SODIUM 7 UNIT(S)/HR: 5000 INJECTION INTRAVENOUS; SUBCUTANEOUS at 08:52

## 2021-01-01 RX ADMIN — AMLODIPINE BESYLATE 5 MILLIGRAM(S): 2.5 TABLET ORAL at 06:39

## 2021-01-01 RX ADMIN — SENNA PLUS 2 TABLET(S): 8.6 TABLET ORAL at 21:31

## 2021-01-01 RX ADMIN — HEPARIN SODIUM 2500 UNIT(S): 5000 INJECTION INTRAVENOUS; SUBCUTANEOUS at 16:45

## 2021-01-01 RX ADMIN — ATORVASTATIN CALCIUM 20 MILLIGRAM(S): 80 TABLET, FILM COATED ORAL at 23:00

## 2021-01-01 RX ADMIN — ALBUTEROL 2.5 MILLIGRAM(S): 90 AEROSOL, METERED ORAL at 15:32

## 2021-01-01 RX ADMIN — Medication 81 MILLIGRAM(S): at 13:17

## 2021-01-01 RX ADMIN — SODIUM CHLORIDE 3 MILLILITER(S): 9 INJECTION INTRAMUSCULAR; INTRAVENOUS; SUBCUTANEOUS at 04:22

## 2021-01-01 RX ADMIN — Medication 325 MILLIGRAM(S): at 12:23

## 2021-01-01 RX ADMIN — Medication 325 MILLIGRAM(S): at 14:30

## 2021-01-01 RX ADMIN — HEPARIN SODIUM 7 UNIT(S)/HR: 5000 INJECTION INTRAVENOUS; SUBCUTANEOUS at 01:31

## 2021-01-01 RX ADMIN — MEGESTROL ACETATE 20 MILLIGRAM(S): 40 SUSPENSION ORAL at 14:15

## 2021-01-01 RX ADMIN — ATORVASTATIN CALCIUM 20 MILLIGRAM(S): 80 TABLET, FILM COATED ORAL at 21:29

## 2021-01-01 RX ADMIN — SODIUM CHLORIDE 2 GRAM(S): 9 INJECTION INTRAMUSCULAR; INTRAVENOUS; SUBCUTANEOUS at 06:23

## 2021-01-01 RX ADMIN — Medication 325 MILLIGRAM(S): at 11:36

## 2021-01-01 RX ADMIN — MEGESTROL ACETATE 20 MILLIGRAM(S): 40 SUSPENSION ORAL at 23:33

## 2021-01-01 RX ADMIN — Medication 0.5 MILLIGRAM(S): at 14:43

## 2021-01-01 RX ADMIN — Medication 81 MILLIGRAM(S): at 11:45

## 2021-01-01 RX ADMIN — HEPARIN SODIUM 7 UNIT(S)/HR: 5000 INJECTION INTRAVENOUS; SUBCUTANEOUS at 18:50

## 2021-01-01 RX ADMIN — ONDANSETRON 8 MILLIGRAM(S): 8 TABLET, FILM COATED ORAL at 10:58

## 2021-01-01 RX ADMIN — Medication 81 MILLIGRAM(S): at 12:05

## 2021-01-01 RX ADMIN — Medication 12.5 MILLIGRAM(S): at 06:22

## 2021-01-01 RX ADMIN — HEPARIN SODIUM 6 UNIT(S)/HR: 5000 INJECTION INTRAVENOUS; SUBCUTANEOUS at 14:30

## 2021-01-01 RX ADMIN — HEPARIN SODIUM 7 UNIT(S)/HR: 5000 INJECTION INTRAVENOUS; SUBCUTANEOUS at 20:05

## 2021-01-01 RX ADMIN — ATORVASTATIN CALCIUM 20 MILLIGRAM(S): 80 TABLET, FILM COATED ORAL at 21:40

## 2021-01-01 RX ADMIN — AMIODARONE HYDROCHLORIDE 200 MILLIGRAM(S): 400 TABLET ORAL at 06:07

## 2021-01-01 RX ADMIN — Medication 2.5 MILLIGRAM(S): at 18:34

## 2021-01-01 RX ADMIN — SODIUM CHLORIDE 2 GRAM(S): 9 INJECTION INTRAMUSCULAR; INTRAVENOUS; SUBCUTANEOUS at 05:55

## 2021-01-01 RX ADMIN — Medication 12.5 MILLIGRAM(S): at 06:19

## 2021-01-01 RX ADMIN — PIPERACILLIN AND TAZOBACTAM 25 GRAM(S): 4; .5 INJECTION, POWDER, LYOPHILIZED, FOR SOLUTION INTRAVENOUS at 16:45

## 2021-01-01 RX ADMIN — AMIODARONE HYDROCHLORIDE 200 MILLIGRAM(S): 400 TABLET ORAL at 06:16

## 2021-01-01 RX ADMIN — HEPARIN SODIUM 7 UNIT(S)/HR: 5000 INJECTION INTRAVENOUS; SUBCUTANEOUS at 08:25

## 2021-01-01 RX ADMIN — ALBUTEROL 2.5 MILLIGRAM(S): 90 AEROSOL, METERED ORAL at 10:03

## 2021-01-01 RX ADMIN — SODIUM CHLORIDE 3 MILLILITER(S): 9 INJECTION INTRAMUSCULAR; INTRAVENOUS; SUBCUTANEOUS at 05:34

## 2021-01-01 RX ADMIN — APIXABAN 2.5 MILLIGRAM(S): 2.5 TABLET, FILM COATED ORAL at 17:05

## 2021-01-01 RX ADMIN — AMIODARONE HYDROCHLORIDE 200 MILLIGRAM(S): 400 TABLET ORAL at 05:07

## 2021-01-01 RX ADMIN — SODIUM CHLORIDE 50 MILLILITER(S): 9 INJECTION INTRAMUSCULAR; INTRAVENOUS; SUBCUTANEOUS at 10:08

## 2021-01-01 RX ADMIN — Medication 1 INCH(S): at 22:25

## 2021-01-01 RX ADMIN — Medication 81 MILLIGRAM(S): at 12:54

## 2021-01-01 RX ADMIN — Medication 50 MILLIGRAM(S): at 05:42

## 2021-01-01 RX ADMIN — ATORVASTATIN CALCIUM 20 MILLIGRAM(S): 80 TABLET, FILM COATED ORAL at 21:48

## 2021-01-01 RX ADMIN — POLYETHYLENE GLYCOL 3350 17 GRAM(S): 17 POWDER, FOR SOLUTION ORAL at 17:35

## 2021-01-01 RX ADMIN — HEPARIN SODIUM 2500 UNIT(S): 5000 INJECTION INTRAVENOUS; SUBCUTANEOUS at 05:18

## 2021-01-01 RX ADMIN — HEPARIN SODIUM 5 UNIT(S)/HR: 5000 INJECTION INTRAVENOUS; SUBCUTANEOUS at 19:12

## 2021-01-01 RX ADMIN — SODIUM CHLORIDE 50 MILLILITER(S): 9 INJECTION INTRAMUSCULAR; INTRAVENOUS; SUBCUTANEOUS at 10:00

## 2021-01-01 RX ADMIN — APIXABAN 2.5 MILLIGRAM(S): 2.5 TABLET, FILM COATED ORAL at 05:32

## 2021-01-01 RX ADMIN — Medication 81 MILLIGRAM(S): at 12:23

## 2021-01-01 RX ADMIN — Medication 30 MILLILITER(S): at 12:23

## 2021-01-01 RX ADMIN — AMIODARONE HYDROCHLORIDE 200 MILLIGRAM(S): 400 TABLET ORAL at 05:40

## 2021-01-01 RX ADMIN — ATORVASTATIN CALCIUM 20 MILLIGRAM(S): 80 TABLET, FILM COATED ORAL at 21:31

## 2021-01-01 RX ADMIN — SENNA PLUS 2 TABLET(S): 8.6 TABLET ORAL at 21:05

## 2021-01-01 RX ADMIN — SODIUM CHLORIDE 3 MILLILITER(S): 9 INJECTION INTRAMUSCULAR; INTRAVENOUS; SUBCUTANEOUS at 13:18

## 2021-01-01 RX ADMIN — Medication 325 MILLIGRAM(S): at 12:24

## 2021-01-01 RX ADMIN — SODIUM CHLORIDE 4 MILLILITER(S): 9 INJECTION INTRAMUSCULAR; INTRAVENOUS; SUBCUTANEOUS at 21:10

## 2021-01-01 RX ADMIN — ATORVASTATIN CALCIUM 20 MILLIGRAM(S): 80 TABLET, FILM COATED ORAL at 21:03

## 2021-01-01 RX ADMIN — Medication 12.5 MILLIGRAM(S): at 09:22

## 2021-01-01 RX ADMIN — HEPARIN SODIUM 6 UNIT(S)/HR: 5000 INJECTION INTRAVENOUS; SUBCUTANEOUS at 07:00

## 2021-01-01 RX ADMIN — ATORVASTATIN CALCIUM 20 MILLIGRAM(S): 80 TABLET, FILM COATED ORAL at 23:41

## 2021-01-01 RX ADMIN — ONDANSETRON 8 MILLIGRAM(S): 8 TABLET, FILM COATED ORAL at 09:37

## 2021-01-01 RX ADMIN — Medication 0.5 MILLIGRAM(S): at 21:36

## 2021-01-01 RX ADMIN — AMIODARONE HYDROCHLORIDE 200 MILLIGRAM(S): 400 TABLET ORAL at 05:23

## 2021-01-01 RX ADMIN — Medication 325 MILLIGRAM(S): at 11:08

## 2021-01-01 RX ADMIN — Medication 30 MILLILITER(S): at 12:04

## 2021-01-01 RX ADMIN — ATORVASTATIN CALCIUM 20 MILLIGRAM(S): 80 TABLET, FILM COATED ORAL at 19:58

## 2021-01-01 RX ADMIN — Medication 325 MILLIGRAM(S): at 11:45

## 2021-01-01 RX ADMIN — AMLODIPINE BESYLATE 5 MILLIGRAM(S): 2.5 TABLET ORAL at 06:08

## 2021-01-01 RX ADMIN — Medication 50 MILLIGRAM(S): at 05:58

## 2021-01-01 RX ADMIN — SODIUM CHLORIDE 3 MILLILITER(S): 9 INJECTION INTRAMUSCULAR; INTRAVENOUS; SUBCUTANEOUS at 20:51

## 2021-01-01 RX ADMIN — SENNA PLUS 2 TABLET(S): 8.6 TABLET ORAL at 23:33

## 2021-01-01 RX ADMIN — AMLODIPINE BESYLATE 5 MILLIGRAM(S): 2.5 TABLET ORAL at 06:20

## 2021-01-01 RX ADMIN — AMIODARONE HYDROCHLORIDE 200 MILLIGRAM(S): 400 TABLET ORAL at 05:55

## 2021-01-01 RX ADMIN — Medication 100 MICROGRAM(S): at 05:33

## 2021-01-01 RX ADMIN — ATORVASTATIN CALCIUM 20 MILLIGRAM(S): 80 TABLET, FILM COATED ORAL at 21:36

## 2021-01-01 RX ADMIN — PIPERACILLIN AND TAZOBACTAM 25 GRAM(S): 4; .5 INJECTION, POWDER, LYOPHILIZED, FOR SOLUTION INTRAVENOUS at 04:30

## 2021-01-01 RX ADMIN — ONDANSETRON 4 MILLIGRAM(S): 8 TABLET, FILM COATED ORAL at 13:12

## 2021-01-01 RX ADMIN — Medication 50 MILLIGRAM(S): at 05:13

## 2021-01-01 RX ADMIN — Medication 50 MILLIGRAM(S): at 06:06

## 2021-01-01 RX ADMIN — SODIUM CHLORIDE 3 MILLILITER(S): 9 INJECTION INTRAMUSCULAR; INTRAVENOUS; SUBCUTANEOUS at 06:28

## 2021-01-01 RX ADMIN — Medication 30 MILLILITER(S): at 15:47

## 2021-01-01 RX ADMIN — Medication 325 MILLIGRAM(S): at 08:14

## 2021-01-01 RX ADMIN — Medication 75 MICROGRAM(S): at 11:50

## 2021-01-01 RX ADMIN — SODIUM CHLORIDE 3 MILLILITER(S): 9 INJECTION INTRAMUSCULAR; INTRAVENOUS; SUBCUTANEOUS at 23:34

## 2021-01-01 RX ADMIN — HEPARIN SODIUM 6 UNIT(S)/HR: 5000 INJECTION INTRAVENOUS; SUBCUTANEOUS at 17:31

## 2021-01-01 RX ADMIN — Medication 325 MILLIGRAM(S): at 11:25

## 2021-01-01 RX ADMIN — POLYETHYLENE GLYCOL 3350 17 GRAM(S): 17 POWDER, FOR SOLUTION ORAL at 06:22

## 2021-01-01 RX ADMIN — Medication 2.5 MILLIGRAM(S): at 06:20

## 2021-01-01 RX ADMIN — Medication 12.5 MILLIGRAM(S): at 06:03

## 2021-01-01 RX ADMIN — SODIUM CHLORIDE 3 MILLILITER(S): 9 INJECTION INTRAMUSCULAR; INTRAVENOUS; SUBCUTANEOUS at 05:56

## 2021-01-01 RX ADMIN — Medication 325 MILLIGRAM(S): at 13:17

## 2021-01-01 RX ADMIN — HYDROMORPHONE HYDROCHLORIDE 0.5 MILLIGRAM(S): 2 INJECTION INTRAMUSCULAR; INTRAVENOUS; SUBCUTANEOUS at 23:18

## 2021-01-01 RX ADMIN — HEPARIN SODIUM 7 UNIT(S)/HR: 5000 INJECTION INTRAVENOUS; SUBCUTANEOUS at 01:34

## 2021-01-01 RX ADMIN — SENNA PLUS 2 TABLET(S): 8.6 TABLET ORAL at 21:27

## 2021-01-01 RX ADMIN — CHLORHEXIDINE GLUCONATE 1 APPLICATION(S): 213 SOLUTION TOPICAL at 05:59

## 2021-01-01 RX ADMIN — SENNA PLUS 2 TABLET(S): 8.6 TABLET ORAL at 21:12

## 2021-01-01 RX ADMIN — POLYETHYLENE GLYCOL 3350 17 GRAM(S): 17 POWDER, FOR SOLUTION ORAL at 12:04

## 2021-01-01 RX ADMIN — Medication 81 MILLIGRAM(S): at 08:14

## 2021-01-01 RX ADMIN — TOLVAPTAN 15 MILLIGRAM(S): 15 TABLET ORAL at 16:33

## 2021-01-01 RX ADMIN — SENNA PLUS 2 TABLET(S): 8.6 TABLET ORAL at 22:41

## 2021-01-01 RX ADMIN — DORNASE ALFA 2.5 MILLIGRAM(S): 1 SOLUTION RESPIRATORY (INHALATION) at 10:08

## 2021-01-01 RX ADMIN — Medication 81 MILLIGRAM(S): at 12:12

## 2021-01-01 RX ADMIN — AMLODIPINE BESYLATE 5 MILLIGRAM(S): 2.5 TABLET ORAL at 05:58

## 2021-01-01 RX ADMIN — ATORVASTATIN CALCIUM 20 MILLIGRAM(S): 80 TABLET, FILM COATED ORAL at 23:33

## 2021-01-01 RX ADMIN — AMLODIPINE BESYLATE 10 MILLIGRAM(S): 2.5 TABLET ORAL at 05:41

## 2021-01-01 RX ADMIN — AMIODARONE HYDROCHLORIDE 200 MILLIGRAM(S): 400 TABLET ORAL at 05:58

## 2021-01-01 RX ADMIN — ATORVASTATIN CALCIUM 20 MILLIGRAM(S): 80 TABLET, FILM COATED ORAL at 21:08

## 2021-01-01 RX ADMIN — Medication 2.5 MILLIGRAM(S): at 14:15

## 2021-01-01 RX ADMIN — HEPARIN SODIUM 6 UNIT(S)/HR: 5000 INJECTION INTRAVENOUS; SUBCUTANEOUS at 12:00

## 2021-01-01 RX ADMIN — SODIUM CHLORIDE 3 MILLILITER(S): 9 INJECTION INTRAMUSCULAR; INTRAVENOUS; SUBCUTANEOUS at 05:14

## 2021-01-01 RX ADMIN — Medication 325 MILLIGRAM(S): at 14:29

## 2021-01-01 RX ADMIN — SODIUM CHLORIDE 500 MILLILITER(S): 9 INJECTION INTRAMUSCULAR; INTRAVENOUS; SUBCUTANEOUS at 12:00

## 2021-01-01 RX ADMIN — ATORVASTATIN CALCIUM 20 MILLIGRAM(S): 80 TABLET, FILM COATED ORAL at 21:21

## 2021-01-01 RX ADMIN — Medication 100 MICROGRAM(S): at 06:16

## 2021-01-01 RX ADMIN — SODIUM CHLORIDE 3 MILLILITER(S): 9 INJECTION INTRAMUSCULAR; INTRAVENOUS; SUBCUTANEOUS at 21:45

## 2021-01-01 RX ADMIN — HEPARIN SODIUM 6 UNIT(S)/HR: 5000 INJECTION INTRAVENOUS; SUBCUTANEOUS at 09:28

## 2021-01-01 RX ADMIN — AMIODARONE HYDROCHLORIDE 200 MILLIGRAM(S): 400 TABLET ORAL at 05:50

## 2021-01-01 RX ADMIN — ATORVASTATIN CALCIUM 20 MILLIGRAM(S): 80 TABLET, FILM COATED ORAL at 21:15

## 2021-01-01 RX ADMIN — Medication 30 MILLILITER(S): at 09:10

## 2021-01-01 RX ADMIN — SODIUM CHLORIDE 50 MILLILITER(S): 9 INJECTION, SOLUTION INTRAVENOUS at 17:49

## 2021-01-01 RX ADMIN — Medication 650 MILLIGRAM(S): at 06:49

## 2021-01-01 RX ADMIN — Medication 100 MICROGRAM(S): at 09:22

## 2021-01-01 RX ADMIN — MEGESTROL ACETATE 20 MILLIGRAM(S): 40 SUSPENSION ORAL at 12:17

## 2021-01-01 RX ADMIN — AMLODIPINE BESYLATE 5 MILLIGRAM(S): 2.5 TABLET ORAL at 05:53

## 2021-01-01 RX ADMIN — Medication 50 MILLIGRAM(S): at 05:07

## 2021-01-01 RX ADMIN — SODIUM CHLORIDE 50 MILLILITER(S): 9 INJECTION INTRAMUSCULAR; INTRAVENOUS; SUBCUTANEOUS at 08:30

## 2021-01-01 RX ADMIN — ATORVASTATIN CALCIUM 20 MILLIGRAM(S): 80 TABLET, FILM COATED ORAL at 21:38

## 2021-01-01 RX ADMIN — Medication 15 GRAM(S): at 17:28

## 2021-01-01 RX ADMIN — SODIUM CHLORIDE 3 MILLILITER(S): 9 INJECTION INTRAMUSCULAR; INTRAVENOUS; SUBCUTANEOUS at 06:33

## 2021-01-01 RX ADMIN — SODIUM CHLORIDE 2 GRAM(S): 9 INJECTION INTRAMUSCULAR; INTRAVENOUS; SUBCUTANEOUS at 17:28

## 2021-01-01 RX ADMIN — Medication 12.5 MILLIGRAM(S): at 05:40

## 2021-01-01 RX ADMIN — HEPARIN SODIUM 6 UNIT(S)/HR: 5000 INJECTION INTRAVENOUS; SUBCUTANEOUS at 19:00

## 2021-01-01 RX ADMIN — HEPARIN SODIUM 6 UNIT(S)/HR: 5000 INJECTION INTRAVENOUS; SUBCUTANEOUS at 02:04

## 2021-01-01 RX ADMIN — AMIODARONE HYDROCHLORIDE 200 MILLIGRAM(S): 400 TABLET ORAL at 05:25

## 2021-01-01 RX ADMIN — ALBUTEROL 2.5 MILLIGRAM(S): 90 AEROSOL, METERED ORAL at 15:28

## 2021-01-01 RX ADMIN — ONDANSETRON 4 MILLIGRAM(S): 8 TABLET, FILM COATED ORAL at 14:53

## 2021-01-01 RX ADMIN — SODIUM CHLORIDE 3 MILLILITER(S): 9 INJECTION INTRAMUSCULAR; INTRAVENOUS; SUBCUTANEOUS at 22:55

## 2021-01-01 RX ADMIN — SODIUM CHLORIDE 2 GRAM(S): 9 INJECTION INTRAMUSCULAR; INTRAVENOUS; SUBCUTANEOUS at 21:59

## 2021-01-01 RX ADMIN — PIPERACILLIN AND TAZOBACTAM 25 GRAM(S): 4; .5 INJECTION, POWDER, LYOPHILIZED, FOR SOLUTION INTRAVENOUS at 05:15

## 2021-01-01 RX ADMIN — Medication 12.5 MILLIGRAM(S): at 06:20

## 2021-01-01 RX ADMIN — AMIODARONE HYDROCHLORIDE 200 MILLIGRAM(S): 400 TABLET ORAL at 05:42

## 2021-01-01 RX ADMIN — ATORVASTATIN CALCIUM 20 MILLIGRAM(S): 80 TABLET, FILM COATED ORAL at 22:28

## 2021-01-01 RX ADMIN — HEPARIN SODIUM 7 UNIT(S)/HR: 5000 INJECTION INTRAVENOUS; SUBCUTANEOUS at 04:53

## 2021-01-01 RX ADMIN — Medication 81 MILLIGRAM(S): at 11:34

## 2021-01-01 RX ADMIN — SODIUM CHLORIDE 3 MILLILITER(S): 9 INJECTION INTRAMUSCULAR; INTRAVENOUS; SUBCUTANEOUS at 12:34

## 2021-01-01 RX ADMIN — Medication 0.5 MILLIGRAM(S): at 05:00

## 2021-01-01 RX ADMIN — SODIUM CHLORIDE 3 MILLILITER(S): 9 INJECTION INTRAMUSCULAR; INTRAVENOUS; SUBCUTANEOUS at 23:08

## 2021-01-01 RX ADMIN — Medication 100 MICROGRAM(S): at 05:53

## 2021-01-01 RX ADMIN — AMIODARONE HYDROCHLORIDE 200 MILLIGRAM(S): 400 TABLET ORAL at 06:22

## 2021-01-01 RX ADMIN — SODIUM CHLORIDE 3 MILLILITER(S): 9 INJECTION INTRAMUSCULAR; INTRAVENOUS; SUBCUTANEOUS at 05:52

## 2021-01-01 RX ADMIN — Medication 100 MICROGRAM(S): at 10:23

## 2021-01-01 RX ADMIN — Medication 300 MILLIGRAM(S): at 00:42

## 2021-01-01 RX ADMIN — AMIODARONE HYDROCHLORIDE 200 MILLIGRAM(S): 400 TABLET ORAL at 05:32

## 2021-01-01 RX ADMIN — AMIODARONE HYDROCHLORIDE 200 MILLIGRAM(S): 400 TABLET ORAL at 05:10

## 2021-01-01 RX ADMIN — SENNA PLUS 2 TABLET(S): 8.6 TABLET ORAL at 22:28

## 2021-01-01 RX ADMIN — SODIUM CHLORIDE 3 MILLILITER(S): 9 INJECTION INTRAMUSCULAR; INTRAVENOUS; SUBCUTANEOUS at 21:37

## 2021-01-01 RX ADMIN — SODIUM CHLORIDE 3 MILLILITER(S): 9 INJECTION INTRAMUSCULAR; INTRAVENOUS; SUBCUTANEOUS at 13:12

## 2021-01-01 RX ADMIN — SENNA PLUS 2 TABLET(S): 8.6 TABLET ORAL at 23:42

## 2021-01-01 RX ADMIN — SODIUM CHLORIDE 3 MILLILITER(S): 9 INJECTION INTRAMUSCULAR; INTRAVENOUS; SUBCUTANEOUS at 12:00

## 2021-01-01 RX ADMIN — SODIUM CHLORIDE 3 MILLILITER(S): 9 INJECTION INTRAMUSCULAR; INTRAVENOUS; SUBCUTANEOUS at 21:41

## 2021-01-01 RX ADMIN — ONDANSETRON 8 MILLIGRAM(S): 8 TABLET, FILM COATED ORAL at 23:18

## 2021-01-01 RX ADMIN — AMLODIPINE BESYLATE 5 MILLIGRAM(S): 2.5 TABLET ORAL at 06:06

## 2021-01-01 RX ADMIN — Medication 2.5 MILLIGRAM(S): at 16:45

## 2021-01-01 RX ADMIN — SODIUM CHLORIDE 50 MILLILITER(S): 9 INJECTION INTRAMUSCULAR; INTRAVENOUS; SUBCUTANEOUS at 16:15

## 2021-01-01 RX ADMIN — AMLODIPINE BESYLATE 10 MILLIGRAM(S): 2.5 TABLET ORAL at 05:13

## 2021-01-01 RX ADMIN — SODIUM CHLORIDE 3 MILLILITER(S): 9 INJECTION INTRAMUSCULAR; INTRAVENOUS; SUBCUTANEOUS at 05:26

## 2021-01-01 RX ADMIN — APIXABAN 2.5 MILLIGRAM(S): 2.5 TABLET, FILM COATED ORAL at 06:13

## 2021-01-01 RX ADMIN — AMLODIPINE BESYLATE 5 MILLIGRAM(S): 2.5 TABLET ORAL at 10:23

## 2021-01-01 RX ADMIN — AMIODARONE HYDROCHLORIDE 200 MILLIGRAM(S): 400 TABLET ORAL at 06:06

## 2021-01-01 RX ADMIN — SODIUM CHLORIDE 3 MILLILITER(S): 9 INJECTION INTRAMUSCULAR; INTRAVENOUS; SUBCUTANEOUS at 05:35

## 2021-01-01 RX ADMIN — POLYETHYLENE GLYCOL 3350 17 GRAM(S): 17 POWDER, FOR SOLUTION ORAL at 11:08

## 2021-01-01 RX ADMIN — CHLORHEXIDINE GLUCONATE 1 APPLICATION(S): 213 SOLUTION TOPICAL at 05:33

## 2021-01-01 RX ADMIN — AMLODIPINE BESYLATE 5 MILLIGRAM(S): 2.5 TABLET ORAL at 05:32

## 2021-01-01 RX ADMIN — HEPARIN SODIUM 2500 UNIT(S): 5000 INJECTION INTRAVENOUS; SUBCUTANEOUS at 18:34

## 2021-01-01 RX ADMIN — ONDANSETRON 8 MILLIGRAM(S): 8 TABLET, FILM COATED ORAL at 18:14

## 2021-01-01 RX ADMIN — SODIUM CHLORIDE 3 MILLILITER(S): 9 INJECTION INTRAMUSCULAR; INTRAVENOUS; SUBCUTANEOUS at 14:03

## 2021-01-01 RX ADMIN — AMIODARONE HYDROCHLORIDE 200 MILLIGRAM(S): 400 TABLET ORAL at 10:23

## 2021-01-01 RX ADMIN — SODIUM CHLORIDE 3 MILLILITER(S): 9 INJECTION INTRAMUSCULAR; INTRAVENOUS; SUBCUTANEOUS at 11:18

## 2021-01-01 RX ADMIN — APIXABAN 2.5 MILLIGRAM(S): 2.5 TABLET, FILM COATED ORAL at 05:00

## 2021-01-01 RX ADMIN — Medication 325 MILLIGRAM(S): at 12:42

## 2021-01-01 RX ADMIN — SODIUM CHLORIDE 3 MILLILITER(S): 9 INJECTION INTRAMUSCULAR; INTRAVENOUS; SUBCUTANEOUS at 22:46

## 2021-01-01 RX ADMIN — CHLORHEXIDINE GLUCONATE 1 APPLICATION(S): 213 SOLUTION TOPICAL at 05:00

## 2021-01-01 RX ADMIN — Medication 75 MICROGRAM(S): at 09:16

## 2021-01-01 RX ADMIN — Medication 81 MILLIGRAM(S): at 12:00

## 2021-01-01 RX ADMIN — SODIUM CHLORIDE 500 MILLILITER(S): 9 INJECTION INTRAMUSCULAR; INTRAVENOUS; SUBCUTANEOUS at 12:36

## 2021-01-01 RX ADMIN — POLYETHYLENE GLYCOL 3350 17 GRAM(S): 17 POWDER, FOR SOLUTION ORAL at 16:20

## 2021-01-01 RX ADMIN — PIPERACILLIN AND TAZOBACTAM 25 GRAM(S): 4; .5 INJECTION, POWDER, LYOPHILIZED, FOR SOLUTION INTRAVENOUS at 05:10

## 2021-01-01 RX ADMIN — Medication 325 MILLIGRAM(S): at 12:16

## 2021-01-01 RX ADMIN — Medication 12.5 MILLIGRAM(S): at 06:06

## 2021-01-01 RX ADMIN — POLYETHYLENE GLYCOL 3350 17 GRAM(S): 17 POWDER, FOR SOLUTION ORAL at 06:34

## 2021-01-01 RX ADMIN — Medication 25 MILLIGRAM(S): at 06:23

## 2021-01-01 RX ADMIN — SENNA PLUS 2 TABLET(S): 8.6 TABLET ORAL at 23:00

## 2021-03-30 NOTE — REASON FOR VISIT
[FreeTextEntry1] : 3/26/21 - Patient reports worsening MOULTON. Patient reports palpitations at night.  I advised patient to undergo an echocardiogram.  I advised patient to start Diltiazem 30 mg BID.  I advised patient to consider TAVR.\par \par 7/9/20 - Patient wants to stop Amiodarone 200 mg.  I have no objection but warned pt that AFIB will likely return.  Patient denies CP.  Patient reports MOULTON stable, occasional palpitations lasting 1 min.  Patient reports cough, has h/o pulm nodules in 2018, check chest CT.  I advised patient to continue Plavix 75 mg Eliquis 2.5 mg BID Metoprolol ER 50 mg.  Patient had stopped taking  diuretics, which is okay, her BP is good. Fu in 3 months.

## 2021-03-30 NOTE — HISTORY OF PRESENT ILLNESS
[FreeTextEntry1] : 89-year-old female with CAD s/p LAD and Diag stents in 2009 at [facility] s/p 2 LAD stents in 9/2018, severe AS (0.9 cm2), asthma, PAF, HTN and HLD presents for followup. \par \par Patient was last seen on 7/9/20.  Patient wished to stop Amiodarone 200 mg. She is on Plavix 75 mg and Atorvastatin for CAD. She is on Eliquis 2.5 mg BID for stroke prevention and Metoprolol ER 50 mg QD for PAF.

## 2021-03-31 NOTE — ED PROVIDER NOTE - OBJECTIVE STATEMENT
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 Denies any other symptoms including fever, cough, N/V/D, LE edema/pain. Of note, pt is currently being evaluated for TAVR. Sent in for admission to CT surgery.

## 2021-03-31 NOTE — ED PROVIDER NOTE - CARE PLAN
Principal Discharge DX:	Chest pain   Principal Discharge DX:	Chest pain  Secondary Diagnosis:	NELY (acute kidney injury)

## 2021-03-31 NOTE — ASSESSMENT
[FreeTextEntry1] : 89 y/o female h/o afib s/p ppm, CRI, CAD s/p PCI with severe symptomatic aortic stenosis.  Had discussion with pt, pt's son and pt's daughter regarding the natural progression of aortic stenosis and the risks and benefits of tavr with a specific focus on the risk of stroke and vascular injury.  Pt and family understands risk of TAVR and wants to proceed.  Pt will need CTA and coronary angiogram which will need to be timed appropriately given CRI.  Pt will be scheduled to see myself and Dr. Sommers prior to TAVR.  Of note, pt's family stated that they are contemplating sending the pt to the [facility] as her SOB is keeping her up a night.  Should the pt be admitted the TAVR w/u will be performed as an inpt.

## 2021-03-31 NOTE — ED PROVIDER NOTE - ATTENDING CONTRIBUTION TO CARE
Pt scheduled for TAVR with chest pain/epigastric pain. No fever, appears well, nontoxic, clear lungs, abdomen soft, nt.

## 2021-03-31 NOTE — H&P ADULT - NSICDXPASTSURGICALHX_GEN_ALL_CORE_FT
PAST SURGICAL HISTORY:  H/O tubal ligation     History of bilateral knee replacement     History of cataract surgery bilateral    History of cholecystectomy     S/P cholecystectomy

## 2021-03-31 NOTE — H&P ADULT - NSHPREVIEWOFSYSTEMS_GEN_ALL_CORE
REVIEW OF SYSTEMS:  CONSTITUTIONAL: No fever, weight loss, or fatigue  EYES: No eye pain, visual disturbances, or discharge  ENMT: No difficulty hearing, tinnitus, vertigo; No sinus or throat pain  NECK: No pain or stiffness  RESPIRATORY: Has non productive cough, No wheezing, chills or hemoptysis; +shortness of breath  CARDIOVASCULAR: +chest pain, No palpitations, dizziness, or leg swelling  GASTROINTESTINAL: No abdominal or epigastric pain. No nausea, vomiting, or hematemesis; No diarrhea ,has No melena  GENITOURINARY: No dysuria, frequency, hematuria, or incontinence  NEUROLOGICAL: No headaches, memory loss, loss of strength, numbness, or tremors  SKIN: No itching, burning, rashes, or lesions   LYMPH NODES: No enlarged glands  ENDOCRINE: No heat or cold intolerance; No hair loss  MUSCULOSKELETAL: No joint pain or swelling; No muscle, back, or extremity pain  PSYCHIATRIC: No depression, anxiety, mood swings, or difficulty sleeping  HEME/LYMPH: No easy bruising, or bleeding gums  ALLERGY: No hives or eczema

## 2021-03-31 NOTE — H&P ADULT - NSICDXPASTMEDICALHX_GEN_ALL_CORE_FT
PAST MEDICAL HISTORY:  Asthma     CAD (coronary artery disease) coronary stents x 2, 2007    HLD (hyperlipidemia)     HTN (hypertension)     PAF (paroxysmal atrial fibrillation)     Scoliosis

## 2021-03-31 NOTE — H&P ADULT - PROBLEM SELECTOR PLAN 1
Admit to CTS under Dr. Lai   Preop TAVR evaluation   Plan for TTE, Cardiac CT Angio, Cardiac cath to be scheduled   Continue with ASA 81, Ator20 Admit to CTS under Dr. Lai   Preop TAVR evaluation   Plan for TTE, Cardiac CT Angio   Cardiac cath to be scheduled   Continue with ASA 81, Ator20  Plan to medically optimize patient - discharge home then return for TAVR

## 2021-03-31 NOTE — ED PROVIDER NOTE - CLINICAL SUMMARY MEDICAL DECISION MAKING FREE TEXT BOX
SOB/MOULTON + mild dull chest/epigastric pain x 4 days. No fever, cough, N/V/D. Pt with extensive hx including CAD and severe aortic stenosis being evaluated for TAVR. Symptoms seem c/w severe AS. Will also assess for ACS, CHF, PTX, PNA. Lower suspicion for GI pathology (abd completely benign, no N/V/D), dissection (pain is mild dull non-radiating), or PE (no RFs, no signs of DVT). Labs, XR, & admit to CT surgery.

## 2021-03-31 NOTE — H&P ADULT - NSHPPHYSICALEXAM_GEN_ALL_CORE
General: WN/WD NAD  Neurology: A&Ox3, nonfocal, CABRERA x 4  Head:  Normocephalic, atraumatic  ENT:  Mucosa moist, no ulcerations  Neck:  Supple, no sinuses or palpable masses  Lymphatic:  No palpable cervical, supraclavicular, axillary or inguinal adenopathy  Respiratory: CTA B/L  CV: +III/VI systolic murmur   Abdominal: Soft, NT, ND no palpable mass  MSK: No edema, + peripheral pulses, FROM all 4 extremity

## 2021-03-31 NOTE — ED ADULT NURSE NOTE - OBJECTIVE STATEMENT
89 y female presents from home with mild epigastric pain and MOULTON x 4 days. Patient reports to following with Cardiologist; sent her for CT surgery. Denies fevers, chills, lightheadedness, dizziness. A&Ox3. Skin warm dry and intact. Breathing comfortably in bed- no distress. Abdomen soft nontender nondistended. Safety maintained- bed locked in lowest position.

## 2021-03-31 NOTE — DATA REVIEWED
[FreeTextEntry1] : TTE 3/26/2021: Calcified trileaflet aortic valve with decreased opening, PVG 27 mmHg, estimated MERCEDES 0.7 cm2, mild AR, EF 70%

## 2021-03-31 NOTE — HISTORY OF PRESENT ILLNESS
[FreeTextEntry1] : Lynette Barreto is a 89-year-old female followed and referred by Dr. Humphrey for aortic stenosis and possible TAVR evaluation.  To review, PMH includes spinal stenosis, OA s/p b/l knee replacement, CRI, [facility]2018, severe AS (0.9 cm2 in 2019), PPM ([facility]) placed 4/9/2019 with Dr. Day, asthma, PAF on Eliquis, HTN and HLD. She had TTE on 3/26/2021 that demonstrated calcified trileaflet aortic valve with decreased opening, PVG 27 mmHg, estimated MERCEDES 0.7 cm2, mild AR and EF 70%.  Pt states that she has been having increasing shortness of breath at night associated with chest heaviness.  Pt does not c/o chest pain/sob with exercise.  \par \par Pt is very functional and independent.  LIves with children.  \par \par \par \par  [Diabetes Mellitus] : no Diabetes Melllitus [Dyslipidemia] : no dyslipidemia [Home Oxygen] : no home oxygen use [Liver Disease] : no liver disease [Unresponsive Neurologic State] : not in a unresponsive neurologic state [Cerebrovascular Disease] : no cerebrovascular disease [V tach / V fib] :  but no V tach/V fib

## 2021-03-31 NOTE — H&P ADULT - ASSESSMENT
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 Patient to be admitted to [facility] under Dr. Lai for pre TAVR workup.       3/31 VSS; O2 sat 96% on RA, lungs clear, CXR: small R and Trace L pleural Effusion. Non-edematous. EKG normal, Trops 21.    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 Patient to be admitted to CTS under Dr. Lai for pre TAVR workup.       3/31 VSS; O2 sat 96% on RA, CXR: small R and Trace L pleural Effusion. Non-edematous. EKG normal, Trops 21.

## 2021-03-31 NOTE — CONSULT LETTER
[FreeTextEntry2] : Dr. Kam Guerra-kai \par Tel: [phone] [FreeTextEntry3] : Mk Lai MD\par Attending Surgeon\par Cardiovascular & Thoracic Surgery\par  \par [facility]

## 2021-03-31 NOTE — H&P ADULT - PROBLEM SELECTOR PLAN 2
Continue with [id number] QD for rate control   Continue with Eliquis 2.5BID for anticoagulation   No need to stop Eliquis as per Dr. Lai   continue with home dose Amio 200 daily   Monitor on tele

## 2021-03-31 NOTE — ED PROVIDER NOTE - NS ED ROS FT
ROS:  GENERAL: No fever, no chills  EYES: no change in vision  HEENT: no trouble swallowing, no trouble speaking  CARDIAC: +chest/epigastric pain  PULMONARY: +SOB/MOULTON. no cough   GI: no abdominal pain, no nausea, no vomiting, no diarrhea, no constipation  : No dysuria, no frequency, no change in appearance, or odor of urine  SKIN: no rashes  NEURO: no headache, no weakness  MSK: No joint pain    Mg De Leon PGY3

## 2021-03-31 NOTE — ED PROVIDER NOTE - PHYSICAL EXAMINATION
Gen: AAOx3, non-toxic  Head: NCAT  HEENT: EOMI, oral mucosa moist, normal conjunctiva  Lung: CTAB, no respiratory distress, no wheezes/rhonchi/rales B/L, speaking in full sentences  CV: RRR, +systolic murmur   Abd: soft, NTND, negative Alvarez's   MSK: no visible deformities  Neuro: No focal sensory or motor deficits  Skin: Warm, well perfused, no rash, no LE edema or calf tenderness   Psych: normal affect.     Mg De Leon PGY3

## 2021-04-01 NOTE — PROGRESS NOTE ADULT - PROBLEM SELECTOR PLAN 1
Admit to CTS under Dr. Lai   Preop TAVR evaluation   Plan for TTE, Cardiac CT Angio   Cardiac cath to be scheduled   Continue with ASA 81, Ator20  Plan to medically optimize patient - discharge home then return for TAVR

## 2021-04-01 NOTE — PROGRESS NOTE ADULT - ASSESSMENT
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 Patient to be admitted to CTS under Dr. Lai for pre TAVR workup.       3/31 VSS; O2 sat 96% on RA, CXR: small R and Trace L pleural Effusion. Non-edematous. EKG normal, Trops 21.   4/1 HD stable.  Pt had eliquis this am--hold for now and start hep gtt for cardiac cath.  CTA delayed by department hopefully will get done tomorrow

## 2021-04-01 NOTE — PROGRESS NOTE ADULT - PROBLEM SELECTOR PLAN 2
Continue with toprol 50xl QD for rate control   Eliquis on hole for cardiac cath  Hep Gtt started--monitor PTT  continue with home dose Amio 200 daily   Monitor on tele

## 2021-04-01 NOTE — PROGRESS NOTE ADULT - SUBJECTIVE AND OBJECTIVE BOX
S;  denies cp sob    Telemetry: AP 65     Vital Signs Last 24 Hrs  T(C): 36.3 (04-01-21 @ 11:03), Max: 36.8 (04-01-21 @ 00:29)  T(F): 97.4 (04-01-21 @ 11:03), Max: 98.2 (04-01-21 @ 00:29)  HR: 64 (04-01-21 @ 11:03) (60 - 66)  BP: 149/74 (04-01-21 @ 12:30) (136/77 - 170/90)  RR: 18 (04-01-21 @ 11:03) (18 - 20)  SpO2: 97% (04-01-21 @ 11:03) (95% - 97%)                   03-31 @ 07:01  -  04-01 @ 07:00  --------------------------------------------------------  IN: 180 mL / OUT: 500 mL / NET: -320 mL    04-01 @ 07:01  -  04-01 @ 15:54  --------------------------------------------------------  IN: 540 mL / OUT: 0 mL / NET: 540 mL          Daily Height in cm: 157.48 (01 Apr 2021 05:18)    Daily         CAPILLARY BLOOD GLUCOSE              Drains:     MS         [  ] Drainage:                 L Pleural  [  ]  Drainage:                R Pleural  [  ]  Drainage:    Pacing Wires        [  ]   Settings:                                  Isolated  [  ]    Coumadin    [ ] YES          [ x ]      NO         Reason:     eliquis                            10.8   3.69  )-----------( 134      ( 01 Apr 2021 06:46 )             33.4       04-01    139  |  106  |  28<H>  ----------------------------<  108<H>  4.1   |  23  |  1.27    Ca    9.6      01 Apr 2021 06:46    TPro  6.2  /  Alb  4.0  /  TBili  0.7  /  DBili  x   /  AST  18  /  ALT  13  /  AlkPhos  79  04-01      PT/INR - ( 31 Mar 2021 22:45 )   PT: 13.3 sec;   INR: 1.11 ratio         PTT - ( 31 Mar 2021 22:45 )  PTT:31.9 sec    PHYSICAL EXAM:    Neurology: alert and oriented x 3, nonfocal, no gross deficits    CV :  S1S2 heard RRR    Lungs:  clear to ausc.  no w/r/r    Abdomen: soft, nontender, nondistended, positive bowel sounds           Extremities: no edema              aMIOdarone    Tablet 200 milliGRAM(s) Oral daily  amLODIPine   Tablet 5 milliGRAM(s) Oral daily  aspirin enteric coated 81 milliGRAM(s) Oral daily  atorvastatin 20 milliGRAM(s) Oral at bedtime  ferrous    sulfate 325 milliGRAM(s) Oral daily  heparin  Infusion 1000 Unit(s)/Hr IV Continuous <Continuous>  metoprolol succinate ER 50 milliGRAM(s) Oral daily  sodium chloride 0.9% lock flush 3 milliLiter(s) IV Push every 8 hours                              Physical Therapy Rec:   Home  [ x ]   Home w/ PT  [  ]  Rehab  [  ]    Discussed with Cardiothoracic Team at AM rounds.

## 2021-04-01 NOTE — CONSULT NOTE ADULT - SUBJECTIVE AND OBJECTIVE BOX
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 Denies any other symptoms including fever, cough, N/V/D, LE edema/pain.     Today the patient was evaluated and interviewed using the  hotline ([email or web address] #[id number]). The patient states that over the past week, she has noted an increase in MOULTON, especially in the last 3 days, as well as what she describes as "chest heaviness". She denies any Dizziness or Pedal edema. She has noted the inability to lay flat at night, stating her chest feels more comfortable when she is upright. She gets profoundly SOB after walking 15 feet or so. She lives at home with her son, and is able to perform her own ADL's. She does have a concern that she has lost weight because of no appetite and low energy levels, feeling fatigued often. She was never a smoker, and does not use alcohol. She denies any hospitalizations for Heart Failure over the past year.          03-31 @ 07:01 - 04-01 @ 07:00  --------------------------------------------------------  IN: 180 mL / OUT: 500 mL / NET: -320 mL    04-01 @ 07:01 - 04-01 @ 11:38  --------------------------------------------------------  IN: 240 mL / OUT: 0 mL / NET: 240 mL        PAST MEDICAL & SURGICAL HISTORY:  HTN (hypertension)    CAD (coronary artery disease)  coronary stents x 2, 2007    HLD (hyperlipidemia)    Asthma    PAF (paroxysmal atrial fibrillation)    Scoliosis    History of cholecystectomy    H/O tubal ligation    History of bilateral knee replacement    History of cataract surgery  bilateral    S/P cholecystectomy        FAMILY HISTORY:  No pertinent family history in first degree relatives              No Known Allergies    aMIOdarone    Tablet 200 milliGRAM(s) Oral daily  amLODIPine   Tablet 5 milliGRAM(s) Oral daily  apixaban 2.5 milliGRAM(s) Oral every 12 hours  aspirin enteric coated 81 milliGRAM(s) Oral daily  atorvastatin 20 milliGRAM(s) Oral at bedtime  ferrous    sulfate 325 milliGRAM(s) Oral daily  metoprolol succinate ER 50 milliGRAM(s) Oral daily  sodium chloride 0.9% lock flush 3 milliLiter(s) IV Push every 8 hours      T(C): 36.3 (04-01-21 @ 05:18), Max: 36.8 (04-01-21 @ 00:29)  HR: 63 (04-01-21 @ 05:18) (60 - 66)  BP: 162/67 (04-01-21 @ 05:18) (136/77 - 168/73)  RR: 18 (04-01-21 @ 05:18) (18 - 20)  SpO2: 96% (04-01-21 @ 05:18) (95% - 96%)  Wt(kg): --      Review of Symptoms:  General: Alert, Follows commands  Respiratory:  + SOB, + MOULTON, No Cough  Cardiac: Denies CP, Denies Palpitations  Gastrointestinal: Denies Pain, Denies N/V  Extremities: Denies Pedal Edema, No Joint pain  Vascular: Negative  Neurological: Negative  Endocrine: negative      Physical Exam:  Gen: A/Ox3, NAD, Follows commands  HEENT: No JVD, Neck Supple, Trachea midline, No masses  Pulmonary: CTAB,  No accessory muscle use for respiration  Cardiac: S1S2, RRR, III/VI YIMI No Gallops/Rubs  ECG: SR  Gastrointestinal: Soft, NT/ND, + Bowel Sounds  Extremities: No Edema,  No joint pain or swelling +PMSx4  Vascular: 1+ pulses B/L, No Bruits  Neurological: Non Focal, = motor and sensory      Laboratory:                        10.8   3.69  )-----------( 134      ( 01 Apr 2021 06:46 )             33.4    04-01    139  |  106  |  28<H>  ----------------------------<  108<H>  4.1   |  23  |  1.27    Ca    9.6      01 Apr 2021 06:46    TPro  6.2  /  Alb  4.0  /  TBili  0.7  /  DBili  x   /  AST  18  /  ALT  13  /  AlkPhos  79  04-01   PT/INR - ( 31 Mar 2021 22:45 )   PT: 13.3 sec;   INR: 1.11 ratio         PTT - ( 31 Mar 2021 22:45 )  PTT:31.9 sec    Pro-BNP:Serum Pro-Brain Natriuretic Peptide (04.01.21 @ 06:46)    Serum Pro-Brain Natriuretic Peptide: 1545 pg/mL        Transthoracic Echo: Pending      Cardiac Cath: Pending

## 2021-04-02 NOTE — PROGRESS NOTE ADULT - ASSESSMENT
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 Patient to be admitted to CTS under Dr. Lai for pre TAVR workup.       3/31 VSS; O2 sat 96% on RA, CXR: small R and Trace L pleural Effusion. Non-edematous. EKG normal, Trops 21.   4/1 HD stable.  Pt had eliquis this am--hold for now and start hep gtt for cardiac cath.  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 Patient to be admitted to CTS under Dr. Lai for pre TAVR workup.       3/31 VSS; O2 sat 96% on RA, CXR: small R and Trace L pleural Effusion. Non-edematous. EKG normal, Trops 21.   4/1 HD stable.  Pt had eliquis this am--hold for now and start hep gtt for cardiac cath.  CTA delayed by department hopefully will get done tomorrow  4/2 VSS   PTT elevated heparin gtt held . repeat PTT. creatinine elevated 1.62 Dr Daigle consulted - nephrology  OK for CTA- gentle hydration  post CTA.

## 2021-04-02 NOTE — PROGRESS NOTE ADULT - SUBJECTIVE AND OBJECTIVE BOX
Subjective "hello my side hurts"    VITAL SIGNS    Telemetry: NSR /apaced 60-80       Vital Signs Last 24 Hrs  T(C): 36.7 (21 @ 05:25), Max: 36.7 (21 @ 05:25)  T(F): 98.1 (21 @ 05:25), Max: 98.1 (21 @ 05:25)  HR: 61 (21 @ 05:25) (61 - 64)  BP: 158/82 (21 @ 05:25) (125/69 - 170/90)  RR: 18 (21 @ 05:25) (18 - 18)  SpO2: 95% (21 @ 05:25) (95% - 97%)           04 @ 07:01  -   @ 07:00  --------------------------------------------------------  IN: 676 mL / OUT: 900 mL / NET: -224 mL    Daily Weight in k.8 (2021 08:06)  CAPILLARY BLOOD GLUCOSE  MEDICATIONS  (STANDING):  aMIOdarone    Tablet 200 milliGRAM(s) Oral daily  amLODIPine   Tablet 5 milliGRAM(s) Oral daily  aspirin enteric coated 81 milliGRAM(s) Oral daily  atorvastatin 20 milliGRAM(s) Oral at bedtime  ferrous    sulfate 325 milliGRAM(s) Oral daily  heparin  Infusion 1000 Unit(s)/Hr (6 mL/Hr) IV Continuous <Continuous>  metoprolol succinate ER 50 milliGRAM(s) Oral daily  sodium chloride 0.9% lock flush 3 milliLiter(s) IV Push every 8 hours                  PHYSICAL EXAM    Neurology: alert and oriented x 3, nonfocal, no gross deficits    CV :S1 S2 RRR  Lungs: B/l CTA on room air    Abdomen: soft, nontender, nondistended, positive bowel sounds, last bowel movement     :  Voids          Extremities: equal strength throughout    B/l le warm well perfused  + DP                                        Discussed with Cardiothoracic Team at AM rounds.

## 2021-04-02 NOTE — PROGRESS NOTE ADULT - PROBLEM SELECTOR PLAN 2
Continue with toprol 50xl QD for rate control   Eliquis on hold for cardiac cath  Hep Gtt started--monitor PTT  continue with home dose Amio 200 daily   Monitor on tele

## 2021-04-02 NOTE — CONSULT NOTE ADULT - ASSESSMENT
89F with PMHx of  asthma, HTN, HLD, TIA, severe aortic stenosis, CAD s/p stents LAD and Diag, 2009 s/p 2 stent LAD in 9/2018, symptomatic Pafib on Eliquis pw SOB MOULTON and chest pain   Admitted for TAVR work up  89F with PMHx of  asthma, HTN, HLD, TIA, severe aortic stenosis, CAD s/p stents LAD and Diag, 2009 s/p 2 stent LAD in 9/2018, symptomatic Pafib on Eliquis pw SOB MOULTON and chest pain   Admitted for TAVR work up     1 Renal-Renal azotemia vs hemodynamics from pre-renal stimulus from the stenotic valve.  Seems slightly dehydrated as well  Will hydrate for 8 hours now  NO RENAL OBJECTION TO CT SCAN.  She will be optimized for the procedure with IVF  Trend the serum creatinine    2CVS-CT coronaries today and then cath next week   SBP appropriate at present     3 Misc-Heparin gtt     DW CTS  [facility]   [facility]   [phone]

## 2021-04-02 NOTE — PROGRESS NOTE ADULT - SUBJECTIVE AND OBJECTIVE BOX
Structural Heart Team    Examination assisted by  videophone #[id number]  Ms Barreto is seen and examined lying in bed.  She is comfortable and without active complaints.  She denies chest pain/pressure, sob and dizziness but does report some pain last night down the left side of her chest and back.  There were no acute events overnight.        REVIEW OF SYSTEMS:    CONSTITUTIONAL: No weakness, fevers or chills  EYES/ENT: No visual changes;  No vertigo or throat pain   NECK: No pain or stiffness  RESPIRATORY: No cough, wheezing, hemoptysis; No shortness of breath  CARDIOVASCULAR: No chest pain or palpitations  GASTROINTESTINAL: No abdominal or epigastric pain. No nausea, vomiting, or hematemesis; No diarrhea or constipation. No melena or hematochezia.  GENITOURINARY: No dysuria, frequency or hematuria  NEUROLOGICAL: No numbness or weakness  SKIN: No itching, rashes      Allergies    No Known Allergies    Intolerances      Vital Signs Last 24 Hrs  T(C): 36.7 (02 Apr 2021 05:25), Max: 36.7 (02 Apr 2021 05:25)  T(F): 98.1 (02 Apr 2021 05:25), Max: 98.1 (02 Apr 2021 05:25)  HR: 61 (02 Apr 2021 05:25) (61 - 64)  BP: 158/82 (02 Apr 2021 05:25) (125/69 - 170/90)  BP(mean): --  RR: 18 (02 Apr 2021 05:25) (18 - 18)  SpO2: 95% (02 Apr 2021 05:25) (95% - 97%)    MEDICATIONS  (STANDING):  aMIOdarone    Tablet 200 milliGRAM(s) Oral daily  amLODIPine   Tablet 5 milliGRAM(s) Oral daily  aspirin enteric coated 81 milliGRAM(s) Oral daily  atorvastatin 20 milliGRAM(s) Oral at bedtime  ferrous    sulfate 325 milliGRAM(s) Oral daily  heparin  Infusion 1000 Unit(s)/Hr (6 mL/Hr) IV Continuous <Continuous>  metoprolol succinate ER 50 milliGRAM(s) Oral daily  sodium chloride 0.9% lock flush 3 milliLiter(s) IV Push every 8 hours  sodium chloride 0.9%. 1000 milliLiter(s) (50 mL/Hr) IV Continuous <Continuous>      Exam-  General: NAD, WDWN, appropriate affect  Cor: s1s2, RRR, II/VI systolic murmur   Tele: Apaced, SR 60's PVC's   Pulm: Clear, no wheezes, rales, or rhonchi, no use of accessory muscles  Gastrointestinal: soft, nontender, nondistended, +bowel sounds  Extremities:   Neuro: A&Ox3, nonfocal                          9.7    4.41  )-----------( 135      ( 02 Apr 2021 03:04 )             30.7   04-02    139  |  105  |  33<H>  ----------------------------<  120<H>  4.1   |  24  |  1.62<H>    Ca    9.2      02 Apr 2021 03:04    TPro  6.2  /  Alb  4.0  /  TBili  0.7  /  DBili  x   /  AST  18  /  ALT  13  /  AlkPhos  79  04-01  PT/INR - ( 31 Mar 2021 22:45 )   PT: 13.3 sec;   INR: 1.11 ratio         PTT - ( 02 Apr 2021 03:04 )  PTT:187.0 sec  I&O's Summary    01 Apr 2021 07:01  -  02 Apr 2021 07:00  --------------------------------------------------------  IN: 676 mL / OUT: 900 mL / NET: -224 mL              Assessment/Plan:  Ms Barreto is a 89 y/o Mandarin Speaking female, admitted with symptomatic severe Aortic Stenosis  - creatinine elevated today, renal recommending gentle IV hydration and agree with cardiac CTA today  - will monitor creatinine over the weekend and then plan for LHC on Monday is possible  - discussed diagnostic testing and procedure with Ms Barreto, along with possible complications  -- all questions answered  - after diagnostic testing is complete (carotids, PFT's done, CTA & LHC pending) she will be discharged home to return for TAVR as appropriate    LORY Cam  [phone]     Structural Heart Team    Examination assisted by  videophone #[id number]  Ms Barreto is seen and examined lying in bed.  She is comfortable and without active complaints.  She denies chest pain/pressure, sob and dizziness but does report some pain on her left chest and side which she attributes to the echocardiogram.  There were no acute events overnight.        REVIEW OF SYSTEMS:    CONSTITUTIONAL: No weakness, fevers or chills  EYES/ENT: No visual changes;  No vertigo or throat pain   NECK: No pain or stiffness  RESPIRATORY: No cough, wheezing, hemoptysis; No shortness of breath  CARDIOVASCULAR: No chest pain or palpitations  GASTROINTESTINAL: No abdominal or epigastric pain. No nausea, vomiting, or hematemesis; No diarrhea or constipation. No melena or hematochezia.  GENITOURINARY: No dysuria, frequency or hematuria  NEUROLOGICAL: No numbness or weakness  SKIN: No itching, rashes      Allergies    No Known Allergies    Intolerances      Vital Signs Last 24 Hrs  T(C): 36.7 (02 Apr 2021 05:25), Max: 36.7 (02 Apr 2021 05:25)  T(F): 98.1 (02 Apr 2021 05:25), Max: 98.1 (02 Apr 2021 05:25)  HR: 61 (02 Apr 2021 05:25) (61 - 64)  BP: 158/82 (02 Apr 2021 05:25) (125/69 - 170/90)  BP(mean): --  RR: 18 (02 Apr 2021 05:25) (18 - 18)  SpO2: 95% (02 Apr 2021 05:25) (95% - 97%)    MEDICATIONS  (STANDING):  aMIOdarone    Tablet 200 milliGRAM(s) Oral daily  amLODIPine   Tablet 5 milliGRAM(s) Oral daily  aspirin enteric coated 81 milliGRAM(s) Oral daily  atorvastatin 20 milliGRAM(s) Oral at bedtime  ferrous    sulfate 325 milliGRAM(s) Oral daily  heparin  Infusion 1000 Unit(s)/Hr (6 mL/Hr) IV Continuous <Continuous>  metoprolol succinate ER 50 milliGRAM(s) Oral daily  sodium chloride 0.9% lock flush 3 milliLiter(s) IV Push every 8 hours  sodium chloride 0.9%. 1000 milliLiter(s) (50 mL/Hr) IV Continuous <Continuous>      Exam-  General: NAD, WDWN, appropriate affect  Cor: s1s2, RRR, II/VI systolic murmur   Tele: Apaced, SR 60's PVC's   Pulm: Clear, no wheezes, rales, or rhonchi, no use of accessory muscles  Gastrointestinal: soft, nontender, nondistended, +bowel sounds  Extremities: no edema, 1+ DP pulses b/l  Neuro: A&Ox3, nonfocal                          9.7    4.41  )-----------( 135      ( 02 Apr 2021 03:04 )             30.7   04-02    139  |  105  |  33<H>  ----------------------------<  120<H>  4.1   |  24  |  1.62<H>    Ca    9.2      02 Apr 2021 03:04    TPro  6.2  /  Alb  4.0  /  TBili  0.7  /  DBili  x   /  AST  18  /  ALT  13  /  AlkPhos  79  04-01  PT/INR - ( 31 Mar 2021 22:45 )   PT: 13.3 sec;   INR: 1.11 ratio         PTT - ( 02 Apr 2021 03:04 )  PTT:187.0 sec  I&O's Summary    01 Apr 2021 07:01  -  02 Apr 2021 07:00  --------------------------------------------------------  IN: 676 mL / OUT: 900 mL / NET: -224 mL              Assessment/Plan:  Ms Barreto is a 89 y/o Mandarin Speaking female, admitted with symptomatic severe Aortic Stenosis  - creatinine elevated today, renal recommending gentle IV hydration and agree with cardiac CTA today  - will monitor creatinine over the weekend and then plan for LHC on Monday is possible  - discussed diagnostic testing and procedure with Ms Barreto, along with possible complications  -- all questions answered  - after diagnostic testing is complete (carotids, PFT's done, CTA & LHC pending) she will be discharged home to return for TAVR as appropriate  - also discussed plan with her son    Mg Elizabethtristan, PA  [phone]

## 2021-04-02 NOTE — CONSULT NOTE ADULT - SUBJECTIVE AND OBJECTIVE BOX
NEPHROLOGY - NSN    Patient seen and examined.    HPI:  89F with PMHx of  asthma, HTN, HLD, TIA, severe aortic stenosis, CAD s/p stents LAD and Diag, 2009 s/p 2 stent LAD in 2018, symptomatic Pafib on Eliquis, bradycardia s/p PPM in 2019 presents to the ER with constant mild dull chest/epigastric pain radiating to up to left neck, and SOB/MOULTON x 4 days. Denies any other symptoms including fever, cough, N/V/D, LE edema/pain.  (31 Mar 2021 23:26)    3/31 VSS; O2 sat 96% on RA, CXR: small R and Trace L pleural Effusion. Non-edematous. EKG normal, Trops 21.   4/1 HD stable.  Pt had eliquis this am--hold for now and start hep gtt for cardiac cath.  CTA delayed by department       PAST MEDICAL & SURGICAL HISTORY:  HTN (hypertension)    CAD (coronary artery disease)  coronary stents x 2,     HLD (hyperlipidemia)    Asthma    PAF (paroxysmal atrial fibrillation)    Scoliosis    History of cholecystectomy    H/O tubal ligation    History of bilateral knee replacement    History of cataract surgery  bilateral    S/P cholecystectomy        MEDICATIONS  (STANDING):  aMIOdarone    Tablet 200 milliGRAM(s) Oral daily  amLODIPine   Tablet 5 milliGRAM(s) Oral daily  aspirin enteric coated 81 milliGRAM(s) Oral daily  atorvastatin 20 milliGRAM(s) Oral at bedtime  ferrous    sulfate 325 milliGRAM(s) Oral daily  heparin  Infusion 1000 Unit(s)/Hr (6 mL/Hr) IV Continuous <Continuous>  metoprolol succinate ER 50 milliGRAM(s) Oral daily  sodium chloride 0.9% lock flush 3 milliLiter(s) IV Push every 8 hours      Allergies    No Known Allergies    Intolerances        SOCIAL HISTORY:  Denies alcohol abuse, drug abuse or tobacco usage.     FAMILY HISTORY:  No pertinent family history in first degree relatives        VITALS:  T(C): 36.7 (21 @ 05:25), Max: 36.7 (21 @ 05:25)  HR: 61 (21 @ 05:25) (61 - 64)  BP: 158/82 (21 @ 05:25) (125/69 - 170/90)  RR: 18 (21 @ 05:25) (18 - 18)  SpO2: 95% (21 @ 05:25) (95% - 97%)    REVIEW OF SYSTEMS:  Denies any nausea, vomiting, diarrhea, fever or chills.  Good oral intake and denies fatigue or weakness. All other pertinent systems are reviewed and are negative.    PHYSICAL EXAM:  Constitutional: NAD  HEENT: EOMI  Neck:  No JVD, supple   Respiratory: CTA B/L  Cardiovascular: S1 and S2, RRR  Gastrointestinal: + BS, soft, NT, ND  Extremities: No peripheral edema, + peripheral pulses  Neurological: A/O x 3, CN2-12 intact  Psychiatric: Normal mood, normal affect  : No Guzman  Skin: No rashes, C/D/I  Access: Not applicable    I and O's:     @ 07:01  -   @ 07:00  --------------------------------------------------------  IN: 180 mL / OUT: 500 mL / NET: -320 mL     @ 07:01  -   @ 07:00  --------------------------------------------------------  IN: 676 mL / OUT: 900 mL / NET: -224 mL          LABS:                        9.7    4.41  )-----------( 135      ( 2021 03:04 )             30.7     04    139  |  105  |  33<H>  ----------------------------<  120<H>  4.1   |  24  |  1.62<H>    Ca    9.2      2021 03:04    TPro  6.2  /  Alb  4.0  /  TBili  0.7  /  DBili  x   /  AST  18  /  ALT  13  /  AlkPhos  79  04-      URINE:  Urinalysis Basic - ( 2021 06:45 )    Color: Light Yellow / Appearance: Clear / S.012 / pH: x  Gluc: x / Ketone: Negative  / Bili: Negative / Urobili: Negative   Blood: x / Protein: Trace / Nitrite: Negative   Leuk Esterase: Negative / RBC: x / WBC x   Sq Epi: x / Non Sq Epi: x / Bacteria: x        RADIOLOGY & ADDITIONAL STUDIES:     NEPHROLOGY - NSN    Patient seen and examined.    HPI:  89F with PMHx of  asthma, HTN, HLD, TIA, severe aortic stenosis, CAD s/p stents LAD and Diag, 2009 s/p 2 stent LAD in 2018, symptomatic Pafib on Eliquis, bradycardia s/p PPM in 2019 presents to the ER with constant mild dull chest/epigastric pain radiating to up to left neck, and SOB/MOULTON x 4 days. Denies any other symptoms including fever, cough, N/V/D, LE edema/pain.  (31 Mar 2021 23:26)    3/31 VSS; O2 sat 96% on RA, CXR: small R and Trace L pleural Effusion. Non-edematous. EKG normal, Trops 21.   4/1 HD stable.  Pt had eliquis this am--hold for now and start hep gtt for cardiac cath.  CTA delayed by department   She was lying flat and offered no complaints.  Pt states that she has no issues related to her kidneys   There is no hematuria or bubbles in the urine.  No history of NSAIDS or nephrolithisis.  The patient urinates once or twice in the night and there is no incontinence.  No family hx or renal disease or back pain.    No recent abx use.  No alleviating or aggravating factors with respect to the kidneys.   She is not a diabetic   HTN present for years     PAST MEDICAL & SURGICAL HISTORY:  HTN (hypertension)    CAD (coronary artery disease)  coronary stents x 2,     HLD (hyperlipidemia)    Asthma    PAF (paroxysmal atrial fibrillation)    Scoliosis    History of cholecystectomy    H/O tubal ligation    History of bilateral knee replacement    History of cataract surgery  bilateral    S/P cholecystectomy        MEDICATIONS  (STANDING):  aMIOdarone    Tablet 200 milliGRAM(s) Oral daily  amLODIPine   Tablet 5 milliGRAM(s) Oral daily  aspirin enteric coated 81 milliGRAM(s) Oral daily  atorvastatin 20 milliGRAM(s) Oral at bedtime  ferrous    sulfate 325 milliGRAM(s) Oral daily  heparin  Infusion 1000 Unit(s)/Hr (6 mL/Hr) IV Continuous <Continuous>  metoprolol succinate ER 50 milliGRAM(s) Oral daily  sodium chloride 0.9% lock flush 3 milliLiter(s) IV Push every 8 hours      Allergies    No Known Allergies    Intolerances        SOCIAL HISTORY:  Denies alcohol abuse, drug abuse or tobacco usage.     FAMILY HISTORY:  No pertinent family history in first degree relatives        VITALS:  T(C): 36.7 (21 @ 05:25), Max: 36.7 (21 @ 05:25)  HR: 61 (21 @ 05:25) (61 - 64)  BP: 158/82 (21 @ 05:25) (125/69 - 170/90)  RR: 18 (21 @ 05:25) (18 - 18)  SpO2: 95% (21 @ 05:25) (95% - 97%)    REVIEW OF SYSTEMS:  Denies any nausea, vomiting, diarrhea, fever or chills.  Good oral intake and denies fatigue or weakness. All other pertinent systems are reviewed and are negative.    PHYSICAL EXAM:  Constitutional: NAD  HEENT: EOMI  Neck:  No JVD, supple   Respiratory: CTA B/L  Cardiovascular: S1 and S2, RRR 3/6sem  Gastrointestinal: + BS, soft, NT, ND  Extremities: No peripheral edema, + peripheral pulses  Neurological: A/O x 3, CN2-12 intact  Psychiatric: Normal mood, normal affect  : No Guzman  Skin: No rashes, C/D/I  Access: Not applicable    I and O's:     @ 07:  -   @ 07:00  --------------------------------------------------------  IN: 180 mL / OUT: 500 mL / NET: -320 mL     @ 07:  -   @ 07:00  --------------------------------------------------------  IN: 676 mL / OUT: 900 mL / NET: -224 mL          LABS:                        9.7    4.41  )-----------( 135      ( 2021 03:04 )             30.7         139  |  105  |  33<H>  ----------------------------<  120<H>  4.1   |  24  |  1.62<H>    Ca    9.2      2021 03:04    TPro  6.2  /  Alb  4.0  /  TBili  0.7  /  DBili  x   /  AST  18  /  ALT  13  /  AlkPhos  79        URINE:  Urinalysis Basic - ( 2021 06:45 )    Color: Light Yellow / Appearance: Clear / S.012 / pH: x  Gluc: x / Ketone: Negative  / Bili: Negative / Urobili: Negative   Blood: x / Protein: Trace / Nitrite: Negative   Leuk Esterase: Negative / RBC: x / WBC x   Sq Epi: x / Non Sq Epi: x / Bacteria: x        RADIOLOGY & ADDITIONAL STUDIES:    < from: [facility] Duplex Carotid, [place] (21 @ 09:25) >    EXAM:  CAROTID DUPLEX BILATERAL                            PROCEDURE DATE:  2021            INTERPRETATION:  CLINICAL INFORMATION: Hypertension, aortic stenosis. Preop valve repair.    COMPARISON: None available.    TECHNIQUE: Grayscale, color and spectral Doppler examination of both carotid arteries was performed.    FINDINGS:    Multifocal calcific plaque is present in both common carotid arteries and in the right carotid bulb. No disturbed color-flow or elevated blood flow velocities are detected.    Peak systolic velocities are as follows:    RIGHT:  PROX CCA = 70 cm/s  DIST CCA = 44 cm/s  PROX ICA = 47 cm/s  DIST ICA = 75 cm/s  ECA = 52 cm/s    LEFT:  PROX CCA = 66 cm/s  DIST CCA = 54 cm/s  PROX ICA = 29 cm/s  DIST ICA = 61 cm/s  ECA = 56 cm/s    Antegrade flow is noted within both vertebral arteries.    IMPRESSION: Calcified plaque without duplex evidence of hemodynamically significant stenosis of either carotid artery.    Measurement of carotid stenosis is based on velocityparameters that correlate the residual internal carotid diameter with that of the more distal vessel in accordance with a method such as the [place] [employer] Symptomatic Carotid Endarterectomy Trial (NASCET).                    VALDEMAR DIXON MD; Attending Radiologist  This document has been electronically signed. 2021  9:33AM    < end of copied text >

## 2021-04-03 NOTE — PROGRESS NOTE ADULT - ASSESSMENT
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 Patient to be admitted to CTS under Dr. Lai for pre TAVR workup.       3/31 VSS; O2 sat 96% on RA, CXR: small R and Trace L pleural Effusion. Non-edematous. EKG normal, Trops 21.   4/1 HD stable.  Pt had eliquis this am--hold for now and start hep gtt for cardiac cath.  CTA delayed by department hopefully will get done tomorrow  4/2 VSS   PTT elevated heparin gtt held . repeat PTT. creatinine elevated 1.62 Dr Daigle consulted - nephrology  OK for CTA- gentle hydration  post CTA.   4/3 Pending CTA this Monday. Compression dressing to right forearm

## 2021-04-03 NOTE — PROGRESS NOTE ADULT - SUBJECTIVE AND OBJECTIVE BOX
VITAL SIGNS    Telemetry:  SR 60  Vital Signs Last 24 Hrs  T(C): 36.6 (21 @ 05:00), Max: 36.6 (21 @ 20:36)  T(F): 97.9 (21 @ 05:00), Max: 97.9 (21 @ 20:36)  HR: 61 (21 @ 05:00) (60 - 63)  BP: 100/47 (21 @ 05:00) (100/47 - 177/94)  RR: 20 (21 @ 05:00) (18 - 20)  SpO2: 99% (21 @ 05:00) (95% - 99%)             @ 07:01  -   @ 07:00  --------------------------------------------------------  IN: 1295 mL / OUT: 1675 mL / NET: -380 mL       Daily     Daily Weight in k.9 (2021 08:14)  Admit Wt: Drug Dosing Weight  Height (cm): 157.5 (2021 05:18)  Weight (kg): 49.8 (2021 08:00)  BMI (kg/m2): 20.1 (2021 08:00)  BSA (m2): 1.48 (2021 08:00)    Bilirubin Total, Serum: 0.5 mg/dL ( @ 07:18)    CAPILLARY BLOOD GLUCOSE              MEDICATIONS  aMIOdarone    Tablet 200 milliGRAM(s) Oral daily  amLODIPine   Tablet 10 milliGRAM(s) Oral daily  aspirin enteric coated 81 milliGRAM(s) Oral daily  atorvastatin 20 milliGRAM(s) Oral at bedtime  ferrous    sulfate 325 milliGRAM(s) Oral daily  heparin  Infusion 1000 Unit(s)/Hr IV Continuous <Continuous>  metoprolol succinate ER 50 milliGRAM(s) Oral daily  senna 2 Tablet(s) Oral at bedtime  sodium chloride 0.9% lock flush 3 milliLiter(s) IV Push every 8 hours  sodium chloride 0.9%. 1000 milliLiter(s) IV Continuous <Continuous>      >>> <<<  PHYSICAL EXAM  Subjective: NAD  Neurology: alert and oriented x 3, nonfocal, no gross deficits  CV :s1s2  Lungs: CTA b/l  Abdomen: soft, NT,ND, (+ )BM  :  voiding  Extremities: -c/c/e  rue hematoma to prior IV site + radial/ ulnar pulses FROM to digits    LABS      137  |  101  |  30<H>  ----------------------------<  105<H>  4.2   |  24  |  1.38<H>    Ca    9.5      2021 07:18    TPro  5.9<L>  /  Alb  3.6  /  TBili  0.5  /  DBili  x   /  AST  14  /  ALT  11  /  AlkPhos  75                                   9.0    4.80  )-----------( 131      ( 2021 07:18 )             28.1          PT/INR - ( 2021 14:22 )   PT: 13.8 sec;   INR: 1.16 ratio         PTT - ( 2021 07:18 )  PTT:77.5 sec       PAST MEDICAL & SURGICAL HISTORY:  HTN (hypertension)    CAD (coronary artery disease)  coronary stents x 2007    HLD (hyperlipidemia)    Asthma    PAF (paroxysmal atrial fibrillation)    Scoliosis    History of cholecystectomy    H/O tubal ligation    History of bilateral knee replacement    History of cataract surgery  bilateral    S/P cholecystectomy

## 2021-04-03 NOTE — PROGRESS NOTE ADULT - ASSESSMENT
No pain, no shortness of breath    Review of systems: All 10 points ROS was obtained except as above.     aMIOdarone    Tablet 200 milliGRAM(s) Oral daily  amLODIPine   Tablet 10 milliGRAM(s) Oral daily  aspirin enteric coated 81 milliGRAM(s) Oral daily  atorvastatin 20 milliGRAM(s) Oral at bedtime  ferrous    sulfate 325 milliGRAM(s) Oral daily  heparin  Infusion 1000 Unit(s)/Hr IV Continuous <Continuous>  metoprolol succinate ER 50 milliGRAM(s) Oral daily  senna 2 Tablet(s) Oral at bedtime  sodium chloride 0.9% lock flush 3 milliLiter(s) IV Push every 8 hours  sodium chloride 0.9%. 1000 milliLiter(s) IV Continuous <Continuous>      VITAL:  T(C): , Max: 36.6 (04-02-21 @ 20:36)  T(F): , Max: 97.9 (04-02-21 @ 20:36)  HR: 61 (04-03-21 @ 05:00)  BP: 100/47 (04-03-21 @ 05:00)  BP(mean): 95 (04-02-21 @ 20:36)  RR: 20 (04-03-21 @ 05:00)  SpO2: 99% (04-03-21 @ 05:00)  Wt(kg): --    04-02-21 @ 07:01  -  04-03-21 @ 07:00  --------------------------------------------------------  IN: 1295 mL / OUT: 1675 mL / NET: -380 mL        PHYSICAL EXAM:  Constitutional: NAD  HEENT: EOMI  Neck:  No JVD, supple   Respiratory: CTA B/L  Cardiovascular: S1 and S2, RRR 3/6sem  Gastrointestinal: + BS, soft, NT, ND  Extremities: No peripheral edema, + peripheral pulses  Neurological: A/O x 3, CN2-12 intact  Psychiatric: Normal mood, normal affect  : No Guzman  Skin: No rashes, C/D/I  Access: Not applicable      LABS:                          9.0    4.80  )-----------( 131      ( 03 Apr 2021 07:18 )             28.1     Na(137)/K(4.2)/Cl(101)/HCO3(24)/BUN(30)/Cr(1.38)Glu(105)/Ca(9.5)/Mg(--)/PO4(--)    04-03 @ 07:18  Na(139)/K(4.1)/Cl(105)/HCO3(24)/BUN(33)/Cr(1.62)Glu(120)/Ca(9.2)/Mg(--)/PO4(--)    04-02 @ 03:04  Na(139)/K(4.1)/Cl(106)/HCO3(23)/BUN(28)/Cr(1.27)Glu(108)/Ca(9.6)/Mg(--)/PO4(--)    04-01 @ 06:46  Na(133)/K(4.2)/Cl(100)/HCO3(22)/BUN(34)/Cr(1.44)Glu(116)/Ca(9.9)/Mg(--)/PO4(--)    03-31 @ 23:05            ASSESSMENT/PLAN    89F with PMHx of  asthma, HTN, HLD, TIA, severe aortic stenosis, CAD s/p stents LAD and Diag, 2009 s/p 2 stent LAD in 9/2018, symptomatic Pafib on Eliquis pw SOB MOULTON and chest pain   Admitted for TAVR work up     1 Renal- Azotemia improving  with hydration ( Scr at 1.38  relative to yesterday of 1.62)  NO RENAL OBJECTION TO CT SCAN.  She will be optimized for the procedure with IVF  Trend the serum creatinine    2 Lytes- controlled    3 CVS- Cath tentatively for  next week .  BP soft at present    4 Anemia- hgb trending down but acceptable for now    3 Misc-Heparin gtt       Dominick Ross, NP-BC  [facility]  [phone]   Seen and examined at bedside.  Attests to constipation x 3days    aMIOdarone    Tablet 200 milliGRAM(s) Oral daily  amLODIPine   Tablet 10 milliGRAM(s) Oral daily  aspirin enteric coated 81 milliGRAM(s) Oral daily  atorvastatin 20 milliGRAM(s) Oral at bedtime  ferrous    sulfate 325 milliGRAM(s) Oral daily  heparin  Infusion 1000 Unit(s)/Hr IV Continuous <Continuous>  metoprolol succinate ER 50 milliGRAM(s) Oral daily  senna 2 Tablet(s) Oral at bedtime  sodium chloride 0.9% lock flush 3 milliLiter(s) IV Push every 8 hours  sodium chloride 0.9%. 1000 milliLiter(s) IV Continuous <Continuous>      VITAL:  T(C): , Max: 36.6 (04-02-21 @ 20:36)  T(F): , Max: 97.9 (04-02-21 @ 20:36)  HR: 61 (04-03-21 @ 05:00)  BP: 100/47 (04-03-21 @ 05:00)  BP(mean): 95 (04-02-21 @ 20:36)  RR: 20 (04-03-21 @ 05:00)  SpO2: 99% (04-03-21 @ 05:00)  Wt(kg): --    04-02-21 @ 07:01  -  04-03-21 @ 07:00  --------------------------------------------------------  IN: 1295 mL / OUT: 1675 mL / NET: -380 mL        PHYSICAL EXAM:  Constitutional: NAD  HEENT: EOMI  Neck:  No JVD, supple   Respiratory: CTA B/L  Cardiovascular: S1 and S2, RRR 3/6sem  Gastrointestinal: + BS, soft, NT, ND  Extremities: No peripheral edema, + peripheral pulses  Neurological: A/O x 3, CN2-12 intact  Psychiatric: Normal mood, normal affect  : No Guzman  Skin: No rashes, C/D/I  Access: Not applicable      LABS:                          9.0    4.80  )-----------( 131      ( 03 Apr 2021 07:18 )             28.1     Na(137)/K(4.2)/Cl(101)/HCO3(24)/BUN(30)/Cr(1.38)Glu(105)/Ca(9.5)/Mg(--)/PO4(--)    04-03 @ 07:18  Na(139)/K(4.1)/Cl(105)/HCO3(24)/BUN(33)/Cr(1.62)Glu(120)/Ca(9.2)/Mg(--)/PO4(--)    04-02 @ 03:04  Na(139)/K(4.1)/Cl(106)/HCO3(23)/BUN(28)/Cr(1.27)Glu(108)/Ca(9.6)/Mg(--)/PO4(--)    04-01 @ 06:46  Na(133)/K(4.2)/Cl(100)/HCO3(22)/BUN(34)/Cr(1.44)Glu(116)/Ca(9.9)/Mg(--)/PO4(--)    03-31 @ 23:05            ASSESSMENT/PLAN    89F with PMHx of  asthma, HTN, HLD, TIA, severe aortic stenosis, CAD s/p stents LAD and Diag, 2009 s/p 2 stent LAD in 9/2018, symptomatic Pafib on Eliquis pw SOB MOULTON and chest pain   Admitted for TAVR work up     1 Renal- Azotemia improving  with hydration ( Scr at 1.38  relative to yesterday of 1.62)  NO RENAL OBJECTION TO CT SCAN.  She will be optimized for the procedure with IVF  Trend the serum creatinine    2 Lytes- controlled    3 CVS- Cath tentatively for  next week .  BP soft at present    4 Anemia- hgb trending down but acceptable for now    5 GI- constipation x 3 days    6 Misc-Heparin gtt       Dominick Ross, NP-BC  [facility]  [phone]

## 2021-04-04 NOTE — PROGRESS NOTE ADULT - ASSESSMENT
No pain, no shortness of breath    Review of systems: All 10 points ROS was obtained except as above.     aMIOdarone    Tablet 200 milliGRAM(s) Oral daily  amLODIPine   Tablet 10 milliGRAM(s) Oral daily  aspirin enteric coated 81 milliGRAM(s) Oral daily  atorvastatin 20 milliGRAM(s) Oral at bedtime  ferrous    sulfate 325 milliGRAM(s) Oral daily  heparin  Infusion 1000 Unit(s)/Hr IV Continuous <Continuous>  metoprolol succinate ER 50 milliGRAM(s) Oral daily  senna 2 Tablet(s) Oral at bedtime  sodium chloride 0.9% lock flush 3 milliLiter(s) IV Push every 8 hours  sodium chloride 0.9%. 1000 milliLiter(s) IV Continuous <Continuous>      VITAL:  T(C): , Max: 36.6 (04-03-21 @ 19:42)  T(F): , Max: 97.9 (04-04-21 @ 05:22)  HR: 65 (04-04-21 @ 05:22)  BP: 125/52 (04-04-21 @ 05:22)  BP(mean): --  RR: 22 (04-04-21 @ 05:22)  SpO2: 93% (04-04-21 @ 05:22)  Wt(kg): --    04-03-21 @ 07:01  -  04-04-21 @ 07:00  --------------------------------------------------------  IN: 994 mL / OUT: 1850 mL / NET: -856 mL    04-04-21 @ 07:01  -  04-04-21 @ 12:28  --------------------------------------------------------  IN: 264 mL / OUT: 200 mL / NET: 64 mL        PHYSICAL EXAM:  Constitutional: NAD  HEENT: EOMI  Neck:  No JVD, supple   Respiratory: CTA B/L  Cardiovascular: S1 and S2, RRR 3/6sem  Gastrointestinal: + BS, soft, NT, ND  Extremities: No peripheral edema, + peripheral pulses  Neurological: A/O x 3, CN2-12 intact  Psychiatric: Normal mood, normal affect  : No Guzman  Skin: No rashes, C/D/I  Access: Not applicable    LABS:                          8.7    4.66  )-----------( 132      ( 04 Apr 2021 06:34 )             27.8     Na(136)/K(4.2)/Cl(103)/HCO3(21)/BUN(34)/Cr(1.42)Glu(111)/Ca(9.0)/Mg(--)/PO4(--)    04-04 @ 06:34  Na(137)/K(4.2)/Cl(101)/HCO3(24)/BUN(30)/Cr(1.38)Glu(105)/Ca(9.5)/Mg(--)/PO4(--)    04-03 @ 07:18  Na(139)/K(4.1)/Cl(105)/HCO3(24)/BUN(33)/Cr(1.62)Glu(120)/Ca(9.2)/Mg(--)/PO4(--)    04-02 @ 03:04            ASSESSMENT/PLAN  89F with PMHx of  asthma, HTN, HLD, TIA, severe aortic stenosis, CAD s/p stents LAD and Diag, 2009 s/p 2 stent LAD in 9/2018, symptomatic Pafib on Eliquis pw SOB MOULTON and chest pain   Admitted for TAVR work up     1 Renal- Azotemia up trending slightly of IVF ( Scr at 1.42  relative to yesterday of 1.38)  NO RENAL OBJECTION TO CT SCAN.  She will be optimized for the procedure with IVF  Trend the serum creatinine    2 Lytes- controlled    3 CVS- Cath tentatively for  next week .  BP acceptable  present    4 Anemia- hgb trending down but acceptable for now    5 GI- constipation x 3 days    6 Misc-Heparin gtt         Dominick Ross, NP-BC  [facility]  [phone]   Denies n/v/d, pain, constpation  on nasal cannula    aMIOdarone    Tablet 200 milliGRAM(s) Oral daily  amLODIPine   Tablet 10 milliGRAM(s) Oral daily  aspirin enteric coated 81 milliGRAM(s) Oral daily  atorvastatin 20 milliGRAM(s) Oral at bedtime  ferrous    sulfate 325 milliGRAM(s) Oral daily  heparin  Infusion 1000 Unit(s)/Hr IV Continuous <Continuous>  metoprolol succinate ER 50 milliGRAM(s) Oral daily  senna 2 Tablet(s) Oral at bedtime  sodium chloride 0.9% lock flush 3 milliLiter(s) IV Push every 8 hours  sodium chloride 0.9%. 1000 milliLiter(s) IV Continuous <Continuous>      VITAL:  T(C): , Max: 36.6 (04-03-21 @ 19:42)  T(F): , Max: 97.9 (04-04-21 @ 05:22)  HR: 65 (04-04-21 @ 05:22)  BP: 125/52 (04-04-21 @ 05:22)  BP(mean): --  RR: 22 (04-04-21 @ 05:22)  SpO2: 93% (04-04-21 @ 05:22)  Wt(kg): --    04-03-21 @ 07:01  -  04-04-21 @ 07:00  --------------------------------------------------------  IN: 994 mL / OUT: 1850 mL / NET: -856 mL    04-04-21 @ 07:01  -  04-04-21 @ 12:28  --------------------------------------------------------  IN: 264 mL / OUT: 200 mL / NET: 64 mL        PHYSICAL EXAM:  Constitutional: NAD  HEENT: EOMI  Neck:  No JVD, supple   Respiratory: decreased  breat sounds b/l  Cardiovascular: S1 and S2, RRR 3/6sem  Gastrointestinal: + BS, soft, NT, ND  Extremities: No peripheral edema, + peripheral pulses  Neurological: A/O x 3, CN2-12 intact  Psychiatric: Normal mood, normal affect  : No Guzman  Skin: No rashes, C/D/I  Access: Not applicable    LABS:                          8.7    4.66  )-----------( 132      ( 04 Apr 2021 06:34 )             27.8     Na(136)/K(4.2)/Cl(103)/HCO3(21)/BUN(34)/Cr(1.42)Glu(111)/Ca(9.0)/Mg(--)/PO4(--)    04-04 @ 06:34  Na(137)/K(4.2)/Cl(101)/HCO3(24)/BUN(30)/Cr(1.38)Glu(105)/Ca(9.5)/Mg(--)/PO4(--)    04-03 @ 07:18  Na(139)/K(4.1)/Cl(105)/HCO3(24)/BUN(33)/Cr(1.62)Glu(120)/Ca(9.2)/Mg(--)/PO4(--)    04-02 @ 03:04            ASSESSMENT/PLAN  89F with PMHx of  asthma, HTN, HLD, TIA, severe aortic stenosis, CAD s/p stents LAD and Diag, 2009 s/p 2 stent LAD in 9/2018, symptomatic Pafib on Eliquis pw SOB MOULTON and chest pain   Admitted for TAVR work up     1 Renal- Azotemia up trending slightly of IVF ( Scr at 1.42  relative to yesterday of 1.38)  NO RENAL OBJECTION TO CT SCAN.  She will be optimized for the procedure with IVF  Trend the serum creatinine    2 Lytes- controlled    3 CVS- Cath tentatively for  next week .  BP acceptable  present    4 Anemia- hgb trending down but acceptable for now    5 GI- BM last night s/p Tap enema yesterday    6 Misc-Heparin gtt         Dominick Ross, NP-BC  [facility]  [phone]

## 2021-04-04 NOTE — PROGRESS NOTE ADULT - ASSESSMENT
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 Patient to be admitted to CTS under Dr. Lai for pre TAVR workup.       3/31 VSS; O2 sat 96% on RA, CXR: small R and Trace L pleural Effusion. Non-edematous. EKG normal, Trops 21.   4/1 HD stable.  Pt had eliquis this am--hold for now and start hep gtt for cardiac cath.  CTA delayed by department hopefully will get done tomorrow  4/2 VSS   PTT elevated heparin gtt held . repeat PTT. creatinine elevated 1.62 Dr Daigle consulted - nephrology  OK for CTA- gentle hydration  post CTA.   4/3 Pending CTA this Monday. Compression dressing to right forearm  4/4 H/H 8/27. Will continue to trend. T&C sent . Anticipate CTA this Monday and coronary cath this week.

## 2021-04-04 NOTE — PROGRESS NOTE ADULT - SUBJECTIVE AND OBJECTIVE BOX
VITAL SIGNS    Telemetry:  AP 60's  Vital Signs Last 24 Hrs  T(C): 36.6 (21 @ 05:22), Max: 36.6 (21 @ 19:42)  T(F): 97.9 (21 @ 05:22), Max: 97.9 (21 @ 05:22)  HR: 65 (21 @ 05:22) (64 - 65)  BP: 125/52 (21 @ 05:22) (116/67 - 138/69)  RR: 22 (21 @ 05:22) (18 - 22)  SpO2: 93% (21 @ 05:22) (93% - 96%)             @ 07:01  -   @ 07:00  --------------------------------------------------------  IN: 994 mL / OUT: 1850 mL / NET: -856 mL     @ 07:01  -   @ 09:43  --------------------------------------------------------  IN: 240 mL / OUT: 200 mL / NET: 40 mL       Daily     Daily Weight in k (2021 08:04)  Admit Wt: Drug Dosing Weight  Height (cm): 157.5 (2021 05:18)  Weight (kg): 49.8 (2021 08:00)  BMI (kg/m2): 20.1 (2021 08:00)  BSA (m2): 1.48 (2021 08:00)      CAPILLARY BLOOD GLUCOSE              MEDICATIONS  aMIOdarone    Tablet 200 milliGRAM(s) Oral daily  amLODIPine   Tablet 10 milliGRAM(s) Oral daily  aspirin enteric coated 81 milliGRAM(s) Oral daily  atorvastatin 20 milliGRAM(s) Oral at bedtime  ferrous    sulfate 325 milliGRAM(s) Oral daily  heparin  Infusion 1000 Unit(s)/Hr IV Continuous <Continuous>  metoprolol succinate ER 50 milliGRAM(s) Oral daily  senna 2 Tablet(s) Oral at bedtime  sodium chloride 0.9% lock flush 3 milliLiter(s) IV Push every 8 hours  sodium chloride 0.9%. 1000 milliLiter(s) IV Continuous <Continuous>      >>> <<<  PHYSICAL EXAM  Subjective: NAD  Neurology: alert and oriented x 3, nonfocal, no gross deficits  CV : s1s2  Lungs: CTA b/l  Abdomen: soft, NT,ND, ( +)BM  :  voiding  Extremities: -c/c/e right forearm ecchymotic  hematoma from prior venipuncture site     LABS      136  |  103  |  34<H>  ----------------------------<  111<H>  4.2   |  21<L>  |  1.42<H>    Ca    9.0      2021 06:34    TPro  5.9<L>  /  Alb  3.6  /  TBili  0.5  /  DBili  x   /  AST  14  /  ALT  11  /  AlkPhos  75  -                                 8.7    4.66  )-----------( 132      ( 2021 06:34 )             27.8          PT/INR - ( 2021 14:22 )   PT: 13.8 sec;   INR: 1.16 ratio         PTT - ( 2021 06:34 )  PTT:70.0 sec       PAST MEDICAL & SURGICAL HISTORY:  HTN (hypertension)    CAD (coronary artery disease)  coronary stents x 2007    HLD (hyperlipidemia)    Asthma    PAF (paroxysmal atrial fibrillation)    Scoliosis    History of cholecystectomy    H/O tubal ligation    History of bilateral knee replacement    History of cataract surgery  bilateral    S/P cholecystectomy

## 2021-04-05 NOTE — PROGRESS NOTE ADULT - SUBJECTIVE AND OBJECTIVE BOX
VITAL SIGNS    Telemetry:   60  Vital Signs Last 24 Hrs  T(C): 36.6 (21 @ 11:17), Max: 36.9 (21 @ 20:59)  T(F): 97.9 (21 @ 11:17), Max: 98.5 (21 @ 20:59)  HR: 65 (21 @ 16:06) (61 - 66)  BP: 133/68 (21 @ 16:06) (131/73 - 156/84)  RR: 18 (21 @ 16:06) (18 - 18)  SpO2: 95% (21 @ 14:22) (95% - 100%)             @ 07:01  -   @ 07:00  --------------------------------------------------------  IN: 1500 mL / OUT: 1650 mL / NET: -150 mL     @ 07:01  -   @ 16:16  --------------------------------------------------------  IN: 0 mL / OUT: 900 mL / NET: -900 mL       Daily Height in cm: 157.48 (2021 11:17)    Daily Weight in k.2 (2021 08:24)  Admit Wt: Drug Dosing Weight  Height (cm): 157.5 (2021 11:17)  Weight (kg): 49 (2021 11:17)  BMI (kg/m2): 19.8 (2021 11:17)  BSA (m2): 1.47 (2021 11:17)    Bilirubin Total, Serum: 0.4 mg/dL ( @ 06:43)    CAPILLARY BLOOD GLUCOSE              MEDICATIONS  aMIOdarone    Tablet 200 milliGRAM(s) Oral daily  amLODIPine   Tablet 10 milliGRAM(s) Oral daily  aspirin enteric coated 81 milliGRAM(s) Oral daily  atorvastatin 20 milliGRAM(s) Oral at bedtime  ferrous    sulfate 325 milliGRAM(s) Oral daily  heparin  Infusion 1000 Unit(s)/Hr IV Continuous <Continuous>  metoprolol succinate ER 50 milliGRAM(s) Oral daily  senna 2 Tablet(s) Oral at bedtime  sodium chloride 0.9% lock flush 3 milliLiter(s) IV Push every 8 hours  sodium chloride 0.9%. 1000 milliLiter(s) IV Continuous <Continuous>      >>> <<<  PHYSICAL EXAM  Subjective: NAD  Neurology: alert and oriented x 3, nonfocal, no gross deficits  CV :s1s2  Lungs: CTA b/l  Abdomen: soft, NT,ND, (+ )BM  :  voiding  Extremities: -c/c/e right forearm ecchymotic hematoma  non circumferential   + radial ulnar pulses, FROM to digits    LABS      135  |  101  |  29<H>  ----------------------------<  115<H>  4.2   |  24  |  1.15    Ca    9.7      2021 06:43    TPro  6.1  /  Alb  3.6  /  TBili  0.4  /  DBili  x   /  AST  23  /  ALT  17  /  AlkPhos  80  -                                 8.5    5.96  )-----------( 139      ( 2021 06:43 )             26.7          PTT - ( 2021 06:43 )  PTT:67.3 sec       PAST MEDICAL & SURGICAL HISTORY:  HTN (hypertension)    CAD (coronary artery disease)  coronary stents x 2007    HLD (hyperlipidemia)    Asthma    PAF (paroxysmal atrial fibrillation)    Scoliosis    History of cholecystectomy    H/O tubal ligation    History of bilateral knee replacement    History of cataract surgery  bilateral    S/P cholecystectomy

## 2021-04-05 NOTE — PROGRESS NOTE ADULT - ASSESSMENT
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 Patient to be admitted to CTS under Dr. Lai for pre TAVR workup.       3/31 VSS; O2 sat 96% on RA, CXR: small R and Trace L pleural Effusion. Non-edematous. EKG normal, Trops 21.   4/1 HD stable.  Pt had eliquis this am--hold for now and start hep gtt for cardiac cath.  CTA delayed by department hopefully will get done tomorrow  4/2 VSS   PTT elevated heparin gtt held . repeat PTT. creatinine elevated 1.62 Dr Daigle consulted - nephrology  OK for CTA- gentle hydration  post CTA.   4/3 Pending CTA this Monday. Compression dressing to right forearm  4/4 H/H 8/27. Will continue to trend. T&C sent . Anticipate CTA this Monday and coronary cath this week.  4/5 CTA performed. IVF per and post procedure. trend creat. Right forearm hematoma stable. 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 Patient to be admitted to CTS under Dr. Lai for pre TAVR workup.       3/31 VSS; O2 sat 96% on RA, CXR: small R and Trace L pleural Effusion. Non-edematous. EKG normal, Trops 21.   4/1 HD stable.  Pt had eliquis this am--hold for now and start hep gtt for cardiac cath.  CTA delayed by department hopefully will get done tomorrow  4/2 VSS   PTT elevated heparin gtt held . repeat PTT. creatinine elevated 1.62 Dr Daigle consulted - nephrology  OK for CTA- gentle hydration  post CTA.   4/3 Pending CTA this Monday. Compression dressing to right forearm  4/4 H/H 8/27. Will continue to trend. T&C sent . Anticipate CTA this Monday and coronary cath this week.  4/5 dx cath right femoral groin access . IVF per and post procedure. trend creat. Right forearm hematoma stable. Reinstate heparin gtt  for afib at 1800

## 2021-04-05 NOTE — PROGRESS NOTE ADULT - ASSESSMENT
89F with PMHx of  asthma, HTN, HLD, TIA, severe aortic stenosis, CAD s/p stents LAD and Diag, 2009 s/p 2 stent LAD in 9/2018, symptomatic Pafib on Eliquis pw SOB MOULTON and chest pain   Admitted for TAVR work up     1 Renal- NO RENAL OBJECTION TO CT SCAN.  She will be optimized for the procedure with IVF.  DC IVF later today   Trend the serum creatinine    2 CVS- Cath tentatively for this  week .  BP acceptable  present    3 Anemia- hgb trending down but acceptable for now    4  Misc-Heparin gtt         [facility]  [phone]

## 2021-04-05 NOTE — PROGRESS NOTE ADULT - SUBJECTIVE AND OBJECTIVE BOX
[facility] [phone]        Patient seen and examined         MEDICATIONS  (STANDING):  aMIOdarone    Tablet 200 milliGRAM(s) Oral daily  amLODIPine   Tablet 10 milliGRAM(s) Oral daily  aspirin enteric coated 81 milliGRAM(s) Oral daily  atorvastatin 20 milliGRAM(s) Oral at bedtime  ferrous    sulfate 325 milliGRAM(s) Oral daily  heparin  Infusion 1000 Unit(s)/Hr (6 mL/Hr) IV Continuous <Continuous>  metoprolol succinate ER 50 milliGRAM(s) Oral daily  senna 2 Tablet(s) Oral at bedtime  sodium chloride 0.9% lock flush 3 milliLiter(s) IV Push every 8 hours  sodium chloride 0.9%. 1000 milliLiter(s) (50 mL/Hr) IV Continuous <Continuous>      VITAL:  T(C): , Max: 36.9 (04-04-21 @ 20:59)  T(F): , Max: 98.5 (04-04-21 @ 20:59)  HR: 64 (04-05-21 @ 14:22)  BP: 143/73 (04-05-21 @ 14:22)  BP(mean): 92 (04-05-21 @ 05:24)  RR: 18 (04-05-21 @ 14:22)  SpO2: 95% (04-05-21 @ 14:22)  Wt(kg): --    I and O's:    04-04 @ 07:01  -  04-05 @ 07:00  --------------------------------------------------------  IN: 1500 mL / OUT: 1650 mL / NET: -150 mL    04-05 @ 07:01  -  04-05 @ 14:42  --------------------------------------------------------  IN: 0 mL / OUT: 300 mL / NET: -300 mL      Height (cm): 157.5 (04-05 @ 11:17)  Weight (kg): 49 (04-05 @ 11:17)  BMI (kg/m2): 19.8 (04-05 @ 11:17)  BSA (m2): 1.47 (04-05 @ 11:17)    PHYSICAL EXAM:    Constitutional: NAD  Neck:  No JVD  Respiratory: CTAB/L  Cardiovascular: S1 and S2  Gastrointestinal: BS+, soft, NT/ND  Extremities: No peripheral edema  Neurological: A/O x 3, no focal deficits  Psychiatric: Normal mood, normal affect  : No Guzman  Skin: No rashes  Access: Not applicable    LABS:                        8.5    5.96  )-----------( 139      ( 05 Apr 2021 06:43 )             26.7     04-05    135  |  101  |  29<H>  ----------------------------<  115<H>  4.2   |  24  |  1.15    Ca    9.7      05 Apr 2021 06:43    TPro  6.1  /  Alb  3.6  /  TBili  0.4  /  DBili  x   /  AST  23  /  ALT  17  /  AlkPhos  80  04-05          Urine Studies:          RADIOLOGY & ADDITIONAL STUDIES:

## 2021-04-06 NOTE — CONSULT NOTE ADULT - SUBJECTIVE AND OBJECTIVE BOX
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 Denies any other symptoms including fever, cough, N/V/D, LE edema/pain.  (31 Mar 2021 23:26)    Hospital Course:  CXR with snall r and trace left pleural effusion. patient started on heparin drip for Afib with eleiquis on hold. Nephrology consulted for elevated cre. 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        TODAY'S SUBJECTIVE HX:    REVIEW OF SYSTEMS  Constitutional - No fever, No weight loss, No fatigue  HEENT - No eye pain, No visual disturbances, No difficulty hearing, No tinnitus, No vertigo, No neck pain  Respiratory - No cough, No wheezing, + dyspnea on exertion  Cardiovascular - No chest pain, No palpitations  Gastrointestinal - No abdominal pain, No nausea, No vomiting, No diarrhea, No constipation  Genitourinary - No dysuria, No frequency, No hematuria, No incontinence  Neurological - No headaches, No memory loss, No loss of strength, No numbness, No tremors  Skin - No itching, No rashes, No lesions   Endocrine - No temperature intolerance  Musculoskeletal - No joint pain, No joint swelling, No muscle pain  Psychiatric - No depression, No anxiety    VITALS  T(C): 36.3 (04-06-21 @ 10:57), Max: 36.4 (04-05-21 @ 19:24)  HR: 64 (04-06-21 @ 10:57) (60 - 65)  BP: 129/78 (04-06-21 @ 10:57) (96/54 - 143/73)  RR: 18 (04-06-21 @ 10:57) (16 - 18)  SpO2: 94% (04-06-21 @ 10:57) (92% - 97%)  Wt(kg): --    PAST MEDICAL & SURGICAL HISTORY  HTN (hypertension)    CAD (coronary artery disease)    HLD (hyperlipidemia)    Asthma    Palpitations    PAF (paroxysmal atrial fibrillation)    Scoliosis    History of cholecystectomy    H/O tubal ligation    History of bilateral knee replacement    History of cataract surgery    S/P cholecystectomy        SOCIAL HISTORY  Smoking - Denied  EtOH - Denied   Drugs - Denied    FUNCTIONAL HISTORY  Lives with son in . Prior to injury patient  was independent with adls, and ambulation and used cane at time    CURRENT FUNCTIONAL STATUS pending evaluation    FAMILY HISTORY   No pertinent family history in first degree relatives    RECENT LABS/IMAGING  CBC Full  -  ( 06 Apr 2021 01:05 )  WBC Count : 5.69 K/uL  RBC Count : 2.84 M/uL  Hemoglobin : 8.7 g/dL  Hematocrit : 26.7 %  Platelet Count - Automated : 136 K/uL  Mean Cell Volume : 94.0 fl  Mean Cell Hemoglobin : 30.6 pg  Mean Cell Hemoglobin Concentration : 32.6 gm/dL  Auto Neutrophil # : 4.01 K/uL  Auto Lymphocyte # : 1.03 K/uL  Auto Monocyte # : 0.49 K/uL  Auto Eosinophil # : 0.11 K/uL  Auto Basophil # : 0.02 K/uL  Auto Neutrophil % : 70.5 %  Auto Lymphocyte % : 18.1 %  Auto Monocyte % : 8.6 %  Auto Eosinophil % : 1.9 %  Auto Basophil % : 0.4 %    04-06    136  |  101  |  30<H>  ----------------------------<  117<H>  4.2   |  23  |  1.40<H>    Ca    9.7      06 Apr 2021 01:05    TPro  6.0  /  Alb  3.5  /  TBili  0.4  /  DBili  x   /  AST  32  /  ALT  24  /  AlkPhos  83  04-06        ALLERGIES  No Known Allergies      MEDICATIONS   aMIOdarone    Tablet 200 milliGRAM(s) Oral daily  amLODIPine   Tablet 10 milliGRAM(s) Oral daily  aspirin enteric coated 81 milliGRAM(s) Oral daily  atorvastatin 20 milliGRAM(s) Oral at bedtime  ferrous    sulfate 325 milliGRAM(s) Oral daily  heparin  Infusion 1000 Unit(s)/Hr IV Continuous <Continuous>  metoprolol succinate ER 50 milliGRAM(s) Oral daily  senna 2 Tablet(s) Oral at bedtime  sodium chloride 0.9% lock flush 3 milliLiter(s) IV Push every 8 hours      ----------------------------------------------------------------------------------------  PHYSICAL EXAM  Constitutional - NAD, Comfortable  HEENT - NCAT, EOMI  Neck - Supple, No limited ROM  Chest - Breathing comfortably, No wheezing  Abdomen - Soft   Extremities - No C/C/E, No calf tenderness   Neurologic Exam -                    Cognitive - Awake, Alert, AAO to self, place, date, year, situation     Communication - Fluent, No dysarthria     Motor - No focal deficits                    LEFT    UE - ShAB 5/5, EF 5/5, EE 5/5, WE 5/5,  5/5                    RIGHT UE - ShAB 5/5, EF 5/5, EE 5/5, WE 5/5,  5/5                    LEFT    LE - HF 5/5, KE 5/5, DF 5/5, PF 5/5                    RIGHT LE - HF 5/5, KE 5/5, DF 5/5, PF 5/5        Sensory - Intact to LT   Psychiatric - Mood stable, Affect WNL    ------------------------------------------------------------------------------------------------  ASSESSMENT/PLAN  89F with PMHx of  asthma, HTN, HLD, TIA, severe aortic stenosis, CAD s/p stents LAD and Diag, 2009 s/p 2 stent LAD in 9/2018, symptomatic Pafib on Eliquis, 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 Will continue to follow and make recommendations on need for rehabilitation services on discharge.    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 Denies any other symptoms including fever, cough, N/V/D, LE edema/pain.  (31 Mar 2021 23:26)    Hospital Course:  CXR with snall r and trace left pleural effusion. patient started on heparin drip for Afib with eleiquis on hold. Nephrology consulted for elevated cre. 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      TODAY'S SUBJECTIVE HX: Patient reports she has back pain, from upper to lower back, when walking, feels discomfort in legs, relieved by standing, sitting.     REVIEW OF SYSTEMS  Constitutional - No fever, No weight loss, No fatigue  HEENT - No eye pain, No visual disturbances, No difficulty hearing, No tinnitus, No vertigo, No neck pain  Respiratory - No cough, No wheezing, + dyspnea on exertion  Cardiovascular - No chest pain, No palpitations  Gastrointestinal - No abdominal pain, No nausea, No vomiting, No diarrhea, No constipation  Genitourinary - No dysuria, No frequency, No hematuria, No incontinence  Neurological - No headaches, No memory loss, No loss of strength, No numbness, No tremors  Skin - No itching, No rashes, No lesions   Endocrine - No temperature intolerance  Musculoskeletal - No joint pain, No joint swelling, No muscle pain  Psychiatric - No depression, No anxiety    VITALS  T(C): 36.3 (04-06-21 @ 10:57), Max: 36.4 (04-05-21 @ 19:24)  HR: 64 (04-06-21 @ 10:57) (60 - 65)  BP: 129/78 (04-06-21 @ 10:57) (96/54 - 143/73)  RR: 18 (04-06-21 @ 10:57) (16 - 18)  SpO2: 94% (04-06-21 @ 10:57) (92% - 97%)  Wt(kg): --    PAST MEDICAL & SURGICAL HISTORY  HTN (hypertension)    CAD (coronary artery disease)    HLD (hyperlipidemia)    Asthma    Palpitations    PAF (paroxysmal atrial fibrillation)    Scoliosis    History of cholecystectomy    H/O tubal ligation    History of bilateral knee replacement    History of cataract surgery    S/P cholecystectomy        SOCIAL HISTORY  Smoking - Denied  EtOH - Denied   Drugs - Denied    FUNCTIONAL HISTORY  Lives with son in . Prior to injury patient  was independent with adls, and ambulation and used cane at time    CURRENT FUNCTIONAL STATUS pending evaluation    FAMILY HISTORY   No pertinent family history in first degree relatives    RECENT LABS/IMAGING  CBC Full  -  ( 06 Apr 2021 01:05 )  WBC Count : 5.69 K/uL  RBC Count : 2.84 M/uL  Hemoglobin : 8.7 g/dL  Hematocrit : 26.7 %  Platelet Count - Automated : 136 K/uL  Mean Cell Volume : 94.0 fl  Mean Cell Hemoglobin : 30.6 pg  Mean Cell Hemoglobin Concentration : 32.6 gm/dL  Auto Neutrophil # : 4.01 K/uL  Auto Lymphocyte # : 1.03 K/uL  Auto Monocyte # : 0.49 K/uL  Auto Eosinophil # : 0.11 K/uL  Auto Basophil # : 0.02 K/uL  Auto Neutrophil % : 70.5 %  Auto Lymphocyte % : 18.1 %  Auto Monocyte % : 8.6 %  Auto Eosinophil % : 1.9 %  Auto Basophil % : 0.4 %    04-06    136  |  101  |  30<H>  ----------------------------<  117<H>  4.2   |  23  |  1.40<H>    Ca    9.7      06 Apr 2021 01:05    TPro  6.0  /  Alb  3.5  /  TBili  0.4  /  DBili  x   /  AST  32  /  ALT  24  /  AlkPhos  83  04-06        ALLERGIES  No Known Allergies      MEDICATIONS   aMIOdarone    Tablet 200 milliGRAM(s) Oral daily  amLODIPine   Tablet 10 milliGRAM(s) Oral daily  aspirin enteric coated 81 milliGRAM(s) Oral daily  atorvastatin 20 milliGRAM(s) Oral at bedtime  ferrous    sulfate 325 milliGRAM(s) Oral daily  heparin  Infusion 1000 Unit(s)/Hr IV Continuous <Continuous>  metoprolol succinate ER 50 milliGRAM(s) Oral daily  senna 2 Tablet(s) Oral at bedtime  sodium chloride 0.9% lock flush 3 milliLiter(s) IV Push every 8 hours      ----------------------------------------------------------------------------------------  PHYSICAL EXAM  Constitutional - NAD, Comfortable, in bed   HEENT - NCAT, EOMI  Neck - Supple, No limited ROM  Chest - Breathing comfortably, No wheezing  Abdomen - Soft   Extremities - No C/C/E, No calf tenderness   Neurologic Exam -                    Cognitive - Awake, Alert, AAO to self, place, date, year, situation     Communication - Fluent, No dysarthria     Motor - No focal deficits                    LEFT    UE - ShAB 5/5, EF 5/5, EE 5/5, WE 5/5,  5/5                    RIGHT UE - ShAB 5/5, EF 5/5, EE 5/5, WE 5/5,  5/5                    LEFT    LE - HF 5/5, KE 5/5, DF 5/5, PF 5/5                    RIGHT LE - HF 5/5, KE 5/5, DF 5/5, PF 5/5        Sensory - Intact to LT   Psychiatric - Mood stable, Affect WNL    ------------------------------------------------------------------------------------------------  ASSESSMENT/PLAN  89F with PMHx of  asthma, HTN, HLD, TIA, severe aortic stenosis, CAD s/p stents LAD and Diag, 2009 s/p 2 stent LAD in 9/2018, symptomatic Pafib on Eliquis, 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 Will continue to follow and make recommendations on need for rehabilitation services on discharge.

## 2021-04-06 NOTE — PROGRESS NOTE ADULT - PROBLEM SELECTOR PLAN 2
Continue with toprol 50xl QD for rate control   Eliquis on hold   Hep Gtt started--monitor PTT  continue with home dose Amio 200 daily   Monitor on tele

## 2021-04-06 NOTE — PROGRESS NOTE ADULT - ASSESSMENT
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 Patient to be admitted to CTS under Dr. Lai for pre TAVR workup.       3/31 VSS; O2 sat 96% on RA, CXR: small R and Trace L pleural Effusion. Non-edematous. EKG normal, Trops 21.   4/1 HD stable.  Pt had eliquis this am--hold for now and start hep gtt for cardiac cath.  CTA delayed by department hopefully will get done tomorrow  4/2 VSS   PTT elevated heparin gtt held . repeat PTT. creatinine elevated 1.62 Dr Daigle consulted - nephrology  OK for CTA- gentle hydration  post CTA.   4/3 Pending CTA this Monday. Compression dressing to right forearm  4/4 H/H 8/27. Will continue to trend. T&C sent . Anticipate CTA this Monday and coronary cath this week.  4/5 dx cath right femoral groin access . IVF per and post procedure. trend creat. Right forearm hematoma stable. Reinstate heparin gtt  for afib at 1800  4/6 VSS  PTT 61 needs CTA creatinine 1.40 this am. Followed by Nephrology

## 2021-04-06 NOTE — PROGRESS NOTE ADULT - SUBJECTIVE AND OBJECTIVE BOX
[facility] [phone]        Patient seen and examined in bed.  She was about the same         MEDICATIONS  (STANDING):  aMIOdarone    Tablet 200 milliGRAM(s) Oral daily  amLODIPine   Tablet 10 milliGRAM(s) Oral daily  aspirin enteric coated 81 milliGRAM(s) Oral daily  atorvastatin 20 milliGRAM(s) Oral at bedtime  ferrous    sulfate 325 milliGRAM(s) Oral daily  heparin  Infusion 1000 Unit(s)/Hr (6 mL/Hr) IV Continuous <Continuous>  metoprolol succinate ER 50 milliGRAM(s) Oral daily  senna 2 Tablet(s) Oral at bedtime  sodium chloride 0.9% lock flush 3 milliLiter(s) IV Push every 8 hours  sodium chloride 0.9%. 1000 milliLiter(s) (50 mL/Hr) IV Continuous <Continuous>      VITAL:  T(C): , Max: 36.4 (04-05-21 @ 19:24)  T(F): , Max: 97.6 (04-05-21 @ 19:24)  HR: 64 (04-06-21 @ 10:57)  BP: 129/78 (04-06-21 @ 10:57)  BP(mean): --  RR: 18 (04-06-21 @ 10:57)  SpO2: 94% (04-06-21 @ 10:57)  Wt(kg): --    I and O's:    04-05 @ 07:01  -  04-06 @ 07:00  --------------------------------------------------------  IN: 66 mL / OUT: 1725 mL / NET: -1659 mL    04-06 @ 07:01  -  04-06 @ 12:15  --------------------------------------------------------  IN: 200 mL / OUT: 0 mL / NET: 200 mL          PHYSICAL EXAM:    Constitutional: NAD  Neck:  No JVD  Respiratory: CTAB/L  Cardiovascular: S1 and S2  Gastrointestinal: BS+, soft, NT/ND  Extremities: No peripheral edema  Neurological: A/O x 3, no focal deficits  Psychiatric: Normal mood, normal affect  : No Guzman  Skin: No rashes  Access: Not applicable    LABS:                        8.7    5.69  )-----------( 136      ( 06 Apr 2021 01:05 )             26.7     04-06    136  |  101  |  30<H>  ----------------------------<  117<H>  4.2   |  23  |  1.40<H>    Ca    9.7      06 Apr 2021 01:05    TPro  6.0  /  Alb  3.5  /  TBili  0.4  /  DBili  x   /  AST  32  /  ALT  24  /  AlkPhos  83  04-06          Urine Studies:          RADIOLOGY & ADDITIONAL STUDIES:

## 2021-04-06 NOTE — PROGRESS NOTE ADULT - SUBJECTIVE AND OBJECTIVE BOX
Structural Heart Team    Ms Barreto is sitting up in a chair with 2L O2 via NC.  She appears comfortable but reports sob after walking to the bathroom and back.  She also reports constipation.  She denies chest pain/pressure and dizziness.  There were no acute events overnight.          REVIEW OF SYSTEMS:    CONSTITUTIONAL: No weakness, fevers or chills  EYES/ENT: No visual changes;  No vertigo or throat pain   NECK: No pain or stiffness  RESPIRATORY: No cough, wheezing, hemoptysis; No shortness of breath  CARDIOVASCULAR: No chest pain or palpitations  GASTROINTESTINAL: No abdominal or epigastric pain. No nausea, vomiting, or hematemesis; No diarrhea or constipation. No melena or hematochezia.  GENITOURINARY: No dysuria, frequency or hematuria  NEUROLOGICAL: No numbness or weakness  SKIN: No itching, rashes      Allergies    No Known Allergies    Intolerances      Vital Signs Last 24 Hrs  T(C): 36.3 (06 Apr 2021 10:57), Max: 36.4 (05 Apr 2021 19:24)  T(F): 97.4 (06 Apr 2021 10:57), Max: 97.6 (05 Apr 2021 19:24)  HR: 64 (06 Apr 2021 10:57) (60 - 66)  BP: 129/78 (06 Apr 2021 10:57) (96/54 - 146/59)  BP(mean): --  RR: 18 (06 Apr 2021 10:57) (16 - 18)  SpO2: 94% (06 Apr 2021 10:57) (92% - 100%)    MEDICATIONS  (STANDING):  aMIOdarone    Tablet 200 milliGRAM(s) Oral daily  amLODIPine   Tablet 10 milliGRAM(s) Oral daily  aspirin enteric coated 81 milliGRAM(s) Oral daily  atorvastatin 20 milliGRAM(s) Oral at bedtime  ferrous    sulfate 325 milliGRAM(s) Oral daily  heparin  Infusion 1000 Unit(s)/Hr (6 mL/Hr) IV Continuous <Continuous>  metoprolol succinate ER 50 milliGRAM(s) Oral daily  senna 2 Tablet(s) Oral at bedtime  sodium chloride 0.9% lock flush 3 milliLiter(s) IV Push every 8 hours  sodium chloride 0.9%. 1000 milliLiter(s) (50 mL/Hr) IV Continuous <Continuous>      Exam-  General: NAD, WDWN, appropriate affect  Cor: s1s2, RRR, II/VI systolic murmur   Tele: Apaced, SR 60's    Pulm: Clear, no wheezes, rales, or rhonchi, no use of accessory muscles  Gastrointestinal: soft, nontender, nondistended, +bowel sounds  Extremities: no edema, 1+ DP pulses b/l  Neuro: A&Ox3, nonfocal                          8.7    5.69  )-----------( 136      ( 06 Apr 2021 01:05 )             26.7   04-06    136  |  101  |  30<H>  ----------------------------<  117<H>  4.2   |  23  |  1.40<H>    Ca    9.7      06 Apr 2021 01:05    TPro  6.0  /  Alb  3.5  /  TBili  0.4  /  DBili  x   /  AST  32  /  ALT  24  /  AlkPhos  83  04-06  PTT - ( 06 Apr 2021 07:30 )  PTT:61.6 sec  I&O's Summary    05 Apr 2021 07:01  -  06 Apr 2021 07:00  --------------------------------------------------------  IN: 66 mL / OUT: 1725 mL / NET: -1659 mL    06 Apr 2021 07:01  -  06 Apr 2021 12:13  --------------------------------------------------------  IN: 200 mL / OUT: 0 mL / NET: 200 mL      < from: Cardiac Cath Lab - Adult (04.05.21 @ 11:59) >  CORONARY VESSELS: The coronary circulation is right dominant.  LM:   --  LM: Normal.  LAD:   --  Proximal LAD: There was a tubular 50 % stenosis.  --  Mid LAD: There was 30% to 40 % diffuse in-stent restenosis of the  middle left anterior artery stent.  --  Distal LAD: Normal.  --  D1: There was a tubular 30 % in-stent re-stenosis in the proximal  segment of the previously placed stent.  CX:   --  Proximal circumflex: Normal.  --  Mid circumflex: Normal.  --  OM1: Normal.  RCA:   --  Proximal RCA: There was a diffuse 60 % stenosis. The lesion was  moderately calcified.  --  Distal RCA: There wasa diffuse 20 % stenosis.  --  RPDA: There was a discrete 40 % stenosis.  --  RPLS: There was a tubular 30 % stenosis.  COMPLICATIONS: There were no complications.  DIAGNOSTIC IMPRESSIONS: Two vessel non-obstructive coronary artery disease  Mild to moderate in-stent restenosis of left anterior descending artery  /first diagonal artery stents  Normal left main coronary artery    < end of copied text >          Assessment/Plan:  Ms Barreto is a 89 y/o Mandarin Speaking female, admitted with symptomatic severe Aortic Stenosis  - LHC done yesterday, multi vessel CAD  - pending creatinine, plan for cardiac CTA tomorrow  - after CTA, will d/c home and schedule TAVR outpatient as appropriate    LORY Cam  [phone]

## 2021-04-06 NOTE — PROGRESS NOTE ADULT - ASSESSMENT
89F with PMHx of  asthma, HTN, HLD, TIA, severe aortic stenosis, CAD s/p stents LAD and Diag, 2009 s/p 2 stent LAD in 9/2018, symptomatic Pafib on Eliquis pw SOB MOULTON and chest pain   Admitted for TAVR work up     1 Renal-   CT SCAN hopefully in am.  She had the cath yesterday.  She will be optimized for the procedure with IVF.    Trend the serum creatinine--Creatinine is all hemodynamic   IVF in am prior to CT scan(pending a creatinine that doesn't shoot up)     2 CVS-  BP acceptable  present    3 Anemia- Retacrit 10000 x 1     4  Misc-Heparin gtt         [employer]  [phone]

## 2021-04-06 NOTE — PROGRESS NOTE ADULT - SUBJECTIVE AND OBJECTIVE BOX
Subjective ' hello I have  no appetite"    VITAL SIGNS    Telemetry:AP / NSR  60    Vital Signs Last 24 Hrs  T(C): 36.3 (21 @ 06:13), Max: 36.6 (21 @ 11:17)  T(F): 97.3 (21 @ 06:13), Max: 97.9 (21 @ 11:17)  HR: 62 (21 @ 06:13) (60 - 66)  BP: 122/74 (21 @ 06:13) (96/54 - 154/76)  RR: 16 (21 @ 06:13) (16 - 18)  SpO2: 96% (21 @ 06:13) (95% - 100%)              @ 07:01  -   @ 07:00  --------------------------------------------------------  IN: 66 mL / OUT: 1725 mL / NET: -1659 mL  Daily Height in cm: 157.48 (2021 11:17)    Daily Weight in k.2 (2021 08:24)      MEDICATIONS  (STANDING):  aMIOdarone    Tablet 200 milliGRAM(s) Oral daily  amLODIPine   Tablet 10 milliGRAM(s) Oral daily  aspirin enteric coated 81 milliGRAM(s) Oral daily  atorvastatin 20 milliGRAM(s) Oral at bedtime  ferrous    sulfate 325 milliGRAM(s) Oral daily  heparin  Infusion 1000 Unit(s)/Hr (6 mL/Hr) IV Continuous <Continuous>  metoprolol succinate ER 50 milliGRAM(s) Oral daily  senna 2 Tablet(s) Oral at bedtime  sodium chloride 0.9% lock flush 3 milliLiter(s) IV Push every 8 hours  sodium chloride 0.9%. 1000 milliLiter(s) (50 mL/Hr) IV Continuous <Continuous>    MEDICATIONS  (PRN):    < from: Cardiac Cath Lab - Adult (21 @ 11:59) >  CORONARY VESSELS: The coronary circulation is right dominant.  LM:   --  LM: Normal.  LAD:   --  Proximal LAD: There was a tubular 50 % stenosis.  --  Mid LAD: There was 30% to 40 % diffuse in-stent restenosis of the  middle left anterior artery stent.  --  Distal LAD: Normal.  --  D1: There was a tubular 30 % in-stent re-stenosis in the proximal  segment of the previously placed stent.  CX:   --  Proximal circumflex: Normal.  --  Mid circumflex: Normal.  --  OM1: Normal.  RCA:   --  Proximal RCA: There was a diffuse 60 % stenosis. The lesion was  moderately calcified.  --  Distal RCA: There wasa diffuse 20 % stenosis.  --  RPDA: There was a discrete 40 % stenosis.  --  RPLS: There was a tubular 30 % stenosis.  COMPLICATIONS: There were no complications.  DIAGNOSTIC IMPRESSIONS: Two vessel non-obstructive coronary artery disease  Mild to moderate in-stent restenosis of left anterior descending artery  /first diagonal artery stents  Normal left main coronary artery  Right dominant system    < end of copied text >                    PHYSICAL EXAM  Neurology: alert and oriented x 3, nonfocal, no gross deficits    CV :S1 S2 RRR  Lungs: B/l breath sounds on 2 L NC    Abdomen: soft, nontender, nondistended, positive bowel sounds, + Flatus  + BM    : Voids              Extremities:       Equal strength throughout  B/lle + DP no edema   RT groin stable CDI no hematoma                                        Discussed with Cardiothoracic Team at AM rounds.

## 2021-04-06 NOTE — PROGRESS NOTE ADULT - PROBLEM SELECTOR PLAN 1
Admit to CTS under Dr. Lai   Preop TAVR evaluation   Plan for TTE, Cardiac CT Angio   Cardiac cath completed  Continue with ASA 81, Ator20  Plan to medically optimize patient - discharge home then return for TAVR

## 2021-04-07 NOTE — PROGRESS NOTE ADULT - ASSESSMENT
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 Patient to be admitted to CTS under Dr. Lai for pre TAVR workup.       3/31 VSS; O2 sat 96% on RA, CXR: small R and Trace L pleural Effusion. Non-edematous. EKG normal, Trops 21.   4/1 HD stable.  Pt had eliquis this am--hold for now and start hep gtt for cardiac cath.  CTA delayed by department hopefully will get done tomorrow  4/2 VSS   PTT elevated heparin gtt held . repeat PTT. creatinine elevated 1.62 Dr Daigle consulted - nephrology  OK for CTA- gentle hydration  post CTA.   4/3 Pending CTA this Monday. Compression dressing to right forearm  4/4 H/H 8/27. Will continue to trend. T&C sent . Anticipate CTA this Monday and coronary cath this week.  4/5 dx cath right femoral groin access . IVF per and post procedure. trend creat. Right forearm hematoma stable. Reinstate heparin gtt  for afib at 1800  4/6 VSS  PTT 61 needs CTA creatinine 1.40 this am. Followed by Nephrology   4/7 VSS Creatine improved to 1.26 this am. Plan for CTA today. Plan to discharge home tomorrow then come back for TAVR.

## 2021-04-07 NOTE — PROGRESS NOTE ADULT - ASSESSMENT
89F with PMHx of  asthma, HTN, HLD, TIA, severe aortic stenosis, CAD s/p stents LAD and Diag, 2009 s/p 2 stent LAD in 9/2018, symptomatic Pafib on Eliquis pw SOB MOULTON and chest pain   Admitted for TAVR work up   Hyperkalemia(but had hemolysis)    1 Renal-   CT SCAN hopefully today..If this is confirmed will start very gentle IVF.    NO RENAL OBJECTION TO PROCEED  Trend the serum creatinine--Creatinine is all hemodynamic      2 CVS-  BP acceptable  present    3 Anemia- Retacrit 10000 x 1 in am     4  Misc-Heparin gtt       [employer]  [phone]   89F with PMHx of  asthma, HTN, HLD, TIA, severe aortic stenosis, CAD s/p stents LAD and Diag, 2009 s/p 2 stent LAD in 9/2018, symptomatic Pafib on Eliquis pw SOB MOULTON and chest pain   Admitted for TAVR work up   Hyperkalemia(but had hemolysis)    1 Renal-   CT SCAN hopefully today.  Given JVD no IVF.    NO RENAL OBJECTION TO PROCEED  Trend the serum creatinine--Creatinine is all hemodynamic      2 CVS-  BP acceptable  present    3 Anemia- Retacrit 10000 x 1 in am     4  Misc-Heparin gtt       [facility]  [phone]

## 2021-04-07 NOTE — PROGRESS NOTE ADULT - SUBJECTIVE AND OBJECTIVE BOX
VITAL SIGNS    Telemetry: Apaced 61   Vital Signs Last 24 Hrs  T(C): 36.4 (21 @ 09:00), Max: 36.4 (21 @ 20:30)  T(F): 97.5 (21 @ 09:00), Max: 97.6 (21 @ 20:30)  HR: 60 (21 @ 09:00) (60 - 65)  BP: 125/74 (21 @ 09:00) (111/69 - 129/78)  RR: 18 (21 @ 09:00) (16 - 18)  SpO2: 99% (21 @ 09:00) (92% - 99%)             @ 07:01  -   @ 07:00  --------------------------------------------------------  IN: 518 mL / OUT: 750 mL / NET: -232 mL     @ 07:01  -   @ 09:29  --------------------------------------------------------  IN: 212 mL / OUT: 200 mL / NET: 12 mL       Daily     Daily Weight in k.3 (2021 08:28)  Admit Wt: Drug Dosing Weight  Height (cm): 157.5 (2021 11:17)  Weight (kg): 49 (2021 11:17)  BMI (kg/m2): 19.8 (2021 11:17)  BSA (m2): 1.47 (2021 11:17)      CAPILLARY BLOOD GLUCOSE            MEDICATIONS  aMIOdarone    Tablet 200 milliGRAM(s) Oral daily  amLODIPine   Tablet 10 milliGRAM(s) Oral daily  aspirin enteric coated 81 milliGRAM(s) Oral daily  atorvastatin 20 milliGRAM(s) Oral at bedtime  ferrous    sulfate 325 milliGRAM(s) Oral daily  heparin  Infusion 1000 Unit(s)/Hr IV Continuous <Continuous>  metoprolol succinate ER 50 milliGRAM(s) Oral daily  senna 2 Tablet(s) Oral at bedtime  sodium chloride 0.9% lock flush 3 milliLiter(s) IV Push every 8 hours      >>> <<<  PHYSICAL EXAM  Subjective: " Hi, Im doing okay."  Neurology: alert and oriented x 3, nonfocal, no gross deficits  CV : IRR, +systolic murmur   Lungs: CTA B/L, No wheezing, rales, rhonchi. Non labored respirations   Abdomen: soft, NT, ND, (+ )BM  :  voiding  Extremities: No LE edema bilaterally, +DP, No calf tenderness       LABS      134<L>  |  98  |  36<H>  ----------------------------<  124<H>  5.4<H>   |  23  |  1.26    Ca    9.5      2021 07:09    TPro  6.0  /  Alb  3.5  /  TBili  0.4  /  DBili  x   /  AST  32  /  ALT  24  /  AlkPhos  83  04-06                                 9.3    6.57  )-----------( 161      ( 2021 07:09 )             28.6          PT/INR - ( 2021 07:09 )   PT: 11.4 sec;   INR: 0.95 ratio         PTT - ( 2021 07:09 )  PTT:45.4 sec       PAST MEDICAL & SURGICAL HISTORY:  HTN (hypertension)    CAD (coronary artery disease)  coronary stents x 2007    HLD (hyperlipidemia)    Asthma    PAF (paroxysmal atrial fibrillation)    Scoliosis    History of cholecystectomy    H/O tubal ligation    History of bilateral knee replacement    History of cataract surgery  bilateral    S/P cholecystectomy

## 2021-04-07 NOTE — PHYSICAL THERAPY INITIAL EVALUATION ADULT - PLANNED THERAPY INTERVENTIONS, PT EVAL
LTG 1: Stairs - Pt will be independent with negotiation of 5 steps w/ unilateral handrail within 2 weeks./bed mobility training/gait training/transfer training

## 2021-04-07 NOTE — PROGRESS NOTE ADULT - SUBJECTIVE AND OBJECTIVE BOX
[facility] [phone]        Patient seen and examined in bed.  She was in good spirits         MEDICATIONS  (STANDING):  aMIOdarone    Tablet 200 milliGRAM(s) Oral daily  amLODIPine   Tablet 10 milliGRAM(s) Oral daily  aspirin enteric coated 81 milliGRAM(s) Oral daily  atorvastatin 20 milliGRAM(s) Oral at bedtime  ferrous    sulfate 325 milliGRAM(s) Oral daily  heparin  Infusion 1000 Unit(s)/Hr (7 mL/Hr) IV Continuous <Continuous>  metoprolol succinate ER 50 milliGRAM(s) Oral daily  senna 2 Tablet(s) Oral at bedtime  sodium chloride 0.9% lock flush 3 milliLiter(s) IV Push every 8 hours      VITAL:  T(C): , Max: 36.4 (04-06-21 @ 20:30)  T(F): , Max: 97.6 (04-06-21 @ 20:30)  HR: 60 (04-07-21 @ 09:00)  BP: 125/74 (04-07-21 @ 09:00)  BP(mean): --  RR: 18 (04-07-21 @ 09:00)  SpO2: 99% (04-07-21 @ 09:00)  Wt(kg): --    I and O's:    04-06 @ 07:01  -  04-07 @ 07:00  --------------------------------------------------------  IN: 518 mL / OUT: 750 mL / NET: -232 mL    04-07 @ 07:01  -  04-07 @ 10:24  --------------------------------------------------------  IN: 212 mL / OUT: 200 mL / NET: 12 mL          PHYSICAL EXAM:    Constitutional: NAD  Neck:  No JVD  Respiratory: CTAB/L  Cardiovascular: S1 and S2  Gastrointestinal: BS+, soft, NT/ND  Extremities: No peripheral edema  Neurological: A/O x 3, no focal deficits  Psychiatric: Normal mood, normal affect  : No Guzman  Skin: No rashes  Access: Not applicable    LABS:                        9.3    6.57  )-----------( 161      ( 07 Apr 2021 07:09 )             28.6     04-07    134<L>  |  98  |  36<H>  ----------------------------<  124<H>  5.4<H>   |  23  |  1.26    Ca    9.5      07 Apr 2021 07:09    TPro  6.0  /  Alb  3.5  /  TBili  0.4  /  DBili  x   /  AST  32  /  ALT  24  /  AlkPhos  83  04-06          Urine Studies:          RADIOLOGY & ADDITIONAL STUDIES:             [facility] [phone]        Patient seen and examined in bed.  She was in good spirits         MEDICATIONS  (STANDING):  aMIOdarone    Tablet 200 milliGRAM(s) Oral daily  amLODIPine   Tablet 10 milliGRAM(s) Oral daily  aspirin enteric coated 81 milliGRAM(s) Oral daily  atorvastatin 20 milliGRAM(s) Oral at bedtime  ferrous    sulfate 325 milliGRAM(s) Oral daily  heparin  Infusion 1000 Unit(s)/Hr (7 mL/Hr) IV Continuous <Continuous>  metoprolol succinate ER 50 milliGRAM(s) Oral daily  senna 2 Tablet(s) Oral at bedtime  sodium chloride 0.9% lock flush 3 milliLiter(s) IV Push every 8 hours      VITAL:  T(C): , Max: 36.4 (04-06-21 @ 20:30)  T(F): , Max: 97.6 (04-06-21 @ 20:30)  HR: 60 (04-07-21 @ 09:00)  BP: 125/74 (04-07-21 @ 09:00)  BP(mean): --  RR: 18 (04-07-21 @ 09:00)  SpO2: 99% (04-07-21 @ 09:00)  Wt(kg): --    I and O's:    04-06 @ 07:01  -  04-07 @ 07:00  --------------------------------------------------------  IN: 518 mL / OUT: 750 mL / NET: -232 mL    04-07 @ 07:01  -  04-07 @ 10:24  --------------------------------------------------------  IN: 212 mL / OUT: 200 mL / NET: 12 mL          PHYSICAL EXAM:    Constitutional: NAD  Neck:  +  JVD  Respiratory: CTAB/L  Cardiovascular: S1 and S2  Gastrointestinal: BS+, soft, NT/ND  Extremities: No peripheral edema  Neurological: A/O x 3, no focal deficits  Psychiatric: Normal mood, normal affect  : No Guzman  Skin: No rashes  Access: Not applicable    LABS:                        9.3    6.57  )-----------( 161      ( 07 Apr 2021 07:09 )             28.6     04-07    134<L>  |  98  |  36<H>  ----------------------------<  124<H>  5.4<H>   |  23  |  1.26    Ca    9.5      07 Apr 2021 07:09    TPro  6.0  /  Alb  3.5  /  TBili  0.4  /  DBili  x   /  AST  32  /  ALT  24  /  AlkPhos  83  04-06          Urine Studies:          RADIOLOGY & ADDITIONAL STUDIES:

## 2021-04-07 NOTE — PHYSICAL THERAPY INITIAL EVALUATION ADULT - PERTINENT HX OF CURRENT PROBLEM, REHAB EVAL
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 Admitted for TAVR w/u. Plan for CTA, then d/c and pt will come back for TAVR.

## 2021-04-07 NOTE — PROGRESS NOTE ADULT - PROBLEM SELECTOR PLAN 1
Preop TAVR evaluation   Plan for Cardiac CT Angio   Cardiac cath completed  Continue with ASA 81, Ator20  Plan to medically optimize patient - discharge home then return for TAVR

## 2021-04-07 NOTE — PHYSICAL THERAPY INITIAL EVALUATION ADULT - ADDITIONAL COMMENTS
PTA pt independent w/ ambulation and transfers (occasionally uses straight cane). Son assists w/ ADLs.

## 2021-04-07 NOTE — PROGRESS NOTE ADULT - SUBJECTIVE AND OBJECTIVE BOX
*****Structural Heart Team*****    Subjective:    The patient is resting comfortably in bed, offering no complaints at this time.    PAST MEDICAL & SURGICAL HISTORY:  HTN (hypertension)    CAD (coronary artery disease)  coronary stents x 2, 2007    HLD (hyperlipidemia)    Asthma    PAF (paroxysmal atrial fibrillation)    Scoliosis    History of cholecystectomy    H/O tubal ligation    History of bilateral knee replacement    History of cataract surgery  bilateral    S/P cholecystectomy          T(C): 36.3 (04-07-21 @ 10:54), Max: 36.4 (04-06-21 @ 20:30)  HR: 60 (04-07-21 @ 10:54) (60 - 63)  BP: 127/76 (04-07-21 @ 10:54) (119/57 - 127/76)  RR: 18 (04-07-21 @ 10:54) (18 - 18)  SpO2: 96% (04-07-21 @ 10:54) (93% - 99%)  Wt(kg): --  04-06 @ 07:01  -  04-07 @ 07:00  --------------------------------------------------------  IN: 518 mL / OUT: 750 mL / NET: -232 mL    04-07 @ 07:01  -  04-07 @ 11:17  --------------------------------------------------------  IN: 227 mL / OUT: 300 mL / NET: -73 mL      MEDICATIONS  (STANDING):  aMIOdarone    Tablet 200 milliGRAM(s) Oral daily  amLODIPine   Tablet 10 milliGRAM(s) Oral daily  aspirin enteric coated 81 milliGRAM(s) Oral daily  atorvastatin 20 milliGRAM(s) Oral at bedtime  ferrous    sulfate 325 milliGRAM(s) Oral daily  heparin  Infusion 1000 Unit(s)/Hr (7 mL/Hr) IV Continuous <Continuous>  metoprolol succinate ER 50 milliGRAM(s) Oral daily  senna 2 Tablet(s) Oral at bedtime  sodium chloride 0.9% lock flush 3 milliLiter(s) IV Push every 8 hours    MEDICATIONS  (PRN):      Review of Symptoms:  Constitutional: Awake, Alert, Follows commands  Respiratory: Mild MOULTON  Cardiac: Denies CP, Denies Palpitations  Gastrointestinal: Denies Pain, Denies N/V, tolerating po intake  Vascular: Negative  Extremities: + Edema, No joint pain or swelling  Neurological: Negative  Endocrine: No heat or cold intolerance, No excessive thirst  Heme/Onc: Negative    Exam:  General: A/O x3, ROBERT, NAD  HEENT: Supple, No JVD, Trachea midline, no masses  Pulmonary: CTAB, = Chest Excursion, no accessory muscle use  Cor: S1S2, RRR, No murmur or rub noted  ECG: SR  Gastrointestinal: Soft, NT/ND, + Bowel Sounds  Neuro: = motor and sensory B/L, No focal deficits  Vascular: 1+ Pulses B/L, No edema noted  Extremities: No joint   Skin: Warm/Dry/Normal color, Normal turgor, no rashes                          9.3    6.57  )-----------( 161      ( 07 Apr 2021 07:09 )             28.6   04-07    134<L>  |  98  |  36<H>  ----------------------------<  124<H>  5.4<H>   |  23  |  1.26    Ca    9.5      07 Apr 2021 07:09    TPro  6.0  /  Alb  3.5  /  TBili  0.4  /  DBili  x   /  AST  32  /  ALT  24  /  AlkPhos  83  04-06  PT/INR - ( 07 Apr 2021 07:09 )   PT: 11.4 sec;   INR: 0.95 ratio         PTT - ( 07 Apr 2021 07:09 )  PTT:45.4 sec    Imaging Reviewed:          Assesment/Plan:  89 y/o female With severe symptomatic Aortic Stenosis    1.) Severe Aortic Stenosis: Patient is undergoing workup for TAVR. She is set to get her Cardiac CT today, which would be the last step in the process. We will review the images and discuss with the SH team to come up with a plan. Her TAVR will be done as an out patient. Would recommend keeping her and checking her Creatinine tomorrow to look for VENU.  2.) Ambulate Patient  3.) Chronic Diastolic Heart Failure: Monitor I and O's, daily weight.   Discussed with  Team    LORY Miranda  [phone]

## 2021-04-08 NOTE — DIETITIAN NUTRITION RISK NOTIFICATION - TREATMENT: THE FOLLOWING DIET HAS BEEN RECOMMENDED
Diet, DASH/TLC:   Sodium & Cholesterol Restricted     Special Instructions for Nursing:  Sodium & Cholesterol Restricted (04-01-21 @ 04:22) [Active]

## 2021-04-08 NOTE — PROGRESS NOTE ADULT - SUBJECTIVE AND OBJECTIVE BOX
[facility] [phone]        Patient seen and examined in bed.  She was not SOB  Yesterday did get lasix         MEDICATIONS  (STANDING):  aMIOdarone    Tablet 200 milliGRAM(s) Oral daily  amLODIPine   Tablet 10 milliGRAM(s) Oral daily  aspirin enteric coated 81 milliGRAM(s) Oral daily  atorvastatin 20 milliGRAM(s) Oral at bedtime  ferrous    sulfate 325 milliGRAM(s) Oral daily  heparin  Infusion 1000 Unit(s)/Hr (6 mL/Hr) IV Continuous <Continuous>  metoprolol succinate ER 50 milliGRAM(s) Oral daily  senna 2 Tablet(s) Oral at bedtime  sodium chloride 0.9% lock flush 3 milliLiter(s) IV Push every 8 hours      VITAL:  T(C): , Max: 36.8 (04-07-21 @ 20:23)  T(F): , Max: 98.2 (04-07-21 @ 20:23)  HR: 61 (04-08-21 @ 05:41)  BP: 136/79 (04-08-21 @ 05:41)  BP(mean): 98 (04-08-21 @ 05:41)  RR: 18 (04-08-21 @ 05:41)  SpO2: 99% (04-08-21 @ 05:41)  Wt(kg): --    I and O's:    04-07 @ 07:01  -  04-08 @ 07:00  --------------------------------------------------------  IN: 849 mL / OUT: 2150 mL / NET: -1301 mL    04-08 @ 07:01  -  04-08 @ 10:31  --------------------------------------------------------  IN: 612 mL / OUT: 200 mL / NET: 412 mL          PHYSICAL EXAM:    Constitutional: looks stated age   Neck:  No JVD  Respiratory: CTAB/L  Cardiovascular: S1 and S2  Gastrointestinal: BS+, soft, NT/ND  Extremities: No peripheral edema  Neurological: A/O x 3, no focal deficits  Psychiatric: Normal mood, normal affect  : No Guzman  Skin: No rashes  Access: Not applicable    LABS:                        10.0   5.74  )-----------( 165      ( 08 Apr 2021 05:49 )             31.5     04-08    134<L>  |  97  |  34<H>  ----------------------------<  114<H>  4.0   |  23  |  1.61<H>    Ca    10.0      08 Apr 2021 05:49    TPro  6.3  /  Alb  3.9  /  TBili  0.7  /  DBili  x   /  AST  34  /  ALT  32  /  AlkPhos  88  04-08          Urine Studies:          RADIOLOGY & ADDITIONAL STUDIES:

## 2021-04-08 NOTE — DIETITIAN INITIAL EVALUATION ADULT. - ADD RECOMMEND
1. Recommend Liquid Protein Supplement x 2 packets daily (100cal, 15 Gm Prot per packet). 2. Continue to provide assistance and encouragement c all meals and snacks.

## 2021-04-08 NOTE — DIETITIAN INITIAL EVALUATION ADULT. - ORAL INTAKE PTA/DIET HISTORY
Pt reports decreased appetite and intake at times at home, did not want to provide specifics for what she usually eats. Reports at times using salt at home. Reports NKFA. States she was taking multivitamin and vitamin B complex at home.

## 2021-04-08 NOTE — DIETITIAN INITIAL EVALUATION ADULT. - PERTINENT MEDS FT
MEDICATIONS  (STANDING):  aMIOdarone    Tablet 200 milliGRAM(s) Oral daily  amLODIPine   Tablet 10 milliGRAM(s) Oral daily  aspirin enteric coated 81 milliGRAM(s) Oral daily  atorvastatin 20 milliGRAM(s) Oral at bedtime  ferrous    sulfate 325 milliGRAM(s) Oral daily  heparin  Infusion 1000 Unit(s)/Hr (6 mL/Hr) IV Continuous <Continuous>  metoprolol succinate ER 50 milliGRAM(s) Oral daily  polyethylene glycol 3350 17 Gram(s) Oral once  senna 2 Tablet(s) Oral at bedtime  sodium chloride 0.9% lock flush 3 milliLiter(s) IV Push every 8 hours    MEDICATIONS  (PRN):

## 2021-04-08 NOTE — DIETITIAN INITIAL EVALUATION ADULT. - PERTINENT LABORATORY DATA
04-08 @ 05:49: Na 134<L>, BUN 34<H>, Cr 1.61<H>, <H>, K+ 4.0, Phos --, Mg --, Alk Phos 88, ALT/SGPT 32, AST/SGOT 34, HbA1c --    4/1: A1C 6.3%-indicating pre-DM, no previous history noted, would continue to monitor

## 2021-04-08 NOTE — DIETITIAN INITIAL EVALUATION ADULT. - EDUCATION DIETARY MODIFICATIONS
Encouraged PO intake-small, frequent meals and nutrient dense snacks. In house, encouraged pt to order nutrient dense snacks c meals to consume in between meals to mimic small, frequent meals. Discussed protein rich foods available on menu. Discussed consuming protein first at meal times and then eating the other foods./(1) partially meets; needs review/practice/verbalization

## 2021-04-08 NOTE — PROGRESS NOTE ADULT - SUBJECTIVE AND OBJECTIVE BOX
VITAL SIGNS    Telemetry:  Ap 60  Vital Signs Last 24 Hrs  T(C): 36.3 (21 @ 05:41), Max: 36.8 (21 @ 20:23)  T(F): 97.4 (21 @ 05:41), Max: 98.2 (21 @ 20:23)  HR: 61 (21 @ 05:41) (60 - 76)  BP: 136/79 (21 @ 05:41) (121/53 - 136/79)  RR: 18 (21 @ 05:41) (18 - 18)  SpO2: 99% (21 @ 05:41) (96% - 99%)             @ 07:01  -   @ 07:00  --------------------------------------------------------  IN: 849 mL / OUT: 2150 mL / NET: -1301 mL     @ 07:01  -   @ 10:40  --------------------------------------------------------  IN: 612 mL / OUT: 200 mL / NET: 412 mL       Daily     Daily Weight in k.4 (2021 08:28)  Admit Wt: Drug Dosing Weight  Height (cm): 157.5 (2021 11:17)  Weight (kg): 49 (2021 11:17)  BMI (kg/m2): 19.8 (2021 11:17)  BSA (m2): 1.47 (2021 11:17)    Bilirubin Total, Serum: 0.7 mg/dL ( @ 05:49)    CAPILLARY BLOOD GLUCOSE      MEDICATIONS  aMIOdarone    Tablet 200 milliGRAM(s) Oral daily  amLODIPine   Tablet 10 milliGRAM(s) Oral daily  aspirin enteric coated 81 milliGRAM(s) Oral daily  atorvastatin 20 milliGRAM(s) Oral at bedtime  ferrous    sulfate 325 milliGRAM(s) Oral daily  heparin  Infusion 1000 Unit(s)/Hr IV Continuous <Continuous>  metoprolol succinate ER 50 milliGRAM(s) Oral daily  polyethylene glycol 3350 17 Gram(s) Oral once  senna 2 Tablet(s) Oral at bedtime  sodium chloride 0.9% lock flush 3 milliLiter(s) IV Push every 8 hours      >>> <<<  PHYSICAL EXAM  Subjective: " My belly feels tight"  Neurology: alert and oriented x 3, nonfocal, no gross deficits  CV : +murmur   Lungs: CTA B/L, No wheezing, rales, rhonchi. Non labored respirations   Abdomen: soft, NT,ND, (- )BM Last BM yesterday  :  voiding  Extremities: No LE edema bilaterally, +DP, No calf tenderness     LABS      134<L>  |  97  |  34<H>  ----------------------------<  114<H>  4.0   |  23  |  1.61<H>    Ca    10.0      2021 05:49    TPro  6.3  /  Alb  3.9  /  TBili  0.7  /  DBili  x   /  AST  34  /  ALT  32  /  AlkPhos  88                                   10.0   5.74  )-----------( 165      ( 2021 05:49 )             31.5          PT/INR - ( 2021 07:09 )   PT: 11.4 sec;   INR: 0.95 ratio         PTT - ( 2021 05:49 )  PTT:76.5 sec       PAST MEDICAL & SURGICAL HISTORY:  HTN (hypertension)    CAD (coronary artery disease)  coronary stents x 22007    HLD (hyperlipidemia)    Asthma    PAF (paroxysmal atrial fibrillation)    Scoliosis    History of cholecystectomy    H/O tubal ligation    History of bilateral knee replacement    History of cataract surgery  bilateral    S/P cholecystectomy

## 2021-04-08 NOTE — PROGRESS NOTE ADULT - ASSESSMENT
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 Patient to be admitted to CTS under Dr. Lai for pre TAVR workup.       3/31 VSS; O2 sat 96% on RA, CXR: small R and Trace L pleural Effusion. Non-edematous. EKG normal, Trops 21.   4/1 HD stable.  Pt had eliquis this am--hold for now and start hep gtt for cardiac cath.  CTA delayed by department hopefully will get done tomorrow  4/2 VSS   PTT elevated heparin gtt held . repeat PTT. creatinine elevated 1.62 Dr Daigle consulted - nephrology  OK for CTA- gentle hydration  post CTA.   4/3 Pending CTA this Monday. Compression dressing to right forearm  4/4 H/H 8/27. Will continue to trend. T&C sent . Anticipate CTA this Monday and coronary cath this week.  4/5 dx cath right femoral groin access . IVF per and post procedure. trend creat. Right forearm hematoma stable. Reinstate heparin gtt  for afib at 1800  4/6 VSS  PTT 61 needs CTA creatinine 1.40 this am. Followed by Nephrology   4/7 VSS Creatine improved to 1.26 this am. Plan for CTA today. Plan to discharge home tomorrow then come back for TAVR.   4/8 VSS; Belly discomfort/distention. Soft, slightly distended, No tenderness on palpation. BM yesterday, no BM today. +Miralax -will continue to monitor. CR 1.6 this am. CTA completed yesterday, pending results. Patient family is not comfortable with patient going back home so patient will stay till next friday for her TAVR.  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 Patient to be admitted to CTS under Dr. Lai for pre TAVR workup.       3/31 VSS; O2 sat 96% on RA, CXR: small R and Trace L pleural Effusion. Non-edematous. EKG normal, Trops 21.   4/1 HD stable.  Pt had eliquis this am--hold for now and start hep gtt for cardiac cath.  CTA delayed by department hopefully will get done tomorrow  4/2 VSS   PTT elevated heparin gtt held . repeat PTT. creatinine elevated 1.62 Dr Daigle consulted - nephrology  OK for CTA- gentle hydration  post CTA.   4/3 Pending CTA this Monday. Compression dressing to right forearm  4/4 H/H 8/27. Will continue to trend. T&C sent . Anticipate CTA this Monday and coronary cath this week.  4/5 dx cath right femoral groin access . IVF per and post procedure. trend creat. Right forearm hematoma stable. Reinstate heparin gtt  for afib at 1800  4/6 VSS  PTT 61 needs CTA creatinine 1.40 this am. Followed by Nephrology   4/7 VSS Creatine improved to 1.26 this am. Plan for CTA today. Plan to discharge home tomorrow then come back for TAVR.   4/8 VSS; Belly discomfort/distention. Soft, slightly distended, No tenderness on palpation. BM yesterday, no BM today. +Miralax -will continue to monitor. CR 1.6 this am. CTA completed yesterday, pending results. As per Family request and given inability to ambulate short distance without SOB patient will stay for medical optimization till next friday for her TAVR.

## 2021-04-08 NOTE — PROGRESS NOTE ADULT - PROBLEM SELECTOR PLAN 1
Take the following medications the morning of surgery with a small sip of water:    ZIAC    General Instructions:  • Do not eat solid food after midnight the night before surgery.  • You may drink clear liquids day of surgery but must stop at least one hour before your hospital arrival time.  • It is beneficial for you to have a clear drink that contains carbohydrates the day of surgery.  We suggest a 12 to 20 ounce bottle of Gatorade or Powerade for non-diabetic patients     Clear liquids are liquids you can see through.  Nothing red in color.     Plain water                               Sports drinks  Sodas                                   Gelatin (Jell-O)  Fruit juices without pulp such as white grape juice and apple juice  Popsicles that contain no fruit or yogurt  Tea or coffee (no cream or milk added)  Gatorade / Powerade  G2 / Powerade Zero    • Bring any papers given to you in the doctor’s office.  • Wear clean comfortable clothes.  • Do not wear contact lenses, false eyelashes or make-up.  Bring a case for your glasses.   • Remove all piercings.  Leave jewelry and any other valuables at home.  • Hair extensions with metal clips must be removed prior to surgery.  • The Pre-Admission Testing nurse will instruct you to bring medications if unable to obtain an accurate list in Pre-Admission Testing.    • REPORT TO MAIN SURGERY ON 11-29-19 AT 0800          Preventing a Surgical Site Infection:  • For 2 to 3 days before surgery, avoid shaving with a razor because the razor can irritate skin and make it easier to develop an infection.    • Any areas of open skin can increase the risk of a post-operative wound infection by allowing bacteria to enter and travel throughout the body.  Notify your surgeon if you have any skin wounds / rashes even if it is not near the expected surgical site.  The area will need assessed to determine if surgery should be delayed until it is healed.  • The night prior to surgery sleep  Preop TAVR evaluation   Plan for Cardiac CT Angio   Cardiac cath completed  Continue with ASA 81, Ator20  TAVR next friday in a clean bed with clean clothing.  Do not allow pets to sleep with you.  • Shower on the morning of surgery using a fresh bar of anti-bacterial soap (such as Dial) and clean washcloth.  Dry with a clean towel and dress in clean clothing.  • Ask your surgeon if you will be receiving antibiotics prior to surgery.  • Make sure you, your family, and all healthcare providers clean their hands with soap and water or an alcohol based hand  before caring for you or your wound.    Day of surgery:  Your arrival time is approximately two hours before your scheduled surgery time.  Upon arrival, a Pre-op nurse and Anesthesiologist will review your health history, obtain vital signs, and answer questions you may have.  The only belongings needed at this time will be a list of your home medications and if applicable your C-PAP/BI-PAP machine.  If you are staying overnight your family can leave the rest of your belongings in the car and bring them to your room later.  A Pre-op nurse will start an IV and you may receive medication in preparation for surgery, including something to help you relax.  Your family will be able to see you in the Pre-op area.  Two visitors at a time will be allowed in the Pre-op room.  While you are in surgery your family should notify the waiting room  if they leave the waiting room area and provide a contact phone number.    Please be aware that surgery does come with discomfort.  We want to make every effort to control your discomfort so please discuss any uncontrolled symptoms with your nurse.   Your doctor will most likely have prescribed pain medications.      If you are going home after surgery you will receive individualized written care instructions before being discharged.  A responsible adult must drive you to and from the hospital on the day of your surgery and stay with you for 24 hours.        You have received a list of surgical assistants for your reference.  If you  have any questions please call Pre-Admission Testing at [phone].  Deductibles and co-payments are collected on the day of service. Please be prepared to pay the required co-pay, deductible or deposit on the day of service as defined by your plan.

## 2021-04-08 NOTE — DIETITIAN NUTRITION RISK NOTIFICATION - ADDITIONAL COMMENTS/DIETITIAN RECOMMENDATIONS
Recommend liberalize diet to low sodium to allow for more options. Recommend Liquid Protein Supplement x 2 packets daily (100cal, 15 Gm Prot per packet).

## 2021-04-08 NOTE — DIETITIAN INITIAL EVALUATION ADULT. - PROBLEM SELECTOR PLAN 2
Continue with [id number] QD for rate control   Continue with Eliquis 2.5BID for anticoagulation   No need to stop Eliquis as per Dr. Lai   continue with home dose Amio 200 daily   Monitor on tele

## 2021-04-08 NOTE — DIETITIAN INITIAL EVALUATION ADULT. - FACTORS AFF FOOD INTAKE
reports some stomach discomfort this morning after drinking cold milk, states she feels it was too early to have a cold beverage; reports she has been eating some of her meals in house better than others-noted flow sheets indicate pt was eating 40-80% of her meals in house; reports no chewing/swallowing issues; reports last BM 4/8, noted pt previously constipated, states she feels she may have a BM later today

## 2021-04-08 NOTE — PROGRESS NOTE ADULT - ASSESSMENT
89F with PMHx of  asthma, HTN, HLD, TIA, severe aortic stenosis, CAD s/p stents LAD and Diag, 2009 s/p 2 stent LAD in 9/2018, symptomatic Pafib on Eliquis pw SOB MOULTON and chest pain   Admitted for TAVR work up   Hyperkalemia(but had hemolysis)    1 Renal-   CT SCAN hopefully today.     NO RENAL OBJECTION TO PROCEED  Trend the serum creatinine--Creatinine is all hemodynamic      2 CVS-  BP acceptable  present    3 Anemia- Retacrit 10000 x 1 in am     4  Misc-Heparin gtt       Sayed Iperia  [phone]   89F with PMHx of  asthma, HTN, HLD, TIA, severe aortic stenosis, CAD s/p stents LAD and Diag, 2009 s/p 2 stent LAD in 9/2018, symptomatic Pafib on Eliquis pw SOB MOULTON and chest pain   Admitted for TAVR work up   Hyperkalemia(but had hemolysis)    1 Renal-   CT SCAN done and SHD to assess gradients   Trend the serum creatinine--Creatinine is all hemodynamic    SP lasix yesterday     2 CVS-  BP acceptable  present  Tentatively sched for next friday but may be done sooner depending on sx     3 Anemia- Retacrit 10000 x 1 in am      4  Misc-Heparin gtt       Sayed Serious Parody  [phone]

## 2021-04-08 NOTE — PROGRESS NOTE ADULT - SUBJECTIVE AND OBJECTIVE BOX
Structural Heart Team    Ms Barreto is complaining of abdominal distension this morning.  She had a bowel movement yesterday and feels as though she needs to have another.  She denies chest pain/pressure, sob and dizziness and is comfortable on room air.  There were no acute events overnight.  She reportedly is only able to walk a short distance without stopping with sob.        REVIEW OF SYSTEMS:    CONSTITUTIONAL: No weakness, fevers or chills  EYES/ENT: No visual changes;  No vertigo or throat pain   NECK: No pain or stiffness  RESPIRATORY: No cough, wheezing, hemoptysis; No shortness of breath  CARDIOVASCULAR: No chest pain or palpitations  GASTROINTESTINAL: No abdominal or epigastric pain. No nausea, vomiting, or hematemesis; No diarrhea or constipation. No melena or hematochezia.  GENITOURINARY: No dysuria, frequency or hematuria  NEUROLOGICAL: No numbness or weakness  SKIN: No itching, rashes      Allergies    No Known Allergies    Intolerances      Vital Signs Last 24 Hrs  T(C): 36.4 (08 Apr 2021 11:09), Max: 36.8 (07 Apr 2021 20:23)  T(F): 97.5 (08 Apr 2021 11:09), Max: 98.2 (07 Apr 2021 20:23)  HR: 60 (08 Apr 2021 11:09) (60 - 76)  BP: 122/68 (08 Apr 2021 11:09) (121/63 - 136/79)  BP(mean): 98 (08 Apr 2021 05:41) (82 - 98)  RR: 18 (08 Apr 2021 11:09) (18 - 18)  SpO2: 94% (08 Apr 2021 11:09) (94% - 99%)    MEDICATIONS  (STANDING):  aMIOdarone    Tablet 200 milliGRAM(s) Oral daily  amLODIPine   Tablet 10 milliGRAM(s) Oral daily  aspirin enteric coated 81 milliGRAM(s) Oral daily  atorvastatin 20 milliGRAM(s) Oral at bedtime  ferrous    sulfate 325 milliGRAM(s) Oral daily  heparin  Infusion 1000 Unit(s)/Hr (6 mL/Hr) IV Continuous <Continuous>  metoprolol succinate ER 50 milliGRAM(s) Oral daily  polyethylene glycol 3350 17 Gram(s) Oral once  senna 2 Tablet(s) Oral at bedtime  sodium chloride 0.9% lock flush 3 milliLiter(s) IV Push every 8 hours      Exam-  General: NAD, WDWN, appropriate affect  Cor: s1s2, RRR, II/VI systolic murmur   Tele: Apaced, SR 60    Pulm: Clear, no wheezes, rales, or rhonchi, no use of accessory muscles  Gastrointestinal: soft, nontender, nondistended, +bowel sounds  Extremities: no edema, 1+ DP pulses b/l  Neuro: A&Ox3, nonfocal                          10.0   5.74  )-----------( 165      ( 08 Apr 2021 05:49 )             31.5   04-08    134<L>  |  97  |  34<H>  ----------------------------<  114<H>  4.0   |  23  |  1.61<H>    Ca    10.0      08 Apr 2021 05:49    TPro  6.3  /  Alb  3.9  /  TBili  0.7  /  DBili  x   /  AST  34  /  ALT  32  /  AlkPhos  88  04-08  PT/INR - ( 07 Apr 2021 07:09 )   PT: 11.4 sec;   INR: 0.95 ratio         PTT - ( 08 Apr 2021 05:49 )  PTT:76.5 sec  I&O's Summary    07 Apr 2021 07:01  -  08 Apr 2021 07:00  --------------------------------------------------------  IN: 849 mL / OUT: 2150 mL / NET: -1301 mL    08 Apr 2021 07:01  -  08 Apr 2021 11:50  --------------------------------------------------------  IN: 612 mL / OUT: 200 mL / NET: 412 mL              Assessment/Plan:  Ms Barreto is a 89 y/o Mandarin Speaking female, admitted with symptomatic severe Aortic Stenosis  - CTA done yesterday, will review measurements when available  - creatinine up to 1.6 today after contrast yesterday  - monitor creatinine, renal recs  - scheduled for TAVR next Friday (April 16, 2021)  - though pt would ideally be discharged and return next week for TAVR, she is reportedly having severe dyspnea with minimal exertion and her kidney function needs to be monitored.  Will continue inpt management for now.    LORY Cam  [phone]

## 2021-04-08 NOTE — DIETITIAN INITIAL EVALUATION ADULT. - OTHER INFO
Pt reports UBW of about 120-130 pounds previously, states since the last half of last year, her wt has been around 115 pounds, noted admit wt of about 110 pounds and most recent wt of 106.7 pounds earlier today. Pt unable to report whether wt loss has been recently or over the last 9 months.     Reports she would prefer to consume warm foods for lunch and dinner, provided pt c menu as well.     Pt agreeable to taking liquid protein supplement at this time since she did not want to have a shake-like supplement such as Ensure. Pt reports UBW of about 120-130 pounds previously, states since the last half of last year, her wt has been around 115 pounds, noted admit wt of about 110 pounds and most recent wt of 106.7 pounds earlier today. Pt unable to report whether wt loss has been recently or over the last 9 months.     Reports she would prefer to consume warm foods for lunch and dinner, provided pt c menu as well.     Pt agreeable to taking liquid protein supplement at this time since she did not want to have a shake-like supplement such as Ensure.    Pt spent the first 20 minutes of our conversation discussing other issues she had, team was able to visit during interview and did answer patient's questions.

## 2021-04-09 NOTE — PROGRESS NOTE ADULT - SUBJECTIVE AND OBJECTIVE BOX
[facility] [phone]        Patient seen and examined in bed,  She was not SOB this am  On heparin gtt at present         MEDICATIONS  (STANDING):  aMIOdarone    Tablet 200 milliGRAM(s) Oral daily  amLODIPine   Tablet 10 milliGRAM(s) Oral daily  aspirin enteric coated 81 milliGRAM(s) Oral daily  atorvastatin 20 milliGRAM(s) Oral at bedtime  ferrous    sulfate 325 milliGRAM(s) Oral daily  heparin  Infusion 1000 Unit(s)/Hr (6 mL/Hr) IV Continuous <Continuous>  metoprolol succinate ER 50 milliGRAM(s) Oral daily  senna 2 Tablet(s) Oral at bedtime  sodium chloride 0.9% lock flush 3 milliLiter(s) IV Push every 8 hours      VITAL:  T(C): , Max: 36.6 (04-08-21 @ 20:13)  T(F): , Max: 97.8 (04-08-21 @ 20:13)  HR: 60 (04-09-21 @ 11:40)  BP: 135/76 (04-09-21 @ 05:14)  BP(mean): 96 (04-09-21 @ 05:14)  RR: 18 (04-09-21 @ 11:40)  SpO2: 96% (04-09-21 @ 11:40)  Wt(kg): --    I and O's:    04-08 @ 07:01  -  04-09 @ 07:00  --------------------------------------------------------  IN: 1142 mL / OUT: 900 mL / NET: 242 mL    04-09 @ 07:01  -  04-09 @ 11:42  --------------------------------------------------------  IN: 360 mL / OUT: 400 mL / NET: -40 mL          PHYSICAL EXAM:    Constitutional: NAD  Neck:  No JVD  Respiratory: CTAB/L  Cardiovascular: S1 and S2  Gastrointestinal: BS+, soft, NT/ND  Extremities: No peripheral edema  Neurological: A/O x 3, no focal deficits  Psychiatric: Normal mood, normal affect  : No Guzman  Skin: No rashes  Access: Not applicable    LABS:                        9.3    7.10  )-----------( 167      ( 09 Apr 2021 05:34 )             28.1     04-09    130<L>  |  95<L>  |  36<H>  ----------------------------<  112<H>  4.2   |  25  |  1.67<H>    Ca    9.9      09 Apr 2021 05:34    TPro  6.2  /  Alb  3.8  /  TBili  0.8  /  DBili  x   /  AST  26  /  ALT  29  /  AlkPhos  88  04-09          Urine Studies:          RADIOLOGY & ADDITIONAL STUDIES:

## 2021-04-09 NOTE — PROGRESS NOTE ADULT - PROBLEM SELECTOR PLAN 1
Preop TAVR evaluation   Plan for Cardiac CT Angio   Cardiac cath completed  Continue with ASA 81, Ator20  TAVR next friday Preop TAVR evaluation   Continue with ASA 81, Ator20  TAVR next friday 4/16 as per Dr. Lai

## 2021-04-09 NOTE — PROGRESS NOTE ADULT - ASSESSMENT
89F with PMHx of  asthma, HTN, HLD, TIA, severe aortic stenosis, CAD s/p stents LAD and Diag, 2009 s/p 2 stent LAD in 9/2018, symptomatic Pafib on Eliquis pw SOB MOULTON and chest pain   Admitted for TAVR work up   Hyperkalemia(but had hemolysis)    1 Renal-   CT SCAN done and SHD to assess gradients   Trend the serum creatinine--Creatinine is all hemodynamic    SP lasix 2 days ago and no need for additional doses today     2 CVS-  BP acceptable  present  Tentatively sched for next friday and she will stay till the surgery     3 Anemia- Retacrit 10000 x 1 today       4  Misc-Heparin gtt       Sayed Skilljar  [phone]

## 2021-04-09 NOTE — PROGRESS NOTE ADULT - SUBJECTIVE AND OBJECTIVE BOX
*****Structural Heart Team*****    Subjective:    The patient is resting comfortably in bed, c/o abdominal tightness but states her breathing feels a little better today. She remains on oxygen via nasal cannula.    PAST MEDICAL & SURGICAL HISTORY:  HTN (hypertension)    CAD (coronary artery disease)  coronary stents x 2, 2007    HLD (hyperlipidemia)    Asthma    PAF (paroxysmal atrial fibrillation)    Scoliosis    History of cholecystectomy    H/O tubal ligation    History of bilateral knee replacement    History of cataract surgery  bilateral    S/P cholecystectomy          T(C): 36.5 (04-09-21 @ 05:14), Max: 36.6 (04-08-21 @ 20:13)  HR: 64 (04-09-21 @ 05:14) (60 - 72)  BP: 135/76 (04-09-21 @ 05:14) (122/68 - 135/76)  RR: 18 (04-09-21 @ 05:14) (18 - 18)  SpO2: 93% (04-09-21 @ 05:14) (93% - 97%)  Wt(kg): --  04-08 @ 07:01  -  04-09 @ 07:00  --------------------------------------------------------  IN: 1142 mL / OUT: 900 mL / NET: 242 mL    04-09 @ 07:01  -  04-09 @ 09:22  --------------------------------------------------------  IN: 360 mL / OUT: 200 mL / NET: 160 mL      MEDICATIONS  (STANDING):  aMIOdarone    Tablet 200 milliGRAM(s) Oral daily  amLODIPine   Tablet 10 milliGRAM(s) Oral daily  aspirin enteric coated 81 milliGRAM(s) Oral daily  atorvastatin 20 milliGRAM(s) Oral at bedtime  ferrous    sulfate 325 milliGRAM(s) Oral daily  heparin  Infusion 1000 Unit(s)/Hr (6 mL/Hr) IV Continuous <Continuous>  metoprolol succinate ER 50 milliGRAM(s) Oral daily  senna 2 Tablet(s) Oral at bedtime  sodium chloride 0.9% lock flush 3 milliLiter(s) IV Push every 8 hours    MEDICATIONS  (PRN):      Review of Symptoms:  Constitutional: Awake, Alert, Follows commands  Respiratory: + MOULTON  Cardiac: Denies CP, Denies Palpitations  Gastrointestinal: Denies Pain, Denies N/V, tolerating po intake  Vascular: Negative  Extremities: No Edema, No joint pain or swelling  Neurological: Negative  Endocrine: No heat or cold intolerance, No excessive thirst  Heme/Onc: Negative    Exam:  General: A/Ox3, ROBERT, NAD  HEENT: Supple, No JVD, Trachea midline, no masses  Pulmonary: CTAB, = Chest Excursion, no accessory muscle use  Cor: S1S2, III/VI YIMI, No rub  ECG: SR  Gastrointestinal: Soft, NT/ND, + Bowel Sounds  Neuro: = motor and sensory B/L, No focal deficits  Vascular: 1+ Pulses, No Edema Noted  Extremities: No joint pain or swelling  Skin: Warm/Dry/Normal color, Normal turgor, no rashes                          9.3    7.10  )-----------( 167      ( 09 Apr 2021 05:34 )             28.1   04-09    130<L>  |  95<L>  |  36<H>  ----------------------------<  112<H>  4.2   |  25  |  1.67<H>    Ca    9.9      09 Apr 2021 05:34    TPro  6.2  /  Alb  3.8  /  TBili  0.8  /  DBili  x   /  AST  26  /  ALT  29  /  AlkPhos  88  04-09  PTT - ( 09 Apr 2021 05:34 )  PTT:65.9 sec    Imaging Reviewed:          Assesment/Plan:    89 Female with Severe Aortic Stenosis, Chronic Diastolic Heart Failure, Acute on Chronic Kidney injury  1.) Severe Aortic Stenosis: Patient's work up is complete for TAVR. She states she is continually SOB, so after discussing with Dr. Lai, will keep the patient in house until her scheduled procedure next Friday.  2.) Chronic Diastolic Heart Failure: Monitor I and O's. Daily weight. Patient is euvolemic, no indication for diuresis, and would not start in light of renal dysfunction.  3.) Acute on Chronic Renal Injury: Continue to monitor Creatinine, which continues to slowly rise.  4.) Constipation: Patient on stool softeners. If still no BM, Consider laxative.  5.) Ambulate patient.  LORY Miranda  [phone]

## 2021-04-09 NOTE — PROGRESS NOTE ADULT - ASSESSMENT
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 Patient to be admitted to CTS under Dr. Lai for pre TAVR workup.       3/31 VSS; O2 sat 96% on RA, CXR: small R and Trace L pleural Effusion. Non-edematous. EKG normal, Trops 21.   4/1 HD stable.  Pt had eliquis this am--hold for now and start hep gtt for cardiac cath.  CTA delayed by department hopefully will get done tomorrow  4/2 VSS   PTT elevated heparin gtt held . repeat PTT. creatinine elevated 1.62 Dr Daigle consulted - nephrology  OK for CTA- gentle hydration  post CTA.   4/3 Pending CTA this Monday. Compression dressing to right forearm  4/4 H/H 8/27. Will continue to trend. T&C sent . Anticipate CTA this Monday and coronary cath this week.  4/5 dx cath right femoral groin access . IVF per and post procedure. trend creat. Right forearm hematoma stable. Reinstate heparin gtt  for afib at 1800  4/6 VSS  PTT 61 needs CTA creatinine 1.40 this am. Followed by Nephrology   4/7 VSS Creatine improved to 1.26 this am. Plan for CTA today. Plan to discharge home tomorrow then come back for TAVR.   4/8 VSS; Belly discomfort/distention. Soft, slightly distended, No tenderness on palpation. BM yesterday, no BM today. +Miralax -will continue to monitor. CR 1.6 this am. CTA completed yesterday, pending results. As per Family request and given inability to ambulate short distance without SOB patient will stay for medical optimization till next friday for her TAVR.  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 Patient to be admitted to CTS under Dr. Lai for pre TAVR workup.       3/31 VSS; O2 sat 96% on RA, CXR: small R and Trace L pleural Effusion. Non-edematous. EKG normal, Trops 21.   4/1 HD stable.  Pt had eliquis this am--hold for now and start hep gtt for cardiac cath.  CTA delayed by department hopefully will get done tomorrow  4/2 VSS   PTT elevated heparin gtt held . repeat PTT. creatinine elevated 1.62 Dr Daigle consulted - nephrology  OK for CTA- gentle hydration  post CTA.   4/3 Pending CTA this Monday. Compression dressing to right forearm  4/4 H/H 8/27. Will continue to trend. T&C sent . Anticipate CTA this Monday and coronary cath this week.  4/5 dx cath right femoral groin access . IVF per and post procedure. trend creat. Right forearm hematoma stable. Reinstate heparin gtt  for afib at 1800  4/6 VSS  PTT 61 needs CTA creatinine 1.40 this am. Followed by Nephrology   4/7 VSS Creatine improved to 1.26 this am. Plan for CTA today. Plan to discharge home tomorrow then come back for TAVR.   4/8 VSS; Belly discomfort/distention. Soft, slightly distended, No tenderness on palpation. BM yesterday, no BM today. +Miralax -will continue to monitor. CR 1.6 this am. CTA completed yesterday, pending results. As per Family request and given inability to ambulate short distance without SOB patient will stay for medical optimization till next friday for her TAVR.   4/9 + othrostasis noted; pt asymptomatic; 250 ns bolus given and norvasc d/c; continue to monitor closely; ck ua/ urine cx    TAVR 4/16 as per Dr. Lai

## 2021-04-09 NOTE — PROGRESS NOTE ADULT - SUBJECTIVE AND OBJECTIVE BOX
VITAL SIGNS    Telemetry:    Vital Signs Last 24 Hrs  T(C): 36.5 (21 @ 05:14), Max: 36.6 (21 @ 20:13)  T(F): 97.7 (21 @ 05:14), Max: 97.8 (21 @ 20:13)  HR: 64 (21 @ 05:14) (60 - 72)  BP: 135/76 (21 @ 05:14) (122/68 - 135/76)  RR: 18 (21 @ 05:14) (18 - 18)  SpO2: 93% (21 @ 05:14) (93% - 97%)             @ 07:01  -   @ 07:00  --------------------------------------------------------  IN: 1142 mL / OUT: 900 mL / NET: 242 mL       Daily     Daily Weight in k.4 (2021 08:28)  Admit Wt: Drug Dosing Weight  Height (cm): 157.5 (2021 11:17)  Weight (kg): 49 (2021 11:17)  BMI (kg/m2): 19.8 (2021 11:17)  BSA (m2): 1.47 (2021 11:17)    Bilirubin Total, Serum: 0.8 mg/dL ( @ 05:34)    CAPILLARY BLOOD GLUCOSE              aMIOdarone    Tablet 200 milliGRAM(s) Oral daily  amLODIPine   Tablet 10 milliGRAM(s) Oral daily  aspirin enteric coated 81 milliGRAM(s) Oral daily  atorvastatin 20 milliGRAM(s) Oral at bedtime  ferrous    sulfate 325 milliGRAM(s) Oral daily  heparin  Infusion 1000 Unit(s)/Hr IV Continuous <Continuous>  metoprolol succinate ER 50 milliGRAM(s) Oral daily  senna 2 Tablet(s) Oral at bedtime  sodium chloride 0.9% lock flush 3 milliLiter(s) IV Push every 8 hours      PHYSICAL EXAM    Subjective: "Hi.   Neurology: alert and oriented x 3, nonfocal, no gross deficits  CV : tele:  RSR  Sternal Wound :  CDI with dressing , Stable  Lungs: clear. RR easy, unlabored   Abdomen: soft, nontender, nondistended, positive bowel sounds, bowel movement   Neg N/V/D   :  pt voiding without difficulty   Extremities:   CABRERA; edema, neg calf tenderness.   PPP bilaterally      PW:  Chest tubes:                 VITAL SIGNS    Telemetry:  A.paced 60   Vital Signs Last 24 Hrs  T(C): 36.5 (21 @ 05:14), Max: 36.6 (21 @ 20:13)  T(F): 97.7 (21 @ 05:14), Max: 97.8 (21 @ 20:13)  HR: 64 (21 @ 05:14) (60 - 72)  BP: 135/76 (21 @ 05:14) (122/68 - 135/76)  RR: 18 (21 @ 05:14) (18 - 18)  SpO2: 93% (21 @ 05:14) (93% - 97%)             @ 07:01  -   @ 07:00  --------------------------------------------------------  IN: 1142 mL / OUT: 900 mL / NET: 242 mL       Daily     Daily Weight in k.4 (2021 08:28)  Admit Wt: Drug Dosing Weight  Height (cm): 157.5 (2021 11:17)  Weight (kg): 49 (2021 11:17)  BMI (kg/m2): 19.8 (2021 11:17)  BSA (m2): 1.47 (2021 11:17)    Bilirubin Total, Serum: 0.8 mg/dL ( @ 05:34)         aMIOdarone    Tablet 200 milliGRAM(s) Oral daily  amLODIPine   Tablet 10 milliGRAM(s) Oral daily  aspirin enteric coated 81 milliGRAM(s) Oral daily  atorvastatin 20 milliGRAM(s) Oral at bedtime  ferrous    sulfate 325 milliGRAM(s) Oral daily  heparin  Infusion 1000 Unit(s)/Hr IV Continuous <Continuous>  metoprolol succinate ER 50 milliGRAM(s) Oral daily  senna 2 Tablet(s) Oral at bedtime  sodium chloride 0.9% lock flush 3 milliLiter(s) IV Push every 8 hours      PHYSICAL EXAM    Subjective: "Hi.   Neurology: alert and oriented x 3, nonfocal, no gross deficits  CV : tele:  A. Paced 60   Lungs: clear. RR easy, unlabored   Abdomen: soft, nontender, nondistended, positive bowel sounds, bowel movement   Neg N/V/D   :  pt voiding without difficulty   Extremities:   CABRERA; edema, neg calf tenderness.   PPP bilaterally                   VITAL SIGNS    Telemetry:  A.paced 60   Vital Signs Last 24 Hrs  T(C): 36.5 (21 @ 05:14), Max: 36.6 (21 @ 20:13)  T(F): 97.7 (21 @ 05:14), Max: 97.8 (21 @ 20:13)  HR: 64 (21 @ 05:14) (60 - 72)  BP: 135/76 (21 @ 05:14) (122/68 - 135/76)  RR: 18 (21 @ 05:14) (18 - 18)  SpO2: 93% (21 @ 05:14) (93% - 97%)             @ 07:01  -   @ 07:00  --------------------------------------------------------  IN: 1142 mL / OUT: 900 mL / NET: 242 mL       Daily     Daily Weight in k.4 (2021 08:28)  Admit Wt: Drug Dosing Weight  Height (cm): 157.5 (2021 11:17)  Weight (kg): 49 (2021 11:17)  BMI (kg/m2): 19.8 (2021 11:17)  BSA (m2): 1.47 (2021 11:17)    Bilirubin Total, Serum: 0.8 mg/dL ( @ 05:34)         aMIOdarone    Tablet 200 milliGRAM(s) Oral daily  amLODIPine   Tablet 10 milliGRAM(s) Oral daily  aspirin enteric coated 81 milliGRAM(s) Oral daily  atorvastatin 20 milliGRAM(s) Oral at bedtime  ferrous    sulfate 325 milliGRAM(s) Oral daily  heparin  Infusion 1000 Unit(s)/Hr IV Continuous <Continuous>  metoprolol succinate ER 50 milliGRAM(s) Oral daily  senna 2 Tablet(s) Oral at bedtime  sodium chloride 0.9% lock flush 3 milliLiter(s) IV Push every 8 hours      PHYSICAL EXAM    Subjective: "I feel tired."   Neurology: alert and oriented x 3, nonfocal, no gross deficits  CV : tele:  A. Paced 60 ; + murmur   Lungs: clear diminished at the bases.  RR easy, unlabored   Abdomen: soft, nontender, nondistended, positive bowel sounds, + bowel movement   Neg N/V/D   :  pt voiding without difficulty   Extremities:   CABRERA; neg LE edema, neg calf tenderness.   PPP bilaterally

## 2021-04-09 NOTE — PROGRESS NOTE ADULT - PROBLEM SELECTOR PLAN 2
Continue with Toprol 50xl QD for rate control   Eliquis on hold   Hep Gtt started--monitor PTT  Continue with home dose Amio 200 daily   Monitor on tele Continue with Toprol 50xl QD for rate control   Eliquis on hold   Hep Gtt monitor PTT  Continue with home dose Amio 200 daily   continue tele

## 2021-04-10 NOTE — PROGRESS NOTE ADULT - ASSESSMENT
OOB to chair. On nasal cannula; SOB on exertion but much improved now   orthostatic positive    aluminum hydroxide/magnesium hydroxide/simethicone Suspension 30 milliLiter(s) Oral every 4 hours PRN  aMIOdarone    Tablet 200 milliGRAM(s) Oral daily  aspirin enteric coated 81 milliGRAM(s) Oral daily  atorvastatin 20 milliGRAM(s) Oral at bedtime  ferrous    sulfate 325 milliGRAM(s) Oral daily  heparin  Infusion 1000 Unit(s)/Hr IV Continuous <Continuous>  polyethylene glycol 3350 17 Gram(s) Oral daily  senna 2 Tablet(s) Oral at bedtime  sodium chloride 0.9% lock flush 3 milliLiter(s) IV Push every 8 hours      VITAL:  T(C): , Max: 36.7 (21 @ 19:05)  T(F): , Max: 98.1 (21 @ 19:05)  HR: 60 (04-10-21 @ 12:58)  BP: 145/76 (04-10-21 @ 12:58)  BP(mean): 89 (04-10-21 @ 05:22)  RR: 18 (04-10-21 @ 12:58)  SpO2: 98% (04-10-21 @ 12:58)  Wt(kg): --    21 @ 07:01  -  04-10-21 @ 07:00  --------------------------------------------------------  IN: 1346 mL / OUT: 1875 mL / NET: -529 mL    04-10-21 @ 07:01  -  04-10-21 @ 16:57  --------------------------------------------------------  IN: 828 mL / OUT: 550 mL / NET: 278 mL        PHYSICAL EXAM:  Constitutional: NAD  Neck:  No JVD  Respiratory: CTAB/L  Cardiovascular: S1 and S2  Gastrointestinal: BS+, soft, NT/ND  Extremities: No peripheral edema  Neurological: A/O x 3, no focal deficits  Psychiatric: Normal mood, normal affect  : No Guzman  Skin: No rashes  Access: Not applicable      LABS:                          9.4    7.47  )-----------( 162      ( 10 Apr 2021 05:34 )             28.8     Na(129)/K(4.6)/Cl(94)/HCO3(22)/BUN(36)/Cr(1.86)Glu(111)/Ca(9.4)/Mg(--)/PO4(--)    04-10 @ 05:34  Na(130)/K(4.2)/Cl(95)/HCO3(25)/BUN(36)/Cr(1.67)Glu(112)/Ca(9.9)/Mg(--)/PO4(--)     @ 05:34  Na(134)/K(4.0)/Cl(97)/HCO3(23)/BUN(34)/Cr(1.61)Glu(114)/Ca(10.0)/Mg(--)/PO4(--)     @ 05:49    Urinalysis Basic - ( 2021 11:45 )    Color: Yellow / Appearance: Clear / S.022 / pH: x  Gluc: x / Ketone: Negative  / Bili: Negative / Urobili: Negative   Blood: x / Protein: 30 mg/dL / Nitrite: Negative   Leuk Esterase: Negative / RBC: 2 /hpf / WBC 3 /HPF   Sq Epi: x / Non Sq Epi: 2 /hpf / Bacteria: Negative      Imaging      EXAM:  XR ABDOMEN 1 VIEW                            PROCEDURE DATE:  04/10/2021        INTERPRETATION:  DATE OF EXAM: 4/10/21    COMPARISON: None.    CLINICAL INDICATION: Preop TAVR. R/O obstruction .    TECHNIQUE: 1 flat abdominal film.    FINDINGS:  L3-L5 lumbar spinal fusion hardware in situ.  Cholecystectomy clips in the right upper quadrant.  Nonspecific, nonobstructive bowel gas pattern.  No gross free intraperitoneal air.  Bilateral hip osteoarthritic changes.    IMPRESSION:  Nonspecific, nonobstructive bowel gas pattern.        ASSESSMENT/PLAN  89F with PMHx of  asthma, HTN, HLD, TIA, severe aortic stenosis, CAD s/p stents LAD and Diag, 2009 s/p 2 stent LAD in 2018, symptomatic Pafib on Eliquis pw SOB MOULTON and chest pain   Admitted for TAVR work up   Hyperkalemia(but had hemolysis)    1 Renal- Scr trending up  relative to yesterday  Trend the serum creatinine--Creatinine is all hemodynamic    SP lasix 2 days ago and no need for additional doses today     2 Hyponatremia-   2 Hyperkalemia resolving/ resolved now    3 CVS- orthostatic earlier on this AM. given 250 saline bolus;    TAVR Tentatively sched for next friday and she will stay till the surgery     4 Anemia- hgb improving slightlt s/p Retacrit 10000 x 1 yesterday     5  Misc-Heparin gtt       Dominick Ross NP-BC  [facility]  [phone]   OOB to chair. On nasal cannula; SOB on exertion but much improved now   orthostatic positive    aluminum hydroxide/magnesium hydroxide/simethicone Suspension 30 milliLiter(s) Oral every 4 hours PRN  aMIOdarone    Tablet 200 milliGRAM(s) Oral daily  aspirin enteric coated 81 milliGRAM(s) Oral daily  atorvastatin 20 milliGRAM(s) Oral at bedtime  ferrous    sulfate 325 milliGRAM(s) Oral daily  heparin  Infusion 1000 Unit(s)/Hr IV Continuous <Continuous>  polyethylene glycol 3350 17 Gram(s) Oral daily  senna 2 Tablet(s) Oral at bedtime  sodium chloride 0.9% lock flush 3 milliLiter(s) IV Push every 8 hours      VITAL:  T(C): , Max: 36.7 (21 @ 19:05)  T(F): , Max: 98.1 (21 @ 19:05)  HR: 60 (04-10-21 @ 12:58)  BP: 145/76 (04-10-21 @ 12:58)  BP(mean): 89 (04-10-21 @ 05:22)  RR: 18 (04-10-21 @ 12:58)  SpO2: 98% (04-10-21 @ 12:58)  Wt(kg): --    21 @ 07:01  -  04-10-21 @ 07:00  --------------------------------------------------------  IN: 1346 mL / OUT: 1875 mL / NET: -529 mL    04-10-21 @ 07:01  -  04-10-21 @ 16:57  --------------------------------------------------------  IN: 828 mL / OUT: 550 mL / NET: 278 mL        PHYSICAL EXAM:  Constitutional: NAD  Neck:  No JVD  Respiratory: CTAB/L  Cardiovascular: S1 and S2  Gastrointestinal: BS+, soft, NT/ND  Extremities: No peripheral edema  Neurological: A/O x 3, no focal deficits  Psychiatric: Normal mood, normal affect  : No Guzman  Skin: No rashes  Access: Not applicable      LABS:                          9.4    7.47  )-----------( 162      ( 10 Apr 2021 05:34 )             28.8     Na(129)/K(4.6)/Cl(94)/HCO3(22)/BUN(36)/Cr(1.86)Glu(111)/Ca(9.4)/Mg(--)/PO4(--)    04-10 @ 05:34  Na(130)/K(4.2)/Cl(95)/HCO3(25)/BUN(36)/Cr(1.67)Glu(112)/Ca(9.9)/Mg(--)/PO4(--)     @ 05:34  Na(134)/K(4.0)/Cl(97)/HCO3(23)/BUN(34)/Cr(1.61)Glu(114)/Ca(10.0)/Mg(--)/PO4(--)     @ 05:49    Urinalysis Basic - ( 2021 11:45 )    Color: Yellow / Appearance: Clear / S.022 / pH: x  Gluc: x / Ketone: Negative  / Bili: Negative / Urobili: Negative   Blood: x / Protein: 30 mg/dL / Nitrite: Negative   Leuk Esterase: Negative / RBC: 2 /hpf / WBC 3 /HPF   Sq Epi: x / Non Sq Epi: 2 /hpf / Bacteria: Negative      Imaging      EXAM:  XR ABDOMEN 1 VIEW                            PROCEDURE DATE:  04/10/2021        INTERPRETATION:  DATE OF EXAM: 4/10/21    COMPARISON: None.    CLINICAL INDICATION: Preop TAVR. R/O obstruction .    TECHNIQUE: 1 flat abdominal film.    FINDINGS:  L3-L5 lumbar spinal fusion hardware in situ.  Cholecystectomy clips in the right upper quadrant.  Nonspecific, nonobstructive bowel gas pattern.  No gross free intraperitoneal air.  Bilateral hip osteoarthritic changes.    IMPRESSION:  Nonspecific, nonobstructive bowel gas pattern.        ASSESSMENT/PLAN  89F with PMHx of  asthma, HTN, HLD, TIA, severe aortic stenosis, CAD s/p stents LAD and Diag, 2009 s/p 2 stent LAD in 2018, symptomatic Pafib on Eliquis pw SOB MOULTON and chest pain   Admitted for TAVR work up   Hyperkalemia(but had hemolysis)    1 Renal- Scr trending up  relative to yesterday  Trend the serum creatinine--Creatinine is all hemodynamic    SP lasix 2 days ago and no need for additional doses today     2 Hyponatremia- salt tabs 2gms po BID x 2 doses  2 Hyperkalemia resolving/ resolved now    3 CVS- orthostatic earlier on this AM. given 250 saline bolus;    TAVR Tentatively sched for next friday and she will stay till the surgery     4 Anemia- hgb improving slightlt s/p Retacrit 10000 x 1 yesterday     5  Misc-Heparin gtt       Dominick Ross, NP-BC  [facility] [employer]  [phone]

## 2021-04-10 NOTE — PROGRESS NOTE ADULT - SUBJECTIVE AND OBJECTIVE BOX
History of present illness  No acute events reported overnight.  The patient reports that she is feeling unwell this morning.  She feels like she is going to die due to shortness of breath that she notes at rest and upon exertion.  Continues to have abdominal discomfort and complains of abdominal bloating.  No chest pain/tightness/discomfort.  No fevers, chills or sweats.  Currently getting 250 cc normal saline bolus for orthostatic hypotension for which she was asymptomatic.    Review of systems  Review of systems otherwise unremarkable except described above in the history of present illness    Telemetry  A paced with rates in the 60s    Physical examination  No apparent distress, alert oriented 3, appropriate affect  Pupils equal round reactive to light, extraocular is intact, moist mucous membranes, no erythema exudate tonsillar hypertrophy JVD is not elevated, supple, no lymphadenopathy  Clear to auscultation bilaterally with no wheezing rhonchi crackles  Regular rate and rhythm with 3 out of 6 crescendo decrescendo murmur lies at the right upper sternal border radiating to the carotids  Positive bowel sound, soft, mildly distended, no masses guarding or rebound  No clubbing cyanosis or edema  Moving all extremities spontaneously  2+ DP/PT pulses  T(C): 36.3 (04-10-21 @ 12:58), Max: 36.7 (04-09-21 @ 19:05)  HR: 60 (04-10-21 @ 12:58) (60 - 66)  BP: 145/76 (04-10-21 @ 12:58) (112/59 - 145/76)  RR: 18 (04-10-21 @ 12:58) (18 - 18)  SpO2: 98% (04-10-21 @ 12:58) (97% - 99%)  Wt(kg): --  04-09 @ 07:01  -  04-10 @ 07:00  --------------------------------------------------------  IN: 1346 mL / OUT: 1875 mL / NET: -529 mL  04-10 @ 07:01  -  04-10 @ 13:00  --------------------------------------------------------  IN: 730 mL / OUT: 0 mL / NET: 730 mL      MEDICATIONS  (STANDING):  aMIOdarone    Tablet 200 milliGRAM(s) Oral daily  aspirin enteric coated 81 milliGRAM(s) Oral daily  atorvastatin 20 milliGRAM(s) Oral at bedtime  ferrous    sulfate 325 milliGRAM(s) Oral daily  heparin  Infusion 1000 Unit(s)/Hr (6 mL/Hr) IV Continuous <Continuous>  metoprolol succinate ER 50 milliGRAM(s) Oral daily  polyethylene glycol 3350 17 Gram(s) Oral daily  senna 2 Tablet(s) Oral at bedtime  sodium chloride 0.9% lock flush 3 milliLiter(s) IV Push every 8 hours    MEDICATIONS  (PRN):  aluminum hydroxide/magnesium hydroxide/simethicone Suspension 30 milliLiter(s) Oral every 4 hours PRN Dyspepsia      Home Medications:  acetaminophen 325 mg oral tablet: 2 tab(s) orally every 6 hours, As Needed (11 Apr 2019 13:13)  amiodarone 200 mg oral tablet: 1 tab(s) orally once a day (11 Apr 2019 13:48)  aspirin 81 mg oral delayed release tablet: 1 tab(s) orally once a day (11 Apr 2019 13:13)  atorvastatin 20 mg oral tablet: 1 tab(s) orally once a day (11 Apr 2019 13:13)  Eliquis 2.5 mg oral tablet: 1 tab(s) orally 2 times a day (09 Apr 2019 10:13)  ferrous sulfate 325 mg (65 mg elemental iron) oral tablet: 1 tab(s) orally once a day (09 Apr 2019 10:13)  Metoprolol Succinate ER 50 mg oral tablet, extended release: 1 tab(s) orally once a day (11 Apr 2019 13:13)  Nitrostat 0.4 mg sublingual tablet:  sublingual , As Needed - please continue your home dosing  (26 Aug 2016 11:56)                       9.4    7.47  )-----------( 162      ( 10 Apr 2021 05:34 )             28.8   04-10    129<L>  |  94<L>  |  36<H>  ----------------------------<  111<H>  4.6   |  22  |  1.86<H>    Ca    9.4      10 Apr 2021 05:34    TPro  6.2  /  Alb  3.8  /  TBili  0.8  /  DBili  x   /  AST  26  /  ALT  29  /  AlkPhos  88  04-09  PTT - ( 10 Apr 2021 05:34 )  PTT:79.8 sec    Assessment/plan  89 Female with Severe Aortic Stenosis, Chronic Diastolic Heart Failure, Acute on Chronic Kidney injury  --Due to unexplained abdominal discomfort and x-ray will be ordered.  Continue senna.  Started on Miralax.  --Patient appears dry on examination.  Will get 250cc of NS times two.  Daily orthostatics will be checked.  Closely monitor kidney function.  --Chest x-ray ordered for shortness of breath.  --Continue heparin drip with close monitoring for signs and symptoms of bleeding.  --Severe Aortic Stenosis: Patient's work up is complete for TAVR. She states she is continually SOB, so after discussing with Dr. Lai, will keep the patient in house until her scheduled procedure next Friday.  --Chronic Diastolic Heart Failure: Monitor I and O's. Daily weight. Patient is euvolemic, no indication for diuresis, and would not start in light of renal dysfunction.  --Acute on Chronic Renal Injury: Continue to monitor Creatinine, which continues to slowly rise.  --Out of bed to chair.  Daily PT.    All questions and concerns of the patient were addressed.    Findings and plan were discussed with cardiac surgery team/Dr. Lai and structural heart team.

## 2021-04-10 NOTE — PROGRESS NOTE ADULT - ASSESSMENT
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 Patient to be admitted to CTS under Dr. Lai for pre TAVR workup.       3/31 VSS; O2 sat 96% on RA, CXR: small R and Trace L pleural Effusion. Non-edematous. EKG normal, Trops 21.   4/1 HD stable.  Pt had eliquis this am--hold for now and start hep gtt for cardiac cath.  CTA delayed by department hopefully will get done tomorrow  4/2 VSS   PTT elevated heparin gtt held . repeat PTT. creatinine elevated 1.62 Dr Daigle consulted - nephrology  OK for CTA- gentle hydration  post CTA.   4/3 Pending CTA this Monday. Compression dressing to right forearm  4/4 H/H 8/27. Will continue to trend. T&C sent . Anticipate CTA this Monday and coronary cath this week.  4/5 dx cath right femoral groin access . IVF per and post procedure. trend creat. Right forearm hematoma stable. Reinstate heparin gtt  for afib at 1800  4/6 VSS  PTT 61 needs CTA creatinine 1.40 this am. Followed by Nephrology   4/7 VSS Creatine improved to 1.26 this am. Plan for CTA today. Plan to discharge home tomorrow then come back for TAVR.   4/8 VSS; Belly discomfort/distention. Soft, slightly distended, No tenderness on palpation. BM yesterday, no BM today. +Miralax -will continue to monitor. CR 1.6 this am. CTA completed yesterday, pending results. As per Family request and given inability to ambulate short distance without SOB patient will stay for medical optimization till next friday for her TAVR.   4/9 + othrostasis noted; pt asymptomatic; 250 ns bolus given and norvasc d/c; continue to monitor closely; ck ua/ urine cx    TAVR 4/16 as per Dr. 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 Patient to be admitted to CTS under Dr. Lai for pre TAVR workup.       3/31 VSS; O2 sat 96% on RA, CXR: small R and Trace L pleural Effusion. Non-edematous. EKG normal, Trops 21.   4/1 HD stable.  Pt had eliquis this am--hold for now and start hep gtt for cardiac cath.  CTA delayed by department hopefully will get done tomorrow  4/2 VSS   PTT elevated heparin gtt held . repeat PTT. creatinine elevated 1.62 Dr Daigle consulted - nephrology  OK for CTA- gentle hydration  post CTA.   4/3 Pending CTA this Monday. Compression dressing to right forearm  4/4 H/H 8/27. Will continue to trend. T&C sent . Anticipate CTA this Monday and coronary cath this week.  4/5 dx cath right femoral groin access . IVF per and post procedure. trend creat. Right forearm hematoma stable. Reinstate heparin gtt  for afib at 1800  4/6 VSS  PTT 61 needs CTA creatinine 1.40 this am. Followed by Nephrology   4/7 VSS Creatine improved to 1.26 this am. Plan for CTA today. Plan to discharge home tomorrow then come back for TAVR.   4/8 VSS; Belly discomfort/distention. Soft, slightly distended, No tenderness on palpation. BM yesterday, no BM today. +Miralax -will continue to monitor. CR 1.6 this am. CTA completed yesterday, pending results. As per Family request and given inability to ambulate short distance without SOB patient will stay for medical optimization till next friday for her TAVR.   4/9 + othrostasis noted; pt asymptomatic; 250 ns bolus given and norvasc d/c; continue to monitor closely; ck ua negative/ urine cx  4/10 + orthostasis but improving; IVF given- total 500 cc; ck chest xray/ abdominal xray as per Dr. Sommers   TAVR 4/16 as per Dr. Lai

## 2021-04-10 NOTE — PROGRESS NOTE ADULT - PROBLEM SELECTOR PLAN 3
+ othrostasis noted; pt asymptomatic; 250 ns bolus given and norvasc d/c + othrostasis noted; pt asymptomatic; 500 cc given  monitor VS  norvasc d/c 4/9  ck chest xray/ abdominal xray

## 2021-04-10 NOTE — PROGRESS NOTE ADULT - SUBJECTIVE AND OBJECTIVE BOX
VITAL SIGNS    Telemetry:    Vital Signs Last 24 Hrs  T(C): 36.4 (04-10-21 @ 05:22), Max: 36.7 (21 @ 19:05)  T(F): 97.5 (04-10-21 @ 05:22), Max: 98.1 (21 @ 19:05)  HR: 62 (04-10-21 @ 05:22) (60 - 66)  BP: 128/70 (04-10-21 @ 05:22) (112/59 - 128/70)  RR: 18 (04-10-21 @ 05:22) (18 - 18)  SpO2: 99% (04-10-21 @ 05:22) (96% - 99%)             @ 07:01  -  04-10 @ 07:00  --------------------------------------------------------  IN: 1346 mL / OUT: 1875 mL / NET: -529 mL    04-10 @ 07:01  -  04-10 @ 09:29  --------------------------------------------------------  IN: 18 mL / OUT: 0 mL / NET: 18 mL       Daily     Daily Weight in k.1 (10 Apr 2021 07:16)  Admit Wt: Drug Dosing Weight  Height (cm): 157.5 (2021 11:17)  Weight (kg): 49 (2021 11:17)  BMI (kg/m2): 19.8 (2021 11:17)  BSA (m2): 1.47 (2021 11:17)      CAPILLARY BLOOD GLUCOSE              aluminum hydroxide/magnesium hydroxide/simethicone Suspension 30 milliLiter(s) Oral every 4 hours PRN  aMIOdarone    Tablet 200 milliGRAM(s) Oral daily  aspirin enteric coated 81 milliGRAM(s) Oral daily  atorvastatin 20 milliGRAM(s) Oral at bedtime  ferrous    sulfate 325 milliGRAM(s) Oral daily  heparin  Infusion 1000 Unit(s)/Hr IV Continuous <Continuous>  metoprolol succinate ER 50 milliGRAM(s) Oral daily  senna 2 Tablet(s) Oral at bedtime  sodium chloride 0.9% lock flush 3 milliLiter(s) IV Push every 8 hours      PHYSICAL EXAM    Subjective: "Hi.   Neurology: alert and oriented x 3, nonfocal, no gross deficits  CV : tele:  RSR  Sternal Wound :  CDI with dressing , Stable  Lungs: clear. RR easy, unlabored   Abdomen: soft, nontender, nondistended, positive bowel sounds, bowel movement   Neg N/V/D   :  pt voiding without difficulty   Extremities:   CABRERA; edema, neg calf tenderness.   PPP bilaterally      PW:  Chest tubes:                 VITAL SIGNS    Telemetry:  a. paced 60-80   Vital Signs Last 24 Hrs  T(C): 36.4 (04-10-21 @ 05:22), Max: 36.7 (21 @ 19:05)  T(F): 97.5 (04-10-21 @ 05:22), Max: 98.1 (21 @ 19:05)  HR: 62 (04-10-21 @ 05:22) (60 - 66)  BP: 128/70 (04-10-21 @ 05:22) (112/59 - 128/70)  RR: 18 (04-10-21 @ 05:22) (18 - 18)  SpO2: 99% (04-10-21 @ 05:22) (96% - 99%)             @ 07:01  -  04-10 @ 07:00  --------------------------------------------------------  IN: 1346 mL / OUT: 1875 mL / NET: -529 mL    04-10 @ 07:01  -  04-10 @ 09:29  --------------------------------------------------------  IN: 18 mL / OUT: 0 mL / NET: 18 mL       Daily     Daily Weight in k.1 (10 Apr 2021 07:16)  Admit Wt: Drug Dosing Weight  Height (cm): 157.5 (2021 11:17)  Weight (kg): 49 (2021 11:17)  BMI (kg/m2): 19.8 (2021 11:17)  BSA (m2): 1.47 (2021 11:17)           aluminum hydroxide/magnesium hydroxide/simethicone Suspension 30 milliLiter(s) Oral every 4 hours PRN  aMIOdarone    Tablet 200 milliGRAM(s) Oral daily  aspirin enteric coated 81 milliGRAM(s) Oral daily  atorvastatin 20 milliGRAM(s) Oral at bedtime  ferrous    sulfate 325 milliGRAM(s) Oral daily  heparin  Infusion 1000 Unit(s)/Hr IV Continuous <Continuous>  metoprolol succinate ER 50 milliGRAM(s) Oral daily  senna 2 Tablet(s) Oral at bedtime  sodium chloride 0.9% lock flush 3 milliLiter(s) IV Push every 8 hours      PHYSICAL EXAM    Subjective: "I don't feel well."   Neurology: alert and oriented x 3, nonfocal, no gross deficits  CV : tele:  APaced 60- 80; + murmur   Lungs: clear. RR easy, unlabored   Abdomen: soft, nontender, nondistended, positive bowel sounds, neg bowel movement;  Neg N/V/D   :  pt voiding without difficulty   Extremities:   CABRERA; neg LE edema, neg calf tenderness.   PPP bilaterally

## 2021-04-10 NOTE — PROGRESS NOTE ADULT - PROBLEM SELECTOR PLAN 2
Continue with Toprol 50xl QD for rate control   Eliquis on hold   Hep Gtt monitor PTT  Continue with home dose Amio 200 daily   continue tele

## 2021-04-11 NOTE — PROGRESS NOTE ADULT - PROBLEM SELECTOR PLAN 2
Continue with Toprol 50xl QD for rate control   Eliquis on hold   Hep Gtt monitor PTT-79  Continue with home dose Amio 200 daily   continue tele

## 2021-04-11 NOTE — PROGRESS NOTE ADULT - ASSESSMENT
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 Patient to be admitted to CTS under Dr. Lai for pre TAVR workup.       3/31 VSS; O2 sat 96% on RA, CXR: small R and Trace L pleural Effusion. Non-edematous. EKG normal, Trops 21.   4/1 HD stable.  Pt had eliquis this am--hold for now and start hep gtt for cardiac cath.  CTA delayed by department hopefully will get done tomorrow  4/2 VSS   PTT elevated heparin gtt held . repeat PTT. creatinine elevated 1.62 Dr Daigle consulted - nephrology  OK for CTA- gentle hydration  post CTA.   4/3 Pending CTA this Monday. Compression dressing to right forearm  4/4 H/H 8/27. Will continue to trend. T&C sent . Anticipate CTA this Monday and coronary cath this week.  4/5 dx cath right femoral groin access . IVF per and post procedure. trend creat. Right forearm hematoma stable. Reinstate heparin gtt  for afib at 1800  4/6 VSS  PTT 61 needs CTA creatinine 1.40 this am. Followed by Nephrology   4/7 VSS Creatine improved to 1.26 this am. Plan for CTA today. Plan to discharge home tomorrow then come back for TAVR.   4/8 VSS; Belly discomfort/distention. Soft, slightly distended, No tenderness on palpation. BM yesterday, no BM today. +Miralax -will continue to monitor. CR 1.6 this am. CTA completed yesterday, pending results. As per Family request and given inability to ambulate short distance without SOB patient will stay for medical optimization till next friday for her TAVR.   4/9 + othrostasis noted; pt asymptomatic; 250 ns bolus given and norvasc d/c; continue to monitor closely; ck ua negative/ urine cx  4/10 + orthostasis but improving; IVF given- total 500 cc; ck chest xray/ abdominal xray as per Dr. Sommers   TAVR 4/16 as per Dr. Lai   4/11 /66 feels better.  c/o belly distention--AXR 4/10 non specific bowel gas pattern.  +122cc 24hrs.  Na 130.  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 Patient to be admitted to CTS under Dr. Lai for pre TAVR workup.       3/31 VSS; O2 sat 96% on RA, CXR: small R and Trace L pleural Effusion. Non-edematous. EKG normal, Trops 21.   4/1 HD stable.  Pt had eliquis this am--hold for now and start hep gtt for cardiac cath.  CTA delayed by department hopefully will get done tomorrow  4/2 VSS   PTT elevated heparin gtt held . repeat PTT. creatinine elevated 1.62 Dr Daigle consulted - nephrology  OK for CTA- gentle hydration  post CTA.   4/3 Pending CTA this Monday. Compression dressing to right forearm  4/4 H/H 8/27. Will continue to trend. T&C sent . Anticipate CTA this Monday and coronary cath this week.  4/5 dx cath right femoral groin access . IVF per and post procedure. trend creat. Right forearm hematoma stable. Reinstate heparin gtt  for afib at 1800  4/6 VSS  PTT 61 needs CTA creatinine 1.40 this am. Followed by Nephrology   4/7 VSS Creatine improved to 1.26 this am. Plan for CTA today. Plan to discharge home tomorrow then come back for TAVR.   4/8 VSS; Belly discomfort/distention. Soft, slightly distended, No tenderness on palpation. BM yesterday, no BM today. +Miralax -will continue to monitor. CR 1.6 this am. CTA completed yesterday, pending results. As per Family request and given inability to ambulate short distance without SOB patient will stay for medical optimization till next friday for her TAVR.   4/9 + othrostasis noted; pt asymptomatic; 250 ns bolus given and norvasc d/c; continue to monitor closely; ck ua negative/ urine cx  4/10 + orthostasis but improving; IVF given- total 500 cc; ck chest xray/ abdominal xray as per Dr. Sommers   TAVR 4/16 as per Dr. Lai   4/11 /66 feels better.  c/o belly distention--AXR 4/10 non specific bowel gas pattern.  +122cc 24hrs.  Na 130.  PTT 79.  TSH 27  Free Thyroxine .8

## 2021-04-11 NOTE — PROGRESS NOTE ADULT - SUBJECTIVE AND OBJECTIVE BOX
S:  pt resting in bed.  +MOULTON    Telemetry:  AP 65    Vital Signs Last 24 Hrs  T(C): 36.3 (21 @ 10:51), Max: 36.6 (04-10-21 @ 20:06)  T(F): 97.4 (21 @ 10:51), Max: 97.9 (04-10-21 @ 20:06)  HR: 60 (21 @ 10:51) (59 - 62)  BP: 129/73 (21 @ 10:51) (126/66 - 143/80)  RR: 18 (21 @ 10:51) (18 - 18)  SpO2: 98% (21 @ 10:51) (94% - 98%)                   04-10 @ 07:01  -   @ 07:00  --------------------------------------------------------  IN: 1322 mL / OUT: 1200 mL / NET: 122 mL     @ 07:01  -   @ 16:38  --------------------------------------------------------  IN: 348 mL / OUT: 350 mL / NET: -2 mL        Daily Weight in k (2021 07:58)    Drains:     MS         [  ] Drainage:                 L Pleural  [  ]  Drainage:                R Pleural  [  ]  Drainage:    Pacing Wires        [  ]   Settings:                                  Isolated  [  ]    Coumadin    [ ] YES          [ x ]      NO         Reason:                                 9.8    8.50  )-----------( 190      ( 2021 07:08 )             30.6       0411    130<L>  |  95<L>  |  30<H>  ----------------------------<  112<H>  4.4   |  23  |  1.37<H>    Ca    9.2      2021 07:07        PTT - ( 2021 07:10 )  PTT:79.5 sec    PHYSICAL EXAM:    Neurology: alert and oriented x 3, nonfocal, no gross deficits    CV : S1S2    Lungs: clear to ausc.  No w/r/r    Abdomen: soft, nontender, mild distention , positive bowel sounds, last bowel movement             Extremities:   no edema            aluminum hydroxide/magnesium hydroxide/simethicone Suspension 30 milliLiter(s) Oral every 4 hours PRN  aMIOdarone    Tablet 200 milliGRAM(s) Oral daily  aspirin enteric coated 81 milliGRAM(s) Oral daily  atorvastatin 20 milliGRAM(s) Oral at bedtime  ferrous    sulfate 325 milliGRAM(s) Oral daily  heparin  Infusion 1000 Unit(s)/Hr IV Continuous <Continuous>  levothyroxine 75 MICROGram(s) Oral daily  metoprolol succinate ER 25 milliGRAM(s) Oral daily  polyethylene glycol 3350 17 Gram(s) Oral daily  senna 2 Tablet(s) Oral at bedtime  sodium chloride 2 Gram(s) Oral two times a day  sodium chloride 0.9% lock flush 3 milliLiter(s) IV Push every 8 hours                              Physical Therapy Rec:   Home  [  ]   Home w/ PT  [  ]  Rehab  [  ]  TBD    Discussed with Cardiothoracic Team at AM rounds.

## 2021-04-11 NOTE — CONSULT NOTE ADULT - PROBLEM SELECTOR RECOMMENDATION 9
Ms. Barreto comes to us for worsening symptoms due to her Aortic Stenosis. She has had a progressive worsening of her symptoms. We will repeat her TAVR TTE for a better look at the valve, as well as getting her Cardiac CT and Cardiac Catheterization while as an inpatient. We will discharge her, and TAVR will be brought back as an out patient for her TAVR. I explained about her disease process, as well as treatment options using the  phone.
Will start patient on synthroid 75mcg po daily.  Will continue monitoring TFTs and FU.  Discussed plan with patient and son Gino as .

## 2021-04-11 NOTE — CONSULT NOTE ADULT - SUBJECTIVE AND OBJECTIVE BOX
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 Denies any other symptoms including fever, cough, N/V/D, LE edema/pain.  (31 Mar 2021 23:26)    Endocrine history obtained with help from son Gino over phone.  Patient has history of hypothyroidism, was taking synthroid 50mcg po daily, compliant with intake, FU with PCP for management.  Endo was consulted for further management and recommendations.    PAST MEDICAL & SURGICAL HISTORY:  HTN (hypertension)    CAD (coronary artery disease)  coronary stents x 2, 2007    HLD (hyperlipidemia)    Asthma    PAF (paroxysmal atrial fibrillation)    Scoliosis    History of cholecystectomy    H/O tubal ligation    History of bilateral knee replacement    History of cataract surgery  bilateral    S/P cholecystectomy        FAMILY HISTORY:  No pertinent family history in first degree relatives        Social History:    Outpatient Medications:    MEDICATIONS  (STANDING):  aMIOdarone    Tablet 200 milliGRAM(s) Oral daily  aspirin enteric coated 81 milliGRAM(s) Oral daily  atorvastatin 20 milliGRAM(s) Oral at bedtime  ferrous    sulfate 325 milliGRAM(s) Oral daily  heparin  Infusion 1000 Unit(s)/Hr (6 mL/Hr) IV Continuous <Continuous>  metoprolol succinate ER 25 milliGRAM(s) Oral daily  polyethylene glycol 3350 17 Gram(s) Oral daily  senna 2 Tablet(s) Oral at bedtime  sodium chloride 2 Gram(s) Oral two times a day  sodium chloride 0.9% lock flush 3 milliLiter(s) IV Push every 8 hours    MEDICATIONS  (PRN):  aluminum hydroxide/magnesium hydroxide/simethicone Suspension 30 milliLiter(s) Oral every 4 hours PRN Dyspepsia      Allergies    No Known Allergies    Intolerances      Review of Systems:  Constitutional: No fever, no chills  Eyes: No blurry vision  Neuro: No tremors  HEENT: No pain, no neck swelling  Cardiovascular: No chest pain, no palpitations  Respiratory: Has SOB, no cough  GI: No nausea, vomiting, abdominal pain  : No dysuria  Skin: no rash  MSK: Has leg swelling.  Psych: no depression  Endocrine: no polyuria, polydipsia    ALL OTHER SYSTEMS REVIEWED AND NEGATIVE    UNABLE TO OBTAIN    PHYSICAL EXAM:  VITALS: T(C): 36.3 (04-11-21 @ 10:51)  T(F): 97.4 (04-11-21 @ 10:51), Max: 97.9 (04-10-21 @ 20:06)  HR: 60 (04-11-21 @ 10:51) (59 - 62)  BP: 129/73 (04-11-21 @ 10:51) (126/66 - 143/80)  RR:  (18 - 18)  SpO2:  (94% - 98%)  Wt(kg): --  GENERAL: NAD, well-groomed, well-developed  EYES: No proptosis, no lid lag  HEENT:  Atraumatic, Normocephalic  THYROID: Normal size, no palpable nodules  RESPIRATORY: Clear to auscultation bilaterally; No rales, rhonchi, wheezing  CARDIOVASCULAR: Si S2, No murmurs;  GI: Soft, non distended, normal bowel sounds  SKIN: Dry, intact, No rashes or lesions  MUSCULOSKELETAL: Has BL lower extremity edema.  NEURO:  no tremor, sensation decreased in feet BL,                              9.8    8.50  )-----------( 190      ( 11 Apr 2021 07:08 )             30.6       04-11    130<L>  |  95<L>  |  30<H>  ----------------------------<  112<H>  4.4   |  23  |  1.37<H>    EGFR if : 39<L>  EGFR if non : 34<L>    Ca    9.2      04-11    TPro  6.2  /  Alb  3.8  /  TBili  0.8  /  DBili  x   /  AST  26  /  ALT  29  /  AlkPhos  88  04-09      Thyroid Function Tests:  04-11 @ 08:25 TSH 27.30 FreeT4 0.8 T3 -- Anti TPO -- Anti Thyroglobulin Ab -- TSI --  04-02 @ 13:31 TSH -- FreeT4 -- T3 80 Anti TPO -- Anti Thyroglobulin Ab -- TSI --              Radiology:              HPI:  89F with PMHx of  asthma, HTN, HLD, TIA, severe aortic stenosis, CAD s/p stents LAD and Diag, 2009 s/p 2 stent LAD in 9/2018, symptomatic Pafib on Eliquis, 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 Denies any other symptoms including fever, cough, N/V/D, LE edema/pain.  (31 Mar 2021 23:26)    Endocrine history obtained with help from son Gino over phone.  Patient has history of hypothyroidism, was taking synthroid 50mcg po daily, compliant with intake, FU with PCP for management.  Endo was consulted for further management and recommendations.    PAST MEDICAL & SURGICAL HISTORY:  HTN (hypertension)    CAD (coronary artery disease)  coronary stents x 2, 2007    HLD (hyperlipidemia)    Asthma    PAF (paroxysmal atrial fibrillation)    Scoliosis    History of cholecystectomy    H/O tubal ligation    History of bilateral knee replacement    History of cataract surgery  bilateral    S/P cholecystectomy        FAMILY HISTORY:  No pertinent family history in first degree relatives        Social History:    Outpatient Medications:    MEDICATIONS  (STANDING):  aMIOdarone    Tablet 200 milliGRAM(s) Oral daily  aspirin enteric coated 81 milliGRAM(s) Oral daily  atorvastatin 20 milliGRAM(s) Oral at bedtime  ferrous    sulfate 325 milliGRAM(s) Oral daily  heparin  Infusion 1000 Unit(s)/Hr (6 mL/Hr) IV Continuous <Continuous>  metoprolol succinate ER 25 milliGRAM(s) Oral daily  polyethylene glycol 3350 17 Gram(s) Oral daily  senna 2 Tablet(s) Oral at bedtime  sodium chloride 2 Gram(s) Oral two times a day  sodium chloride 0.9% lock flush 3 milliLiter(s) IV Push every 8 hours    MEDICATIONS  (PRN):  aluminum hydroxide/magnesium hydroxide/simethicone Suspension 30 milliLiter(s) Oral every 4 hours PRN Dyspepsia      Allergies    No Known Allergies    Intolerances      Review of Systems:  Constitutional: No fever, no chills  Eyes: No blurry vision  Neuro: No tremors  HEENT: No pain, no neck swelling  Cardiovascular: No chest pain, no palpitations  Respiratory: Has SOB, no cough  GI: No nausea, vomiting, abdominal pain  : No dysuria  Skin: no rash  MSK: Has leg swelling.  Psych: no depression  Endocrine: no polyuria, polydipsia    ALL OTHER SYSTEMS REVIEWED AND NEGATIVE    UNABLE TO OBTAIN    PHYSICAL EXAM:  VITALS: T(C): 36.3 (04-11-21 @ 10:51)  T(F): 97.4 (04-11-21 @ 10:51), Max: 97.9 (04-10-21 @ 20:06)  HR: 60 (04-11-21 @ 10:51) (59 - 62)  BP: 129/73 (04-11-21 @ 10:51) (126/66 - 143/80)  RR:  (18 - 18)  SpO2:  (94% - 98%)  Wt(kg): --  GENERAL: NAD, well-groomed, well-developed  EYES: No proptosis, no lid lag  HEENT:  Atraumatic, Normocephalic  THYROID: Normal size, no palpable nodules  RESPIRATORY: Clear to auscultation bilaterally; No rales, rhonchi, wheezing  CARDIOVASCULAR: Si S2, No murmurs;  GI: Soft, non distended, normal bowel sounds  SKIN: Dry, intact, No rashes or lesions  MUSCULOSKELETAL: Has BL lower extremity edema.  NEURO:  no tremor, sensation decreased in feet BL,                              9.8    8.50  )-----------( 190      ( 11 Apr 2021 07:08 )             30.6       04-11    130<L>  |  95<L>  |  30<H>  ----------------------------<  112<H>  4.4   |  23  |  1.37<H>    EGFR if : 39<L>  EGFR if non : 34<L>    Ca    9.2      04-11    TPro  6.2  /  Alb  3.8  /  TBili  0.8  /  DBili  x   /  AST  26  /  ALT  29  /  AlkPhos  88  04-09      Thyroid Function Tests:  04-11 @ 08:25 TSH 27.30 FreeT4 0.8 T3 -- Anti TPO -- Anti Thyroglobulin Ab -- TSI --  04-02 @ 13:31 TSH -- FreeT4 -- T3 80 Anti TPO -- Anti Thyroglobulin Ab -- TSI --              Radiology:

## 2021-04-11 NOTE — CONSULT NOTE ADULT - ASSESSMENT
Assessment  Hypothyroidism: 89y Female with history of hypothyroidism, was taking synthroid 50mcg po daily per discussion with her son Gino, compliant with intake, found to be in hypothyroid state. Patient appears comfortable, reports eating partial meals, TAVR workup in progress.  CAD: on medications, no chest pain, stable, monitored.  HTN: Controlled,  on antihypertensive medications.  NELY: Monitor labs, BMP.      Trace Beatty MD  Cell: [phone]  Office: [phone]       Assessment  Hypothyroidism: 89y Female with history of hypothyroidism, was taking synthroid 50mcg po daily per discussion with her son Gino, compliant with intake,  found to be in hypothyroid state. Patient appears comfortable, reports eating partial meals, TAVR workup in progress.  CAD: on medications, no chest pain, stable, monitored.  HTN: Controlled,  on antihypertensive medications.  NELY: Monitor labs, BMP.      Trace Beatty MD  Cell: [phone]  Office: [phone]

## 2021-04-12 NOTE — CHART NOTE - NSCHARTNOTEFT_GEN_A_CORE
Pt seen for malnutrition f/u. Continues with fair po intake 40-50% of meals.     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    Source: Patient [x]    Family [ ]     other [x] EMR    Diet: DASH/TLC, Ensure Enlive x 2 (700 kcal, 40 grams protein)    Patient reports [ ] nausea  [ ] vomiting [ ] diarrhea [ ] constipation  [ ] chewing problems [ ] swallowing issues  [x] other: Denies GI distress    PO intake (meals):  < 50% [x] 50-75% [x]   % [ ]  other :  PO intake (supplements): ~1/2-1 container Ensure daily    Source for PO intake [x] Patient [ ] family [ ] chart [ ] staff [ ] other    Enteral/Parenteral Nutrition: n/a    Current Weight: 110.8 pounds. Admit wt: 111.9 pounds 4/2    Pertinent Medications: MEDICATIONS  (STANDING):  aMIOdarone    Tablet 200 milliGRAM(s) Oral daily  aspirin enteric coated 81 milliGRAM(s) Oral daily  atorvastatin 20 milliGRAM(s) Oral at bedtime  ferrous    sulfate 325 milliGRAM(s) Oral daily  heparin  Infusion 1000 Unit(s)/Hr (5 mL/Hr) IV Continuous <Continuous>  levothyroxine 75 MICROGram(s) Oral daily  metoprolol succinate ER 25 milliGRAM(s) Oral daily  polyethylene glycol 3350 17 Gram(s) Oral daily  senna 2 Tablet(s) Oral at bedtime  sodium chloride 0.9% lock flush 3 milliLiter(s) IV Push every 8 hours    MEDICATIONS  (PRN):  aluminum hydroxide/magnesium hydroxide/simethicone Suspension 30 milliLiter(s) Oral every 4 hours PRN Dyspepsia    Pertinent Labs:  04-12 Na132 mmol/L<L> Glu 111 mg/dL<H> K+ 4.5 mmol/L Cr  1.27 mg/dL BUN 27 mg/dL<H> 04-09 Alb 3.8 g/dL 04-11 PAB 18 mg/dL<L>      Skin: No pressure injuries noted      Estimated Needs:   [x] no change since previous assessment  [ ] recalculated:       Previous Nutrition Diagnosis: Moderate Malnutrition         Nutrition Diagnosis is [x] ongoing  [ ] resolved [ ] not applicable          New Nutrition Diagnosis: [x] not applicable        Interventions:     1) Recommend continue current diet order to promote heart health  -Continue Ensure Enlive x 2 daily for supplemental nutrition       Monitoring and Evaluation:     [x] PO intake [x] Tolerance to diet prescription [x] weights [x] follow up per protocol    [x] other: RD remains available: Kiarra Tomlinson MS, RDN, CDN, CDE, [facility]. #[phone]

## 2021-04-12 NOTE — PROGRESS NOTE ADULT - PROBLEM SELECTOR PLAN 2
Continue with Toprol 50xl QD for rate control   Eliquis on hold   Hep Gtt monitor PTT-79  Continue with home dose Amio 200 daily   continue tele Continue with Toprol 50xl QD for rate control   Eliquis on hold   Hep Gtt monitor PTT 93- heparin gttp decreased  Continue with home dose Amio 200 daily   continue tele

## 2021-04-12 NOTE — PROGRESS NOTE ADULT - SUBJECTIVE AND OBJECTIVE BOX
Chief complaint  Patient is a 89y old  Female who presents with a chief complaint of SOB/CP (12 Apr 2021 10:59)   Review of systems  Patient in bed, looks comfortable.    Medications  MEDICATIONS  (STANDING):  aMIOdarone    Tablet 200 milliGRAM(s) Oral daily  aspirin enteric coated 81 milliGRAM(s) Oral daily  atorvastatin 20 milliGRAM(s) Oral at bedtime  ferrous    sulfate 325 milliGRAM(s) Oral daily  heparin  Infusion 1000 Unit(s)/Hr (5 mL/Hr) IV Continuous <Continuous>  levothyroxine 75 MICROGram(s) Oral daily  metoprolol succinate ER 25 milliGRAM(s) Oral daily  polyethylene glycol 3350 17 Gram(s) Oral daily  senna 2 Tablet(s) Oral at bedtime  sodium chloride 0.9% lock flush 3 milliLiter(s) IV Push every 8 hours      Physical Exam  General: Patient comfortable in bed  Vital Signs Last 12 Hrs  T(F): 97.9 (04-12-21 @ 11:08), Max: 97.9 (04-12-21 @ 11:08)  HR: 83 (04-12-21 @ 11:08) (61 - 83)  BP: 118/69 (04-12-21 @ 11:08) (118/69 - 144/71)  BP(mean): --  RR: 18 (04-12-21 @ 11:08) (18 - 18)  SpO2: 97% (04-12-21 @ 11:08) (96% - 97%)  Neck: No palpable thyroid nodules.           Chief complaint  Patient is a 89y old  Female who presents with a chief complaint of SOB/CP (12 Apr 2021 10:59)   Review of systems  Patient in bed, looks comfortable.    Medications  MEDICATIONS  (STANDING):  aMIOdarone    Tablet 200 milliGRAM(s) Oral daily  aspirin enteric coated 81 milliGRAM(s) Oral daily  atorvastatin 20 milliGRAM(s) Oral at bedtime  ferrous    sulfate 325 milliGRAM(s) Oral daily  heparin  Infusion 1000 Unit(s)/Hr (5 mL/Hr) IV Continuous <Continuous>  levothyroxine 75 MICROGram(s) Oral daily  metoprolol succinate ER 25 milliGRAM(s) Oral daily  polyethylene glycol 3350 17 Gram(s) Oral daily  senna 2 Tablet(s) Oral at bedtime  sodium chloride 0.9% lock flush 3 milliLiter(s) IV Push every 8 hours      Physical Exam  General: Patient comfortable in bed  Vital Signs Last 12 Hrs  T(F): 97.9 (04-12-21 @ 11:08), Max: 97.9 (04-12-21 @ 11:08)  HR: 83 (04-12-21 @ 11:08) (61 - 83)  BP: 118/69 (04-12-21 @ 11:08) (118/69 - 144/71)  BP(mean): --  RR: 18 (04-12-21 @ 11:08) (18 - 18)  SpO2: 97% (04-12-21 @ 11:08) (96% - 97%)  Neck: No palpable thyroid nodules.

## 2021-04-12 NOTE — PROGRESS NOTE ADULT - PROBLEM SELECTOR PLAN 1
Will continue increased-dose synthroid 75mcg po daily.  Will continue monitoring TFTs and FU.  Discussed plan with patient and son over the phone.

## 2021-04-12 NOTE — PROGRESS NOTE ADULT - SUBJECTIVE AND OBJECTIVE BOX
VITAL SIGNS    Telemetry:    Vital Signs Last 24 Hrs  T(C): 36.4 (21 @ 05:28), Max: 36.5 (21 @ 19:19)  T(F): 97.5 (21 @ 05:28), Max: 97.7 (21 @ 19:19)  HR: 61 (21 @ 05:28) (60 - 61)  BP: 144/71 (21 @ 05:28) (144/71 - 145/78)  RR: 18 (21 @ 05:28) (18 - 18)  SpO2: 96% (21 @ 05:28) (96% - 98%)             @ 07:01  -   @ 07:00  --------------------------------------------------------  IN: 557 mL / OUT: 1000 mL / NET: -443 mL     @ 07:01  -   @ 10:59  --------------------------------------------------------  IN: 21 mL / OUT: 400 mL / NET: -379 mL       Daily     Daily Weight in k.3 (2021 08:02)  Admit Wt: Drug Dosing Weight  Height (cm): 157.5 (2021 11:17)  Weight (kg): 49 (2021 11:17)  BMI (kg/m2): 19.8 (2021 11:17)  BSA (m2): 1.47 (2021 11:17)      CAPILLARY BLOOD GLUCOSE              aluminum hydroxide/magnesium hydroxide/simethicone Suspension 30 milliLiter(s) Oral every 4 hours PRN  aMIOdarone    Tablet 200 milliGRAM(s) Oral daily  aspirin enteric coated 81 milliGRAM(s) Oral daily  atorvastatin 20 milliGRAM(s) Oral at bedtime  ferrous    sulfate 325 milliGRAM(s) Oral daily  heparin  Infusion 1000 Unit(s)/Hr IV Continuous <Continuous>  levothyroxine 75 MICROGram(s) Oral daily  metoprolol succinate ER 25 milliGRAM(s) Oral daily  polyethylene glycol 3350 17 Gram(s) Oral daily  senna 2 Tablet(s) Oral at bedtime  sodium chloride 0.9% lock flush 3 milliLiter(s) IV Push every 8 hours      PHYSICAL EXAM    Subjective: "Hi.   Neurology: alert and oriented x 3, nonfocal, no gross deficits  CV : tele:  RSR  Sternal Wound :  CDI with dressing , Stable  Lungs: clear. RR easy, unlabored   Abdomen: soft, nontender, nondistended, positive bowel sounds, bowel movement   Neg N/V/D   :  pt voiding without difficulty   Extremities:   CABRERA; edema, neg calf tenderness.   PPP bilaterally      PW:  Chest tubes:                 VITAL SIGNS    Telemetry:  rsr 60's   Vital Signs Last 24 Hrs  T(C): 36.4 (21 @ 05:28), Max: 36.5 (21 @ 19:19)  T(F): 97.5 (21 @ 05:28), Max: 97.7 (21 @ 19:19)  HR: 61 (21 @ 05:28) (60 - 61)  BP: 144/71 (21 @ 05:28) (144/71 - 145/78)  RR: 18 (21 @ 05:28) (18 - 18)  SpO2: 96% (21 @ 05:28) (96% - 98%)             @ 07:01  -   @ 07:00  --------------------------------------------------------  IN: 557 mL / OUT: 1000 mL / NET: -443 mL     @ 07:01  -   @ 10:59  --------------------------------------------------------  IN: 21 mL / OUT: 400 mL / NET: -379 mL       Daily     Daily Weight in k.3 (2021 08:02)  Admit Wt: Drug Dosing Weight  Height (cm): 157.5 (2021 11:17)  Weight (kg): 49 (2021 11:17)  BMI (kg/m2): 19.8 (2021 11:17)  BSA (m2): 1.47 (2021 11:17)    aluminum hydroxide/magnesium hydroxide/simethicone Suspension 30 milliLiter(s) Oral every 4 hours PRN  aMIOdarone    Tablet 200 milliGRAM(s) Oral daily  aspirin enteric coated 81 milliGRAM(s) Oral daily  atorvastatin 20 milliGRAM(s) Oral at bedtime  ferrous    sulfate 325 milliGRAM(s) Oral daily  heparin  Infusion 1000 Unit(s)/Hr IV Continuous <Continuous>  levothyroxine 75 MICROGram(s) Oral daily  metoprolol succinate ER 25 milliGRAM(s) Oral daily  polyethylene glycol 3350 17 Gram(s) Oral daily  senna 2 Tablet(s) Oral at bedtime  sodium chloride 0.9% lock flush 3 milliLiter(s) IV Push every 8 hours      PHYSICAL EXAM    Subjective: "I don't feel well."   Neurology: alert and oriented x 3, nonfocal, no gross deficits  CV : tele:  RSR 60's; +murmur   Lungs: clear. RR easy, unlabored   Abdomen: soft, nontender, nondistended, positive bowel sounds, + bowel movement   Neg N/V/D   :  pt voiding without difficulty   Extremities:   CABRERA; neg LE edema, neg calf tenderness.   PPP bilaterally

## 2021-04-12 NOTE — PROGRESS NOTE ADULT - ASSESSMENT
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 Patient to be admitted to CTS under Dr. Lai for pre TAVR workup.       3/31 VSS; O2 sat 96% on RA, CXR: small R and Trace L pleural Effusion. Non-edematous. EKG normal, Trops 21.   4/1 HD stable.  Pt had eliquis this am--hold for now and start hep gtt for cardiac cath.  CTA delayed by department hopefully will get done tomorrow  4/2 VSS   PTT elevated heparin gtt held . repeat PTT. creatinine elevated 1.62 Dr Daigle consulted - nephrology  OK for CTA- gentle hydration  post CTA.   4/3 Pending CTA this Monday. Compression dressing to right forearm  4/4 H/H 8/27. Will continue to trend. T&C sent . Anticipate CTA this Monday and coronary cath this week.  4/5 dx cath right femoral groin access . IVF per and post procedure. trend creat. Right forearm hematoma stable. Reinstate heparin gtt  for afib at 1800  4/6 VSS  PTT 61 needs CTA creatinine 1.40 this am. Followed by Nephrology   4/7 VSS Creatine improved to 1.26 this am. Plan for CTA today. Plan to discharge home tomorrow then come back for TAVR.   4/8 VSS; Belly discomfort/distention. Soft, slightly distended, No tenderness on palpation. BM yesterday, no BM today. +Miralax -will continue to monitor. CR 1.6 this am. CTA completed yesterday, pending results. As per Family request and given inability to ambulate short distance without SOB patient will stay for medical optimization till next friday for her TAVR.   4/9 + othrostasis noted; pt asymptomatic; 250 ns bolus given and norvasc d/c; continue to monitor closely; ck ua negative/ urine cx  4/10 + orthostasis but improving; IVF given- total 500 cc; ck chest xray/ abdominal xray as per Dr. Sommers   TAVR 4/16 as per Dr. Lai   4/11 /66 feels better.  c/o belly distention--AXR 4/10 non specific bowel gas pattern.  +122cc 24hrs.  Na 130.  PTT 79.  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 Patient to be admitted to CTS under Dr. Gutierrez for pre TAVR workup.       3/31 VSS; O2 sat 96% on RA, CXR: small R and Trace L pleural Effusion. Non-edematous. EKG normal, Trops 21.   4/1 HD stable.  Pt had eliquis this am--hold for now and start hep gtt for cardiac cath.  CTA delayed by department hopefully will get done tomorrow  4/2 VSS   PTT elevated heparin gtt held . repeat PTT. creatinine elevated 1.62 Dr Daigle consulted - nephrology  OK for CTA- gentle hydration  post CTA.   4/3 Pending CTA this Monday. Compression dressing to right forearm  4/4 H/H 8/27. Will continue to trend. T&C sent . Anticipate CTA this Monday and coronary cath this week.  4/5 dx cath right femoral groin access . IVF per and post procedure. trend creat. Right forearm hematoma stable. Reinstate heparin gtt  for afib at 1800  4/6 VSS  PTT 61 needs CTA creatinine 1.40 this am. Followed by Nephrology   4/7 VSS Creatine improved to 1.26 this am. Plan for CTA today. Plan to discharge home tomorrow then come back for TAVR.   4/8 VSS; Belly discomfort/distention. Soft, slightly distended, No tenderness on palpation. BM yesterday, no BM today. +Miralax -will continue to monitor. CR 1.6 this am. CTA completed yesterday, pending results. As per Family request and given inability to ambulate short distance without SOB patient will stay for medical optimization till next friday for her TAVR.   4/9 + othrostasis noted; pt asymptomatic; 250 ns bolus given and norvasc d/c; continue to monitor closely; ck ua negative/ urine cx  4/10 + orthostasis but improving; IVF given- total 500 cc; ck chest xray/ abdominal xray as per Dr. Sommers   TAVR 4/16 as per Dr. Gutierrez   4/11 /66 feels better.  c/o belly distention--AXR 4/10 non specific bowel gas pattern.  +122cc 24hrs.  Na 130.  PTT 79.  TSH 27  Free Thyroxine .8  4/12 VSS; ck ESR/CRP in am as per DR. Gutierrez; add ensure for nutritional support  TAVR 4/16 as per structural heart team and Dr. gutierrez

## 2021-04-12 NOTE — PROGRESS NOTE ADULT - SUBJECTIVE AND OBJECTIVE BOX
Structural Heart Team    Video  [id number]  Ms Barreto is seen and examined lying in bed with 4L O2 via NC.  She says she feels "very bad" today and complains of sob with minimal exertion and nausea/abdominal fullness.  She denies chest pain.          REVIEW OF SYSTEMS:    CONSTITUTIONAL: No weakness, fevers or chills  EYES/ENT: No visual changes;  No vertigo or throat pain   NECK: No pain or stiffness  RESPIRATORY: No cough, wheezing, hemoptysis; No shortness of breath  CARDIOVASCULAR: No chest pain or palpitations  GASTROINTESTINAL: No abdominal or epigastric pain. No nausea, vomiting, or hematemesis; No diarrhea or constipation. No melena or hematochezia.  GENITOURINARY: No dysuria, frequency or hematuria  NEUROLOGICAL: No numbness or weakness  SKIN: No itching, rashes      Allergies    No Known Allergies    Intolerances      Vital Signs Last 24 Hrs  T(C): 36.4 (12 Apr 2021 05:28), Max: 36.5 (11 Apr 2021 19:19)  T(F): 97.5 (12 Apr 2021 05:28), Max: 97.7 (11 Apr 2021 19:19)  HR: 61 (12 Apr 2021 05:28) (60 - 61)  BP: 144/71 (12 Apr 2021 05:28) (129/73 - 145/78)  BP(mean): --  RR: 18 (12 Apr 2021 05:28) (18 - 18)  SpO2: 96% (12 Apr 2021 05:28) (96% - 98%)    MEDICATIONS  (STANDING):  aMIOdarone    Tablet 200 milliGRAM(s) Oral daily  aspirin enteric coated 81 milliGRAM(s) Oral daily  atorvastatin 20 milliGRAM(s) Oral at bedtime  ferrous    sulfate 325 milliGRAM(s) Oral daily  heparin  Infusion 1000 Unit(s)/Hr (5 mL/Hr) IV Continuous <Continuous>  levothyroxine 75 MICROGram(s) Oral daily  metoprolol succinate ER 25 milliGRAM(s) Oral daily  polyethylene glycol 3350 17 Gram(s) Oral daily  senna 2 Tablet(s) Oral at bedtime  sodium chloride 2 Gram(s) Oral two times a day  sodium chloride 0.9% lock flush 3 milliLiter(s) IV Push every 8 hours      Exam-  General: NAD, WDWN, appropriate affect  Cor: s1s2, RRR, II/VI systolic murmur   Tele: Apaced, SR 60 occ PAC   Pulm: Clear, no wheezes, rales, or rhonchi, no use of accessory muscles  Gastrointestinal: soft, nontender, nondistended, +bowel sounds  Extremities: no edema, 1+ DP pulses b/l  Neuro: A&Ox3, nonfocal                          9.3    7.70  )-----------( 185      ( 12 Apr 2021 07:12 )             29.5   04-12    132<L>  |  98  |  27<H>  ----------------------------<  111<H>  4.5   |  23  |  1.27    Ca    8.7      12 Apr 2021 07:09    PTT - ( 12 Apr 2021 07:12 )  PTT:93.4 sec  I&O's Summary    11 Apr 2021 07:01  -  12 Apr 2021 07:00  --------------------------------------------------------  IN: 557 mL / OUT: 1000 mL / NET: -443 mL    12 Apr 2021 07:01  -  12 Apr 2021 10:41  --------------------------------------------------------  IN: 21 mL / OUT: 400 mL / NET: -379 mL              Assessment/Plan:  Ms Barreto is a 89 y/o Mandarin Speaking female, admitted with symptomatic severe Aortic Stenosis  - abdominal and chest xrays were done over the weekend   -- AXR was negative, CXR showed small b/l pleural effusions and associated atelectasis  - scheduled for TAVR on Friday 4/16  - discussed at length with Ms Barreto about the risks and benefits of TAVR and answered all her questions  -- also stressed that TAVR may not give her significant symptomatic improvement  - cont current medical management    LORY Cam  [phone]

## 2021-04-12 NOTE — PROGRESS NOTE ADULT - ASSESSMENT
89F with PMHx of  asthma, HTN, HLD, TIA, severe aortic stenosis, CAD s/p stents LAD and Diag, 2009 s/p 2 stent LAD in 9/2018, symptomatic Pafib on Eliquis pw SOB MOULTON and chest pain   Admitted for TAVR work up       1 Renal- Scr   improved and  scr fluctuates based on hemodynamic status      2 Hyponatremia- DC salt tabs    3 Hyperkalemia resolving/ resolved now    4 CVS- BP acceptable today. Check orthostasis again today      TAVR Tentatively sched for next friday and she will stay till the surgery     5 Anemia- hgb improving      Sayed [facility]   [phone]

## 2021-04-12 NOTE — PROGRESS NOTE ADULT - SUBJECTIVE AND OBJECTIVE BOX
NEPHROLOGY-[facility] [phone]        Patient seen and examined         MEDICATIONS  (STANDING):  aMIOdarone    Tablet 200 milliGRAM(s) Oral daily  aspirin enteric coated 81 milliGRAM(s) Oral daily  atorvastatin 20 milliGRAM(s) Oral at bedtime  ferrous    sulfate 325 milliGRAM(s) Oral daily  heparin  Infusion 1000 Unit(s)/Hr (5 mL/Hr) IV Continuous <Continuous>  levothyroxine 75 MICROGram(s) Oral daily  metoprolol succinate ER 25 milliGRAM(s) Oral daily  polyethylene glycol 3350 17 Gram(s) Oral daily  senna 2 Tablet(s) Oral at bedtime  sodium chloride 2 Gram(s) Oral two times a day  sodium chloride 0.9% lock flush 3 milliLiter(s) IV Push every 8 hours      VITAL:  T(C): , Max: 36.5 (04-11-21 @ 19:19)  T(F): , Max: 97.7 (04-11-21 @ 19:19)  HR: 61 (04-12-21 @ 05:28)  BP: 144/71 (04-12-21 @ 05:28)  BP(mean): --  RR: 18 (04-12-21 @ 05:28)  SpO2: 96% (04-12-21 @ 05:28)  Wt(kg): --    I and O's:    04-11 @ 07:01  -  04-12 @ 07:00  --------------------------------------------------------  IN: 557 mL / OUT: 1000 mL / NET: -443 mL    04-12 @ 07:01  -  04-12 @ 10:45  --------------------------------------------------------  IN: 21 mL / OUT: 400 mL / NET: -379 mL          PHYSICAL EXAM:    Constitutional: NAD  Neck:  No JVD  Respiratory: CTAB/L  Cardiovascular: S1 and S2  Gastrointestinal: BS+, soft, NT/ND  Extremities: No peripheral edema  Neurological: A/O x 3, no focal deficits  Psychiatric: Normal mood, normal affect  : No Guzman  Skin: No rashes  Access: Not applicable    LABS:                        9.3    7.70  )-----------( 185      ( 12 Apr 2021 07:12 )             29.5     04-12    132<L>  |  98  |  27<H>  ----------------------------<  111<H>  4.5   |  23  |  1.27    Ca    8.7      12 Apr 2021 07:09            Urine Studies:          RADIOLOGY & ADDITIONAL STUDIES:

## 2021-04-13 NOTE — PROGRESS NOTE ADULT - PROBLEM SELECTOR PLAN 2
Continue with Toprol 50xl QD for rate control   Eliquis on hold   Hep Gtt   Continue with home dose Amio 200 daily   continue tele Continue with Toprol 12.5 xl QD for rate control , reduced for orthostatic BP  Eliquis on hold   Hep Gtt   Continue with home dose Amio 200 daily   continue tele

## 2021-04-13 NOTE — PROCEDURE NOTE - NSINFORMCONSENT_GEN_A_CORE
phone use ,/Benefits, risks, and possible complications of procedure explained to patient/caregiver who verbalized understanding and gave written consent.

## 2021-04-13 NOTE — PROGRESS NOTE ADULT - SUBJECTIVE AND OBJECTIVE BOX
Chief complaint  Patient is a 89y old  Female who presents with a chief complaint of SOB/CP (13 Apr 2021 12:03)   Review of systems  Patient in bed, looks comfortable, no hypoglycemic episodes.    Labs and Fingersticks  CAPILLARY BLOOD GLUCOSE      POCT Blood Glucose.: 189 mg/dL (12 Apr 2021 21:28)      Anion Gap, Serum: 12 (04-13 @ 06:40)  Anion Gap, Serum: 11 (04-12 @ 07:09)      Calcium, Total Serum: 8.2 *L* (04-13 @ 06:40)  Calcium, Total Serum: 8.7 (04-12 @ 07:09)          04-13    133<L>  |  99  |  27<H>  ----------------------------<  123<H>  4.2   |  22  |  1.21    Ca    8.2<L>      13 Apr 2021 06:40                          9.4    10.14 )-----------( 201      ( 13 Apr 2021 06:40 )             29.0     Medications  MEDICATIONS  (STANDING):  aMIOdarone    Tablet 200 milliGRAM(s) Oral daily  aspirin enteric coated 81 milliGRAM(s) Oral daily  atorvastatin 20 milliGRAM(s) Oral at bedtime  ferrous    sulfate 325 milliGRAM(s) Oral daily  heparin  Infusion 1000 Unit(s)/Hr (5.5 mL/Hr) IV Continuous <Continuous>  levothyroxine 100 MICROGram(s) Oral daily  metoprolol succinate ER 25 milliGRAM(s) Oral daily  polyethylene glycol 3350 17 Gram(s) Oral daily  senna 2 Tablet(s) Oral at bedtime  sodium chloride 0.9% lock flush 3 milliLiter(s) IV Push every 8 hours      Physical Exam  General: Patient comfortable in bed  Vital Signs Last 12 Hrs  T(F): 98 (04-13-21 @ 10:49), Max: 98 (04-13-21 @ 10:49)  HR: 61 (04-13-21 @ 10:49) (60 - 61)  BP: 118/71 (04-13-21 @ 10:49) (118/71 - 132/72)  BP(mean): --  RR: 18 (04-13-21 @ 10:49) (18 - 18)  SpO2: 97% (04-13-21 @ 10:49) (95% - 97%)  Neck: No palpable thyroid nodules.  CVS: S1S2, No murmurs  Respiratory: No wheezing, no crepitations  GI: Abdomen soft, bowel sounds positive  Musculoskeletal:  edema lower extremities.   Skin: No skin rashes, no ecchymosis    Diagnostics    Free Thyroxine, Serum: AM Sched. Collection: 15-Apr-2021 06:00 (04-13 @ 13:35)           Chief complaint  Patient is a 89y old  Female who presents with a chief complaint of SOB/CP (13 Apr 2021 12:03)   Review of systems  Patient in bed, looks comfortable, no hypoglycemic episodes.    Labs and Fingersticks  CAPILLARY BLOOD GLUCOSE      POCT Blood Glucose.: 189 mg/dL (12 Apr 2021 21:28)      Anion Gap, Serum: 12 (04-13 @ 06:40)  Anion Gap, Serum: 11 (04-12 @ 07:09)      Calcium, Total Serum: 8.2 *L* (04-13 @ 06:40)  Calcium, Total Serum: 8.7 (04-12 @ 07:09)          04-13    133<L>  |  99  |  27<H>  ----------------------------<  123<H>  4.2   |  22  |  1.21    Ca    8.2<L>      13 Apr 2021 06:40                          9.4    10.14 )-----------( 201      ( 13 Apr 2021 06:40 )             29.0     Medications  MEDICATIONS  (STANDING):  aMIOdarone    Tablet 200 milliGRAM(s) Oral daily  aspirin enteric coated 81 milliGRAM(s) Oral daily  atorvastatin 20 milliGRAM(s) Oral at bedtime  ferrous    sulfate 325 milliGRAM(s) Oral daily  heparin  Infusion 1000 Unit(s)/Hr (5.5 mL/Hr) IV Continuous <Continuous>  levothyroxine 100 MICROGram(s) Oral daily  metoprolol succinate ER 25 milliGRAM(s) Oral daily  polyethylene glycol 3350 17 Gram(s) Oral daily  senna 2 Tablet(s) Oral at bedtime  sodium chloride 0.9% lock flush 3 milliLiter(s) IV Push every 8 hours      Physical Exam  General: Patient comfortable in bed  Vital Signs Last 12 Hrs  T(F): 98 (04-13-21 @ 10:49), Max: 98 (04-13-21 @ 10:49)  HR: 61 (04-13-21 @ 10:49) (60 - 61)  BP: 118/71 (04-13-21 @ 10:49) (118/71 - 132/72)  BP(mean): --  RR: 18 (04-13-21 @ 10:49) (18 - 18)  SpO2: 97% (04-13-21 @ 10:49) (95% - 97%)  Neck: No palpable thyroid nodules.  CVS: S1S2, No murmurs  Respiratory: No wheezing, no crepitations  GI: Abdomen soft, bowel sounds positive  Musculoskeletal:  edema lower extremities.   Skin: No skin rashes, no ecchymosis    Diagnostics    Free Thyroxine, Serum: AM Sched. Collection: 15-Apr-2021 06:00 (04-13 @ 13:35)

## 2021-04-13 NOTE — PROGRESS NOTE ADULT - ASSESSMENT
89F with PMHx of  asthma, HTN, HLD, TIA, severe aortic stenosis, CAD s/p stents LAD and Diag, 2009 s/p 2 stent LAD in 9/2018, symptomatic Pafib on Eliquis pw SOB MOULTON and chest pain   Admitted for TAVR work up   Hyponatremia     1 Renal- Scr   improved and scr fluctuates based on hemodynamic status    2 Hyponatremia- Off the  salt tabs and the sodium improved from yesterday     3  CVS-   Check orthostasis again today ???    4. TAVR Tentatively sched for next friday      5 Anemia- hgb improving     Will likely need LIAM on dc   Sayed [facility]   [phone]      89F with PMHx of  asthma, HTN, HLD, TIA, severe aortic stenosis, CAD s/p stents LAD and Diag, 2009 s/p 2 stent LAD in 9/2018, symptomatic Pafib on Eliquis pw SOB MOULTON and chest pain   Admitted for TAVR work up   Hyponatremia   Acute diastolic heart failure     1 Renal- Scr   improved and scr fluctuates based on hemodynamic status  Lasix 40mg IVP x 1 -I think she is in heart failure    2 Hyponatremia- Off the  salt tabs and the sodium improved from yesterday     3  CVS- TAVR Tentatively sched for next friday      4 Anemia- hgb improving     Will likely need LIAM on dc    DW NP     Sayed Ali   [place] [facility]   [phone]

## 2021-04-13 NOTE — PROCEDURE NOTE - NSPROCDETAILS_GEN_ALL_CORE
Seldinger technique/dressing applied/secured in place/sterile dressing applied/supine position/percutaneous/ultrasound assessment of fluid (location)

## 2021-04-13 NOTE — PROGRESS NOTE ADULT - ASSESSMENT
Assessment  Hypothyroidism: 89y Female with history of hypothyroidism, was taking synthroid 50mcg po daily per discussion with her son Gino, compliant with intake,  found to be in hypothyroid state, increased dose to synthroid 75mcg po daily, appears comfortable, eating partial meals, planning TAVR 4/16.  CAD: on medications, no chest pain, stable, monitored.  HTN: Controlled,  on antihypertensive medications.  NELY: Monitor labs, BMP.      Trace Beatty MD  Cell: [phone]  Office: [phone]       Assessment  Hypothyroidism: 89y Female with history of hypothyroidism, was taking synthroid 50mcg po daily per discussion with her son Gino, compliant  with intake,  found to be in hypothyroid state, increased dose to synthroid 75mcg po daily, appears comfortable, eating partial meals, planning TAVR 4/16.  CAD: on medications, no chest pain, stable, monitored.  HTN: Controlled,  on antihypertensive medications.  NELY: Monitor labs, BMP.      Trace Beatty MD  Cell: [phone]  Office: [phone]

## 2021-04-13 NOTE — PROGRESS NOTE ADULT - SUBJECTIVE AND OBJECTIVE BOX
Structural Heart Team    Ms Barreto continues to feel badly.  Today she complains of sob, abdominal discomfort, and dizziness.  There were no acute events overnight.        REVIEW OF SYSTEMS:    CONSTITUTIONAL: No weakness, fevers or chills  EYES/ENT: No visual changes;  No vertigo or throat pain   NECK: No pain or stiffness  RESPIRATORY: No cough, wheezing, hemoptysis;  + shortness of breath  CARDIOVASCULAR: No chest pain or palpitations  GASTROINTESTINAL: +abdominal pain. +nausea, no vomiting, or hematemesis; No diarrhea or constipation. No melena or hematochezia.  GENITOURINARY: No dysuria, frequency or hematuria  NEUROLOGICAL: No numbness or weakness  SKIN: No itching, rashes      Allergies    No Known Allergies    Intolerances      Vital Signs Last 24 Hrs  T(C): 36.4 (13 Apr 2021 06:25), Max: 36.8 (12 Apr 2021 19:42)  T(F): 97.5 (13 Apr 2021 06:25), Max: 98.2 (12 Apr 2021 19:42)  HR: 60 (13 Apr 2021 06:25) (60 - 83)  BP: 132/72 (13 Apr 2021 06:25) (118/69 - 157/70)  BP(mean): --  RR: 18 (13 Apr 2021 06:25) (18 - 18)  SpO2: 95% (13 Apr 2021 06:25) (95% - 97%)    MEDICATIONS  (STANDING):  aMIOdarone    Tablet 200 milliGRAM(s) Oral daily  aspirin enteric coated 81 milliGRAM(s) Oral daily  atorvastatin 20 milliGRAM(s) Oral at bedtime  ferrous    sulfate 325 milliGRAM(s) Oral daily  heparin  Infusion 1000 Unit(s)/Hr (5.5 mL/Hr) IV Continuous <Continuous>  levothyroxine 75 MICROGram(s) Oral daily  metoprolol succinate ER 25 milliGRAM(s) Oral daily  polyethylene glycol 3350 17 Gram(s) Oral daily  senna 2 Tablet(s) Oral at bedtime  sodium chloride 0.9% lock flush 3 milliLiter(s) IV Push every 8 hours      Exam-  General: NAD, frail  Cor: s1s2, RRR, II/VI systolic murmur   Tele: Apaced, SR 60's  Pulm: Clear, no wheezes, rales, or rhonchi, no use of accessory muscles  Gastrointestinal: soft, nontender, nondistended, +bowel sounds  Extremities: no edema, 1+ DP pulses b/l  Neuro: A&Ox3, nonfocal                          9.4    10.14 )-----------( 201      ( 13 Apr 2021 06:40 )             29.0   04-13    133<L>  |  99  |  27<H>  ----------------------------<  123<H>  4.2   |  22  |  1.21    Ca    8.2<L>      13 Apr 2021 06:40    PTT - ( 13 Apr 2021 06:40 )  PTT:58.3 sec  I&O's Summary    12 Apr 2021 07:01  -  13 Apr 2021 07:00  --------------------------------------------------------  IN: 424.5 mL / OUT: 1350 mL / NET: -925.5 mL    13 Apr 2021 07:01  -  13 Apr 2021 11:02  --------------------------------------------------------  IN: 211 mL / OUT: 200 mL / NET: 11 mL              Assessment/Plan:  Ms Barreto is a 89 y/o Mandarin Speaking female, admitted with symptomatic severe Aortic Stenosis  - she continues to feel worse  - she is on increased dose of levothyroxine  - requested COVID PCR, along with respiratory panel   - still on schedule for Friday    LORY Cam  [phone]

## 2021-04-13 NOTE — PROGRESS NOTE ADULT - SUBJECTIVE AND OBJECTIVE BOX
NEPHROLOGY-[facility] [phone]        Patient seen and examined in bed.  She was the same         MEDICATIONS  (STANDING):  aMIOdarone    Tablet 200 milliGRAM(s) Oral daily  aspirin enteric coated 81 milliGRAM(s) Oral daily  atorvastatin 20 milliGRAM(s) Oral at bedtime  ferrous    sulfate 325 milliGRAM(s) Oral daily  heparin  Infusion 1000 Unit(s)/Hr (5.5 mL/Hr) IV Continuous <Continuous>  levothyroxine 75 MICROGram(s) Oral daily  metoprolol succinate ER 25 milliGRAM(s) Oral daily  polyethylene glycol 3350 17 Gram(s) Oral daily  senna 2 Tablet(s) Oral at bedtime  sodium chloride 0.9% lock flush 3 milliLiter(s) IV Push every 8 hours      VITAL:  T(C): , Max: 36.8 (04-12-21 @ 19:42)  T(F): , Max: 98.2 (04-12-21 @ 19:42)  HR: 61 (04-13-21 @ 10:49)  BP: 118/71 (04-13-21 @ 10:49)  BP(mean): --  RR: 18 (04-13-21 @ 10:49)  SpO2: 97% (04-13-21 @ 10:49)  Wt(kg): --    I and O's:    04-12 @ 07:01  -  04-13 @ 07:00  --------------------------------------------------------  IN: 424.5 mL / OUT: 1350 mL / NET: -925.5 mL    04-13 @ 07:01  -  04-13 @ 12:03  --------------------------------------------------------  IN: 211 mL / OUT: 200 mL / NET: 11 mL          PHYSICAL EXAM:    Constitutional: NAD  Neck:  No JVD  Respiratory: CTAB/L  Cardiovascular: S1 and S2  Gastrointestinal: BS+, soft, NT/ND  Extremities: No peripheral edema  Neurological: A/O x 3, no focal deficits  Psychiatric: Normal mood, normal affect  : No Guzman  Skin: No rashes  Access: Not applicable    LABS:                        9.4    10.14 )-----------( 201      ( 13 Apr 2021 06:40 )             29.0     04-13    133<L>  |  99  |  27<H>  ----------------------------<  123<H>  4.2   |  22  |  1.21    Ca    8.2<L>      13 Apr 2021 06:40            Urine Studies:          RADIOLOGY & ADDITIONAL STUDIES:             [facility] [phone]        Patient seen and examined in bed.  She was the same but she states that her belly was tight         MEDICATIONS  (STANDING):  aMIOdarone    Tablet 200 milliGRAM(s) Oral daily  aspirin enteric coated 81 milliGRAM(s) Oral daily  atorvastatin 20 milliGRAM(s) Oral at bedtime  ferrous    sulfate 325 milliGRAM(s) Oral daily  heparin  Infusion 1000 Unit(s)/Hr (5.5 mL/Hr) IV Continuous <Continuous>  levothyroxine 75 MICROGram(s) Oral daily  metoprolol succinate ER 25 milliGRAM(s) Oral daily  polyethylene glycol 3350 17 Gram(s) Oral daily  senna 2 Tablet(s) Oral at bedtime  sodium chloride 0.9% lock flush 3 milliLiter(s) IV Push every 8 hours      VITAL:  T(C): , Max: 36.8 (04-12-21 @ 19:42)  T(F): , Max: 98.2 (04-12-21 @ 19:42)  HR: 61 (04-13-21 @ 10:49)  BP: 118/71 (04-13-21 @ 10:49)  BP(mean): --  RR: 18 (04-13-21 @ 10:49)  SpO2: 97% (04-13-21 @ 10:49)  Wt(kg): --    I and O's:    04-12 @ 07:01  -  04-13 @ 07:00  --------------------------------------------------------  IN: 424.5 mL / OUT: 1350 mL / NET: -925.5 mL    04-13 @ 07:01  -  04-13 @ 12:03  --------------------------------------------------------  IN: 211 mL / OUT: 200 mL / NET: 11 mL          PHYSICAL EXAM:    Constitutional: NAD  Neck:  +  JVD at 30 degrees 3-4 cm over clavicle   Respiratory: Course   Cardiovascular: S1 and S2  Gastrointestinal: BS+, soft, NT/ND  Extremities: No peripheral edema  Neurological: A/O x 3, no focal deficits  Psychiatric: Normal mood, normal affect  : No Guzman  Skin: No rashes  Access: Not applicable    LABS:                        9.4    10.14 )-----------( 201      ( 13 Apr 2021 06:40 )             29.0     04-13    133<L>  |  99  |  27<H>  ----------------------------<  123<H>  4.2   |  22  |  1.21    Ca    8.2<L>      13 Apr 2021 06:40            Urine Studies:          RADIOLOGY & ADDITIONAL STUDIES:

## 2021-04-13 NOTE — PROGRESS NOTE ADULT - SUBJECTIVE AND OBJECTIVE BOX
Subjective:  "Im not happy,  my heart is beating fast, my legs hurt"  Sitting in chair, states shes not "Happy"    Tele:     SR  70s                           T(C): 36.7 (04-13-21 @ 10:49), Max: 36.8 (04-12-21 @ 19:42)  HR: 61 (04-13-21 @ 10:49) (60 - 61)  BP: 118/71 (04-13-21 @ 10:49) (118/71 - 157/70)  RR: 18 (04-13-21 @ 10:49) (18 - 18)  SpO2: 97% (04-13-21 @ 10:49) (95% - 97%)        04-13    133<L>  |  99  |  27<H>  ----------------------------<  123<H>  4.2   |  22  |  1.21    Ca    8.2<L>      13 Apr 2021 06:40                                 9.4    10.14 )-----------( 201      ( 13 Apr 2021 06:40 )             29.0        PTT - ( 13 Apr 2021 06:40 )  PTT:58.3 sec    CAPILLARY BLOOD GLUCOSE      POCT Blood Glucose.: 189 mg/dL (12 Apr 2021 21:28)               Assessment    Neurology: alert and oriented x 3,    CV : S1 S2 ; +murmur   Lungs: crackles at bilateral bases, supplemental O2, mild tachypnea    Abdomen: soft, nontender, nondistended, positive bowel sounds, +small  bowel movement   reports nausea    :  pt voiding without difficulty     Extremities:   CABRERA; neg LE edema, neg calf tenderness.   PPP bilaterally        MEDICATIONS  (STANDING):  aMIOdarone    Tablet 200 milliGRAM(s) Oral daily  aspirin enteric coated 81 milliGRAM(s) Oral daily  atorvastatin 20 milliGRAM(s) Oral at bedtime  ferrous    sulfate 325 milliGRAM(s) Oral daily  furosemide   Injectable 40 milliGRAM(s) IV Push once  heparin  Infusion 1000 Unit(s)/Hr (5.5 mL/Hr) IV Continuous <Continuous>  levothyroxine 100 MICROGram(s) Oral daily  polyethylene glycol 3350 17 Gram(s) Oral daily  senna 2 Tablet(s) Oral at bedtime  sodium chloride 0.9% lock flush 3 milliLiter(s) IV Push every 8 hours  sorbitol 70% Solution 30 milliLiter(s) Oral once       PAST MEDICAL & SURGICAL HISTORY:  HTN (hypertension)    CAD (coronary artery disease)  coronary stents x 2, 2007    HLD (hyperlipidemia)    Asthma    PAF (paroxysmal atrial fibrillation)    Scoliosis    History of cholecystectomy    H/O tubal ligation    History of bilateral knee replacement    History of cataract surgery  bilateral    S/P cholecystectomy             Subjective:  "Im not happy,  my heart is beating fast, my legs hurt"  Sitting in chair, states shes not "Happy"    Tele:     SR  70s                           T(C): 36.7 (04-13-21 @ 10:49), Max: 36.8 (04-12-21 @ 19:42)  HR: 61 (04-13-21 @ 10:49) (60 - 61)  BP: 118/71 (04-13-21 @ 10:49) (118/71 - 157/70)  RR: 18 (04-13-21 @ 10:49) (18 - 18)  SpO2: 97% (04-13-21 @ 10:49) (95% - 97%)        04-13    133<L>  |  99  |  27<H>  ----------------------------<  123<H>  4.2   |  22  |  1.21    Ca    8.2<L>      13 Apr 2021 06:40                                 9.4    10.14 )-----------( 201      ( 13 Apr 2021 06:40 )             29.0        PTT - ( 13 Apr 2021 06:40 )  PTT:58.3 sec    CAPILLARY BLOOD GLUCOSE      POCT Blood Glucose.: 189 mg/dL (12 Apr 2021 21:28)               Assessment    Neurology: alert and oriented x 3,    CV : S1 S2 ; +murmur   Lungs: crackles at bilateral bases, diminished right base, supplemental O2, mild tachypnea    Abdomen: soft, nontender, nondistended, positive bowel sounds, +small  bowel movement   reports nausea    :  pt voiding without difficulty     Extremities:   CABRERA; neg LE edema, neg calf tenderness.   PPP bilaterally        MEDICATIONS  (STANDING):  aMIOdarone    Tablet 200 milliGRAM(s) Oral daily  aspirin enteric coated 81 milliGRAM(s) Oral daily  atorvastatin 20 milliGRAM(s) Oral at bedtime  ferrous    sulfate 325 milliGRAM(s) Oral daily  furosemide   Injectable 40 milliGRAM(s) IV Push once  heparin  Infusion 1000 Unit(s)/Hr (5.5 mL/Hr) IV Continuous <Continuous>  levothyroxine 100 MICROGram(s) Oral daily  polyethylene glycol 3350 17 Gram(s) Oral daily  senna 2 Tablet(s) Oral at bedtime  sodium chloride 0.9% lock flush 3 milliLiter(s) IV Push every 8 hours  sorbitol 70% Solution 30 milliLiter(s) Oral once       PAST MEDICAL & SURGICAL HISTORY:  HTN (hypertension)    CAD (coronary artery disease)  coronary stents x 2, 2007    HLD (hyperlipidemia)    Asthma    PAF (paroxysmal atrial fibrillation)    Scoliosis    History of cholecystectomy    H/O tubal ligation    History of bilateral knee replacement    History of cataract surgery  bilateral    S/P cholecystectomy

## 2021-04-13 NOTE — PROGRESS NOTE ADULT - PROBLEM SELECTOR PLAN 1
Will increase synthroid to 100mcg po daily for now.  Will repeat FT4 on Thursday morning and FU.  Discussed plan with patient and primary team.

## 2021-04-13 NOTE — PROCEDURE NOTE - NSSIZEINFR_GEN_A_CORE
14 Oxybutynin Counseling:  I discussed with the patient the risks of oxybutynin including but not limited to skin rash, drowsiness, dry mouth, difficulty urinating, and blurred vision.

## 2021-04-13 NOTE — PROGRESS NOTE ADULT - ASSESSMENT
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 Patient to be admitted to CTS under Dr. Gutierrez for pre TAVR workup.       3/31 VSS; O2 sat 96% on RA, CXR: small R and Trace L pleural Effusion. Non-edematous. EKG normal, Trops 21.   4/1 HD stable.  Pt had eliquis this am--hold for now and start hep gtt for cardiac cath.  CTA delayed by department hopefully will get done tomorrow  4/2 VSS   PTT elevated heparin gtt held . repeat PTT. creatinine elevated 1.62 Dr Daigle consulted - nephrology  OK for CTA- gentle hydration  post CTA.   4/3 Pending CTA this Monday. Compression dressing to right forearm  4/4 H/H 8/27. Will continue to trend. T&C sent . Anticipate CTA this Monday and coronary cath this week.  4/5 dx cath right femoral groin access . IVF per and post procedure. trend creat. Right forearm hematoma stable. Reinstate heparin gtt  for afib at 1800  4/6 VSS  PTT 61 needs CTA creatinine 1.40 this am. Followed by Nephrology   4/7 VSS Creatine improved to 1.26 this am. Plan for CTA today. Plan to discharge home tomorrow then come back for TAVR.   4/8 VSS; Belly discomfort/distention. Soft, slightly distended, No tenderness on palpation. BM yesterday, no BM today. +Miralax -will continue to monitor. CR 1.6 this am. CTA completed yesterday, pending results. As per Family request and given inability to ambulate short distance without SOB patient will stay for medical optimization till next friday for her TAVR.   4/9 + othrostasis noted; pt asymptomatic; 250 ns bolus given and norvasc d/c; continue to monitor closely; ck ua negative/ urine cx  4/10 + orthostasis but improving; IVF given- total 500 cc; ck chest xray/ abdominal xray as per Dr. Sommers   TAVR 4/16 as per Dr. Gutierrez   4/11 /66 feels better.  c/o belly distention--AXR 4/10 non specific bowel gas pattern.  +122cc 24hrs.  Na 130.  PTT 79.  TSH 27  Free Thyroxine .8  4/12 VSS; ck ESR/CRP in am as per DR. Gutierrez; add ensure for nutritional support  TAVR 4/16 as per structural heart team and Dr. 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 Patient to be admitted to CTS under Dr. Gutierrez for pre TAVR workup.       3/31 VSS; O2 sat 96% on RA, CXR: small R and Trace L pleural Effusion. Non-edematous. EKG normal, Trops 21.   4/1 HD stable.  Pt had eliquis this am--hold for now and start hep gtt for cardiac cath.  CTA delayed by department hopefully will get done tomorrow  4/2 VSS   PTT elevated heparin gtt held . repeat PTT. creatinine elevated 1.62 Dr Daigle consulted - nephrology  OK for CTA- gentle hydration  post CTA.   4/3 Pending CTA this Monday. Compression dressing to right forearm  4/4 H/H 8/27. Will continue to trend. T&C sent . Anticipate CTA this Monday and coronary cath this week.  4/5 dx cath right femoral groin access . IVF per and post procedure. trend creat. Right forearm hematoma stable. Reinstate heparin gtt  for afib at 1800  4/6 VSS  PTT 61 needs CTA creatinine 1.40 this am. Followed by Nephrology   4/7 VSS Creatine improved to 1.26 this am. Plan for CTA today. Plan to discharge home tomorrow then come back for TAVR.   4/8 VSS; Belly discomfort/distention. Soft, slightly distended, No tenderness on palpation. BM yesterday, no BM today. +Miralax -will continue to monitor. CR 1.6 this am. CTA completed yesterday, pending results. As per Family request and given inability to ambulate short distance without SOB patient will stay for medical optimization till next friday for her TAVR.   4/9 + othrostasis noted; pt asymptomatic; 250 ns bolus given and norvasc d/c; continue to monitor closely; ck ua negative/ urine cx  4/10 + orthostasis but improving; IVF given- total 500 cc; ck chest xray/ abdominal xray as per Dr. Sommers   TAVR 4/16 as per Dr. Gutierrez   4/11 /66 feels better.  c/o belly distention--AXR 4/10 non specific bowel gas pattern.  +122cc 24hrs.  Na 130.  PTT 79.  TSH 27  Free Thyroxine .8  4/12 VSS; ck ESR/CRP in am as per DR. Gutierrez; add ensure for nutritional support  TAVR 4/16 as per structural heart team and Dr. gutierrez   4/13 Endo following  TSH 27, synthroid ^ 100 mcgm,  renal following CXR done> right effuion, will check U/S TAVR Friday

## 2021-04-14 NOTE — PROGRESS NOTE ADULT - SUBJECTIVE AND OBJECTIVE BOX
NEPHROLOGY-[facility] [phone]        Patient seen and examined in bed.  She had a thoracentesis yesterday and also a dose of lasix         MEDICATIONS  (STANDING):  aMIOdarone    Tablet 200 milliGRAM(s) Oral daily  aspirin enteric coated 81 milliGRAM(s) Oral daily  atorvastatin 20 milliGRAM(s) Oral at bedtime  ferrous    sulfate 325 milliGRAM(s) Oral daily  heparin  Infusion 550 Unit(s)/Hr (5.5 mL/Hr) IV Continuous <Continuous>  levothyroxine 100 MICROGram(s) Oral daily  metoprolol succinate ER 12.5 milliGRAM(s) Oral daily  polyethylene glycol 3350 17 Gram(s) Oral daily  senna 2 Tablet(s) Oral at bedtime  sodium chloride 0.9% lock flush 3 milliLiter(s) IV Push every 8 hours      VITAL:  T(C): , Max: 36.7 (04-13-21 @ 10:49)  T(F): , Max: 98 (04-13-21 @ 10:49)  HR: 60 (04-14-21 @ 04:22)  BP: 136/74 (04-14-21 @ 04:22)  BP(mean): --  RR: 18 (04-14-21 @ 04:22)  SpO2: 93% (04-14-21 @ 06:48)  Wt(kg): --    I and O's:    04-13 @ 07:01  -  04-14 @ 07:00  --------------------------------------------------------  IN: 895 mL / OUT: 2450 mL / NET: -1555 mL    04-14 @ 07:01  -  04-14 @ 10:22  --------------------------------------------------------  IN: 240 mL / OUT: 20 mL / NET: 220 mL          PHYSICAL EXAM:    Constitutional: NAD  Neck:  No JVD  Respiratory: CTAB/L  Cardiovascular: S1 and S2  Gastrointestinal: BS+, soft, NT/ND  Extremities: No peripheral edema  Neurological: A/O x 3, no focal deficits  Psychiatric: Normal mood, normal affect  : No Guzman  Skin: No rashes  Access: Not applicable    LABS:                        9.9    11.70 )-----------( 188      ( 14 Apr 2021 05:15 )             30.3     04-14    127<L>  |  95<L>  |  30<H>  ----------------------------<  125<H>  5.2   |  25  |  1.35<H>    Ca    8.9      14 Apr 2021 05:15    TPro  5.7<L>  /  Alb  3.5  /  TBili  0.9  /  DBili  x   /  AST  29  /  ALT  36  /  AlkPhos  82  04-14          Urine Studies:          RADIOLOGY & ADDITIONAL STUDIES:

## 2021-04-14 NOTE — PROGRESS NOTE ADULT - ASSESSMENT
Assessment  Hypothyroidism: 89y Female with history of hypothyroidism, was taking synthroid 50mcg po daily per discussion with her son Gino, compliant  with intake,  found to be in hypothyroid state, increased dose to synthroid 100mcg po daily. Patient is s/p thoracentesis, eating partial meals, appears comfortable, eating partial meals.  AS: Planning TAVR 4/16, on medications, no chest pain, stable, monitored.  HTN: Controlled,  on antihypertensive medications.  NELY: Monitor labs, BMP.      Trace Beatty MD  Cell: [phone]  Office: [phone]       Assessment  Hypothyroidism: 89y Female with history of hypothyroidism, was taking synthroid 50mcg po daily per discussion  with her son Gino, compliant  with intake,  found to be in hypothyroid state, increased dose to synthroid 100mcg po daily. Patient is s/p thoracentesis, eating partial meals, appears comfortable, eating partial meals.  AS: Planning TAVR 4/16, on medications, no chest pain, stable, monitored.  HTN: Controlled,  on antihypertensive medications.  NELY: Monitor labs, BMP.      Trace Beatty MD  Cell: [phone]  Office: [phone]

## 2021-04-14 NOTE — PROGRESS NOTE ADULT - SUBJECTIVE AND OBJECTIVE BOX
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 Denies any other symptoms including fever, cough, N/V/D, LE edema/pain.  (31 Mar 2021 23:26)      PAST MEDICAL & SURGICAL HISTORY:  HTN (hypertension)    CAD (coronary artery disease)  coronary stents x 2, 2007    HLD (hyperlipidemia)    Asthma    PAF (paroxysmal atrial fibrillation)    Scoliosis    History of cholecystectomy    H/O tubal ligation    History of bilateral knee replacement    History of cataract surgery  bilateral    S/P cholecystectomy        MEDICATIONS  (STANDING):  aMIOdarone    Tablet 200 milliGRAM(s) Oral daily  aspirin enteric coated 81 milliGRAM(s) Oral daily  atorvastatin 20 milliGRAM(s) Oral at bedtime  ferrous    sulfate 325 milliGRAM(s) Oral daily  heparin  Infusion 600 Unit(s)/Hr (6 mL/Hr) IV Continuous <Continuous>  levothyroxine 100 MICROGram(s) Oral daily  metoprolol succinate ER 12.5 milliGRAM(s) Oral daily  polyethylene glycol 3350 17 Gram(s) Oral daily  senna 2 Tablet(s) Oral at bedtime  sodium chloride 0.9% lock flush 3 milliLiter(s) IV Push every 8 hours      Allergies    No Known Allergies    Intolerances        SOCIAL HISTORY:  Denies ETOh,Smoking,     FAMILY HISTORY:  No pertinent family history in first degree relatives        REVIEW OF SYSTEMS:    CONSTITUTIONAL: No weakness, fevers or chills  EYES/ENT: No visual changes;  No vertigo or throat pain   NECK: No pain or stiffness  RESPIRATORY: No cough, wheezing, hemoptysis; No shortness of breath  CARDIOVASCULAR: No chest pain or palpitations  GASTROINTESTINAL: No abdominal or epigastric pain. No nausea, vomiting, or hematemesis; No diarrhea or constipation. No melena or hematochezia.  GENITOURINARY: No dysuria, frequency or hematuria  NEUROLOGICAL: No numbness or weakness  SKIN: No itching, burning, rashes, or lesions   All other review of systems is negative unless indicated above.    VITAL:  T(C): , Max: 36.6 (04-14-21 @ 19:55)  T(F): , Max: 97.8 (04-14-21 @ 19:55)  HR: 61 (04-14-21 @ 19:55)  BP: 133/65 (04-14-21 @ 19:55)  BP(mean): --  RR: 18 (04-14-21 @ 19:55)  SpO2: 94% (04-14-21 @ 19:55)  Wt(kg): --    I and O's:    04-12 @ 07:01  -  04-13 @ 07:00  --------------------------------------------------------  IN: 424.5 mL / OUT: 1350 mL / NET: -925.5 mL    04-13 @ 07:01  -  04-14 @ 07:00  --------------------------------------------------------  IN: 900.5 mL / OUT: 2450 mL / NET: -1549.5 mL    04-14 @ 07:01  -  04-14 @ 20:14  --------------------------------------------------------  IN: 503 mL / OUT: 560 mL / NET: -57 mL          PHYSICAL EXAM:    Constitutional: NAD  HEENT: PERRLA,   Neck: No JVD  Respiratory: CTA B/L  Cardiovascular: S1 and S2  Gastrointestinal: BS+, soft, NT/ND  Extremities: No peripheral edema  Neurological: A/O x 3, no focal deficits  Psychiatric: Normal mood, normal affect  : No Guzman  Skin: No rashes  Access: Not applicable  Back: No CVA tenderness    LABS:                        9.9    11.70 )-----------( 188      ( 14 Apr 2021 05:15 )             30.3     04-14    127<L>  |  95<L>  |  30<H>  ----------------------------<  125<H>  5.2   |  25  |  1.35<H>    Ca    8.9      14 Apr 2021 05:15    TPro  5.7<L>  /  Alb  3.5  /  TBili  0.9  /  DBili  x   /  AST  29  /  ALT  36  /  AlkPhos  82  04-14          RADIOLOGY & ADDITIONAL STUDIES:

## 2021-04-14 NOTE — PROGRESS NOTE ADULT - PROBLEM SELECTOR PLAN 2
Continue with Toprol 12.5 xl QD for rate control , reduced for orthostatic BP  Eliquis on hold   Hep Gtt   Continue with home dose Amio 200 daily   continue tele

## 2021-04-14 NOTE — PROGRESS NOTE ADULT - PROBLEM SELECTOR PLAN 1
Will continue increased-dose synthroid 100mcg po daily for now.  Will repeat FT4 on Thursday morning and FU.  Discussed plan with patient and primary team.

## 2021-04-14 NOTE — PROGRESS NOTE ADULT - ASSESSMENT
3 cm massin splenic hylum.  i think mri would be best with cristian if can have with pacemeker  option includes eus/ poss fna, but would need off a/c and risk/ benfit  check ca 19-9.  will make further recommendations after speaking to dr. gutierrez

## 2021-04-14 NOTE — PROGRESS NOTE ADULT - SUBJECTIVE AND OBJECTIVE BOX
Chief complaint  Patient is a 89y old  Female who presents with a chief complaint of SOB/CP (14 Apr 2021 10:45)   Review of systems  Patient in bed, looks comfortable, no hypoglycemic episodes.    Labs and Fingersticks  CAPILLARY BLOOD GLUCOSE          Anion Gap, Serum: 7 (04-14 @ 05:15)  Anion Gap, Serum: 12 (04-13 @ 06:40)      Calcium, Total Serum: 8.9 (04-14 @ 05:15)  Calcium, Total Serum: 8.2 *L* (04-13 @ 06:40)  Albumin, Serum: 3.5 (04-14 @ 05:15)  Albumin, Serum: 4.2 (04-13 @ 16:42)    Alanine Aminotransferase (ALT/SGPT): 36 (04-14 @ 05:15)  Alanine Aminotransferase (ALT/SGPT): 42 (04-13 @ 16:42)  Alkaline Phosphatase, Serum: 82 (04-14 @ 05:15)  Alkaline Phosphatase, Serum: 99 (04-13 @ 16:42)  Aspartate Aminotransferase (AST/SGOT): 29 (04-14 @ 05:15)  Aspartate Aminotransferase (AST/SGOT): 37 (04-13 @ 16:42)        04-14    127<L>  |  95<L>  |  30<H>  ----------------------------<  125<H>  5.2   |  25  |  1.35<H>    Ca    8.9      14 Apr 2021 05:15    TPro  5.7<L>  /  Alb  3.5  /  TBili  0.9  /  DBili  x   /  AST  29  /  ALT  36  /  AlkPhos  82  04-14                        9.9    11.70 )-----------( 188      ( 14 Apr 2021 05:15 )             30.3     Medications  MEDICATIONS  (STANDING):  aMIOdarone    Tablet 200 milliGRAM(s) Oral daily  aspirin enteric coated 81 milliGRAM(s) Oral daily  atorvastatin 20 milliGRAM(s) Oral at bedtime  ferrous    sulfate 325 milliGRAM(s) Oral daily  heparin  Infusion 550 Unit(s)/Hr (5.5 mL/Hr) IV Continuous <Continuous>  levothyroxine 100 MICROGram(s) Oral daily  metoprolol succinate ER 12.5 milliGRAM(s) Oral daily  polyethylene glycol 3350 17 Gram(s) Oral daily  senna 2 Tablet(s) Oral at bedtime  sodium chloride 0.9% lock flush 3 milliLiter(s) IV Push every 8 hours  tolvaptan 15 milliGRAM(s) Oral once      Physical Exam  General: Patient comfortable in bed  Vital Signs Last 12 Hrs  T(F): 97.4 (04-14-21 @ 10:23), Max: 97.5 (04-14-21 @ 04:22)  HR: 67 (04-14-21 @ 10:23) (60 - 67)  BP: 117/74 (04-14-21 @ 10:23) (117/74 - 145/76)  BP(mean): --  RR: 18 (04-14-21 @ 10:23) (18 - 20)  SpO2: 94% (04-14-21 @ 10:23) (88% - 94%)  Neck: No palpable thyroid nodules.  CVS: S1S2, No murmurs  Respiratory: No wheezing, no crepitations  GI: Abdomen soft, bowel sounds positive  Musculoskeletal:  edema lower extremities.   Skin: No skin rashes, no ecchymosis    Diagnostics    Free Thyroxine, Serum: AM Sched. Collection: 15-Apr-2021 06:00 (04-13 @ 13:35)         Chief complaint  Patient is a 89y old  Female who presents with a chief complaint of SOB/CP (14 Apr 2021 10:45)   Review of systems  Patient in bed, looks comfortable, no hypoglycemic episodes.    Labs and Fingersticks  CAPILLARY BLOOD GLUCOSE    Anion Gap, Serum: 7 (04-14 @ 05:15)  Anion Gap, Serum: 12 (04-13 @ 06:40)      Calcium, Total Serum: 8.9 (04-14 @ 05:15)  Calcium, Total Serum: 8.2 *L* (04-13 @ 06:40)  Albumin, Serum: 3.5 (04-14 @ 05:15)  Albumin, Serum: 4.2 (04-13 @ 16:42)    Alanine Aminotransferase (ALT/SGPT): 36 (04-14 @ 05:15)  Alanine Aminotransferase (ALT/SGPT): 42 (04-13 @ 16:42)  Alkaline Phosphatase, Serum: 82 (04-14 @ 05:15)  Alkaline Phosphatase, Serum: 99 (04-13 @ 16:42)  Aspartate Aminotransferase (AST/SGOT): 29 (04-14 @ 05:15)  Aspartate Aminotransferase (AST/SGOT): 37 (04-13 @ 16:42)        04-14    127<L>  |  95<L>  |  30<H>  ----------------------------<  125<H>  5.2   |  25  |  1.35<H>    Ca    8.9      14 Apr 2021 05:15    TPro  5.7<L>  /  Alb  3.5  /  TBili  0.9  /  DBili  x   /  AST  29  /  ALT  36  /  AlkPhos  82  04-14                        9.9    11.70 )-----------( 188      ( 14 Apr 2021 05:15 )             30.3     Medications  MEDICATIONS  (STANDING):  aMIOdarone    Tablet 200 milliGRAM(s) Oral daily  aspirin enteric coated 81 milliGRAM(s) Oral daily  atorvastatin 20 milliGRAM(s) Oral at bedtime  ferrous    sulfate 325 milliGRAM(s) Oral daily  heparin  Infusion 550 Unit(s)/Hr (5.5 mL/Hr) IV Continuous <Continuous>  levothyroxine 100 MICROGram(s) Oral daily  metoprolol succinate ER 12.5 milliGRAM(s) Oral daily  polyethylene glycol 3350 17 Gram(s) Oral daily  senna 2 Tablet(s) Oral at bedtime  sodium chloride 0.9% lock flush 3 milliLiter(s) IV Push every 8 hours  tolvaptan 15 milliGRAM(s) Oral once      Physical Exam  General: Patient comfortable in bed  Vital Signs Last 12 Hrs  T(F): 97.4 (04-14-21 @ 10:23), Max: 97.5 (04-14-21 @ 04:22)  HR: 67 (04-14-21 @ 10:23) (60 - 67)  BP: 117/74 (04-14-21 @ 10:23) (117/74 - 145/76)  BP(mean): --  RR: 18 (04-14-21 @ 10:23) (18 - 20)  SpO2: 94% (04-14-21 @ 10:23) (88% - 94%)  Neck: No palpable thyroid nodules.  CVS: S1S2, No murmurs  Respiratory: No wheezing, no crepitations  GI: Abdomen soft, bowel sounds positive  Musculoskeletal:  edema lower extremities.   Skin: No skin rashes, no ecchymosis    Diagnostics    Free Thyroxine, Serum: AM Sched. Collection: 15-Apr-2021 06:00 (04-13 @ 13:35)

## 2021-04-14 NOTE — PROVIDER CONTACT NOTE (CHANGE IN STATUS NOTIFICATION) - ASSESSMENT
/76, HR 62, Resp 20/min, O2 sat 88% on 4L. CT site & tube checked by NP. Ct patent & draining serosanguinous fluid, no SQ air or air leaks noted.

## 2021-04-14 NOTE — PROGRESS NOTE ADULT - ASSESSMENT
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 Patient to be admitted to CTS under Dr. Lai for pre TAVR workup.     3/31 VSS; O2 sat 96% on RA, CXR: small R and Trace L pleural Effusion. Non-edematous. EKG normal, Trops 21.   4/1 HD stable.  Pt had eliquis this am--hold for now and start hep gtt for cardiac cath.  CTA delayed by department hopefully will get done tomorrow  4/2 VSS   PTT elevated heparin gtt held . repeat PTT. creatinine elevated 1.62 Dr Daigle consulted - nephrology  OK for CTA- gentle hydration  post CTA.   4/3 Pending CTA this Monday. Compression dressing to right forearm  4/4 H/H 8/27. Will continue to trend. T&C sent . Anticipate CTA this Monday and coronary cath this week.  4/5 dx cath right femoral groin access . IVF per and post procedure. trend creat. Right forearm hematoma stable. Reinstate heparin gtt  for afib at 1800  4/6 VSS  PTT 61 needs CTA creatinine 1.40 this am. Followed by Nephrology   4/7 VSS Creatine improved to 1.26 this am. Plan for CTA today. Plan to discharge home tomorrow then come back for TAVR.   4/8 VSS; Belly discomfort/distention. Soft, slightly distended, No tenderness on palpation. BM yesterday, no BM today. +Miralax -will continue to monitor. CR 1.6 this am. CTA completed yesterday, pending results. As per Family request and given inability to ambulate short distance without SOB patient will stay for medical optimization till next Friday for her TAVR.   4/9 + orthostasis noted; pt asymptomatic; 250 ns bolus given and Norvasc d/c; continue to monitor closely; ck ua negative/ urine cx  4/10 + orthostasis but improving; IVF given- total 500 cc; ck chest xray/ abdominal xray as per Dr. Sommers   TAVR 4/16 as per Dr. Lai   4/11 /66 feels better.  c/o belly distention --AXR 4/10 non specific bowel gas pattern. +122cc 24hrs.  Na 130.  PTT 79.  TSH 27  Free Thyroxine 0.8  4/12 VSS; ck ESR/CRP in am as per Dr. Lai; add ensure for nutritional support  TAVR 4/16 as per structural heart team and Dr. Lai  4/13 Endo following  TSH 27, synthroid ^ 100 mcg, renal following CXR done > right effusion will check U/S TAVR Friday.  4/14 VVS; Continue with current medication regimen. Heparin gtt for h/o Afib.  GI Consult called to further evaluate findings of CT Angio Heart Structural w/ IV Cont (04.07.21 @ 13:58)  ill-defined hypodense mass in the splenic hilum measuring approximate 3 cm. This may be a pancreatic tail mass or a splenic mass. Associated focal area of hypoperfusion the spleen is noted, Awaiting for further reccs.       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 Patient to be admitted to CTS under Dr. Lai for pre TAVR workup.     3/31 VSS; O2 sat 96% on RA, CXR: small R and Trace L pleural Effusion. Non-edematous. EKG normal, Trops 21.   4/1 HD stable.  Pt had eliquis this am--hold for now and start hep gtt for cardiac cath.  CTA delayed by department hopefully will get done tomorrow  4/2 VSS   PTT elevated heparin gtt held . repeat PTT. creatinine elevated 1.62 Dr Daigle consulted - nephrology  OK for CTA- gentle hydration  post CTA.   4/3 Pending CTA this Monday. Compression dressing to right forearm  4/4 H/H 8/27. Will continue to trend. T&C sent . Anticipate CTA this Monday and coronary cath this week.  4/5 dx cath right femoral groin access . IVF per and post procedure. trend creat. Right forearm hematoma stable. Reinstate heparin gtt  for afib at 1800  4/6 VSS  PTT 61 needs CTA creatinine 1.40 this am. Followed by Nephrology   4/7 VSS Creatine improved to 1.26 this am. Plan for CTA today. Plan to discharge home tomorrow then come back for TAVR.   4/8 VSS; Belly discomfort/distention. Soft, slightly distended, No tenderness on palpation. BM yesterday, no BM today. +Miralax -will continue to monitor. CR 1.6 this am. CTA completed yesterday, pending results. As per Family request and given inability to ambulate short distance without SOB patient will stay for medical optimization till next Friday for her TAVR.   4/9 + orthostasis noted; pt asymptomatic; 250 ns bolus given and Norvasc d/c; continue to monitor closely; ck ua negative/ urine cx  4/10 + orthostasis but improving; IVF given- total 500 cc; ck chest xray/ abdominal xray as per Dr. Sommers   TAVR 4/16 as per Dr. Lai   4/11 /66 feels better.  c/o belly distention --AXR 4/10 non specific bowel gas pattern. +122cc 24hrs.  Na 130.  PTT 79.  TSH 27  Free Thyroxine 0.8  4/12 VSS; ck ESR/CRP in am as per Dr. Lai; add ensure for nutritional support  TAVR 4/16 as per structural heart team and Dr. Lai  4/13 Endo following  TSH 27, synthroid ^ 100 mcg, renal following CXR done > right effusion will check U/S TAVR Friday.  4/14 VVS; Continue with current medication regimen. Heparin gtt for h/o Afib.  GI Consult called to further evaluate findings of CT Angio Heart Structural w/ IV Cont (04.07.21 @ 13:58) An ill-defined hypodense mass in the splenic hilum measuring approximate 3 cm. This may be a pancreatic tail mass or a splenic mass. Associated focal area of hypoperfusion the spleen is noted, Awaiting for further reccs.

## 2021-04-14 NOTE — PROGRESS NOTE ADULT - ASSESSMENT
89F with PMHx of  asthma, HTN, HLD, TIA, severe aortic stenosis, CAD s/p stents LAD and Diag, 2009 s/p 2 stent LAD in 9/2018, symptomatic Pafib on Eliquis pw SOB MOULTON and chest pain   Admitted for TAVR work up   Hyponatremia   Acute diastolic heart failure   SP thoracentesis     1 Renal- Scr   improved and scr fluctuates based on hemodynamic status  SP Lasix 40mg IVP x 1 yesterday     2 Hyponatremia- Samsca 15mg po x 1.  No need to repeat the sodium today     3  CVS- TAVR Tentatively sched for next friday      4 CTS-SP thoracentesis     Will likely need LIAM on dc          Sayed [facility]   [phone]      89F with PMHx of  asthma, HTN, HLD, TIA, severe aortic stenosis, CAD s/p stents LAD and Diag, 2009 s/p 2 stent LAD in 9/2018, symptomatic Pafib on Eliquis pw SOB MOULTON and chest pain   Admitted for TAVR work up   Hyponatremia   Acute diastolic heart failure   SP thoracentesis     1 Renal- Scr   improved and scr fluctuates based on hemodynamic status  SP Lasix 40mg IVP x 1 yesterday --Good negative output     2 Hyponatremia- Samsca 15mg po x 1.  No need to repeat the sodium today     3  CVS- TAVR Tentatively sched for next friday      4 CTS-SP thoracentesis and Chest tube per CTS     Will likely need LIAM on dc          Sayed [facility]   [phone]

## 2021-04-14 NOTE — PROGRESS NOTE ADULT - SUBJECTIVE AND OBJECTIVE BOX
VITAL SIGNS    Subjective: "I'm feeling ok." Denies CP, palpitation, SOB, MOULTON, HA, dizziness, N/V/D, fever or chills.  No acute event noted overnight.     Telemetry:  AP / NSR  60-70    Vital Signs Last 24 Hrs  T(C): 36.4 (21 @ 14:23), Max: 36.6 (21 @ 19:23)  T(F): 97.6 (21 @ 14:23), Max: 97.9 (21 @ 19:23)  HR: 66 (21:23) (60 - 67)  BP: 128/74 (21 @ 14:23) (117/74 - 153/76)  RR: 18 (21 @ 14:23) (18 - 20)  SpO2: 95% (21 14:23) (88% - 100%)            @ 07:01  -   @ 07:00  --------------------------------------------------------  IN: 900.5 mL / OUT: 2450 mL / NET: -1549.5 mL     @ 07:01  -   @ 16:09  --------------------------------------------------------  IN: 491 mL / OUT: 560 mL / NET: -69 mL    Daily     Daily Weight in k.9 (2021 08:26)    PHYSICAL EXAM    Neurology: alert and oriented x 3, nonfocal, no gross deficits    CV: (+) S1 and S2, No murmurs, rubs, gallops or clicks     Sternal Wound: MSI -->CDI, sternum stable; Right Pigtail cath --> pleural vac --> Negative air leak.      Lungs: CTA B/L     Abdomen: soft, nontender, nondistended, positive bowel sounds, (+) Flatus; (+) BM     :  Voiding               Extremities:  B/L LE (+) edema; negative calf tenderness; (+) 2 DP palpable        acetaminophen Tablet .. 650 milliGRAM(s) Oral every 6 hours PRN  aluminum hydroxide/magnesium hydroxide/simethicone Suspension 30 milliLiter(s) Oral every 4 hours PRN  aMIOdarone Tablet 200 milliGRAM(s) Oral daily  aspirin enteric coated 81 milliGRAM(s) Oral daily  atorvastatin 20 milliGRAM(s) Oral at bedtime  ferrous sulfate 325 milliGRAM(s) Oral daily  heparin  Infusion 600 Unit(s)/Hr IV Continuous <Continuous>  levothyroxine 100 MICROGram(s) Oral daily  metoprolol succinate ER 12.5 milliGRAM(s) Oral daily  polyethylene glycol 3350 17 Gram(s) Oral daily  senna 2 Tablet(s) Oral at bedtime  sodium chloride 0.9% lock flush 3 milliLiter(s) IV Push every 8 hours  tolvaptan 15 milliGRAM(s) Oral once    Physical Therapy Rec:   Home  [  ]   Home w/ PT  [ X ]  Rehab  [  ]    Discussed with Cardiothoracic Team at AM rounds.

## 2021-04-14 NOTE — PROGRESS NOTE ADULT - SUBJECTIVE AND OBJECTIVE BOX
Structural Heart Team    Ms Barreto was seen this morning sitting up in a chair appearing comfortable with 5L O2 via NC.  She reports improvement in regards to her sob and abdominal discomfort.  Yesterday she had a R pigtail placed for pleural effusion (with 1250cc's of serosanguinous and straw colored drainage).         REVIEW OF SYSTEMS:    CONSTITUTIONAL: No weakness, fevers or chills  EYES/ENT: No visual changes;  No vertigo or throat pain   NECK: No pain or stiffness  RESPIRATORY: No cough, wheezing, hemoptysis; No shortness of breath  CARDIOVASCULAR: No chest pain or palpitations  GASTROINTESTINAL: No abdominal or epigastric pain. No nausea, vomiting, or hematemesis; No diarrhea or constipation. No melena or hematochezia.  GENITOURINARY: No dysuria, frequency or hematuria  NEUROLOGICAL: No numbness or weakness  SKIN: No itching, rashes      Allergies    No Known Allergies    Intolerances      Vital Signs Last 24 Hrs  T(C): 36.3 (14 Apr 2021 10:23), Max: 36.6 (13 Apr 2021 19:23)  T(F): 97.4 (14 Apr 2021 10:23), Max: 97.9 (13 Apr 2021 19:23)  HR: 67 (14 Apr 2021 10:23) (60 - 67)  BP: 117/74 (14 Apr 2021 10:23) (117/74 - 153/76)  BP(mean): --  RR: 18 (14 Apr 2021 10:23) (18 - 20)  SpO2: 94% (14 Apr 2021 10:23) (88% - 100%)    MEDICATIONS  (STANDING):  aMIOdarone    Tablet 200 milliGRAM(s) Oral daily  aspirin enteric coated 81 milliGRAM(s) Oral daily  atorvastatin 20 milliGRAM(s) Oral at bedtime  ferrous    sulfate 325 milliGRAM(s) Oral daily  heparin  Infusion 550 Unit(s)/Hr (5.5 mL/Hr) IV Continuous <Continuous>  levothyroxine 100 MICROGram(s) Oral daily  metoprolol succinate ER 12.5 milliGRAM(s) Oral daily  polyethylene glycol 3350 17 Gram(s) Oral daily  senna 2 Tablet(s) Oral at bedtime  sodium chloride 0.9% lock flush 3 milliLiter(s) IV Push every 8 hours  tolvaptan 15 milliGRAM(s) Oral once      Exam-  General: NAD, frail  Cor: s1s2, RRR, II/VI systolic murmur   Tele: Apaced, SR 60-70's  Pulm: Clear, no wheezes, rales, or rhonchi, no use of accessory muscles  Gastrointestinal: soft, nontender, nondistended, +bowel sounds  Extremities: no edema, 1+ DP pulses b/l  Neuro: A&Ox3, nonfocal                          9.9    11.70 )-----------( 188      ( 14 Apr 2021 05:15 )             30.3   04-14    127<L>  |  95<L>  |  30<H>  ----------------------------<  125<H>  5.2   |  25  |  1.35<H>    Ca    8.9      14 Apr 2021 05:15    TPro  5.7<L>  /  Alb  3.5  /  TBili  0.9  /  DBili  x   /  AST  29  /  ALT  36  /  AlkPhos  82  04-14  PTT - ( 14 Apr 2021 05:15 )  PTT:28.7 sec  I&O's Summary    13 Apr 2021 07:01  -  14 Apr 2021 07:00  --------------------------------------------------------  IN: 900.5 mL / OUT: 2450 mL / NET: -1549.5 mL    14 Apr 2021 07:01  -  14 Apr 2021 13:45  --------------------------------------------------------  IN: 273 mL / OUT: 250 mL / NET: 23 mL              Assessment/Plan:  Ms Barreto is a 89 y/o Mandarin Speaking female, admitted with symptomatic severe Aortic Stenosis  - GI consult today (pancreatic tail vs splenic mass)  - monitor chest tube output  - check SaO2 and decrease O2 as tolerated  - cont heparin drip  - repiratory panel negative    LORY Cma  [phone]

## 2021-04-14 NOTE — PROVIDER CONTACT NOTE (CHANGE IN STATUS NOTIFICATION) - ACTION/TREATMENT ORDERED:
Orders received- repeat CXR, O2 increased to 5L n/c. Orders received- repeat CXR, O2 increased to 6L n/c.

## 2021-04-15 NOTE — PROGRESS NOTE ADULT - SUBJECTIVE AND OBJECTIVE BOX
Structural Heart Team    Ms Barreto is sitting upright in a chair and appears comfortable on room air.  She says that her breathing is "ok" and that she ate lunch but then felt nauseous afterward.  She did not vomit.  She does report mild pain at the site of the R pigtail.  There were no acute events overnight.        REVIEW OF SYSTEMS:    CONSTITUTIONAL: No weakness, fevers or chills  EYES/ENT: No visual changes;  No vertigo or throat pain   NECK: No pain or stiffness  RESPIRATORY: No cough, wheezing, hemoptysis; No shortness of breath  CARDIOVASCULAR: No chest pain or palpitations  GASTROINTESTINAL: No abdominal or epigastric pain. No nausea, vomiting, or hematemesis; No diarrhea or constipation. No melena or hematochezia.  GENITOURINARY: No dysuria, frequency or hematuria  NEUROLOGICAL: No numbness or weakness  SKIN: No itching, rashes      Allergies    No Known Allergies    Intolerances      Vital Signs Last 24 Hrs  T(C): 36.5 (15 Apr 2021 05:05), Max: 36.6 (14 Apr 2021 19:55)  T(F): 97.7 (15 Apr 2021 05:05), Max: 97.8 (14 Apr 2021 19:55)  HR: 69 (15 Apr 2021 05:05) (61 - 69)  BP: 137/74 (15 Apr 2021 05:05) (128/74 - 137/74)  BP(mean): --  RR: 18 (15 Apr 2021 07:15) (18 - 18)  SpO2: 95% (15 Apr 2021 07:15) (94% - 95%)    MEDICATIONS  (STANDING):  aMIOdarone    Tablet 200 milliGRAM(s) Oral daily  aspirin enteric coated 81 milliGRAM(s) Oral daily  atorvastatin 20 milliGRAM(s) Oral at bedtime  ferrous    sulfate 325 milliGRAM(s) Oral daily  heparin  Infusion 600 Unit(s)/Hr (6 mL/Hr) IV Continuous <Continuous>  levothyroxine 100 MICROGram(s) Oral daily  metoprolol succinate ER 12.5 milliGRAM(s) Oral daily  polyethylene glycol 3350 17 Gram(s) Oral daily  senna 2 Tablet(s) Oral at bedtime  sodium chloride 0.9% lock flush 3 milliLiter(s) IV Push every 8 hours  urea Oral Powder 15 Gram(s) Oral once      Exam-  General: NAD, frail  Cor: s1s2, RRR, II/VI systolic murmur   Tele: Apaced, SR 60-70's  Pulm: Clear, no wheezes, rales, or rhonchi, no use of accessory muscles  Gastrointestinal: soft, nontender, nondistended, +bowel sounds  Extremities: no edema, 1+ DP pulses b/l  Neuro: A&Ox3, nonfocal                          10.3   15.12 )-----------( 209      ( 15 Apr 2021 02:26 )             31.9   04-15    129<L>  |  96  |  33<H>  ----------------------------<  125<H>  5.3   |  25  |  1.42<H>    Ca    9.5      15 Apr 2021 02:26    TPro  5.7<L>  /  Alb  3.5  /  TBili  0.9  /  DBili  x   /  AST  29  /  ALT  36  /  AlkPhos  82  04-14  PTT - ( 15 Apr 2021 02:26 )  PTT:62.8 sec  I&O's Summary    14 Apr 2021 07:01  -  15 Apr 2021 07:00  --------------------------------------------------------  IN: 815 mL / OUT: 1240 mL / NET: -425 mL    15 Apr 2021 07:01  -  15 Apr 2021 13:13  --------------------------------------------------------  IN: 240 mL / OUT: 200 mL / NET: 40 mL              Assessment/Plan:  Ms Barreto is a 89 y/o Mandarin Speaking female, admitted with symptomatic severe Aortic Stenosis  - appreciate GI input  - cont heparin  - preop for TAVR tomorrow  - COVID negative on 4/13/21  - NPO after midnight  - discussed plan with Dr. Lai and the patient    Mg Cates, PA  [phone]

## 2021-04-15 NOTE — PROGRESS NOTE ADULT - SUBJECTIVE AND OBJECTIVE BOX
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 Denies any other symptoms including fever, cough, N/V/D, LE edema/pain.  (31 Mar 2021 23:26)      PAST MEDICAL & SURGICAL HISTORY:  HTN (hypertension)    CAD (coronary artery disease)  coronary stents x 2, 2007    HLD (hyperlipidemia)    Asthma    PAF (paroxysmal atrial fibrillation)    Scoliosis    History of cholecystectomy    H/O tubal ligation    History of bilateral knee replacement    History of cataract surgery  bilateral    S/P cholecystectomy        MEDICATIONS  (STANDING):  aMIOdarone    Tablet 200 milliGRAM(s) Oral daily  aspirin enteric coated 81 milliGRAM(s) Oral daily  atorvastatin 20 milliGRAM(s) Oral at bedtime  ferrous    sulfate 325 milliGRAM(s) Oral daily  heparin  Infusion 600 Unit(s)/Hr (6 mL/Hr) IV Continuous <Continuous>  levothyroxine 100 MICROGram(s) Oral daily  metoprolol succinate ER 12.5 milliGRAM(s) Oral daily  polyethylene glycol 3350 17 Gram(s) Oral daily  senna 2 Tablet(s) Oral at bedtime  sodium chloride 0.9% lock flush 3 milliLiter(s) IV Push every 8 hours      Allergies    No Known Allergies    Intolerances        SOCIAL HISTORY:  Denies ETOh,Smoking,     FAMILY HISTORY:  No pertinent family history in first degree relatives        REVIEW OF SYSTEMS:    CONSTITUTIONAL: No weakness, fevers or chills  EYES/ENT: No visual changes;  No vertigo or throat pain   NECK: No pain or stiffness  RESPIRATORY: No cough, wheezing, hemoptysis; No shortness of breath  CARDIOVASCULAR: No chest pain or palpitations  GASTROINTESTINAL: No abdominal or epigastric pain. No nausea, vomiting, or hematemesis; No diarrhea or constipation. No melena or hematochezia.  GENITOURINARY: No dysuria, frequency or hematuria  NEUROLOGICAL: No numbness or weakness  SKIN: No itching, burning, rashes, or lesions   All other review of systems is negative unless indicated above.    VITAL:  T(C): , Max: 36.6 (04-14-21 @ 19:55)  T(F): , Max: 97.8 (04-14-21 @ 19:55)  HR: 69 (04-15-21 @ 05:05)  BP: 137/74 (04-15-21 @ 05:05)  BP(mean): --  RR: 18 (04-14-21 @ 19:55)  SpO2: 94% (04-14-21 @ 19:55)  Wt(kg): --    I and O's:    04-12 @ 07:01  -  04-13 @ 07:00  --------------------------------------------------------  IN: 424.5 mL / OUT: 1350 mL / NET: -925.5 mL    04-13 @ 07:01  -  04-14 @ 07:00  --------------------------------------------------------  IN: 900.5 mL / OUT: 2450 mL / NET: -1549.5 mL    04-14 @ 07:01  -  04-15 @ 06:59  --------------------------------------------------------  IN: 815 mL / OUT: 1240 mL / NET: -425 mL          PHYSICAL EXAM:    Constitutional: NAD  HEENT: PERRLA,   Neck: No JVD  Respiratory: CTA B/L  Cardiovascular: S1 and S2  Gastrointestinal: BS+, soft, NT/ND  Extremities: No peripheral edema  Neurological: A/O x 3, no focal deficits  Psychiatric: Normal mood, normal affect  : No Guzman  Skin: No rashes  Access: Not applicable  Back: No CVA tenderness    LABS:                        10.3   15.12 )-----------( 209      ( 15 Apr 2021 02:26 )             31.9     04-15    129<L>  |  96  |  33<H>  ----------------------------<  125<H>  5.3   |  25  |  1.42<H>    Ca    9.5      15 Apr 2021 02:26    TPro  5.7<L>  /  Alb  3.5  /  TBili  0.9  /  DBili  x   /  AST  29  /  ALT  36  /  AlkPhos  82  04-14          RADIOLOGY & ADDITIONAL STUDIES:

## 2021-04-15 NOTE — PROGRESS NOTE ADULT - SUBJECTIVE AND OBJECTIVE BOX
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 Denies any other symptoms including fever, cough, N/V/D, LE edema/pain.  (31 Mar 2021 23:26)      PAST MEDICAL & SURGICAL HISTORY:  HTN (hypertension)    CAD (coronary artery disease)  coronary stents x 2, 2007    HLD (hyperlipidemia)    Asthma    PAF (paroxysmal atrial fibrillation)    Scoliosis    History of cholecystectomy    H/O tubal ligation    History of bilateral knee replacement    History of cataract surgery  bilateral    S/P cholecystectomy        MEDICATIONS  (STANDING):  aMIOdarone    Tablet 200 milliGRAM(s) Oral daily  aspirin enteric coated 81 milliGRAM(s) Oral daily  atorvastatin 20 milliGRAM(s) Oral at bedtime  chlorhexidine 0.12% Liquid 5 milliLiter(s) Swish and Spit once  chlorhexidine 4% Liquid 1 Application(s) Topical once  ferrous    sulfate 325 milliGRAM(s) Oral daily  heparin  Infusion 600 Unit(s)/Hr (6 mL/Hr) IV Continuous <Continuous>  levothyroxine 100 MICROGram(s) Oral daily  metoprolol succinate ER 12.5 milliGRAM(s) Oral daily  polyethylene glycol 3350 17 Gram(s) Oral daily  senna 2 Tablet(s) Oral at bedtime  sodium chloride 0.9% lock flush 3 milliLiter(s) IV Push every 8 hours  sodium chloride 0.9%. 1000 milliLiter(s) (50 mL/Hr) IV Continuous <Continuous>  urea Oral Powder 15 Gram(s) Oral once    MEDICATIONS  (PRN):  acetaminophen   Tablet .. 650 milliGRAM(s) Oral every 6 hours PRN Mild Pain (1 - 3)  aluminum hydroxide/magnesium hydroxide/simethicone Suspension 30 milliLiter(s) Oral every 4 hours PRN Dyspepsia        Labs:                        10.3   15.12 )-----------( 209      ( 15 Apr 2021 02:26 )             31.9     04-15    129<L>  |  96  |  33<H>  ----------------------------<  125<H>  5.3   |  25  |  1.42<H>    Ca    9.5      15 Apr 2021 02:26    TPro  5.7<L>  /  Alb  3.5  /  TBili  0.9  /  DBili  x   /  AST  29  /  ALT  36  /  AlkPhos  82  04-14    PTT - ( 15 Apr 2021 02:26 )  PTT:62.8 sec    Blood Type: ABO Interpretation: O (04-15 @ 10:06)    HGB A1C:   Prealbumin:   Pro-BNP:   Thyroid Panel: 04-15 @ 09:23/--  1.1/--/--  04-15 @ 06:26/--  1.2/--/--  04-14 @ 09:43/--  1.0/--/--  04-11 @ 08:25/27.30  0.8/--/--    MRSA:  / MSSA:       CXR:     EKG:    Carotid Duplex:      PFT's:    Echocardiogram:    Cardiac catheterization:    Vein Mapping:    Gen: WN/WD NAD  Neuro: AAOx3, nonfocal  Pulm: CTA B/L  CV: RRR, S1S2  Abd: Soft, NT, ND +BS  Ext: No edema, + peripheral pulses      Pt has AICD/PPM [ ] Yes  [ ] No             Brand Name:  Pre-op Beta Blocker ordered within 24 hrs of surgery ([facility] ONLY)?  [ ] Yes  [ ] No  If not, Why?  Type & Cross  [ ] Yes  [ ] No  NPO after Midnight [ ] Yes  [ ] No  Pre-op ABX ordered, to be taped on chart:  [ ] Yes  [ ] No     Hibiclens/Peridex ordered [ ] Yes  [ ] No  Intraop on Hold: PRBCs, CXR, NATHALIE [ ]   Consent obtained  [ ] Yes  [ ] No

## 2021-04-15 NOTE — PROGRESS NOTE ADULT - PROBLEM SELECTOR PLAN 2
Continue with Toprol 12.5 xl QD for rate control ,  Eliquis on hold   Hep Gtt   off at 6 am   friday  Continue with home dose Amio 200 daily

## 2021-04-15 NOTE — PROGRESS NOTE ADULT - SUBJECTIVE AND OBJECTIVE BOX
[facility] [phone]        Patient seen and examined in bed.  She was feeling slightly better  Chest tube on water seal         MEDICATIONS  (STANDING):  aMIOdarone    Tablet 200 milliGRAM(s) Oral daily  aspirin enteric coated 81 milliGRAM(s) Oral daily  atorvastatin 20 milliGRAM(s) Oral at bedtime  ferrous    sulfate 325 milliGRAM(s) Oral daily  heparin  Infusion 600 Unit(s)/Hr (6 mL/Hr) IV Continuous <Continuous>  levothyroxine 100 MICROGram(s) Oral daily  metoprolol succinate ER 12.5 milliGRAM(s) Oral daily  polyethylene glycol 3350 17 Gram(s) Oral daily  senna 2 Tablet(s) Oral at bedtime  sodium chloride 0.9% lock flush 3 milliLiter(s) IV Push every 8 hours      VITAL:  T(C): , Max: 36.6 (04-14-21 @ 19:55)  T(F): , Max: 97.8 (04-14-21 @ 19:55)  HR: 69 (04-15-21 @ 05:05)  BP: 137/74 (04-15-21 @ 05:05)  BP(mean): --  RR: 18 (04-15-21 @ 07:15)  SpO2: 95% (04-15-21 @ 07:15)  Wt(kg): --    I and O's:    04-14 @ 07:01  -  04-15 @ 07:00  --------------------------------------------------------  IN: 815 mL / OUT: 1240 mL / NET: -425 mL    04-15 @ 07:01  -  04-15 @ 12:53  --------------------------------------------------------  IN: 240 mL / OUT: 200 mL / NET: 40 mL          PHYSICAL EXAM:    Constitutional: NAD  Neck:  No JVD  Respiratory: CTAB/L  Cardiovascular: S1 and S2  Gastrointestinal: BS+, soft, NT/ND  Extremities: No peripheral edema  Neurological: A/O x 3, no focal deficits  Psychiatric: Normal mood, normal affect  : No Guzman  Skin: No rashes  Access: Not applicable    LABS:                        10.3   15.12 )-----------( 209      ( 15 Apr 2021 02:26 )             31.9     04-15    129<L>  |  96  |  33<H>  ----------------------------<  125<H>  5.3   |  25  |  1.42<H>    Ca    9.5      15 Apr 2021 02:26    TPro  5.7<L>  /  Alb  3.5  /  TBili  0.9  /  DBili  x   /  AST  29  /  ALT  36  /  AlkPhos  82  04-14          Urine Studies:          RADIOLOGY & ADDITIONAL STUDIES:

## 2021-04-15 NOTE — PROGRESS NOTE ADULT - SUBJECTIVE AND OBJECTIVE BOX
Chief complaint  Patient is a 89y old  Female who presents with a chief complaint of SOB/CP (15 Apr 2021 06:59)   Review of systems  Patient in bed, looks comfortable.    Medications  MEDICATIONS  (STANDING):  aMIOdarone    Tablet 200 milliGRAM(s) Oral daily  aspirin enteric coated 81 milliGRAM(s) Oral daily  atorvastatin 20 milliGRAM(s) Oral at bedtime  ferrous    sulfate 325 milliGRAM(s) Oral daily  heparin  Infusion 600 Unit(s)/Hr (6 mL/Hr) IV Continuous <Continuous>  levothyroxine 100 MICROGram(s) Oral daily  metoprolol succinate ER 12.5 milliGRAM(s) Oral daily  polyethylene glycol 3350 17 Gram(s) Oral daily  senna 2 Tablet(s) Oral at bedtime  sodium chloride 0.9% lock flush 3 milliLiter(s) IV Push every 8 hours  sorbitol 70% Solution 30 milliLiter(s) Oral once      Physical Exam  General: Patient comfortable in bed  Vital Signs Last 12 Hrs  T(F): 97.7 (04-15-21 @ 05:05), Max: 97.7 (04-15-21 @ 05:05)  HR: 69 (04-15-21 @ 05:05) (69 - 69)  BP: 137/74 (04-15-21 @ 05:05) (137/74 - 137/74)  BP(mean): --  RR: 18 (04-15-21 @ 07:15) (18 - 18)  SpO2: 95% (04-15-21 @ 07:15) (95% - 95%)  Neck: No palpable thyroid nodules.      Diagnostics    Free Thyroxine, Serum: AM Sched. Collection: 15-Apr-2021 06:00 (04-13 @ 13:35)           Chief complaint  Patient is a 89y old  Female who presents with a chief complaint of SOB/CP (15 Apr 2021 06:59)   Review of systems  Patient in bed, looks comfortable.    Medications  MEDICATIONS  (STANDING):  aMIOdarone    Tablet 200 milliGRAM(s) Oral daily  aspirin enteric coated 81 milliGRAM(s) Oral daily  atorvastatin 20 milliGRAM(s) Oral at bedtime  ferrous    sulfate 325 milliGRAM(s) Oral daily  heparin  Infusion 600 Unit(s)/Hr (6 mL/Hr) IV Continuous <Continuous>  levothyroxine 100 MICROGram(s) Oral daily  metoprolol succinate ER 12.5 milliGRAM(s) Oral daily  polyethylene glycol 3350 17 Gram(s) Oral daily  senna 2 Tablet(s) Oral at bedtime  sodium chloride 0.9% lock flush 3 milliLiter(s) IV Push every 8 hours  sorbitol 70% Solution 30 milliLiter(s) Oral once      Physical Exam  General: Patient comfortable in bed  Vital Signs Last 12 Hrs  T(F): 97.7 (04-15-21 @ 05:05), Max: 97.7 (04-15-21 @ 05:05)  HR: 69 (04-15-21 @ 05:05) (69 - 69)  BP: 137/74 (04-15-21 @ 05:05) (137/74 - 137/74)  BP(mean): --  RR: 18 (04-15-21 @ 07:15) (18 - 18)  SpO2: 95% (04-15-21 @ 07:15) (95% - 95%)  Neck: No palpable thyroid nodules.      Diagnostics    Free Thyroxine, Serum: AM Sched. Collection: 15-Apr-2021 06:00 (04-13 @ 13:35)

## 2021-04-15 NOTE — PROGRESS NOTE ADULT - ASSESSMENT
limited communicaiton  dw dr. gutierrez doubt weight loss or dyspepsia from mass  recommend chromogranin a and ca 19-9  will need abodminal imgaing, prefer mri if pacmeaker is complaint  otherwse ct with oral/ iv contrast would be best if mallory nicholson is approrpiate

## 2021-04-15 NOTE — PROGRESS NOTE ADULT - PROBLEM SELECTOR PLAN 1
Will continue increased-dose synthroid 100mcg po daily for now.  Will repeat TFTs prior to DC.  Discussed plan with patient and primary team.

## 2021-04-15 NOTE — PROGRESS NOTE ADULT - ASSESSMENT
Assessment  Hypothyroidism: 89y Female with history of hypothyroidism, was taking synthroid 50mcg po daily per discussion with her son Gino, compliant with intake,  found to be in hypothyroid state, increased dose to synthroid 100mcg po daily, FT4 improving. Patient is eating partial meals, appears comfortable, GI on board for ?splenic mass, planning TAVR tomorrow.  AS: Planning TAVR 4/16, on medications, no chest pain, stable, monitored.  HTN: Controlled,  on antihypertensive medications.  NELY: Monitor labs, BMP.      Trace Beatty MD  Cell: [phone]  Office: [phone]       Assessment  Hypothyroidism: 89y Female with history of hypothyroidism, was taking synthroid 50mcg po daily per discussion with  her son Gino, compliant with intake,  found to be in hypothyroid state, increased dose to synthroid 100mcg po daily, FT4 improving. Patient is eating partial meals, appears comfortable, GI on board for ?splenic mass, planning TAVR tomorrow.  AS: Planning TAVR 4/16, on medications, no chest pain, stable, monitored.  HTN: Controlled,  on antihypertensive medications.  NELY: Monitor labs, BMP.      Trace Beatty MD  Cell: [phone]  Office: [phone]

## 2021-04-15 NOTE — PROGRESS NOTE ADULT - ASSESSMENT
89F with PMHx of  asthma, HTN, HLD, TIA, severe aortic stenosis, CAD s/p stents LAD and Diag, 2009 s/p 2 stent LAD in 9/2018, symptomatic Pafib on Eliquis pw SOB MOULTON and chest pain   Admitted for TAVR work up   Hyponatremia   Acute diastolic heart failure   SP thoracentesis     1 Renal- Scr   improved and scr fluctuates based on hemodynamic status  Net negative balance over 24 hour period     2 Hyponatremia- SP Samsca 15mg po x 1 yesterday.  UreaNa x 1 today (to ensure serum sodium is appropriate for anaesthesia)     3  [facility]- TAVR Tentatively sched for am     4 CTS-SP thoracentesis and Chest tube per CTS (on water seal)     Will likely need LIAM on dc          [facility]   [phone]

## 2021-04-16 NOTE — PROGRESS NOTE ADULT - SUBJECTIVE AND OBJECTIVE BOX
[facility] [phone]        Patient seen and examined in bed.  She was excited this am re procedure         MEDICATIONS  (STANDING):  aMIOdarone    Tablet 200 milliGRAM(s) Oral daily  aspirin enteric coated 81 milliGRAM(s) Oral daily  atorvastatin 20 milliGRAM(s) Oral at bedtime  cefuroxime  IVPB 1500 milliGRAM(s) IV Intermittent once  chlorhexidine 0.12% Liquid 5 milliLiter(s) Swish and Spit once  ferrous    sulfate 325 milliGRAM(s) Oral daily  heparin  Infusion 600 Unit(s)/Hr (6 mL/Hr) IV Continuous <Continuous>  levothyroxine 100 MICROGram(s) Oral daily  metoprolol succinate ER 12.5 milliGRAM(s) Oral daily  polyethylene glycol 3350 17 Gram(s) Oral daily  senna 2 Tablet(s) Oral at bedtime  sodium chloride 0.9% lock flush 3 milliLiter(s) IV Push every 8 hours  sodium chloride 0.9%. 1000 milliLiter(s) (50 mL/Hr) IV Continuous <Continuous>      VITAL:  T(C): , Max: 36.6 (04-15-21 @ 14:19)  T(F): , Max: 97.9 (04-15-21 @ 14:19)  HR: 72 (21 @ 09:25)  BP: 152/61 (21 @ 09:25)  BP(mean): 69 (04-15-21 @ 20:11)  RR: 18 (21 @ 09:25)  SpO2: 100% (21 @ 09:25)  Wt(kg): --    I and O's:    04-15 @ 07:01  -   @ 07:00  --------------------------------------------------------  IN: 1386 mL / OUT: 1680 mL / NET: -294 mL     @ 07:01  -   @ 11:44  --------------------------------------------------------  IN: 0 mL / OUT: 350 mL / NET: -350 mL      Height (cm): 157.5 (04-15 @ 23:39)  Weight (kg): 49 (04-15 @ 23:39)  BMI (kg/m2): 19.8 (04-15 @ 23:39)  BSA (m2): 1.47 (04-15 @ 23:39)    PHYSICAL EXAM:    Constitutional: NAD  Neck:  No JVD  Respiratory: CTAB/L  Cardiovascular: S1 and S2  Gastrointestinal: BS+, soft, NT/ND  Extremities: No peripheral edema  Neurological: A/O x 3, no focal deficits  Psychiatric: Normal mood, normal affect  : No Guzman  Skin: No rashes  Access: Not applicable    LABS:                        9.1    8.41  )-----------( 193      ( 2021 05:46 )             28.4     -    136  |  100  |  49<H>  ----------------------------<  121<H>  4.5   |  24  |  1.49<H>    Ca    9.3      2021 05:46    TPro  5.8<L>  /  Alb  3.5  /  TBili  0.8  /  DBili  x   /  AST  42<H>  /  ALT  56<H>  /  AlkPhos  74  -          Urine Studies:  Urinalysis Basic - ( 15 Apr 2021 14:44 )    Color: Light Yellow / Appearance: Clear / S.006 / pH: x  Gluc: x / Ketone: Negative  / Bili: Negative / Urobili: Negative   Blood: x / Protein: Negative / Nitrite: Negative   Leuk Esterase: Negative / RBC: x / WBC x   Sq Epi: x / Non Sq Epi: x / Bacteria: x            RADIOLOGY & ADDITIONAL STUDIES:

## 2021-04-16 NOTE — PROGRESS NOTE ADULT - ASSESSMENT
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 Patient to be admitted to CTS under Dr. Lai for pre TAVR workup.     3/31 VSS; O2 sat 96% on RA, CXR: small R and Trace L pleural Effusion. Non-edematous. EKG normal, Trops 21.   4/1 HD stable.  Pt had eliquis this am--hold for now and start hep gtt for cardiac cath.  CTA delayed by department hopefully will get done tomorrow  4/2 VSS   PTT elevated heparin gtt held . repeat PTT. creatinine elevated 1.62 Dr Daigle consulted - nephrology  OK for CTA- gentle hydration  post CTA.   4/3 Pending CTA this Monday. Compression dressing to right forearm  4/4 H/H 8/27. Will continue to trend. T&C sent . Anticipate CTA this Monday and coronary cath this week.  4/5 dx cath right femoral groin access . IVF per and post procedure. trend creat. Right forearm hematoma stable. Reinstate heparin gtt  for afib at 1800  4/6 VSS  PTT 61 needs CTA creatinine 1.40 this am. Followed by Nephrology   4/7 VSS Creatine improved to 1.26 this am. Plan for CTA today. Plan to discharge home tomorrow then come back for TAVR.   4/8 VSS; Belly discomfort/distention. Soft, slightly distended, No tenderness on palpation. BM yesterday, no BM today. +Miralax -will continue to monitor. CR 1.6 this am. CTA completed yesterday, pending results. As per Family request and given inability to ambulate short distance without SOB patient will stay for medical optimization till next Friday for her TAVR.   4/9 + orthostasis noted; pt asymptomatic; 250 ns bolus given and Norvasc d/c; continue to monitor closely; ck ua negative/ urine cx  4/10 + orthostasis but improving; IVF given- total 500 cc; ck chest xray/ abdominal xray as per Dr. Sommers   TAVR 4/16 as per Dr. Lai   4/11 /66 feels better.  c/o belly distention --AXR 4/10 non specific bowel gas pattern. +122cc 24hrs.  Na 130.  PTT 79.  TSH 27  Free Thyroxine 0.8  4/12 VSS; ck ESR/CRP in am as per Dr. Lai; add ensure for nutritional support  TAVR 4/16 as per structural heart team and Dr. Lai  4/13 Endo following  TSH 27, synthroid ^ 100 mcg, renal following CXR done > right effusion will check U/S TAVR Friday.  4/14 VVS; Continue with current medication regimen. Heparin gtt for h/o Afib.  GI Consult called to further evaluate findings of CT Angio Heart Structural w/ IV Cont (04.07.21 @ 13:58) An ill-defined hypodense mass in the splenic hilum measuring approximate 3 cm. This may be a pancreatic tail mass or a splenic mass. Associated focal area of hypoperfusion the spleen is noted, Awaiting for further reccs.   4/15 TAVR am  4/16 TAVR cancelled>pleural fluid revealed metastaic adenocarcinoma  Transfer to Medicine team for further management as per Dr Lai  Thoracic will follow pigtail

## 2021-04-16 NOTE — PROGRESS NOTE ADULT - SUBJECTIVE AND OBJECTIVE BOX
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 Denies any other symptoms including fever, cough, N/V/D, LE edema/pain.  (31 Mar 2021 23:26)      PAST MEDICAL & SURGICAL HISTORY:  HTN (hypertension)    CAD (coronary artery disease)  coronary stents x 2, 2007    HLD (hyperlipidemia)    Asthma    PAF (paroxysmal atrial fibrillation)    Scoliosis    History of cholecystectomy    H/O tubal ligation    History of bilateral knee replacement    History of cataract surgery  bilateral    S/P cholecystectomy        MEDICATIONS  (STANDING):  aMIOdarone    Tablet 200 milliGRAM(s) Oral daily  aspirin enteric coated 81 milliGRAM(s) Oral daily  atorvastatin 20 milliGRAM(s) Oral at bedtime  cefuroxime  IVPB 1500 milliGRAM(s) IV Intermittent once  chlorhexidine 0.12% Liquid 5 milliLiter(s) Swish and Spit once  chlorhexidine 4% Liquid 1 Application(s) Topical once  ferrous    sulfate 325 milliGRAM(s) Oral daily  heparin  Infusion 600 Unit(s)/Hr (6 mL/Hr) IV Continuous <Continuous>  levothyroxine 100 MICROGram(s) Oral daily  metoprolol succinate ER 12.5 milliGRAM(s) Oral daily  polyethylene glycol 3350 17 Gram(s) Oral daily  senna 2 Tablet(s) Oral at bedtime  sodium chloride 0.9% lock flush 3 milliLiter(s) IV Push every 8 hours  sodium chloride 0.9%. 1000 milliLiter(s) (50 mL/Hr) IV Continuous <Continuous>      Allergies    No Known Allergies    Intolerances        SOCIAL HISTORY:  Denies ETOh,Smoking,     FAMILY HISTORY:  No pertinent family history in first degree relatives        REVIEW OF SYSTEMS:    CONSTITUTIONAL: No weakness, fevers or chills  EYES/ENT: No visual changes;  No vertigo or throat pain   NECK: No pain or stiffness  RESPIRATORY: No cough, wheezing, hemoptysis; No shortness of breath  CARDIOVASCULAR: No chest pain or palpitations  GASTROINTESTINAL: No abdominal or epigastric pain. No nausea, vomiting, or hematemesis; No diarrhea or constipation. No melena or hematochezia.  GENITOURINARY: No dysuria, frequency or hematuria  NEUROLOGICAL: No numbness or weakness  SKIN: No itching, burning, rashes, or lesions   All other review of systems is negative unless indicated above.    VITAL:  T(C): , Max: 36.6 (04-15-21 @ 14:19)  T(F): , Max: 97.9 (04-15-21 @ 14:19)  HR: 60 (04-16-21 @ 05:20)  BP: 124/69 (04-16-21 @ 05:20)  BP(mean): 69 (04-15-21 @ 20:11)  RR: 18 (04-16-21 @ 05:20)  SpO2: 96% (04-16-21 @ 05:20)  Wt(kg): --    I and O's:    04-13 @ 07:01  -  04-14 @ 07:00  --------------------------------------------------------  IN: 900.5 mL / OUT: 2450 mL / NET: -1549.5 mL    04-14 @ 07:01  -  04-15 @ 07:00  --------------------------------------------------------  IN: 815 mL / OUT: 1240 mL / NET: -425 mL    04-15 @ 07:01  -  04-16 @ 06:33  --------------------------------------------------------  IN: 1386 mL / OUT: 1680 mL / NET: -294 mL      Height (cm): 157.5 (04-15 @ 23:39)  Weight (kg): 49 (04-15 @ 23:39)  BMI (kg/m2): 19.8 (04-15 @ 23:39)  BSA (m2): 1.47 (04-15 @ 23:39)    PHYSICAL EXAM:    Constitutional: NAD  HEENT: PERRLA,   Neck: No JVD  Respiratory: CTA B/L  Cardiovascular: S1 and S2  Gastrointestinal: BS+, soft, NT/ND  Extremities: No peripheral edema  Neurological: A/O x 3, no focal deficits  Psychiatric: Normal mood, normal affect  : No Guzman  Skin: No rashes  Access: Not applicable  Back: No CVA tenderness    LABS:                        9.1    8.41  )-----------( 193      ( 16 Apr 2021 05:46 )             28.4     04-16    136  |  100  |  49<H>  ----------------------------<  121<H>  4.5   |  24  |  1.49<H>    Ca    9.3      16 Apr 2021 05:46    TPro  5.8<L>  /  Alb  3.5  /  TBili  0.8  /  DBili  x   /  AST  42<H>  /  ALT  56<H>  /  AlkPhos  74  04-16          RADIOLOGY & ADDITIONAL STUDIES:

## 2021-04-16 NOTE — PROGRESS NOTE ADULT - ASSESSMENT
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 89F Mandarin speaking asthma, HTN, afib on Eliquis/amio, severe aortic stenosis, CAD s/p last 9/2018, and 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

## 2021-04-16 NOTE — PROGRESS NOTE ADULT - PROBLEM SELECTOR PLAN 10
Spoke with patient with oncology fellow at bedside and son Gino. Diagnosis of the adenoCa is still fresh and as a family they are still processing the information. Gino is HCP, but in total they are 4 children. They are awaiting input from oncology for further plan.

## 2021-04-16 NOTE — PROGRESS NOTE ADULT - PROBLEM SELECTOR PLAN 2
Patient with severe AS initially planned for TAVR however procedure held off in light of malignancy workup. Moderate pHTN but no RV dysfxn  - judicious use of fluids

## 2021-04-16 NOTE — PROGRESS NOTE ADULT - SUBJECTIVE AND OBJECTIVE BOX
Subjective: "They said no surgery anymore"  Pt OOB chair, states shes aware of test result                               T(C): 36.6 (04-16-21 @ 13:19), Max: 36.6 (04-15-21 @ 20:11)  HR: 73 (04-16-21 @ 13:19) (60 - 73)  BP: 128/73 (04-16-21 @ 13:19) (93/58 - 152/61)  RR: 18 (04-16-21 @ 13:19) (18 - 18)  SpO2: 92% (04-16-21 @ 13:19) (90% - 100%)        04-16    136  |  100  |  49<H>  ----------------------------<  121<H>  4.5   |  24  |  1.49<H>    Ca    9.3      16 Apr 2021 05:46    TPro  5.8<L>  /  Alb  3.5  /  TBili  0.8  /  DBili  x   /  AST  42<H>  /  ALT  56<H>  /  AlkPhos  74  04-16                               9.1    8.41  )-----------( 193      ( 16 Apr 2021 05:46 )             28.4        PTT - ( 16 Apr 2021 05:46 )  PTT:59.0 sec    CAPILLARY BLOOD GLUCOSE               Cytopathology - Non Gyn Report (04.13.21 @ 21:17)    Cytopathology - Non Gyn Report:   ACCESSION No:  [id number]    ESME PARIKH                           2        Cytopathology Report            Specimen(s) Submitted  PLEURAL FLUID, RIGHT      Clinical History  89 year old female  Severe aortic stenosis.  Pleuraleffusion for cytology      Gross Description  Received: 10 ml of cloudy-yellow fluid in CytoLyt  Prepared: 1 ThinPrep slide, 1 Cell block, 1 Smear      Final Diagnosis  PLEURAL FLUID, RIGHT  POSITIVE FOR MALIGNANT CELLS.  Consistent with metastatic adneocarcinoma.    Cytology slides and cell block show a hyper cellular specimen  composed of rounded clusters with community borders  and  dispersed single-lying  malignant cells with hyperchromasia,  enlarged nuclei, anisonucleosis, and vaculated cytoplasm in a  background of reactive mesothelial cells.  These findings are consistent metastaic adenocarcinoma.  Note  In light of the absence of provided clinical history of any  primary tumor, a panel of immunohistochemical stains to better  characterize these cells will be attempted and results reported  in an addendum.  Report faxed to Dr. Holland and Garfield Lai's offices  on 04/16/2021.  Case reported to Tumor Registry.    Slide(s) with built in immunohistochemical study control(s)  associated and negative control with this case has/have been  verified by the sign out pathologist.  For slide(s) without built in controls positive control slides  has/have been reviewed and approved by immunohistochemistry lab  These immunohistochemical/ in-situ hybridization testshave been  developed and their performance characteristics determined by  [facility], Department of  Pathology, Division of Immunopathology, [address] [place] [address].  It has not been cleared or approved by the  U.S. Food and Drug Administration.  The FDA has determined that  such            ESME PARIKH 2        Cytopathology Report          clearance or approval is not necessary.  This test is used for  clinical purposes.  The laboratory is certified under the CLIA-88  as qualified to  perform high complexity clinical testing.    Screened by: Donna FENTON(ASCP)  Verified by: Lizzie Caldera MD  (Electronic Signature)  Reported on: 04/16/21 08:34 EDT, [address] [facility]. [address] [place] [address]  Phone: [phone]   Fax: [phone]  Cytology technical processing performed at [address] [place] [id number]  _________________________________________________________________          Assessment    Neurology: alert and oriented x 3,    CV: (+) S1 and S2, No murmurs, rubs, gallops or clicks     Right Pigtail cath --> pleura vac --> Negative air leak. serous fluid     Lungs: CTA B/L     Abdomen: soft, nontender, nondistended, positive bowel sounds, (+) Flatus; (+) BM     :  Voiding               Extremities:  B/L LE (+) edema; negative calf tenderness; (+) 2 DP palpable       MEDICATIONS  (STANDING):  aMIOdarone    Tablet 200 milliGRAM(s) Oral daily  aspirin enteric coated 81 milliGRAM(s) Oral daily  atorvastatin 20 milliGRAM(s) Oral at bedtime  ferrous    sulfate 325 milliGRAM(s) Oral daily  heparin  Infusion 600 Unit(s)/Hr (6 mL/Hr) IV Continuous <Continuous>  levothyroxine 100 MICROGram(s) Oral daily  metoprolol succinate ER 12.5 milliGRAM(s) Oral daily  polyethylene glycol 3350 17 Gram(s) Oral daily  senna 2 Tablet(s) Oral at bedtime  sodium chloride 0.9% lock flush 3 milliLiter(s) IV Push every 8 hours  sodium chloride 0.9%. 1000 milliLiter(s) (50 mL/Hr) IV Continuous <Continuous>       PAST MEDICAL & SURGICAL HISTORY:  HTN (hypertension)    CAD (coronary artery disease)  coronary stents x 2, 2007    HLD (hyperlipidemia)    Asthma    PAF (paroxysmal atrial fibrillation)    Scoliosis    History of cholecystectomy    H/O tubal ligation    History of bilateral knee replacement    History of cataract surgery  bilateral    S/P cholecystectomy

## 2021-04-16 NOTE — PROGRESS NOTE ADULT - PROBLEM SELECTOR PLAN 1
Patient admitted on 3/31 due to severe dyspnea, initially attributed to severe AS however now found to have malignant pleural effusion s/p pigtail catheter on 4/13 with 1L output; cytopath with adenoCA  - per limited CT coronaries ill defined mass noted in pancreatic tail vs spleen  - currently breathing comfortable on RA  - daily CXR  - obtain CT chest/abd/pelvis with contrast, per renal OK for contrast  - indet 1.4 cm adrenal nodule noted  - oncology and GI consulted, recs appreciated

## 2021-04-16 NOTE — PROGRESS NOTE ADULT - ASSESSMENT
case dw/ dr. gutierrez, will need abdominal imgaing and ca 19=9 chromogranin to evaluate mass  no acue gi complaints  doubt anorexia related to mass

## 2021-04-16 NOTE — CONSULT NOTE ADULT - ATTENDING COMMENTS
89  year old woman with severe aortic stenosis, plan is to discharge home then return for TAVR  needs PT/OT bedside evaluation for functional mobility  will continue to follow
89-year-old female the past medical history significant for    Asthma  Hypertension  Hyperlipidemia  TIA  Severe aortic valve  Coronary disease status post stents LAD/diagonal )2009) with 2 additional stents in LAD (9/2018)  Symptomatic paroxysmal atrial fibrillation (Eliquis)  Bradycardia status post pacemaker (April 2019)    The patient now presents with left-sided dull chest discomfort/epigastric pain radiating up to her left neck along with shortness of breath and dyspnea upon exertion.  The patient notes that her breathing is heavy in nature.  Denies any fevers, chills, sweats, his sensation, dizzy or palpitation.  No recent changes noted in her medications, fluid intake or weight.    At this time the patient is being medically optimized.  She has known severe aortic valve stenosis.  Discussion was had with the patient what is severe aortic valve stenosis.  The natural pathophysiology of untreated severe aortic valve stenosis was reviewed.  The different treatment options were gone over.  Due to the patient's age and comorbidities she is considered to be an appropriate candidate to be worked up for transcatheter aortic valve replacement.  The necessary work-up was reviewed in great detail including TAVR CTA, PFTs, carotid ultrasound and cardiac angiogram.  Details of the procedures were reviewed.  Benefit and risks of the coronary angiogram study were gone over in great detail.  Risk include but not limited to infection, bleeding, arrhythmia, TIA/stroke, neck instability, vascular, need for urgent surgery and death.    Further conversation was had with the patient about indications and details of the transcatheter aortic valve replacement procedure.  At this time plan is for medical optimization/work-up for her transcatheter aortic valve replacement procedure which will be done as an outpatient if the patient continues to clinically do well.    All questions and concerns of the patient were addressed.    EKG, laboratory studies and imaging signs were personally reviewed.    Findings and plan were discussed with cardiac surgery/ and structural heart team.
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 Awaiting IHC to determine primary. Recommend CT CAP  (IV contrast if ok with renal). Pt with good performance status prior to hospitalization. To discuss tx options once final diagnosis determined. Discussed with pt and son at bedside.

## 2021-04-16 NOTE — PROGRESS NOTE ADULT - ASSESSMENT
89F with PMHx of  asthma, HTN, HLD, TIA, severe aortic stenosis, CAD s/p stents LAD and Diag, 2009 s/p 2 stent LAD in 9/2018, symptomatic Pafib on Eliquis pw SOB MOULTON and chest pain   Admitted for TAVR work up   Hyponatremia   Acute diastolic heart failure   SP thoracentesis     1 Renal- Scr   improved and scr fluctuates based on hemodynamic status and stenotic valve.  No renal objection to proceed      2 Hyponatremia- Sodium normalized (SP UreaNa and samsca previously)     3  [facility]- TAVR Tentatively sched for today     4 CTS-SP thoracentesis      Will likely need LIAM on dc          [facility]   [phone]

## 2021-04-16 NOTE — CONSULT NOTE ADULT - CONSULT REASON
rehab services
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
TAVR evaluation
Hypothyroidism
CKD stage 3/NELY

## 2021-04-16 NOTE — PROGRESS NOTE ADULT - PROBLEM SELECTOR PLAN 3
HX of afib on amiodarone, Toprol and Eliquis at home, now on heparin gtt   - per CTSX no further intervention  - if no further biopsies planned, will switch back to Eliquis 2.5 BID   - c/w Toprol 12.5  - c/w amiodarone 200 qd given long standing med

## 2021-04-16 NOTE — CONSULT NOTE ADULT - SUBJECTIVE AND OBJECTIVE BOX
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 Denies any other symptoms including fever, cough, N/V/D, LE edema/pain.  (31 Mar 2021 23:26)      PAST MEDICAL & SURGICAL HISTORY:  HTN (hypertension)    CAD (coronary artery disease)  coronary stents x 2, 2007    HLD (hyperlipidemia)    Asthma    PAF (paroxysmal atrial fibrillation)    Scoliosis    History of cholecystectomy    H/O tubal ligation    History of bilateral knee replacement    History of cataract surgery  bilateral    S/P cholecystectomy        Allergies    No Known Allergies    Intolerances        MEDICATIONS  (STANDING):  aMIOdarone    Tablet 200 milliGRAM(s) Oral daily  aspirin enteric coated 81 milliGRAM(s) Oral daily  atorvastatin 20 milliGRAM(s) Oral at bedtime  ferrous    sulfate 325 milliGRAM(s) Oral daily  heparin  Infusion 600 Unit(s)/Hr (6 mL/Hr) IV Continuous <Continuous>  levothyroxine 100 MICROGram(s) Oral daily  metoprolol succinate ER 12.5 milliGRAM(s) Oral daily  polyethylene glycol 3350 17 Gram(s) Oral daily  senna 2 Tablet(s) Oral at bedtime  sodium chloride 0.9% lock flush 3 milliLiter(s) IV Push every 8 hours  sodium chloride 0.9%. 1000 milliLiter(s) (50 mL/Hr) IV Continuous <Continuous>    MEDICATIONS  (PRN):  acetaminophen   Tablet .. 650 milliGRAM(s) Oral every 6 hours PRN Mild Pain (1 - 3)  aluminum hydroxide/magnesium hydroxide/simethicone Suspension 30 milliLiter(s) Oral every 4 hours PRN Dyspepsia      FAMILY HISTORY:  No pertinent family history in first degree relatives        SOCIAL HISTORY: No EtOH, no tobacco    REVIEW OF SYSTEMS:    CONSTITUTIONAL: No weakness, fevers or chills  EYES/ENT: No visual changes;  No vertigo or throat pain   NECK: No pain or stiffness  RESPIRATORY: No cough, wheezing, hemoptysis; No shortness of breath  CARDIOVASCULAR: No chest pain or palpitations  GASTROINTESTINAL: No abdominal or epigastric pain. No nausea, vomiting, or hematemesis; No diarrhea or constipation. No melena or hematochezia.  GENITOURINARY: No dysuria, frequency or hematuria  NEUROLOGICAL: No numbness or weakness  SKIN: No itching, burning, rashes, or lesions   All other review of systems is negative unless indicated above.    Height (cm): 157.5 (04-15 @ 23:39)  Weight (kg): 49 (04-15 @ 23:39)  BMI (kg/m2): 19.8 (04-15 @ 23:39)  BSA (m2): 1.47 (04-15 @ 23:39)    T(F): 97.9 (04-16-21 @ 13:19), Max: 97.9 (04-15-21 @ 20:11)  HR: 73 (04-16-21 @ 13:19)  BP: 128/73 (04-16-21 @ 13:19)  RR: 18 (04-16-21 @ 13:19)  SpO2: 92% (04-16-21 @ 13:19)  Wt(kg): --    GENERAL: NAD, well-developed  HEAD:  Atraumatic, Normocephalic  EYES: EOMI, PERRLA, conjunctiva and sclera clear  NECK: Supple, No JVD  CHEST/LUNG: Clear to auscultation bilaterally; No wheeze  HEART: Regular rate and rhythm; No murmurs, rubs, or gallops  ABDOMEN: Soft, Nontender, Nondistended; Bowel sounds present  EXTREMITIES:  2+ Peripheral Pulses, No clubbing, cyanosis, or edema  NEUROLOGY: non-focal  SKIN: No rashes or lesions                          9.1    8.41  )-----------( 193      ( 16 Apr 2021 05:46 )             28.4       04-16    136  |  100  |  49<H>  ----------------------------<  121<H>  4.5   |  24  |  1.49<H>    Ca    9.3      16 Apr 2021 05:46    TPro  5.8<L>  /  Alb  3.5  /  TBili  0.8  /  DBili  x   /  AST  42<H>  /  ALT  56<H>  /  AlkPhos  74  04-16          PTT - ( 16 Apr 2021 05:46 )  PTT:59.0 sec    .Urine Clean Catch (Midstream)  04-09 @ 15:10   <10,000 CFU/mL Normal Urogenital Sara  --  --       HPI:  89F with PMHx of  asthma, HTN, HLD, TIA, severe aortic stenosis, CAD s/p stents LAD and Diag, 2009 s/p 2 stent LAD in 9/2018, symptomatic Pafib on Eliquis, 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 Denies any other symptoms including fever, cough, N/V/D, LE edema/pain.  (31 Mar 2021 23:26)    Patients hospital course consisted of work up for a TAVR as found to have severe aortic stenosis. During the work up she was found to have 3cm ill defined hypodense mass in the splenic hilum which may be the pancreatic tail. Also indeterminate left adrenal nodule and hepatic hypodensity. She also complained of shortness of breath and found to have large right pleural effusion, s/p drainage with cytology showing metastatic adenocarcinoma. IHC pending. TAVR cancelled and oncology consulted for further evaluation.     PAST MEDICAL & SURGICAL HISTORY:  HTN (hypertension)    CAD (coronary artery disease)  coronary stents x 2, 2007    HLD (hyperlipidemia)    Asthma    PAF (paroxysmal atrial fibrillation)    Scoliosis    History of cholecystectomy    H/O tubal ligation    History of bilateral knee replacement    History of cataract surgery  bilateral    S/P cholecystectomy        Allergies    No Known Allergies    Intolerances        MEDICATIONS  (STANDING):  aMIOdarone    Tablet 200 milliGRAM(s) Oral daily  aspirin enteric coated 81 milliGRAM(s) Oral daily  atorvastatin 20 milliGRAM(s) Oral at bedtime  ferrous    sulfate 325 milliGRAM(s) Oral daily  heparin  Infusion 600 Unit(s)/Hr (6 mL/Hr) IV Continuous <Continuous>  levothyroxine 100 MICROGram(s) Oral daily  metoprolol succinate ER 12.5 milliGRAM(s) Oral daily  polyethylene glycol 3350 17 Gram(s) Oral daily  senna 2 Tablet(s) Oral at bedtime  sodium chloride 0.9% lock flush 3 milliLiter(s) IV Push every 8 hours  sodium chloride 0.9%. 1000 milliLiter(s) (50 mL/Hr) IV Continuous <Continuous>    MEDICATIONS  (PRN):  acetaminophen   Tablet .. 650 milliGRAM(s) Oral every 6 hours PRN Mild Pain (1 - 3)  aluminum hydroxide/magnesium hydroxide/simethicone Suspension 30 milliLiter(s) Oral every 4 hours PRN Dyspepsia      FAMILY HISTORY:  No pertinent family history in first degree relatives        SOCIAL HISTORY: No EtOH, no tobacco    REVIEW OF SYSTEMS:    CONSTITUTIONAL: No weakness, fevers or chills  EYES/ENT: No visual changes;  No vertigo or throat pain   NECK: No pain or stiffness  RESPIRATORY: No cough, wheezing, hemoptysis; No shortness of breath  CARDIOVASCULAR: No chest pain or palpitations  GASTROINTESTINAL: No abdominal or epigastric pain. No nausea, vomiting, or hematemesis; No diarrhea or constipation. No melena or hematochezia.  GENITOURINARY: No dysuria, frequency or hematuria  NEUROLOGICAL: No numbness or weakness  SKIN: No itching, burning, rashes, or lesions   All other review of systems is negative unless indicated above.    Height (cm): 157.5 (04-15 @ 23:39)  Weight (kg): 49 (04-15 @ 23:39)  BMI (kg/m2): 19.8 (04-15 @ 23:39)  BSA (m2): 1.47 (04-15 @ 23:39)    T(F): 97.9 (04-16-21 @ 13:19), Max: 97.9 (04-15-21 @ 20:11)  HR: 73 (04-16-21 @ 13:19)  BP: 128/73 (04-16-21 @ 13:19)  RR: 18 (04-16-21 @ 13:19)  SpO2: 92% (04-16-21 @ 13:19)  Wt(kg): --    GENERAL: NAD, well-developed  HEAD:  Atraumatic, Normocephalic  EYES: EOMI, PERRLA, conjunctiva and sclera clear  NECK: Supple, No JVD  CHEST/LUNG: Clear to auscultation bilaterally; No wheeze  HEART: Regular rate and rhythm; No murmurs, rubs, or gallops  ABDOMEN: Soft, Nontender, Nondistended; Bowel sounds present  EXTREMITIES:  2+ Peripheral Pulses, No clubbing, cyanosis, or edema  NEUROLOGY: non-focal  SKIN: No rashes or lesions                          9.1    8.41  )-----------( 193      ( 16 Apr 2021 05:46 )             28.4       04-16    136  |  100  |  49<H>  ----------------------------<  121<H>  4.5   |  24  |  1.49<H>    Ca    9.3      16 Apr 2021 05:46    TPro  5.8<L>  /  Alb  3.5  /  TBili  0.8  /  DBili  x   /  AST  42<H>  /  ALT  56<H>  /  AlkPhos  74  04-16          PTT - ( 16 Apr 2021 05:46 )  PTT:59.0 sec    .Urine Clean Catch (Midstream)  04-09 @ 15:10   <10,000 CFU/mL Normal Urogenital Sara  --  --

## 2021-04-16 NOTE — PROGRESS NOTE ADULT - SUBJECTIVE AND OBJECTIVE BOX
Chief complaint  Patient is a 89y old  Female who presents with a chief complaint of SOB/CP (16 Apr 2021 06:33)   Review of systems  Patient NPO overnight, no hypoglycemic episodes.    Medications  MEDICATIONS  (STANDING):  aMIOdarone    Tablet 200 milliGRAM(s) Oral daily  aspirin enteric coated 81 milliGRAM(s) Oral daily  atorvastatin 20 milliGRAM(s) Oral at bedtime  cefuroxime  IVPB 1500 milliGRAM(s) IV Intermittent once  chlorhexidine 0.12% Liquid 5 milliLiter(s) Swish and Spit once  chlorhexidine 4% Liquid 1 Application(s) Topical once  ferrous    sulfate 325 milliGRAM(s) Oral daily  heparin  Infusion 600 Unit(s)/Hr (6 mL/Hr) IV Continuous <Continuous>  levothyroxine 100 MICROGram(s) Oral daily  metoprolol succinate ER 12.5 milliGRAM(s) Oral daily  polyethylene glycol 3350 17 Gram(s) Oral daily  senna 2 Tablet(s) Oral at bedtime  sodium chloride 0.9% lock flush 3 milliLiter(s) IV Push every 8 hours  sodium chloride 0.9%. 1000 milliLiter(s) (50 mL/Hr) IV Continuous <Continuous>      Physical Exam  Vital Signs Last 12 Hrs  T(F): 97.5 (04-16-21 @ 09:25), Max: 97.9 (04-16-21 @ 05:20)  HR: 72 (04-16-21 @ 09:25) (60 - 72)  BP: 152/61 (04-16-21 @ 09:25) (113/66 - 152/61)  BP(mean): --  RR: 18 (04-16-21 @ 09:25) (18 - 18)  SpO2: 100% (04-16-21 @ 09:25) (95% - 100%)    Diagnostics    Free Thyroxine, Serum: AM Sched. Collection: 15-Apr-2021 06:00 (04-13 @ 13:35)           Chief complaint  Patient is a 89y old  Female who presents with a chief complaint of SOB/CP (16 Apr 2021 06:33)   Review of systems  Patient NPO overnight, no hypoglycemic episodes.    Medications  MEDICATIONS  (STANDING):  aMIOdarone    Tablet 200 milliGRAM(s) Oral daily  aspirin enteric coated 81 milliGRAM(s) Oral daily  atorvastatin 20 milliGRAM(s) Oral at bedtime  cefuroxime  IVPB 1500 milliGRAM(s) IV Intermittent once  chlorhexidine 0.12% Liquid 5 milliLiter(s) Swish and Spit once  chlorhexidine 4% Liquid 1 Application(s) Topical once  ferrous    sulfate 325 milliGRAM(s) Oral daily  heparin  Infusion 600 Unit(s)/Hr (6 mL/Hr) IV Continuous <Continuous>  levothyroxine 100 MICROGram(s) Oral daily  metoprolol succinate ER 12.5 milliGRAM(s) Oral daily  polyethylene glycol 3350 17 Gram(s) Oral daily  senna 2 Tablet(s) Oral at bedtime  sodium chloride 0.9% lock flush 3 milliLiter(s) IV Push every 8 hours  sodium chloride 0.9%. 1000 milliLiter(s) (50 mL/Hr) IV Continuous <Continuous>      Physical Exam  Vital Signs Last 12 Hrs  T(F): 97.5 (04-16-21 @ 09:25), Max: 97.9 (04-16-21 @ 05:20)  HR: 72 (04-16-21 @ 09:25) (60 - 72)  BP: 152/61 (04-16-21 @ 09:25) (113/66 - 152/61)  BP(mean): --  RR: 18 (04-16-21 @ 09:25) (18 - 18)  SpO2: 100% (04-16-21 @ 09:25) (95% - 100%)    Diagnostics    Free Thyroxine, Serum: AM Sched. Collection: 15-Apr-2021 06:00 (04-13 @ 13:35)

## 2021-04-16 NOTE — PROGRESS NOTE ADULT - ASSESSMENT
Assessment  Hypothyroidism: 89y Female with history of hypothyroidism, was taking synthroid 50mcg po daily per discussion with her son Gino, compliant with intake, found to be in hypothyroid state, increased dose to synthroid 100mcg po daily, FT4 improving, scheduled for TAVR today.  AS: Planning TAVR 4/16, on medications, no chest pain, stable, monitored.  HTN: Controlled,  on antihypertensive medications.  NELY: Monitor labs, BMP.      Trace Beatty MD  Cell: [phone]  Office: [phone]       Assessment  Hypothyroidism: 89y Female with history of hypothyroidism, was taking synthroid 50mcg po daily per discussion with her son Gino,  compliant with intake, found to be in hypothyroid state, increased dose to synthroid 100mcg po daily, FT4 improving, scheduled for TAVR today.  AS: Planning TAVR 4/16, on medications, no chest pain, stable, monitored.  HTN: Controlled,  on antihypertensive medications.  NELY: Monitor labs, BMP.      Trace Beatty MD  Cell: [phone]  Office: [phone]

## 2021-04-16 NOTE — PROGRESS NOTE ADULT - SUBJECTIVE AND OBJECTIVE BOX
MEDICINE TRANSFER ACCEPT NOTE  [facility]  [facility]  Pager [phone]    Patient is a 89y old  Female who presents with a chief complaint of SOB/CP (2021 14:46)      HPI  Patient requested son Gino to be on speakerphone for Mandarin translation and medical updates    89F Mandarin speaking asthma, HTN, TIA, severe aortic stenosis, CAD s/p last 2018, and bradycardia s/p PPM in 2019 who was initially admitted on 3/31 with concern for chest pain and dyspnea x 4 days initially attributed to symptomatic aortic stenosis. Throughout hospital stay, patient noted significant dyspnea particularly with ambulation. Additionally over the last week, patient noting more abdominal distention, which was attributed to constipation and started on bowel regimen.     She was in progress of TAVR workup of which included: 1. cardiac cath on  showing 2v nonobstructive CAD no stents placed, 2. TTE on  EF 59%, severe AS, enlarged LA, moderate pHTN but intact RV function and motion. 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 The aneurysm sac size is 1.3 cm. Incidentally, the imaging also noted ill-defined hypodense mass in the splenic hilum measuring approximate 3 cm, unclear if splenic or pancreatic in origin as well as an indet left adrenal nodule measures 1.6 cm. She underwent placed of R pigtail catheter of which pleural fluid studies returned with adenoCA, concerning for metastatic adenoCa of possible GI origin. Patient transferred to medicine for further workup.     Per son, has been noticing that patient has had less appetite but thinks weight has been stable. She has been more nauseated when seeing food. She feels overall improved in terms of her breathing after placement of catheter, but notes some discomfort at the catheter insertion site.     SOCIAL: Lives at home with son Gino, no smoking or alcohol use, relatively independent although ambulation limited by AS.     Allergies: NKDA    PAST MEDICAL HISTORY:  Asthma   CAD (coronary artery disease) coronary stents x 2,   HLD (hyperlipidemia)   HTN (hypertension)   PAF (paroxysmal atrial fibrillation)   Scoliosis.     PAST SURGICAL HISTORY:  H/O tubal ligation   History of bilateral knee replacement   History of cataract surgery bilateral  History of cholecystectomy   S/P cholecystectomy.       FH: No GI malignancy in family    REVIEW OF SYSTEMS:  CONSTITUTIONAL: No weakness, fevers or chills  EYES/ENT: No visual changes;  No vertigo or throat pain   NECK: No pain or stiffness  RESPIRATORY: + cough, no wheezing, hemoptysis; + shortness of breath  CARDIOVASCULAR: No chest pain or palpitations  GASTROINTESTINAL: No abdominal pain; +nausea, no vomiting;  +constipation. No hemetemesis, melena or hematochezia.  GENITOURINARY: No dysuria, frequency or hematuria  NEUROLOGICAL: No numbness or weakness  SKIN: No itching, burning, rashes, or lesions   All other review of systems is negative unless indicated above.    MEDICATIONS  (STANDING):  aMIOdarone    Tablet 200 milliGRAM(s) Oral daily  aspirin enteric coated 81 milliGRAM(s) Oral daily  atorvastatin 20 milliGRAM(s) Oral at bedtime  ferrous    sulfate 325 milliGRAM(s) Oral daily  heparin  Infusion 600 Unit(s)/Hr (6 mL/Hr) IV Continuous <Continuous>  levothyroxine 100 MICROGram(s) Oral daily  metoprolol succinate ER 12.5 milliGRAM(s) Oral daily  polyethylene glycol 3350 17 Gram(s) Oral daily  senna 2 Tablet(s) Oral at bedtime  sodium chloride 0.9% lock flush 3 milliLiter(s) IV Push every 8 hours  sodium chloride 0.9%. 1000 milliLiter(s) (50 mL/Hr) IV Continuous <Continuous>    MEDICATIONS  (PRN):  acetaminophen   Tablet .. 650 milliGRAM(s) Oral every 6 hours PRN Mild Pain (1 - 3)  aluminum hydroxide/magnesium hydroxide/simethicone Suspension 30 milliLiter(s) Oral every 4 hours PRN Dyspepsia      CAPILLARY BLOOD GLUCOSE        I&O's Summary    15 Apr 2021 07:  -  2021 07:00  --------------------------------------------------------  IN: 1386 mL / OUT: 1680 mL / NET: -294 mL    2021 07:  -  2021 15:28  --------------------------------------------------------  IN: 0 mL / OUT: 350 mL / NET: -350 mL        PHYSICAL EXAM:  Vital Signs Last 24 Hrs  T(C): 36.6 (2021 13:19), Max: 36.6 (15 Apr 2021 20:11)  T(F): 97.9 (2021 13:19), Max: 97.9 (15 Apr 2021 20:11)  HR: 73 (:19) (60 - 73)  BP: 128/73 (:19) (93/58 - 152/61)  BP(mean): 69 (15 Apr 2021 20:11) (69 - 69)  RR: 18 (:19) (18 - 18)  SpO2: 92% (:19) (91% - 100%)    GENERAL: No acute distress, well-developed  HEAD:  Atraumatic, Normocephalic  EYES: EOMI, PERRLA, conjunctiva and sclera clear  NECK: Supple, no lymphadenopathy, no JVD  CHEST/LUNG: CTAB; No wheezes, rales, or rhonchi  HEART: Regular rate and rhythm; + loud systolic murmur  ABDOMEN: Soft, non-tender, non-distended; normal bowel sounds, no organomegaly  EXTREMITIES:  2+ peripheral pulses b/l, No r edema  NEUROLOGY: A&O x 3, no focal deficits  SKIN: No rashes or lesions    LABS:                        9.1    8.41  )-----------( 193      ( 2021 05:46 )             28.4     04-16    136  |  100  |  49<H>  ----------------------------<  121<H>  4.5   |  24  |  1.49<H>    Ca    9.3      2021 05:46    TPro  5.8<L>  /  Alb  3.5  /  TBili  0.8  /  DBili  x   /  AST  42<H>  /  ALT  56<H>  /  AlkPhos  74  04-16    PTT - ( 2021 05:46 )  PTT:59.0 sec      Urinalysis Basic - ( 15 Apr 2021 14:44 )    Color: Light Yellow / Appearance: Clear / S.006 / pH: x  Gluc: x / Ketone: Negative  / Bili: Negative / Urobili: Negative   Blood: x / Protein: Negative / Nitrite: Negative   Leuk Esterase: Negative / RBC: x / WBC x   Sq Epi: x / Non Sq Epi: x / Bacteria: x      ct< from: CT Angio Heart Structural w/ IV Cont (21 @ 13:58) >  FINDINGS:    Non-cardiac: Thyroid gland demonstrates multiple nodules measuring up to 7 mm. There is multichamber cardiac enlargement. Aorta and pulmonary arteries are normal in size. No pericardial effusion. No lymphadenopathy. Small bilateral pleural effusions noted, right greater than left. Patchy areasof peribronchial vascular architectural distortion noted in the right middle and lower lobes is nonspecific but may be due to prior infection. Minimal patchy atelectasis at the left lung base.    There is an ill-defined hypodense mass in the splenic hilum measuring approximate 3 cm. This may be a pancreatic tail mass or a splenic mass. Associated focal area of hypoperfusion the spleen is noted. Indeterminate left adrenal nodule measures 1.6 cm. Hepatic hypodensity is too small to characterize. Both kidneys enhance symmetrically without stones or hydronephrosis.        No retroperitoneal lymphadenopathy. Atheromatous plaque noted within the abdominal aorta and its branches. 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 The aneurysm sac size is 1.3 cm.    5.1 cm left ovarian cyst. No evidence of bowel obstruction. The appendix is normal.    Degenerative changes noted throughout the spine. No acute bony abnormality. Spinal fixation hardware is noted.    < end of copied text >  < from: Transthoracic Echocardiogram (21 @ 15:39) >  Conclusions:  1. Calcified trileaflet aortic valve with decreased  opening. Peak transaortic valve gradient equals 43 mm Hg,  mean transaortic valve gradient equals 26 mm Hg, estimated  aortic valve area equals0.8 sqcm (by continuity equation),  aortic valve velocity time integral equals 77 cm,  consistent with severe aortic stenosis. Mild aortic  regurgitation.  2. Severely dilated left atrium.  LA volume index = 56  cc/m2.  3. Mild concentric left ventricular hypertrophy.  4. Normal left ventricular systolic function. No segmental  wall motion abnormalities.  5. Normal tricuspid valve. Moderate tricuspid  regurgitation.  6. Estimated pulmonary artery systolic pressure equals 57  mm Hg, assuming rightatrial pressure equals 8 mm Hg,  consistent with moderate pulmonary pressures.    < end of copied text >  < from: Transthoracic Echocardiogram (21 @ 15:39) >  EF (Heard Rule): 59 %Doppler Peak Velocity (m/sec):  AoV=3.3    < end of copied text >      RADIOLOGY & ADDITIONAL TESTS:  Results Reviewed:   Imaging Personally Reviewed:  Electrocardiogram Personally Reviewed: Apaced rate of 63 no STT changes    COORDINATION OF CARE:  Care Discussed with Consultants/Other Providers [Y/N]: Dr Kang  Prior or Outpatient Records Reviewed [Y/N]:

## 2021-04-16 NOTE — CONSULT NOTE ADULT - ASSESSMENT
#Metastatic Adenocarcinoma     NOTE INCOMPLETE     Michael Kang MD   Hematology/Oncology Fellow   pager [phone]    89F with PMHx of  asthma, HTN, HLD, TIA, severe aortic stenosis, CAD s/p stents LAD and Diag, 2009 s/p 2 stent LAD in 9/2018, symptomatic Pafib on Eliquis, 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 Denies any other symptoms including fever, cough, N/V/D, LE edema/pain. Found to have severe aortic stenosis. During the work up she was found to have 3cm ill defined hypodense mass in the splenic hilum which may be the pancreatic tail. Also indeterminate left adrenal nodule and hepatic hypodensity. She also complained of shortness of breath and found to have large right pleural effusion, s/p drainage with cytology showing metastatic adenocarcinoma. IHC pending. TAVR cancelled and oncology consulted for further evaluation.    #Metastatic Adenocarcinoma   -Has been having abdominal bloating and discomfort with decreased appetite, unclear amount of time. No weight loss   -CTA Heart 4/7/21: There is an ill-defined hypodense mass in the splenic hilum measuring approximate 3 cm. This may be a pancreatic tail mass or a splenic mass. Associated focal area of hypoperfusion the spleen is noted. Indeterminate left adrenal nodule measures 1.6 cm. Hepatic hypodensity is too small to characterize. Both kidneys enhance symmetrically without stones or hydronephrosis  -Chest Tube inserted on 4/13 for large right pleural effusion draining >1L   -cytopathology of pleural fluid: metastatic adenocarcinoma. IHC pending   -At baseline patient has relatively good PS, able to care for herself. Lives with her son, Gino. Has 4 children.   -Son aware of diagnosis however has not told his mother at this time     #Afib   -c/w amio  -c/w ac     #Severe AS   -CT Sx no longer planning procedure given recent findings of metastatic adenocarcinoma     Michael Kang MD   Hematology/Oncology Fellow   pager [phone]    89F with PMHx of  asthma, HTN, HLD, TIA, severe aortic stenosis, CAD s/p stents LAD and Diag, 2009 s/p 2 stent LAD in 9/2018, symptomatic Pafib on Eliquis, 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 Denies any other symptoms including fever, cough, N/V/D, LE edema/pain. Found to have severe aortic stenosis. During the work up she was found to have 3cm ill defined hypodense mass in the splenic hilum which may be the pancreatic tail. Also indeterminate left adrenal nodule and hepatic hypodensity. She also complained of shortness of breath and found to have large right pleural effusion, s/p drainage with cytology showing metastatic adenocarcinoma. IHC pending. TAVR cancelled and oncology consulted for further evaluation.    #Metastatic Adenocarcinoma   -Has been having abdominal bloating and discomfort with decreased appetite, unclear amount of time. No weight loss   -CTA Heart 4/7/21: There is an ill-defined hypodense mass in the splenic hilum measuring approximate 3 cm. This may be a pancreatic tail mass or a splenic mass. Associated focal area of hypoperfusion the spleen is noted. Indeterminate left adrenal nodule measures 1.6 cm. Hepatic hypodensity is too small to characterize. Both kidneys enhance symmetrically without stones or hydronephrosis  -Chest Tube inserted on 4/13 for large right pleural effusion draining >1L   -cytopathology of pleural fluid: metastatic adenocarcinoma. IHC pending   -At baseline patient has relatively good PS, able to care for herself. Lives with her son, Gino. Has 4 children.   -Son aware of diagnosis and he told the patient as well   -Please obtain CT C/A/P with contrast for full staging   -IHC staining pending on path to have a better idea of origin of cancer   -Future recs based on above     #Afib   -c/w amio  -c/w ac     #Severe AS   -CT Sx no longer planning procedure given recent findings of metastatic adenocarcinoma     Michael Kang MD   Hematology/Oncology Fellow   pager [phone]

## 2021-04-17 NOTE — PROGRESS NOTE ADULT - ASSESSMENT
Assessment  Hypothyroidism: 89y Female with history of hypothyroidism, was taking synthroid 50mcg po daily per discussion with her son Gino, compliant with intake, found to be in hypothyroid state, increased dose to synthroid 100mcg po daily, FT4 improving, s/p  TAVR.  AS:  s/p TAVR 4/16, on medications, no chest pain, stable, monitored.  HTN: Controlled,  on antihypertensive medications.  NELY: Monitor labs, BMP.      Trace Beatty MD  Cell: [phone]  Office: [phone]       Assessment  Hypothyroidism: 89y Female with history of hypothyroidism, was taking synthroid 50mcg po daily per discussion with her son Gino, compliant with intake, found to be in hypothyroid state, increased dose to synthroid 100mcg po daily, FT4 improving, deferred TAVR.  AS:  on medications, no chest pain, stable, monitored.  HTN: Controlled,  on antihypertensive medications.  NELY: Monitor labs, BMP.      Trace Beatty MD  Cell: [phone]  Office: [phone]

## 2021-04-17 NOTE — PROGRESS NOTE ADULT - ASSESSMENT
widely metastatic pancreatic cancer, can stain pleural fluid, however, dx seems clear  bx likely not warranted.  palliatve crae.  can have onc eval.  d/w dr. gutierrez yesterday

## 2021-04-17 NOTE — PROGRESS NOTE ADULT - SUBJECTIVE AND OBJECTIVE BOX
[facility] Division of Hospital Medicine  Dylan Major DO  Pager (MARLENY-F, 8A-5P): [phone]  Other Times:  [phone]    Patient is a 89y old  Female who presents with a chief complaint of SOB/CP (2021 11:31)      SUBJECTIVE / OVERNIGHT EVENTS:      mandarin  # [id number]  patient seen and examined at bedside.  patient feels well, denies CP, sob, palpitations, abdominal pain.  states she feels constipated. stools are a bit hard.      MEDICATIONS  (STANDING):  aMIOdarone    Tablet 200 milliGRAM(s) Oral daily  aspirin enteric coated 81 milliGRAM(s) Oral daily  atorvastatin 20 milliGRAM(s) Oral at bedtime  ferrous    sulfate 325 milliGRAM(s) Oral daily  heparin  Infusion 600 Unit(s)/Hr (6 mL/Hr) IV Continuous <Continuous>  levothyroxine 100 MICROGram(s) Oral daily  metoprolol succinate ER 12.5 milliGRAM(s) Oral daily  polyethylene glycol 3350 17 Gram(s) Oral two times a day  senna 2 Tablet(s) Oral at bedtime  sodium chloride 0.9% lock flush 3 milliLiter(s) IV Push every 8 hours  sodium chloride 0.9%. 1000 milliLiter(s) (50 mL/Hr) IV Continuous <Continuous>    MEDICATIONS  (PRN):  acetaminophen   Tablet .. 650 milliGRAM(s) Oral every 6 hours PRN Mild Pain (1 - 3)  ALBUTerol    90 MICROgram(s) HFA Inhaler 1 Puff(s) Inhalation every 4 hours PRN Shortness of Breath and/or Wheezing  aluminum hydroxide/magnesium hydroxide/simethicone Suspension 30 milliLiter(s) Oral every 4 hours PRN Dyspepsia  lidocaine   Patch 1 Patch Transdermal daily PRN low back pain      CAPILLARY BLOOD GLUCOSE        I&O's Summary    2021 07:  -  2021 07:00  --------------------------------------------------------  IN: 0 mL / OUT: 1550 mL / NET: -1550 mL    2021 07:01  -  2021 12:54  --------------------------------------------------------  IN: 267.5 mL / OUT: 0 mL / NET: 267.5 mL        PHYSICAL EXAM:  Vital Signs Last 24 Hrs  T(C): 36.3 (2021 11:53), Max: 36.7 (2021 20:52)  T(F): 97.4 (2021 11:53), Max: 98.1 (2021 20:52)  HR: 70 (2021 11:53) (60 - 77)  BP: 133/77 (2021 11:53) (100/58 - 133/77)  BP(mean): --  RR: 18 (2021 11:53) (18 - 18)  SpO2: 93% (2021 11:53) (90% - 95%)    GENERAL: No acute distress, well-developed  HEAD:  Atraumatic, Normocephalic  EYES: EOMI, PERRL, conjunctiva and sclera clear  NECK: Supple, no lymphadenopathy, no JVD  CHEST/LUNG: CTAB; No wheezes, rales, or rhonchi R pigtail catheter + c/d/i  HEART: Regular rate and rhythm; + loud systolic murmur  ABDOMEN: Soft, non-tender, non-distended; normal bowel sounds, no organomegaly  EXTREMITIES:  2+ peripheral pulses b/l, No r edema  NEUROLOGY: A&O x 3, no focal deficits  SKIN: No rashes or lesions      LABS:                        9.8    8.32  )-----------( 208      ( 2021 09:20 )             30.5     04-    138  |  101  |  32<H>  ----------------------------<  117<H>  4.4   |  26  |  1.31<H>    Ca    9.5      2021 09:20    TPro  6.1  /  Alb  3.5  /  TBili  0.9  /  DBili  x   /  AST  44<H>  /  ALT  63<H>  /  AlkPhos  82      PTT - ( 2021 09:20 )  PTT:84.7 sec      Urinalysis Basic - ( 15 Apr 2021 14:44 )    Color: Light Yellow / Appearance: Clear / S.006 / pH: x  Gluc: x / Ketone: Negative  / Bili: Negative / Urobili: Negative   Blood: x / Protein: Negative / Nitrite: Negative   Leuk Esterase: Negative / RBC: x / WBC x   Sq Epi: x / Non Sq Epi: x / Bacteria: x        Culture - Urine (collected 15 Apr 2021 18:05)  Source: .Urine Clean Catch (Midstream)  Final Report (2021 16:19):    <10,000 CFU/mL Normal Urogenital Sara        RADIOLOGY & ADDITIONAL TESTS:  Results Reviewed:   Imaging Personally Reviewed:  Electrocardiogram Personally Reviewed:    COORDINATION OF CARE:  Care Discussed with Consultants/Other Providers [Y/N]:  Prior or Outpatient Records Reviewed [Y/N]:  davidson Marcum

## 2021-04-17 NOTE — PROGRESS NOTE ADULT - ASSESSMENT
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 Patient to be admitted to CTS under Dr. Lai for pre TAVR workup.     3/31 VSS; O2 sat 96% on RA, CXR: small R and Trace L pleural Effusion. Non-edematous. EKG normal, Trops 21.   4/1 HD stable.  Pt had eliquis this am--hold for now and start hep gtt for cardiac cath.  CTA delayed by department hopefully will get done tomorrow  4/2 VSS   PTT elevated heparin gtt held . repeat PTT. creatinine elevated 1.62 Dr Daigle consulted - nephrology  OK for CTA- gentle hydration  post CTA.   4/3 Pending CTA this Monday. Compression dressing to right forearm  4/4 H/H 8/27. Will continue to trend. T&C sent . Anticipate CTA this Monday and coronary cath this week.  4/5 dx cath right femoral groin access . IVF per and post procedure. trend creat. Right forearm hematoma stable. Reinstate heparin gtt  for afib at 1800  4/6 VSS  PTT 61 needs CTA creatinine 1.40 this am. Followed by Nephrology   4/7 VSS Creatine improved to 1.26 this am. Plan for CTA today. Plan to discharge home tomorrow then come back for TAVR.   4/8 VSS; Belly discomfort/distention. Soft, slightly distended, No tenderness on palpation. BM yesterday, no BM today. +Miralax -will continue to monitor. CR 1.6 this am. CTA completed yesterday, pending results. As per Family request and given inability to ambulate short distance without SOB patient will stay for medical optimization till next Friday for her TAVR.   4/9 + orthostasis noted; pt asymptomatic; 250 ns bolus given and Norvasc d/c; continue to monitor closely; ck ua negative/ urine cx  4/10 + orthostasis but improving; IVF given- total 500 cc; ck chest xray/ abdominal xray as per Dr. Sommers   TAVR 4/16 as per Dr. Lai   4/11 /66 feels better.  c/o belly distention --AXR 4/10 non specific bowel gas pattern. +122cc 24hrs.  Na 130.  PTT 79.  TSH 27  Free Thyroxine 0.8  4/12 VSS; ck ESR/CRP in am as per Dr. Lai; add ensure for nutritional support  TAVR 4/16 as per structural heart team and Dr. Lai  4/13 Endo following  TSH 27, synthroid ^ 100 mcg, renal following CXR done > right effusion will check U/S TAVR Friday.  4/14 VVS; Continue with current medication regimen. Heparin gtt for h/o Afib.  GI Consult called to further evaluate findings of CT Angio Heart Structural w/ IV Cont (04.07.21 @ 13:58) An ill-defined hypodense mass in the splenic hilum measuring approximate 3 cm. This may be a pancreatic tail mass or a splenic mass. Associated focal area of hypoperfusion the spleen is noted, Awaiting for further reccs.   4/15 TAVR am  4/16 TAVR cancelled>pleural fluid revealed metastaic adenocarcinoma  Transfer to Medicine team for further management as per Dr Lai  CT Surgery will follow pigtail.  4/17 VSS, right pigtail placed to waterseal. Plan for CT Chest, abd, pelvis with IV contrast today.

## 2021-04-17 NOTE — PROGRESS NOTE ADULT - PROBLEM SELECTOR PLAN 3
HX of afib on amiodarone, Toprol and Eliquis at home, now on heparin gtt   - per CTSX no further intervention  - if no further biopsies planned, will switch back to Eliquis 2.5 BID   - c/w Toprol 12.5 daily  - c/w amiodarone 200daily given long standing med

## 2021-04-17 NOTE — PROGRESS NOTE ADULT - PROBLEM SELECTOR PLAN 1
Patient admitted on 3/31 due to severe dyspnea, initially attributed to severe AS however now found to have malignant pleural effusion s/p pigtail catheter on 4/13 with 1L output; cytopath with adenoCA  - per limited CT coronaries ill defined mass noted in pancreatic tail vs spleen  - currently breathing comfortable on RA  - obtain CT chest/abd/pelvis with contrast, per renal OK for contrast, d/w patient- she is in agreement  - indet 1.4 cm adrenal nodule noted- follow up CT scan  - oncology and GI consulted, recs appreciated

## 2021-04-17 NOTE — PROGRESS NOTE ADULT - ASSESSMENT
ASSESSMENT/PLAN:  89F with PMHx of  asthma, HTN, HLD, TIA, severe aortic stenosis, CAD s/p stents LAD and Diag, 2009 s/p 2 stent LAD in 9/2018, symptomatic Pafib on Eliquis pw SOB MOULTON and chest pain   Admitted for TAVR work up   Hyponatremia   Acute diastolic heart failure   SP thoracentesis     1 Renal- Scr   improved and scr fluctuates based on hemodynamic status and stenotic valve.    Planned for CT chest, abd/pelvis      2 Hyponatremia- Sodium normalized, improving  (SP UreaNa and samsca previously)     3  [facility]- TAVR cancelled yesterday     4 CTS-SP thoracentesis, pleural fluid showed adenoCA, further workup pending    Yadira Lewis NP-C  [facility]  [phone]

## 2021-04-17 NOTE — PROGRESS NOTE ADULT - SUBJECTIVE AND OBJECTIVE BOX
NEPHROLOGY     Patient seen and examined. Pt reports feeling ok, no complaints of pain, no sob, in no acute distress. No overnight events noted.     MEDICATIONS  (STANDING):  aMIOdarone    Tablet 200 milliGRAM(s) Oral daily  aspirin enteric coated 81 milliGRAM(s) Oral daily  atorvastatin 20 milliGRAM(s) Oral at bedtime  ferrous    sulfate 325 milliGRAM(s) Oral daily  levothyroxine 100 MICROGram(s) Oral daily  metoprolol succinate ER 12.5 milliGRAM(s) Oral daily  polyethylene glycol 3350 17 Gram(s) Oral daily  senna 2 Tablet(s) Oral at bedtime  sodium chloride 0.9% lock flush 3 milliLiter(s) IV Push every 8 hours  sodium chloride 0.9%. 1000 milliLiter(s) (50 mL/Hr) IV Continuous <Continuous>    VITALS:  T(C): , Max: 36.7 (21 @ 20:52)  T(F): , Max: 98.1 (21 @ 20:52)  HR: 77 (21 @ 09:18)  BP: 112/72 (21 @ 09:18)  RR: 18 (21 @ 09:18)  SpO2: 95% (21 @ 09:18)    I and O's:     @ 07:01  -   @ 07:00  --------------------------------------------------------  IN: 0 mL / OUT: 1550 mL / NET: -1550 mL    PHYSICAL EXAM:  Constitutional: NAD  Neck:  No JVD  Respiratory: CTAB/L, R pigtail cath  Cardiovascular: S1 and S2,  Gastrointestinal: BS+, soft, NT/ND  Extremities: No peripheral edema  Neurological: A/O x 3, no focal deficits  Psychiatric: Normal mood, normal affect  : No Guzman  Skin: No rashes    LABS:                        9.8    8.32  )-----------( 208      ( 2021 09:20 )             30.5         138  |  101  |  32<H>  ----------------------------<  117<H>  4.4   |  26  |  1.31<H>    Ca    9.5      2021 09:20    TPro  6.1  /  Alb  3.5  /  TBili  0.9  /  DBili  x   /  AST  44<H>  /  ALT  63<H>  /  AlkPhos  82  04-17      Urine Studies:  Urinalysis Basic - ( 15 Apr 2021 14:44 )    Color: Light Yellow / Appearance: Clear / S.006 / pH: x  Gluc: x / Ketone: Negative  / Bili: Negative / Urobili: Negative   Blood: x / Protein: Negative / Nitrite: Negative   Leuk Esterase: Negative / RBC: x / WBC x   Sq Epi: x / Non Sq Epi: x / Bacteria: x

## 2021-04-17 NOTE — PROGRESS NOTE ADULT - SUBJECTIVE AND OBJECTIVE BOX
Patient is a 89y old  Female who presents with a chief complaint of SOB/CP (17 Apr 2021 11:31)      Vital Signs Last 24 Hrs  T(C): 36.3 (04-17-21 @ 11:53), Max: 36.7 (04-16-21 @ 20:52)  T(F): 97.4 (04-17-21 @ 11:53), Max: 98.1 (04-16-21 @ 20:52)  HR: 70 (04-17-21 @ 11:53) (60 - 77)  BP: 133/77 (04-17-21 @ 11:53) (100/58 - 133/77)  RR: 18 (04-17-21 @ 11:53) (18 - 18)  SpO2: 93% (04-17-21 @ 11:53) (90% - 95%)            04-16-21 @ 07:01  -  04-17-21 @ 07:00  --------------------------------------------------------  IN: 0 mL / OUT: 1550 mL / NET: -1550 mL                            9.8    8.32  )-----------( 208      ( 17 Apr 2021 09:20 )             30.5     138  |  101  |  32<H>  ----------------------------<  117<H>  4.4   |  26  |  1.31<H>    AST  44<H>  /  ALT  63<H>  /  AlkPhos  82  04-17          PHYSICAL EXAM  Neurology: A&Ox3, NAD  CV : RRR+S1S2  Lungs: Respirations non-labored, B/L BS with bibasilar crackles  +Right pigtail with serosanguinous drainage to LWS, no air leak  Abdomen: Soft, NT/ND, +BSx4Q  Extremities: B/L LE warm, no edema, +PP             MEDICATIONS  acetaminophen   Tablet .. 650 milliGRAM(s) Oral every 6 hours PRN  ALBUTerol    90 MICROgram(s) HFA Inhaler 1 Puff(s) Inhalation every 4 hours PRN  aluminum hydroxide/magnesium hydroxide/simethicone Suspension 30 milliLiter(s) Oral every 4 hours PRN  aMIOdarone    Tablet 200 milliGRAM(s) Oral daily  aspirin enteric coated 81 milliGRAM(s) Oral daily  atorvastatin 20 milliGRAM(s) Oral at bedtime  ferrous    sulfate 325 milliGRAM(s) Oral daily  heparin  Infusion 600 Unit(s)/Hr IV Continuous <Continuous>  levothyroxine 100 MICROGram(s) Oral daily  lidocaine   Patch 1 Patch Transdermal daily PRN  metoprolol succinate ER 12.5 milliGRAM(s) Oral daily  polyethylene glycol 3350 17 Gram(s) Oral two times a day  senna 2 Tablet(s) Oral at bedtime  sodium chloride 0.9% lock flush 3 milliLiter(s) IV Push every 8 hours  sodium chloride 0.9%. 1000 milliLiter(s) IV Continuous <Continuous>

## 2021-04-17 NOTE — PROGRESS NOTE ADULT - PROBLEM SELECTOR PLAN 10
family knows diagnosis of the adenoCa is still fresh and as a family they are still processing the information. Gino is HCP, but in total they are 4 children.

## 2021-04-17 NOTE — PROGRESS NOTE ADULT - SUBJECTIVE AND OBJECTIVE BOX
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 Denies any other symptoms including fever, cough, N/V/D, LE edema/pain.  (31 Mar 2021 23:26)      PAST MEDICAL & SURGICAL HISTORY:  HTN (hypertension)    CAD (coronary artery disease)  coronary stents x 2, 2007    HLD (hyperlipidemia)    Asthma    PAF (paroxysmal atrial fibrillation)    Scoliosis    History of cholecystectomy    H/O tubal ligation    History of bilateral knee replacement    History of cataract surgery  bilateral    S/P cholecystectomy        MEDICATIONS  (STANDING):  aMIOdarone    Tablet 200 milliGRAM(s) Oral daily  aspirin enteric coated 81 milliGRAM(s) Oral daily  atorvastatin 20 milliGRAM(s) Oral at bedtime  ferrous    sulfate 325 milliGRAM(s) Oral daily  heparin  Infusion 600 Unit(s)/Hr (6 mL/Hr) IV Continuous <Continuous>  levothyroxine 100 MICROGram(s) Oral daily  metoprolol succinate ER 12.5 milliGRAM(s) Oral daily  polyethylene glycol 3350 17 Gram(s) Oral two times a day  senna 2 Tablet(s) Oral at bedtime  sodium chloride 0.9% lock flush 3 milliLiter(s) IV Push every 8 hours  sodium chloride 0.9%. 1000 milliLiter(s) (50 mL/Hr) IV Continuous <Continuous>      Allergies    No Known Allergies    Intolerances        SOCIAL HISTORY:  Denies ETOh,Smoking,     FAMILY HISTORY:  No pertinent family history in first degree relatives        REVIEW OF SYSTEMS:    CONSTITUTIONAL: No weakness, fevers or chills  EYES/ENT: No visual changes;  No vertigo or throat pain   NECK: No pain or stiffness  RESPIRATORY: No cough, wheezing, hemoptysis; No shortness of breath  CARDIOVASCULAR: No chest pain or palpitations  GASTROINTESTINAL: No abdominal or epigastric pain. No nausea, vomiting, or hematemesis; No diarrhea or constipation. No melena or hematochezia.  GENITOURINARY: No dysuria, frequency or hematuria  NEUROLOGICAL: No numbness or weakness  SKIN: No itching, burning, rashes, or lesions   All other review of systems is negative unless indicated above.    VITAL:  T(C): , Max: 36.7 (04-16-21 @ 20:52)  T(F): , Max: 98.1 (04-16-21 @ 20:52)  HR: 70 (04-17-21 @ 11:53)  BP: 133/77 (04-17-21 @ 11:53)  BP(mean): --  RR: 18 (04-17-21 @ 11:53)  SpO2: 93% (04-17-21 @ 11:53)  Wt(kg): --    I and O's:    04-15 @ 07:01  -  04-16 @ 07:00  --------------------------------------------------------  IN: 1386 mL / OUT: 1680 mL / NET: -294 mL    04-16 @ 07:01  -  04-17 @ 07:00  --------------------------------------------------------  IN: 0 mL / OUT: 1550 mL / NET: -1550 mL    04-17 @ 07:01  -  04-17 @ 16:12  --------------------------------------------------------  IN: 507.5 mL / OUT: 0 mL / NET: 507.5 mL          PHYSICAL EXAM:    Constitutional: NAD  HEENT: PERRLA,   Neck: No JVD  Respiratory: CTA B/L  Cardiovascular: S1 and S2  Gastrointestinal: BS+, soft, NT/ND  Extremities: No peripheral edema  Neurological: A/O x 3, no focal deficits  Psychiatric: Normal mood, normal affect  : No Guzman  Skin: No rashes  Access: Not applicable  Back: No CVA tenderness    LABS:                        9.8    8.32  )-----------( 208      ( 17 Apr 2021 09:20 )             30.5     04-17    138  |  101  |  32<H>  ----------------------------<  117<H>  4.4   |  26  |  1.31<H>    Ca    9.5      17 Apr 2021 09:20    TPro  6.1  /  Alb  3.5  /  TBili  0.9  /  DBili  x   /  AST  44<H>  /  ALT  63<H>  /  AlkPhos  82  04-17          RADIOLOGY & ADDITIONAL STUDIES:

## 2021-04-17 NOTE — PROGRESS NOTE ADULT - ASSESSMENT
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

## 2021-04-17 NOTE — PROGRESS NOTE ADULT - PROBLEM SELECTOR PLAN 1
Pleural fluid cytology +metastatic adenocarcinoma, TAVR cx'd  Maintain right pigtail to water seal  Monitor drainage, document output q shift  Daily CXR  Continue care as per primary team

## 2021-04-17 NOTE — PROGRESS NOTE ADULT - SUBJECTIVE AND OBJECTIVE BOX
Chief complaint  Patient is a 89y old  Female who presents with a chief complaint of SOB/CP (17 Apr 2021 10:25)   Review of systems  Patient in bed, appears comfortable.    Labs and Fingersticks  CAPILLARY BLOOD GLUCOSE          Anion Gap, Serum: 11 (04-17 @ 09:20)  Anion Gap, Serum: 12 (04-16 @ 05:46)      Calcium, Total Serum: 9.5 (04-17 @ 09:20)  Calcium, Total Serum: 9.3 (04-16 @ 05:46)  Albumin, Serum: 3.5 (04-17 @ 09:20)  Albumin, Serum: 3.5 (04-16 @ 05:46)    Alanine Aminotransferase (ALT/SGPT): 63 *H* (04-17 @ 09:20)  Alanine Aminotransferase (ALT/SGPT): 56 *H* (04-16 @ 05:46)  Alkaline Phosphatase, Serum: 82 (04-17 @ 09:20)  Alkaline Phosphatase, Serum: 74 (04-16 @ 05:46)  Aspartate Aminotransferase (AST/SGOT): 44 *H* (04-17 @ 09:20)  Aspartate Aminotransferase (AST/SGOT): 42 *H* (04-16 @ 05:46)        04-17    138  |  101  |  32<H>  ----------------------------<  117<H>  4.4   |  26  |  1.31<H>    Ca    9.5      17 Apr 2021 09:20    TPro  6.1  /  Alb  3.5  /  TBili  0.9  /  DBili  x   /  AST  44<H>  /  ALT  63<H>  /  AlkPhos  82  04-17                        9.8    8.32  )-----------( 208      ( 17 Apr 2021 09:20 )             30.5     Medications  MEDICATIONS  (STANDING):  aMIOdarone    Tablet 200 milliGRAM(s) Oral daily  aspirin enteric coated 81 milliGRAM(s) Oral daily  atorvastatin 20 milliGRAM(s) Oral at bedtime  ferrous    sulfate 325 milliGRAM(s) Oral daily  heparin  Infusion 600 Unit(s)/Hr (6 mL/Hr) IV Continuous <Continuous>  levothyroxine 100 MICROGram(s) Oral daily  metoprolol succinate ER 12.5 milliGRAM(s) Oral daily  polyethylene glycol 3350 17 Gram(s) Oral two times a day  senna 2 Tablet(s) Oral at bedtime  sodium chloride 0.9% lock flush 3 milliLiter(s) IV Push every 8 hours  sodium chloride 0.9%. 1000 milliLiter(s) (50 mL/Hr) IV Continuous <Continuous>      Physical Exam  General: Patient comfortable in bed  Vital Signs Last 12 Hrs  T(F): 97.8 (04-17-21 @ 09:18), Max: 97.8 (04-17-21 @ 09:18)  HR: 77 (04-17-21 @ 09:18) (60 - 77)  BP: 112/72 (04-17-21 @ 09:18) (109/58 - 112/72)  BP(mean): --  RR: 18 (04-17-21 @ 09:18) (18 - 18)  SpO2: 95% (04-17-21 @ 09:18) (95% - 95%)  Neck: No palpable thyroid nodules.  CVS: S1S2, No murmurs  Respiratory: No wheezing, no crepitations  GI: Abdomen soft, bowel sounds positive  Musculoskeletal:  edema lower extremities.     Diagnostics

## 2021-04-18 NOTE — PROGRESS NOTE ADULT - ASSESSMENT
Assessment  Hypothyroidism: 89y Female with history of hypothyroidism, was taking synthroid 50mcg po daily per discussion with her son Gino, compliant with intake, found to be in hypothyroid state, increased dose to synthroid 100mcg po daily, FT4 improving, deferred TAVR.  AS:  on medications, no chest pain, stable, monitored.  HTN: Controlled,  on antihypertensive medications.  NELY: Monitor labs, BMP.      Trace Beatty MD  Cell: [phone]  Office: [phone]

## 2021-04-18 NOTE — PROGRESS NOTE ADULT - ASSESSMENT
Seen and examined. OOB to chair  Denies n/v/d, cp.  improved sob with nasal cannula at 2L/min    acetaminophen   Tablet .. 650 milliGRAM(s) Oral every 6 hours PRN  ALBUTerol    90 MICROgram(s) HFA Inhaler 1 Puff(s) Inhalation every 4 hours PRN  aluminum hydroxide/magnesium hydroxide/simethicone Suspension 30 milliLiter(s) Oral every 4 hours PRN  aMIOdarone    Tablet 200 milliGRAM(s) Oral daily  aspirin enteric coated 81 milliGRAM(s) Oral daily  atorvastatin 20 milliGRAM(s) Oral at bedtime  ferrous    sulfate 325 milliGRAM(s) Oral daily  heparin  Infusion 700 Unit(s)/Hr IV Continuous <Continuous>  levothyroxine 100 MICROGram(s) Oral daily  lidocaine   Patch 1 Patch Transdermal daily PRN  metoprolol succinate ER 12.5 milliGRAM(s) Oral daily  polyethylene glycol 3350 17 Gram(s) Oral two times a day  senna 2 Tablet(s) Oral at bedtime  sodium chloride 0.9% lock flush 3 milliLiter(s) IV Push every 8 hours  sodium chloride 0.9%. 1000 milliLiter(s) IV Continuous <Continuous>      VITAL:  T(C): , Max: 36.7 (04-18-21 @ 04:39)  T(F): , Max: 98.1 (04-18-21 @ 04:39)  HR: 68 (04-18-21 @ 11:51)  BP: 162/83 (04-18-21 @ 11:51)  BP(mean): --  RR: 18 (04-18-21 @ 11:51)  SpO2: 98% (04-18-21 @ 11:51)  Wt(kg): --    04-17-21 @ 07:01  -  04-18-21 @ 07:00  --------------------------------------------------------  IN: 807 mL / OUT: 95 mL / NET: 712 mL    04-18-21 @ 07:01  -  04-18-21 @ 14:21  --------------------------------------------------------  IN: 480 mL / OUT: 0 mL / NET: 480 mL        PHYSICAL EXAM:  Constitutional: NAD  Neck:  No JVD  Respiratory: CTAB/L, R pigtail cath  Cardiovascular: S1 and S2,  Gastrointestinal: BS+, soft, NT/ND  Extremities: No peripheral edema  Neurological: A/O x 3, no focal deficits  Psychiatric: Normal mood, normal affect  : No Guzman  Skin: No rashes      LABS:                          9.9    9.28  )-----------( 214      ( 18 Apr 2021 08:16 )             31.2     Na(138)/K(4.3)/Cl(101)/HCO3(25)/BUN(32)/Cr(1.45)Glu(133)/Ca(9.3)/Mg(--)/PO4(--)    04-18 @ 05:12  Na(138)/K(4.4)/Cl(101)/HCO3(26)/BUN(32)/Cr(1.31)Glu(117)/Ca(9.5)/Mg(--)/PO4(--)    04-17 @ 09:20  Na(136)/K(4.5)/Cl(100)/HCO3(24)/BUN(49)/Cr(1.49)Glu(121)/Ca(9.3)/Mg(--)/PO4(--)    04-16 @ 05:46    Imaging    EXAM:  XR CHEST PORTABLE ROUTINE 1V                            PROCEDURE DATE:  04/18/2021      INTERPRETATION:  INDICATION: Abnormal chest sounds. Right pleural effusion.    TECHNIQUE: Single portable view of the chest.    COMPARISON: 4/17/2021    FINDINGS: The heart size cannot be adequately assessed on this single view. The left-sided dual-lead pacemaker. There is a left pleural effusion and/or left basilar atelectasis, unchanged. The right costophrenic angle was not imaged on this film. There is a probable trace right pleural effusion. There is a patchy persistent airspace opacity in the right lower lobe. A right-sided pigtail catheter is in place. There is mild subcutaneous emphysema in the right chest wall, unchanged.    IMPRESSION: Bilateral pleural effusions and patchy airspace opacity in the right lower lobe, unchanged compared to the prior exam.          ASSESSMENT/PLAN  89F with PMHx of  asthma, HTN, HLD, TIA, severe aortic stenosis, CAD s/p stents LAD and Diag, 2009 s/p 2 stent LAD in 9/2018, symptomatic Pafib on Eliquis pw SOB MOULTON and chest pain   Admitted for TAVR work up   Hyponatremia   Acute diastolic heart failure   SP thoracentesis     1 Renal- Scr   improved and scr fluctuates based on hemodynamic status and stenotic valve.    Planned for CT chest, abd/pelvis      2 Hyponatremia- Sodium normalized, improving  (SP UreaNa and samsca previously)     3  [facility]- TAVR cancelled yesterday     4 CTS-SP thoracentesis, pleural fluid showed adenoCA, further workup pending        Dominick Ross NP-BC  [facility]  [phone]   Seen and examined. OOB to chair  Denies n/v/d, cp.  improved sob with nasal cannula at 2L/min    acetaminophen   Tablet .. 650 milliGRAM(s) Oral every 6 hours PRN  ALBUTerol    90 MICROgram(s) HFA Inhaler 1 Puff(s) Inhalation every 4 hours PRN  aluminum hydroxide/magnesium hydroxide/simethicone Suspension 30 milliLiter(s) Oral every 4 hours PRN  aMIOdarone    Tablet 200 milliGRAM(s) Oral daily  aspirin enteric coated 81 milliGRAM(s) Oral daily  atorvastatin 20 milliGRAM(s) Oral at bedtime  ferrous    sulfate 325 milliGRAM(s) Oral daily  heparin  Infusion 700 Unit(s)/Hr IV Continuous <Continuous>  levothyroxine 100 MICROGram(s) Oral daily  lidocaine   Patch 1 Patch Transdermal daily PRN  metoprolol succinate ER 12.5 milliGRAM(s) Oral daily  polyethylene glycol 3350 17 Gram(s) Oral two times a day  senna 2 Tablet(s) Oral at bedtime  sodium chloride 0.9% lock flush 3 milliLiter(s) IV Push every 8 hours  sodium chloride 0.9%. 1000 milliLiter(s) IV Continuous <Continuous>      VITAL:  T(C): , Max: 36.7 (04-18-21 @ 04:39)  T(F): , Max: 98.1 (04-18-21 @ 04:39)  HR: 68 (04-18-21 @ 11:51)  BP: 162/83 (04-18-21 @ 11:51)  BP(mean): --  RR: 18 (04-18-21 @ 11:51)  SpO2: 98% (04-18-21 @ 11:51)  Wt(kg): --    04-17-21 @ 07:01  -  04-18-21 @ 07:00  --------------------------------------------------------  IN: 807 mL / OUT: 95 mL / NET: 712 mL    04-18-21 @ 07:01  -  04-18-21 @ 14:21  --------------------------------------------------------  IN: 480 mL / OUT: 0 mL / NET: 480 mL        PHYSICAL EXAM:  Constitutional: NAD  Neck:  No JVD  Respiratory: CTAB/L, R pigtail cath  Cardiovascular: S1 and S2,  Gastrointestinal: BS+, soft, NT/ND  Extremities: No peripheral edema  Neurological: A/O x 3, no focal deficits  Psychiatric: Normal mood, normal affect  : No Guzman  Skin: No rashes      LABS:                          9.9    9.28  )-----------( 214      ( 18 Apr 2021 08:16 )             31.2     Na(138)/K(4.3)/Cl(101)/HCO3(25)/BUN(32)/Cr(1.45)Glu(133)/Ca(9.3)/Mg(--)/PO4(--)    04-18 @ 05:12  Na(138)/K(4.4)/Cl(101)/HCO3(26)/BUN(32)/Cr(1.31)Glu(117)/Ca(9.5)/Mg(--)/PO4(--)    04-17 @ 09:20  Na(136)/K(4.5)/Cl(100)/HCO3(24)/BUN(49)/Cr(1.49)Glu(121)/Ca(9.3)/Mg(--)/PO4(--)    04-16 @ 05:46    Imaging    EXAM:  XR CHEST PORTABLE ROUTINE 1V                        PROCEDURE DATE:  04/18/2021      INTERPRETATION:  INDICATION: Abnormal chest sounds. Right pleural effusion.    TECHNIQUE: Single portable view of the chest.    COMPARISON: 4/17/2021    FINDINGS: The heart size cannot be adequately assessed on this single view. The left-sided dual-lead pacemaker. There is a left pleural effusion and/or left basilar atelectasis, unchanged. The right costophrenic angle was not imaged on this film. There is a probable trace right pleural effusion. There is a patchy persistent airspace opacity in the right lower lobe. A right-sided pigtail catheter is in place. There is mild subcutaneous emphysema in the right chest wall, unchanged.    IMPRESSION: Bilateral pleural effusions and patchy airspace opacity in the right lower lobe, unchanged compared to the prior exam.    Imaging      EXAM:  CT ABDOMEN AND PELVIS IC                          EXAM:  CT CHEST IC                            PROCEDURE DATE:  04/17/2021      INTERPRETATION:  CLINICAL INFORMATION: Shortness of breath and chest pain. Malignant pleural effusion.    COMPARISON: CTA coronary arteries 4/7/2021..    CONTRAST/COMPLICATIONS:  IV Contrast: Omnipaque 350. 90 cc of contrast was administered. 10 cc of contrast was discarded.  Oral Contrast: None  Complications: None reported    PROCEDURE:  CT of the Chest, Abdomen and Pelvis was performed.  Sagittal and coronal reformats were performed.    FINDINGS:  CHEST:  LUNGS AND LARGE AIRWAYS: Patent central airways. Interlobular septal thickening and groundglass within the right middle lobe and right lower lobe. Nonspecific patchy opacities seen within the bilateral upper lobes.  PLEURA: Small right hydropneumothorax with a right posterior approach pigtail catheter tip terminating in the pleural space. Small left pleural effusion.  VESSELS: Within normal limits.  HEART: The heart is enlarged. No pericardial effusion. Coronary arterial calcifications. Aortic valve calcifications. Left-sided chest wall pacemaker..  MEDIASTINUM AND TASIA: No lymphadenopathy.  CHEST WALL AND LOWER NECK: Heterogeneous thyroidwith multiple bilateral nodules. Subcutaneous air seen within the posterior right thorax.    ABDOMEN AND PELVIS:  LIVER: Multiple liver lesions consistent with combination of small cysts and metastases. The largest of these measures 1.4 cm in the left hepatic lobe (3:62)    BILE DUCTS: Mild intrahepatic biliary dilatation. The CBD is dilated measuring up to 1.0 cm.  GALLBLADDER: Cholecystectomy.  SPLEEN: Wedge-shaped hypodensities in the spleen, likely splenic infarcts. Perisplenic free fluid.  PANCREAS: Heterogeneous pancreatic tail mass measuring approximately 5.4 x 3.7 cm, extending to the splenic hilum and to the adjacent colon (5:71).  Splenic vein is nonopacified and likely thrombosed.  ADRENALS:  Irregular nodularity in the region of the left adrenal gland measuring up to 2.1 x 1.6 cm, consistent with metastases and/or direct extension of pancreatic mass.Right adrenal gland is within normal limits.    KIDNEYS/URETERS: Bilateral subcentimeter hypodense foci too small to characterize. No hydronephrosis.    BLADDER: Within normal limits.  REPRODUCTIVE ORGANS: The uterus is unremarkable. Left adnexal cyst measuring 4.1 x 4.0 cm.    BOWEL: No bowel obstruction. Appendix normal.  PERITONEUM: Small amount of free fluid in the pelvis and around the spleen.  VESSELS: Encasement of the splenic artery by the pancreatic mass as described above. Splenic vein thrombosis as described above. Severe stenosis seen at no lymphadenopathy.  ABDOMINAL WALL: Within normal limits.  BONES: Status post posterior fusion of L3-L5. Degenerative changes spine.    IMPRESSION:  CT chest:  Small right hydropneumothorax with pigtail catheter catheter tip in the right pleural space. Interlobular septal thickening and groundglass opacities within the right middle lobe and right lower lobe representing asymmetrical pulmonary edema. There are additional nonspecific patchy opacities seen within the bilateral upper lungs.    CT abdomen pelvis:  Pancreatic tail mass measuring 5.4 x 3.7 cm with direct extension to the splenic hilum and colon, and thrombosis of the splenic vein.    Left adrenal gland nodularity measuring up to 2.1 x 1.6 cm, consistent with metastases and/or direct extension of pancreatic mass.    Hepatic metastatic disease.    Small amount of free fluid around the spleen and in the lower pelvis.        ASSESSMENT/PLAN  89F with PMHx of  asthma, HTN, HLD, TIA, severe aortic stenosis, CAD s/p stents LAD and Diag, 2009 s/p 2 stent LAD in 9/2018, symptomatic Pafib on Eliquis pw SOB MOULTON and chest pain   Admitted for TAVR work up   Hyponatremia   Acute diastolic heart failure   SP thoracentesis     1 Renal- Scr trending up  today  and scr fluctuates based on hemodynamic status and stenotic valve.    Planned for CT chest, abd/pelvis      2 Hyponatremia- resolving   (SP UreaNa and samsca previously)     3  [facility]- Adult-CT Surge eval noted    4 CTS-SP thoracentesis, pleural fluid showed adenoCA, further workup pending    5 Anemia- hgb improving    Dominick Ross NP-BC  [facility]  [phone]   Seen and examined. OOB to chair  Denies n/v/d, cp.  improved sob with nasal cannula at 2L/min    acetaminophen   Tablet .. 650 milliGRAM(s) Oral every 6 hours PRN  ALBUTerol    90 MICROgram(s) HFA Inhaler 1 Puff(s) Inhalation every 4 hours PRN  aluminum hydroxide/magnesium hydroxide/simethicone Suspension 30 milliLiter(s) Oral every 4 hours PRN  aMIOdarone    Tablet 200 milliGRAM(s) Oral daily  aspirin enteric coated 81 milliGRAM(s) Oral daily  atorvastatin 20 milliGRAM(s) Oral at bedtime  ferrous    sulfate 325 milliGRAM(s) Oral daily  heparin  Infusion 700 Unit(s)/Hr IV Continuous <Continuous>  levothyroxine 100 MICROGram(s) Oral daily  lidocaine   Patch 1 Patch Transdermal daily PRN  metoprolol succinate ER 12.5 milliGRAM(s) Oral daily  polyethylene glycol 3350 17 Gram(s) Oral two times a day  senna 2 Tablet(s) Oral at bedtime  sodium chloride 0.9% lock flush 3 milliLiter(s) IV Push every 8 hours  sodium chloride 0.9%. 1000 milliLiter(s) IV Continuous <Continuous>      VITAL:  T(C): , Max: 36.7 (04-18-21 @ 04:39)  T(F): , Max: 98.1 (04-18-21 @ 04:39)  HR: 68 (04-18-21 @ 11:51)  BP: 162/83 (04-18-21 @ 11:51)  BP(mean): --  RR: 18 (04-18-21 @ 11:51)  SpO2: 98% (04-18-21 @ 11:51)  Wt(kg): --    04-17-21 @ 07:01  -  04-18-21 @ 07:00  --------------------------------------------------------  IN: 807 mL / OUT: 95 mL / NET: 712 mL    04-18-21 @ 07:01  -  04-18-21 @ 14:21  --------------------------------------------------------  IN: 480 mL / OUT: 0 mL / NET: 480 mL        PHYSICAL EXAM:  Constitutional: NAD  Neck:  No JVD  Respiratory: CTAB/L, R pigtail cath  Cardiovascular: S1 and S2,  Gastrointestinal: BS+, soft, NT/ND  Extremities: No peripheral edema  Neurological: A/O x 3, no focal deficits  Psychiatric: Normal mood, normal affect  : No Guzman  Skin: No rashes      LABS:                          9.9    9.28  )-----------( 214      ( 18 Apr 2021 08:16 )             31.2     Na(138)/K(4.3)/Cl(101)/HCO3(25)/BUN(32)/Cr(1.45)Glu(133)/Ca(9.3)/Mg(--)/PO4(--)    04-18 @ 05:12  Na(138)/K(4.4)/Cl(101)/HCO3(26)/BUN(32)/Cr(1.31)Glu(117)/Ca(9.5)/Mg(--)/PO4(--)    04-17 @ 09:20  Na(136)/K(4.5)/Cl(100)/HCO3(24)/BUN(49)/Cr(1.49)Glu(121)/Ca(9.3)/Mg(--)/PO4(--)    04-16 @ 05:46    Imaging    EXAM:  XR CHEST PORTABLE ROUTINE 1V                        PROCEDURE DATE:  04/18/2021      INTERPRETATION:  INDICATION: Abnormal chest sounds. Right pleural effusion.    TECHNIQUE: Single portable view of the chest.    COMPARISON: 4/17/2021    FINDINGS: The heart size cannot be adequately assessed on this single view. The left-sided dual-lead pacemaker. There is a left pleural effusion and/or left basilar atelectasis, unchanged. The right costophrenic angle was not imaged on this film. There is a probable trace right pleural effusion. There is a patchy persistent airspace opacity in the right lower lobe. A right-sided pigtail catheter is in place. There is mild subcutaneous emphysema in the right chest wall, unchanged.    IMPRESSION: Bilateral pleural effusions and patchy airspace opacity in the right lower lobe, unchanged compared to the prior exam.    Imaging      EXAM:  CT ABDOMEN AND PELVIS IC                          EXAM:  CT CHEST IC                            PROCEDURE DATE:  04/17/2021      INTERPRETATION:  CLINICAL INFORMATION: Shortness of breath and chest pain. Malignant pleural effusion.    COMPARISON: CTA coronary arteries 4/7/2021..    CONTRAST/COMPLICATIONS:  IV Contrast: Omnipaque 350. 90 cc of contrast was administered. 10 cc of contrast was discarded.  Oral Contrast: None  Complications: None reported    PROCEDURE:  CT of the Chest, Abdomen and Pelvis was performed.  Sagittal and coronal reformats were performed.    FINDINGS:  CHEST:  LUNGS AND LARGE AIRWAYS: Patent central airways. Interlobular septal thickening and groundglass within the right middle lobe and right lower lobe. Nonspecific patchy opacities seen within the bilateral upper lobes.  PLEURA: Small right hydropneumothorax with a right posterior approach pigtail catheter tip terminating in the pleural space. Small left pleural effusion.  VESSELS: Within normal limits.  HEART: The heart is enlarged. No pericardial effusion. Coronary arterial calcifications. Aortic valve calcifications. Left-sided chest wall pacemaker..  MEDIASTINUM AND TASIA: No lymphadenopathy.  CHEST WALL AND LOWER NECK: Heterogeneous thyroidwith multiple bilateral nodules. Subcutaneous air seen within the posterior right thorax.    ABDOMEN AND PELVIS:  LIVER: Multiple liver lesions consistent with combination of small cysts and metastases. The largest of these measures 1.4 cm in the left hepatic lobe (3:62)    BILE DUCTS: Mild intrahepatic biliary dilatation. The CBD is dilated measuring up to 1.0 cm.  GALLBLADDER: Cholecystectomy.  SPLEEN: Wedge-shaped hypodensities in the spleen, likely splenic infarcts. Perisplenic free fluid.  PANCREAS: Heterogeneous pancreatic tail mass measuring approximately 5.4 x 3.7 cm, extending to the splenic hilum and to the adjacent colon (5:71).  Splenic vein is nonopacified and likely thrombosed.  ADRENALS:  Irregular nodularity in the region of the left adrenal gland measuring up to 2.1 x 1.6 cm, consistent with metastases and/or direct extension of pancreatic mass.Right adrenal gland is within normal limits.    KIDNEYS/URETERS: Bilateral subcentimeter hypodense foci too small to characterize. No hydronephrosis.    BLADDER: Within normal limits.  REPRODUCTIVE ORGANS: The uterus is unremarkable. Left adnexal cyst measuring 4.1 x 4.0 cm.    BOWEL: No bowel obstruction. Appendix normal.  PERITONEUM: Small amount of free fluid in the pelvis and around the spleen.  VESSELS: Encasement of the splenic artery by the pancreatic mass as described above. Splenic vein thrombosis as described above. Severe stenosis seen at no lymphadenopathy.  ABDOMINAL WALL: Within normal limits.  BONES: Status post posterior fusion of L3-L5. Degenerative changes spine.    IMPRESSION:  CT chest:  Small right hydropneumothorax with pigtail catheter catheter tip in the right pleural space. Interlobular septal thickening and groundglass opacities within the right middle lobe and right lower lobe representing asymmetrical pulmonary edema. There are additional nonspecific patchy opacities seen within the bilateral upper lungs.    CT abdomen pelvis:  Pancreatic tail mass measuring 5.4 x 3.7 cm with direct extension to the splenic hilum and colon, and thrombosis of the splenic vein.    Left adrenal gland nodularity measuring up to 2.1 x 1.6 cm, consistent with metastases and/or direct extension of pancreatic mass.    Hepatic metastatic disease.    Small amount of free fluid around the spleen and in the lower pelvis.        ASSESSMENT/PLAN  89F with PMHx of  asthma, HTN, HLD, TIA, severe aortic stenosis, CAD s/p stents LAD and Diag, 2009 s/p 2 stent LAD in 9/2018, symptomatic Pafib on Eliquis pw SOB MOULTON and chest pain   Admitted for TAVR work up   Hyponatremia   Acute diastolic heart failure   SP thoracentesis     1 Renal- Scr up trending relative to yesterday . fluctuates based on hemodynamic status and stenotic valve.         2 Hyponatremia- resolving   (SP UreaNa and samsca previously)     3  [facility]- Adult-CT Surge eval noted    4 CTS-SP thoracentesis, pleural fluid showed adenoCA, further workup pending    5 Anemia- hgb improving    Dominick Ross NP-BC  [facility]  [phone]

## 2021-04-18 NOTE — PROGRESS NOTE ADULT - SUBJECTIVE AND OBJECTIVE BOX
VITAL SIGNS    Telemetry: AF  Vital Signs Last 24 Hrs  T(C): 36.7 (04-18-21 @ 04:39), Max: 36.7 (04-18-21 @ 04:39)  T(F): 98.1 (04-18-21 @ 04:39), Max: 98.1 (04-18-21 @ 04:39)  HR: 71 (04-18-21 @ 04:40) (63 - 71)  BP: 146/78 (04-18-21 @ 04:39) (133/77 - 146/78)  RR: 18 (04-18-21 @ 04:39) (17 - 18)  SpO2: 96% (04-18-21 @ 04:40) (91% - 96%)            04-17 @ 07:01  -  04-18 @ 07:00  --------------------------------------------------------  IN: 807 mL / OUT: 95 mL / NET: 712 mL       Daily     Daily   Admit Wt: Drug Dosing Weight  Height (cm): 157.5 (15 Apr 2021 23:39)  Weight (kg): 49 (15 Apr 2021 23:39)  BMI (kg/m2): 19.8 (15 Apr 2021 23:39)  BSA (m2): 1.47 (15 Apr 2021 23:39)      CAPILLARY BLOOD GLUCOSE          MEDICATIONS  acetaminophen   Tablet .. 650 milliGRAM(s) Oral every 6 hours PRN  ALBUTerol    90 MICROgram(s) HFA Inhaler 1 Puff(s) Inhalation every 4 hours PRN  aluminum hydroxide/magnesium hydroxide/simethicone Suspension 30 milliLiter(s) Oral every 4 hours PRN  aMIOdarone    Tablet 200 milliGRAM(s) Oral daily  aspirin enteric coated 81 milliGRAM(s) Oral daily  atorvastatin 20 milliGRAM(s) Oral at bedtime  ferrous    sulfate 325 milliGRAM(s) Oral daily  heparin  Infusion 700 Unit(s)/Hr IV Continuous <Continuous>  levothyroxine 100 MICROGram(s) Oral daily  lidocaine   Patch 1 Patch Transdermal daily PRN  metoprolol succinate ER 12.5 milliGRAM(s) Oral daily  polyethylene glycol 3350 17 Gram(s) Oral two times a day  senna 2 Tablet(s) Oral at bedtime  sodium chloride 0.9% lock flush 3 milliLiter(s) IV Push every 8 hours  sodium chloride 0.9%. 1000 milliLiter(s) IV Continuous <Continuous>      >>> <<<  PHYSICAL EXAM  Subjective: " Where is my breakfast"  Neurology: alert and oriented x 3, nonfocal, no gross deficits  CV : IRR  Lungs: CTA B/L, No wheezing, rales, rhonchi. Non labored respirations   Abdomen: soft, NT,ND, (+ )BM  :  voiding  Extremities: No LE edema bilaterally, +DP, No calf tenderness     LABS  04-18    138  |  101  |  32<H>  ----------------------------<  133<H>  4.3   |  25  |  1.45<H>    Ca    9.3      18 Apr 2021 05:12    TPro  6.1  /  Alb  3.5  /  TBili  0.9  /  DBili  x   /  AST  44<H>  /  ALT  63<H>  /  AlkPhos  82  04-17                                 9.9    9.28  )-----------( 214      ( 18 Apr 2021 08:16 )             31.2          PTT - ( 18 Apr 2021 08:16 )  PTT:73.9 sec       PAST MEDICAL & SURGICAL HISTORY:  HTN (hypertension)    CAD (coronary artery disease)  coronary stents x 2, 2007    HLD (hyperlipidemia)    Asthma    PAF (paroxysmal atrial fibrillation)    Scoliosis    History of cholecystectomy    H/O tubal ligation    History of bilateral knee replacement    History of cataract surgery  bilateral    S/P cholecystectomy

## 2021-04-18 NOTE — PROGRESS NOTE ADULT - PROBLEM SELECTOR PLAN 1
Will continue synthroid 100mcg po daily for now.  Will repeat TFTs prior to DC.  Discussed plan with patient and primary team.

## 2021-04-18 NOTE — PROGRESS NOTE ADULT - ASSESSMENT
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 Patient to be admitted to CTS under Dr. Lai for pre TAVR workup.     3/31 VSS; O2 sat 96% on RA, CXR: small R and Trace L pleural Effusion. Non-edematous. EKG normal, Trops 21.   4/1 HD stable.  Pt had eliquis this am--hold for now and start hep gtt for cardiac cath.  CTA delayed by department hopefully will get done tomorrow  4/2 VSS   PTT elevated heparin gtt held . repeat PTT. creatinine elevated 1.62 Dr Daigle consulted - nephrology  OK for CTA- gentle hydration  post CTA.   4/3 Pending CTA this Monday. Compression dressing to right forearm  4/4 H/H 8/27. Will continue to trend. T&C sent . Anticipate CTA this Monday and coronary cath this week.  4/5 dx cath right femoral groin access . IVF per and post procedure. trend creat. Right forearm hematoma stable. Reinstate heparin gtt  for afib at 1800  4/6 VSS  PTT 61 needs CTA creatinine 1.40 this am. Followed by Nephrology   4/7 VSS Creatine improved to 1.26 this am. Plan for CTA today. Plan to discharge home tomorrow then come back for TAVR.   4/8 VSS; Belly discomfort/distention. Soft, slightly distended, No tenderness on palpation. BM yesterday, no BM today. +Miralax -will continue to monitor. CR 1.6 this am. CTA completed yesterday, pending results. As per Family request and given inability to ambulate short distance without SOB patient will stay for medical optimization till next Friday for her TAVR.   4/9 + orthostasis noted; pt asymptomatic; 250 ns bolus given and Norvasc d/c; continue to monitor closely; ck ua negative/ urine cx  4/10 + orthostasis but improving; IVF given- total 500 cc; ck chest xray/ abdominal xray as per Dr. Sommers   TAVR 4/16 as per Dr. Lai   4/11 /66 feels better.  c/o belly distention --AXR 4/10 non specific bowel gas pattern. +122cc 24hrs.  Na 130.  PTT 79.  TSH 27  Free Thyroxine 0.8  4/12 VSS; ck ESR/CRP in am as per Dr. Lai; add ensure for nutritional support  TAVR 4/16 as per structural heart team and Dr. Lai  4/13 Endo following  TSH 27, synthroid ^ 100 mcg, renal following CXR done > right effusion will check U/S TAVR Friday.  4/14 VVS; Continue with current medication regimen. Heparin gtt for h/o Afib.  GI Consult called to further evaluate findings of CT Angio Heart Structural w/ IV Cont (04.07.21 @ 13:58) An ill-defined hypodense mass in the splenic hilum measuring approximate 3 cm. This may be a pancreatic tail mass or a splenic mass. Associated focal area of hypoperfusion the spleen is noted, Awaiting for further reccs.   4/15 TAVR am  4/16 TAVR cancelled>pleural fluid revealed metastaic adenocarcinoma  Transfer to Medicine team for further management as per Dr Lai  CT Surgery will follow pigtail.  4/17 VSS, right pigtail placed to waterseal. Plan for CT Chest, abd, pelvis with IV contrast today  4/18 VSS; R PTC to H2O seal, drainage 85cc overnight.

## 2021-04-18 NOTE — PROGRESS NOTE ADULT - PROBLEM SELECTOR PLAN 10
- discuss Ct findings of pancreatic tail mass and this cancer has spread to liver, adrenal glands and lungs, they are processing the information and will let their mom know as per her wishes/ goal is to get their mom home, stronger

## 2021-04-18 NOTE — PROGRESS NOTE ADULT - SUBJECTIVE AND OBJECTIVE BOX
[facility] Division of Hospital Medicine  Dylan Major DO  Pager ([facility], 8A-5P): [phone]  Other Times:  [phone]    Patient is a 89y old  Female who presents with a chief complaint of SOB/CP (18 Apr 2021 09:31)      SUBJECTIVE / OVERNIGHT EVENTS:    Mandarin    #[id number]      feels ok,. tired. had some mild cp that resolved "quickly" this am states she has been hunched over in a chair for so long an it was uncomfortable and no one was getting her back into bed. no sob.   patient   MEDICATIONS  (STANDING):  aMIOdarone    Tablet 200 milliGRAM(s) Oral daily  aspirin enteric coated 81 milliGRAM(s) Oral daily  atorvastatin 20 milliGRAM(s) Oral at bedtime  ferrous    sulfate 325 milliGRAM(s) Oral daily  heparin  Infusion 700 Unit(s)/Hr (7 mL/Hr) IV Continuous <Continuous>  levothyroxine 100 MICROGram(s) Oral daily  metoprolol succinate ER 12.5 milliGRAM(s) Oral daily  polyethylene glycol 3350 17 Gram(s) Oral two times a day  senna 2 Tablet(s) Oral at bedtime  sodium chloride 0.9% lock flush 3 milliLiter(s) IV Push every 8 hours  sodium chloride 0.9%. 1000 milliLiter(s) (50 mL/Hr) IV Continuous <Continuous>    MEDICATIONS  (PRN):  acetaminophen   Tablet .. 650 milliGRAM(s) Oral every 6 hours PRN Mild Pain (1 - 3)  ALBUTerol    90 MICROgram(s) HFA Inhaler 1 Puff(s) Inhalation every 4 hours PRN Shortness of Breath and/or Wheezing  aluminum hydroxide/magnesium hydroxide/simethicone Suspension 30 milliLiter(s) Oral every 4 hours PRN Dyspepsia  lidocaine   Patch 1 Patch Transdermal daily PRN low back pain      CAPILLARY BLOOD GLUCOSE        I&O's Summary    17 Apr 2021 07:01  -  18 Apr 2021 07:00  --------------------------------------------------------  IN: 807 mL / OUT: 95 mL / NET: 712 mL    18 Apr 2021 07:01  -  18 Apr 2021 13:03  --------------------------------------------------------  IN: 480 mL / OUT: 0 mL / NET: 480 mL        PHYSICAL EXAM:  Vital Signs Last 24 Hrs  T(C): 36.3 (18 Apr 2021 11:51), Max: 36.7 (18 Apr 2021 04:39)  T(F): 97.4 (18 Apr 2021 11:51), Max: 98.1 (18 Apr 2021 04:39)  HR: 68 (18 Apr 2021 11:51) (63 - 71)  BP: 162/83 (18 Apr 2021 11:51) (143/77 - 162/83)  BP(mean): --  RR: 18 (18 Apr 2021 11:51) (17 - 18)  SpO2: 98% (18 Apr 2021 11:51) (91% - 98%)    GENERAL: No acute distress, well-developed  HEAD:  Atraumatic, Normocephalic  EYES: EOMI, PERRL, conjunctiva and sclera clear  NECK: Supple, no lymphadenopathy, no JVD  CHEST/LUNG: CTAB; No wheezes, rales, or rhonchi R pigtail catheter + c/d/i  HEART: Regular rate and rhythm; + loud systolic murmur  ABDOMEN: Soft, non-tender, non-distended; normal bowel sounds, no organomegaly  EXTREMITIES:  2+ peripheral pulses b/l, No r edema  NEUROLOGY: A&O x 3, no focal deficits  SKIN: No rashes or lesions      LABS:                        9.9    9.28  )-----------( 214      ( 18 Apr 2021 08:16 )             31.2     04-18    138  |  101  |  32<H>  ----------------------------<  133<H>  4.3   |  25  |  1.45<H>    Ca    9.3      18 Apr 2021 05:12    TPro  6.1  /  Alb  3.5  /  TBili  0.9  /  DBili  x   /  AST  44<H>  /  ALT  63<H>  /  AlkPhos  82  04-17    PTT - ( 18 Apr 2021 08:16 )  PTT:73.9 sec  CARDIAC MARKERS ( 18 Apr 2021 05:13 )  x     / x     / 49 U/L / x     / 1.4 ng/mL          Culture - Urine (collected 15 Apr 2021 18:05)  Source: .Urine Clean Catch (Midstream)  Final Report (16 Apr 2021 16:19):    <10,000 CFU/mL Normal Urogenital Sara        RADIOLOGY & ADDITIONAL TESTS:  Results Reviewed:   Imaging Personally Reviewed:      < from: CT Abdomen and Pelvis w/ IV Cont (04.17.21 @ 13:30) >  IMPRESSION:  CT chest:  Small right hydropneumothorax with pigtail catheter catheter tip in the right pleural space. Interlobular septal thickening and groundglass opacities within the right middle lobe and right lower lobe representing asymmetrical pulmonary edema. There are additional nonspecific patchy opacities seen within the bilateral upper lungs.    CT abdomen pelvis:  Pancreatic tail mass measuring 5.4 x 3.7 cm with direct extension to the splenic hilum and colon, and thrombosis of the splenic vein.    Left adrenal gland nodularity measuring up to 2.1 x 1.6 cm, consistent with metastases and/or direct extension of pancreatic mass.    Hepatic metastatic disease.    Small amount of free fluid around the spleen and in the lower pelvis.    < end of copied text >      COORDINATION OF CARE:  Care Discussed with Consultants/Other Providers [Y/N]:  Prior or Outpatient Records Reviewed [Y/N]:  davidson Garcia

## 2021-04-18 NOTE — PROGRESS NOTE ADULT - PROBLEM SELECTOR PLAN 2
with acute hypoxemic respiratory failure on 2L NC 02  Patient admitted on 3/31 due to severe dyspnea, initially attributed to severe AS however now found to have malignant pleural effusion s/p pigtail catheter on 4/13  cytopath with adenoCA, stains pending  - per limited CT coronaries ill defined mass noted in pancreatic tail vs spleen  - CT abd pelvis 4/18 confirming pancreatic tail mass with mets to the adrenal gland, liver, lung  - oncology and GI consulted, recs appreciated

## 2021-04-18 NOTE — PROGRESS NOTE ADULT - SUBJECTIVE AND OBJECTIVE BOX
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 Denies any other symptoms including fever, cough, N/V/D, LE edema/pain.  (31 Mar 2021 23:26)      PAST MEDICAL & SURGICAL HISTORY:  HTN (hypertension)    CAD (coronary artery disease)  coronary stents x 2, 2007    HLD (hyperlipidemia)    Asthma    PAF (paroxysmal atrial fibrillation)    Scoliosis    History of cholecystectomy    H/O tubal ligation    History of bilateral knee replacement    History of cataract surgery  bilateral    S/P cholecystectomy        MEDICATIONS  (STANDING):  aMIOdarone    Tablet 200 milliGRAM(s) Oral daily  aspirin enteric coated 81 milliGRAM(s) Oral daily  atorvastatin 20 milliGRAM(s) Oral at bedtime  ferrous    sulfate 325 milliGRAM(s) Oral daily  heparin  Infusion 700 Unit(s)/Hr (7 mL/Hr) IV Continuous <Continuous>  levothyroxine 100 MICROGram(s) Oral daily  metoprolol succinate ER 12.5 milliGRAM(s) Oral daily  polyethylene glycol 3350 17 Gram(s) Oral two times a day  senna 2 Tablet(s) Oral at bedtime  sodium chloride 0.9% lock flush 3 milliLiter(s) IV Push every 8 hours  sodium chloride 0.9%. 1000 milliLiter(s) (50 mL/Hr) IV Continuous <Continuous>      Allergies    No Known Allergies    Intolerances        SOCIAL HISTORY:  Denies ETOh,Smoking,     FAMILY HISTORY:  No pertinent family history in first degree relatives        REVIEW OF SYSTEMS:    CONSTITUTIONAL: No weakness, fevers or chills  EYES/ENT: No visual changes;  No vertigo or throat pain   NECK: No pain or stiffness  RESPIRATORY: No cough, wheezing, hemoptysis; No shortness of breath  CARDIOVASCULAR: No chest pain or palpitations  GASTROINTESTINAL: No abdominal or epigastric pain. No nausea, vomiting, or hematemesis; No diarrhea or constipation. No melena or hematochezia.  GENITOURINARY: No dysuria, frequency or hematuria  NEUROLOGICAL: No numbness or weakness  SKIN: No itching, burning, rashes, or lesions   All other review of systems is negative unless indicated above.    VITAL:  T(C): , Max: 36.7 (04-18-21 @ 04:39)  T(F): , Max: 98.1 (04-18-21 @ 04:39)  HR: 71 (04-18-21 @ 04:40)  BP: 146/78 (04-18-21 @ 04:39)  BP(mean): --  RR: 18 (04-18-21 @ 04:39)  SpO2: 96% (04-18-21 @ 04:40)  Wt(kg): --    I and O's:    04-16 @ 07:01  -  04-17 @ 07:00  --------------------------------------------------------  IN: 0 mL / OUT: 1550 mL / NET: -1550 mL    04-17 @ 07:01  -  04-18 @ 07:00  --------------------------------------------------------  IN: 807 mL / OUT: 95 mL / NET: 712 mL          PHYSICAL EXAM:    Constitutional: NAD  HEENT: PERRLA,   Neck: No JVD  Respiratory: CTA B/L  Cardiovascular: S1 and S2  Gastrointestinal: BS+, soft, NT/ND  Extremities: No peripheral edema  Neurological: A/O x 3, no focal deficits  Psychiatric: Normal mood, normal affect  : No Guzman  Skin: No rashes  Access: Not applicable  Back: No CVA tenderness    LABS:                        8.8    8.34  )-----------( 178      ( 17 Apr 2021 23:58 )             27.8     04-18    138  |  101  |  32<H>  ----------------------------<  133<H>  4.3   |  25  |  1.45<H>    Ca    9.3      18 Apr 2021 05:12    TPro  6.1  /  Alb  3.5  /  TBili  0.9  /  DBili  x   /  AST  44<H>  /  ALT  63<H>  /  AlkPhos  82  04-17          RADIOLOGY & ADDITIONAL STUDIES:

## 2021-04-18 NOTE — PROGRESS NOTE ADULT - SUBJECTIVE AND OBJECTIVE BOX
Chief complaint  Patient is a 89y old  Female who presents with a chief complaint of SOB/CP (18 Apr 2021 14:20)   Review of systems  Patient in bed, appears comfortable.    Labs and Fingersticks  CAPILLARY BLOOD GLUCOSE      Anion Gap, Serum: 12 (04-18 @ 05:12)  Anion Gap, Serum: 11 (04-17 @ 09:20)      Calcium, Total Serum: 9.3 (04-18 @ 05:12)  Calcium, Total Serum: 9.5 (04-17 @ 09:20)  Albumin, Serum: 3.5 (04-17 @ 09:20)    Alanine Aminotransferase (ALT/SGPT): 63 *H* (04-17 @ 09:20)  Alkaline Phosphatase, Serum: 82 (04-17 @ 09:20)  Aspartate Aminotransferase (AST/SGOT): 44 *H* (04-17 @ 09:20)        04-18    138  |  101  |  32<H>  ----------------------------<  133<H>  4.3   |  25  |  1.45<H>    Ca    9.3      18 Apr 2021 05:12    TPro  6.1  /  Alb  3.5  /  TBili  0.9  /  DBili  x   /  AST  44<H>  /  ALT  63<H>  /  AlkPhos  82  04-17                        9.9    9.28  )-----------( 214      ( 18 Apr 2021 08:16 )             31.2     Medications  MEDICATIONS  (STANDING):  aMIOdarone    Tablet 200 milliGRAM(s) Oral daily  amLODIPine   Tablet 5 milliGRAM(s) Oral daily  aspirin enteric coated 81 milliGRAM(s) Oral daily  atorvastatin 20 milliGRAM(s) Oral at bedtime  ferrous    sulfate 325 milliGRAM(s) Oral daily  heparin  Infusion 700 Unit(s)/Hr (7 mL/Hr) IV Continuous <Continuous>  levothyroxine 100 MICROGram(s) Oral daily  metoprolol succinate ER 12.5 milliGRAM(s) Oral daily  polyethylene glycol 3350 17 Gram(s) Oral two times a day  senna 2 Tablet(s) Oral at bedtime  sodium chloride 0.9% lock flush 3 milliLiter(s) IV Push every 8 hours  sodium chloride 0.9%. 1000 milliLiter(s) (50 mL/Hr) IV Continuous <Continuous>      Physical Exam  General: Patient comfortable in bed  Vital Signs Last 12 Hrs  T(F): 97.4 (04-18-21 @ 11:51), Max: 97.4 (04-18-21 @ 11:51)  HR: 68 (04-18-21 @ 17:44) (68 - 68)  BP: 146/76 (04-18-21 @ 17:44) (146/76 - 162/83)  BP(mean): --  RR: 18 (04-18-21 @ 11:51) (18 - 18)  SpO2: 98% (04-18-21 @ 11:51) (98% - 98%)  Neck: No palpable thyroid nodules.  CVS: S1S2, No murmurs  Respiratory: No wheezing, no crepitations  GI: Abdomen soft, bowel sounds positive  Musculoskeletal:  edema lower extremities.     Diagnostics

## 2021-04-18 NOTE — PROGRESS NOTE ADULT - PROBLEM SELECTOR PLAN 1
Ct abd pelvis 4/17 with Pancreatic tail mass measuring 5.4 x 3.7 cm with direct extension to the splenic hilum and colon, and thrombosis of the splenic vein.  Left adrenal gland nodularity measuring up to 2.1 x 1.6 cm, consistent with metastases and/or direct extension of pancreatic mass. Hepatic metastatic disease.  - discuss these findings with the patients son Gino and daughter- they would like to relay this final dx to the patient,   - heme onc follow up- re: chemotherapy inpt v opt  - CT surgery follow up re: chest tube and need for pleurx catheter since this is a malignant effusion  - family wants patient home soon so she can process this information

## 2021-04-18 NOTE — PROGRESS NOTE ADULT - ASSESSMENT
metastatic panc ca  conservative magnetm  can stain cytospin fluid  malignant pleural fluid, need goc

## 2021-04-19 NOTE — PROGRESS NOTE ADULT - ASSESSMENT
ASSESSMENT/PLAN  89F with PMHx of  asthma, HTN, HLD, TIA, severe aortic stenosis, CAD s/p stents LAD and Diag, 2009 s/p 2 stent LAD in 9/2018, symptomatic Pafib on Eliquis pw SOB MOULTON and chest pain   Stage 4 adenocarcinoma that may be from the pancreas and evidence of liver mets    SP thoracentesis   NELY-Worse and likely from contrast     1 Renal- Scr up trending relative to yesterday.  Supportive care 48 hours p contrast load      2 CTS- Chest tube per CTS.  Not a TAVR candidate      3 Onc-IHC staining to better elucidate origin     Poor prognosis     Sayed [facility]   [facility]  [phone]

## 2021-04-19 NOTE — PROGRESS NOTE ADULT - PROBLEM SELECTOR PLAN 1
Pleural fluid cytology +metastatic adenocarcinoma, TAVR cx'd  CT Chest/Abd/Pelvis with IV cont 4/17 - pancreatic cancer with mets to liver/lung/adrenal gland - heme/onc following.  Right pigtail dc'd 4/19 -CT Surgery will sign off.  If pleural effusion re-accumulates inpatient, please consult thoracic surgery for Pleurx catheter evaluation at pager [phone] if family and patient agreeable. Patient can also follow up outpatient for thoracic surgery evaluation at [phone].   Continue care as per primary team Pleural fluid cytology +metastatic adenocarcinoma, TAVR cx'd.  Continue medical management  CT Chest/Abd/Pelvis with IV cont 4/17 - pancreatic cancer with mets to liver/lung/adrenal gland - heme/onc following.  Right pigtail dc'd 4/19 -CT Surgery will sign off.  If pleural effusion re-accumulates inpatient, please consult thoracic surgery for Pleurx catheter evaluation at pager [phone] if family and patient agreeable. Patient can also follow up outpatient for Pleurx evaluation at [phone].   Discussed with Dr. Lai and primary team

## 2021-04-19 NOTE — PROGRESS NOTE ADULT - SUBJECTIVE AND OBJECTIVE BOX
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 Denies any other symptoms including fever, cough, N/V/D, LE edema/pain.  (31 Mar 2021 23:26)      PAST MEDICAL & SURGICAL HISTORY:  HTN (hypertension)    CAD (coronary artery disease)  coronary stents x 2, 2007    HLD (hyperlipidemia)    Asthma    PAF (paroxysmal atrial fibrillation)    Scoliosis    History of cholecystectomy    H/O tubal ligation    History of bilateral knee replacement    History of cataract surgery  bilateral    S/P cholecystectomy        MEDICATIONS  (STANDING):  aMIOdarone    Tablet 200 milliGRAM(s) Oral daily  amLODIPine   Tablet 5 milliGRAM(s) Oral daily  aspirin enteric coated 81 milliGRAM(s) Oral daily  atorvastatin 20 milliGRAM(s) Oral at bedtime  ferrous    sulfate 325 milliGRAM(s) Oral daily  heparin  Infusion 700 Unit(s)/Hr (7 mL/Hr) IV Continuous <Continuous>  levothyroxine 100 MICROGram(s) Oral daily  metoprolol succinate ER 12.5 milliGRAM(s) Oral daily  polyethylene glycol 3350 17 Gram(s) Oral two times a day  senna 2 Tablet(s) Oral at bedtime  sodium chloride 0.9% lock flush 3 milliLiter(s) IV Push every 8 hours  sodium chloride 0.9%. 1000 milliLiter(s) (50 mL/Hr) IV Continuous <Continuous>      Allergies    No Known Allergies    Intolerances        SOCIAL HISTORY:  Denies ETOh,Smoking,     FAMILY HISTORY:  No pertinent family history in first degree relatives        REVIEW OF SYSTEMS:    CONSTITUTIONAL: No weakness, fevers or chills  EYES/ENT: No visual changes;  No vertigo or throat pain   NECK: No pain or stiffness  RESPIRATORY: No cough, wheezing, hemoptysis; No shortness of breath  CARDIOVASCULAR: No chest pain or palpitations  GASTROINTESTINAL: No abdominal or epigastric pain. No nausea, vomiting, or hematemesis; No diarrhea or constipation. No melena or hematochezia.  GENITOURINARY: No dysuria, frequency or hematuria  NEUROLOGICAL: No numbness or weakness  SKIN: No itching, burning, rashes, or lesions   All other review of systems is negative unless indicated above.    VITAL:  T(C): , Max: 36.8 (04-18-21 @ 20:10)  T(F): , Max: 98.2 (04-18-21 @ 20:10)  HR: 64 (04-19-21 @ 04:52)  BP: 123/75 (04-19-21 @ 04:52)  BP(mean): --  RR: 18 (04-19-21 @ 04:52)  SpO2: 94% (04-19-21 @ 04:52)  Wt(kg): --    I and O's:    04-17 @ 07:01  -  04-18 @ 07:00  --------------------------------------------------------  IN: 807 mL / OUT: 95 mL / NET: 712 mL    04-18 @ 07:01  -  04-19 @ 07:00  --------------------------------------------------------  IN: 1044 mL / OUT: 100 mL / NET: 944 mL          PHYSICAL EXAM:    Constitutional: NAD  HEENT: PERRLA,   Neck: No JVD  Respiratory: CTA B/L  Cardiovascular: S1 and S2  Gastrointestinal: BS+, soft, NT/ND  Extremities: No peripheral edema  Neurological: A/O x 3, no focal deficits  Psychiatric: Normal mood, normal affect  : No Guzmna  Skin: No rashes  Access: Not applicable  Back: No CVA tenderness    LABS:                        8.8    6.43  )-----------( 185      ( 19 Apr 2021 06:52 )             27.1     04-19    138  |  100  |  33<H>  ----------------------------<  110<H>  4.4   |  26  |  1.70<H>    Ca    9.4      19 Apr 2021 06:53    TPro  6.1  /  Alb  3.5  /  TBili  0.9  /  DBili  x   /  AST  44<H>  /  ALT  63<H>  /  AlkPhos  82  04-17          RADIOLOGY & ADDITIONAL STUDIES:

## 2021-04-19 NOTE — PROGRESS NOTE ADULT - SUBJECTIVE AND OBJECTIVE BOX
[facility] Division of Hospital Medicine  Dylan Major DO  Pager (MARLENY-F, 4K-0T): [phone]  Other Times:  [phone]    Patient is a 89y old  Female who presents with a chief complaint of SOB/CP (19 Apr 2021 10:57)      SUBJECTIVE / OVERNIGHT EVENTS:    patient seen and examined.  Amandeep Reederreter #[id number]    feels ok except has this feeling of fullness after she eats. no n/v/d/c.   no sob    MEDICATIONS  (STANDING):  aMIOdarone    Tablet 200 milliGRAM(s) Oral daily  amLODIPine   Tablet 5 milliGRAM(s) Oral daily  aspirin enteric coated 81 milliGRAM(s) Oral daily  atorvastatin 20 milliGRAM(s) Oral at bedtime  ferrous    sulfate 325 milliGRAM(s) Oral daily  heparin  Infusion 700 Unit(s)/Hr (7 mL/Hr) IV Continuous <Continuous>  levothyroxine 100 MICROGram(s) Oral daily  metoprolol succinate ER 12.5 milliGRAM(s) Oral daily  polyethylene glycol 3350 17 Gram(s) Oral two times a day  senna 2 Tablet(s) Oral at bedtime  sodium chloride 0.9% lock flush 3 milliLiter(s) IV Push every 8 hours  sodium chloride 0.9%. 1000 milliLiter(s) (50 mL/Hr) IV Continuous <Continuous>    MEDICATIONS  (PRN):  acetaminophen   Tablet .. 650 milliGRAM(s) Oral every 6 hours PRN Mild Pain (1 - 3)  ALBUTerol    90 MICROgram(s) HFA Inhaler 1 Puff(s) Inhalation every 4 hours PRN Shortness of Breath and/or Wheezing  aluminum hydroxide/magnesium hydroxide/simethicone Suspension 30 milliLiter(s) Oral every 4 hours PRN Dyspepsia  lidocaine   Patch 1 Patch Transdermal daily PRN low back pain      CAPILLARY BLOOD GLUCOSE        I&O's Summary    18 Apr 2021 07:01  -  19 Apr 2021 07:00  --------------------------------------------------------  IN: 1044 mL / OUT: 100 mL / NET: 944 mL    19 Apr 2021 07:01  -  19 Apr 2021 11:26  --------------------------------------------------------  IN: 240 mL / OUT: 0 mL / NET: 240 mL        PHYSICAL EXAM:  Vital Signs Last 24 Hrs  T(C): 36.8 (19 Apr 2021 04:52), Max: 36.8 (18 Apr 2021 20:10)  T(F): 98.2 (19 Apr 2021 04:52), Max: 98.2 (18 Apr 2021 20:10)  HR: 64 (19 Apr 2021 04:52) (64 - 70)  BP: 123/75 (19 Apr 2021 04:52) (123/75 - 162/83)  BP(mean): --  RR: 18 (19 Apr 2021 04:52) (17 - 18)  SpO2: 94% (19 Apr 2021 04:52) (94% - 99%)    GENERAL: No acute distress, well-developed  HEAD:  Atraumatic, Normocephalic  EYES: EOMI, PERRL, conjunctiva and sclera ruiz  CHEST/LUNG: CTAB; No wheezes, rales, or rhonchi  HEART: Regular rate and rhythm; + loud systolic murmur  ABDOMEN: Soft, non-tender, non-distended; normal bowel sounds, no organomegaly  EXTREMITIES:  2+ peripheral pulses b/l, No r edema  NEUROLOGY: A&O x 3, no focal deficits  SKIN: No rashes or lesions      LABS:                        8.8    6.43  )-----------( 185      ( 19 Apr 2021 06:52 )             27.1     04-19    138  |  100  |  33<H>  ----------------------------<  110<H>  4.4   |  26  |  1.70<H>    Ca    9.4      19 Apr 2021 06:53      PTT - ( 19 Apr 2021 06:54 )  PTT:74.7 sec  CARDIAC MARKERS ( 18 Apr 2021 05:13 )  x     / x     / 49 U/L / x     / 1.4 ng/mL            RADIOLOGY & ADDITIONAL TESTS:  Results Reviewed:   Imaging Personally Reviewed:  Electrocardiogram Personally Reviewed:    COORDINATION OF CARE:  Care Discussed with Consultants/Other Providers [Y/N]:  Prior or Outpatient Records Reviewed [Y/N]:  davidson Garcia

## 2021-04-19 NOTE — PROGRESS NOTE ADULT - SUBJECTIVE AND OBJECTIVE BOX
INTERVAL HPI/OVERNIGHT EVENTS:  Patient S&E at bedside. No o/n events,     VITAL SIGNS:  T(F): 98.2 (04-19-21 @ 04:52)  HR: 64 (04-19-21 @ 04:52)  BP: 123/75 (04-19-21 @ 04:52)  RR: 18 (04-19-21 @ 04:52)  SpO2: 94% (04-19-21 @ 04:52)  Wt(kg): --    PHYSICAL EXAM:    Constitutional: NAD  Eyes: EOMI, sclera non-icteric  Neck: supple  Respiratory: CTAB, no wheezes or crackles   Cardiovascular: RRR  Gastrointestinal: soft, NTND, + BS  Extremities: no cyanosis, clubbing or edema   Neurological: awake and alert      MEDICATIONS  (STANDING):  aMIOdarone    Tablet 200 milliGRAM(s) Oral daily  amLODIPine   Tablet 5 milliGRAM(s) Oral daily  aspirin enteric coated 81 milliGRAM(s) Oral daily  atorvastatin 20 milliGRAM(s) Oral at bedtime  ferrous    sulfate 325 milliGRAM(s) Oral daily  heparin  Infusion 700 Unit(s)/Hr (7 mL/Hr) IV Continuous <Continuous>  levothyroxine 100 MICROGram(s) Oral daily  metoprolol succinate ER 12.5 milliGRAM(s) Oral daily  polyethylene glycol 3350 17 Gram(s) Oral two times a day  senna 2 Tablet(s) Oral at bedtime  sodium chloride 0.9% lock flush 3 milliLiter(s) IV Push every 8 hours  sodium chloride 0.9%. 1000 milliLiter(s) (50 mL/Hr) IV Continuous <Continuous>    MEDICATIONS  (PRN):  acetaminophen   Tablet .. 650 milliGRAM(s) Oral every 6 hours PRN Mild Pain (1 - 3)  ALBUTerol    90 MICROgram(s) HFA Inhaler 1 Puff(s) Inhalation every 4 hours PRN Shortness of Breath and/or Wheezing  aluminum hydroxide/magnesium hydroxide/simethicone Suspension 30 milliLiter(s) Oral every 4 hours PRN Dyspepsia  lidocaine   Patch 1 Patch Transdermal daily PRN low back pain      Allergies    No Known Allergies    Intolerances        LABS:                        8.8    6.43  )-----------( 185      ( 19 Apr 2021 06:52 )             27.1     04-19    138  |  100  |  33<H>  ----------------------------<  110<H>  4.4   |  26  |  1.70<H>    Ca    9.4      19 Apr 2021 06:53      PTT - ( 19 Apr 2021 06:54 )  PTT:74.7 sec      RADIOLOGY & ADDITIONAL TESTS:  Studies reviewed.

## 2021-04-19 NOTE — PROGRESS NOTE ADULT - ASSESSMENT
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 Patient to be admitted to CTS under Dr. Lai for pre TAVR workup.     3/31 VSS; O2 sat 96% on RA, CXR: small R and Trace L pleural Effusion. Non-edematous. EKG normal, Trops 21.   4/1 HD stable.  Pt had eliquis this am--hold for now and start hep gtt for cardiac cath.  CTA delayed by department hopefully will get done tomorrow  4/2 VSS   PTT elevated heparin gtt held . repeat PTT. creatinine elevated 1.62 Dr Daigle consulted - nephrology  OK for CTA- gentle hydration  post CTA.   4/3 Pending CTA this Monday. Compression dressing to right forearm  4/4 H/H 8/27. Will continue to trend. T&C sent . Anticipate CTA this Monday and coronary cath this week.  4/5 dx cath right femoral groin access . IVF per and post procedure. trend creat. Right forearm hematoma stable. Reinstate heparin gtt  for afib at 1800  4/6 VSS  PTT 61 needs CTA creatinine 1.40 this am. Followed by Nephrology   4/7 VSS Creatine improved to 1.26 this am. Plan for CTA today. Plan to discharge home tomorrow then come back for TAVR.   4/8 VSS; Belly discomfort/distention. Soft, slightly distended, No tenderness on palpation. BM yesterday, no BM today. +Miralax -will continue to monitor. CR 1.6 this am. CTA completed yesterday, pending results. As per Family request and given inability to ambulate short distance without SOB patient will stay for medical optimization till next Friday for her TAVR.   4/9 + orthostasis noted; pt asymptomatic; 250 ns bolus given and Norvasc d/c; continue to monitor closely; ck ua negative/ urine cx  4/10 + orthostasis but improving; IVF given- total 500 cc; ck chest xray/ abdominal xray as per Dr. Sommers   TAVR 4/16 as per Dr. Lai   4/11 /66 feels better.  c/o belly distention --AXR 4/10 non specific bowel gas pattern. +122cc 24hrs.  Na 130.  PTT 79.  TSH 27  Free Thyroxine 0.8  4/12 VSS; ck ESR/CRP in am as per Dr. Lai; add ensure for nutritional support  TAVR 4/16 as per structural heart team and Dr. Lai  4/13 Endo following  TSH 27, synthroid ^ 100 mcg, renal following CXR done > right effusion will check U/S TAVR Friday.  4/14 VVS; Continue with current medication regimen. Heparin gtt for h/o Afib.  GI Consult called to further evaluate findings of CT Angio Heart Structural w/ IV Cont (04.07.21 @ 13:58) An ill-defined hypodense mass in the splenic hilum measuring approximate 3 cm. This may be a pancreatic tail mass or a splenic mass. Associated focal area of hypoperfusion the spleen is noted, Awaiting for further reccs.   4/15 TAVR am  4/16 TAVR cancelled>pleural fluid revealed metastaic adenocarcinoma  Transfer to Medicine team for further management as per Dr Lai  CT Surgery will follow pigtail.  4/17 VSS, right pigtail placed to waterseal. Plan for CT Chest, abd, pelvis with IV contrast today  4/18 VSS; R PTC to H2O seal, drainage 85cc overnight.   4/19 VSS, right pigtail 50cc overnight/100cc in 24h, d/c today per Dr. Lai.

## 2021-04-19 NOTE — PROGRESS NOTE ADULT - PROBLEM SELECTOR PLAN 1
Ct abd pelvis 4/17 with Pancreatic tail mass measuring 5.4 x 3.7 cm with direct extension to the splenic hilum and colon, and thrombosis of the splenic vein.  Left adrenal gland nodularity measuring up to 2.1 x 1.6 cm, consistent with metastases and/or direct extension of pancreatic mass. Hepatic metastatic disease.  - discuss these findings with the patients son Gino and daughter- they would like to relay this final dx to the patient,   - heme onc follow up- re: chemotherapy inpt v opt  - CT surgery removed CT this am  - family wants patient home soon so she can process this information/ and are amenable to chemotherapy

## 2021-04-19 NOTE — PROGRESS NOTE ADULT - ASSESSMENT
Assessment  Hypothyroidism: 89y Female with history of hypothyroidism, was taking synthroid 50mcg po daily per discussion with her son Gino, compliant with intake, found to be in hypothyroid state, increased dose to synthroid 100mcg po daily, FT4 improving, TAVR deferred due to recent finding of metastatic adenocarcinoma.  Adrenal Nodule: Incidental finding on CT imaging 4/17: "Left adrenal gland nodularity measuring up to 2.1 x 1.6 cm, consistent with metastases and/or direct extension of pancreatic mass."  Pancreatic CA: metastatic adenocarcinoma, monitored, heme/onc on board.  AS:  on medications, no chest pain, stable, monitored.  HTN: Controlled,  on antihypertensive medications.  NELY: Monitor labs, BMP.      Trace Beatty MD  Cell: [phone]  Office: [phone]       Assessment  Hypothyroidism: 89y Female with history of hypothyroidism, was taking synthroid 50mcg po daily per discussion with her son Gino, compliant  with intake, found to be in hypothyroid state, increased dose to synthroid 100mcg po daily, FT4 improving, TAVR deferred due to recent finding of metastatic adenocarcinoma.  Adrenal Nodule: Incidental finding on CT imaging 4/17: "Left adrenal gland nodularity measuring up to 2.1 x 1.6 cm, consistent with metastases and/or direct extension of pancreatic mass."  Pancreatic CA: metastatic adenocarcinoma, monitored, heme/onc on board.  AS:  on medications, no chest pain, stable, monitored.  HTN: Controlled,  on antihypertensive medications.  NELY: Monitor labs, BMP.      Trace Beatty MD  Cell: [phone]  Office: [phone]

## 2021-04-19 NOTE — PROGRESS NOTE ADULT - PROBLEM SELECTOR PLAN 9
.  DVT: full AC  Diet:low fat diet  Ambulate as tolerated .  DVT: full AC  Diet: low fat diet  Ambulate as tolerated

## 2021-04-19 NOTE — PROGRESS NOTE ADULT - ASSESSMENT
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 Denies any other symptoms including fever, cough, N/V/D, LE edema/pain. Found to have severe aortic stenosis. During the work up she was found to have 3cm ill defined hypodense mass in the splenic hilum which may be the pancreatic tail. Also indeterminate left adrenal nodule and hepatic hypodensity. She also complained of shortness of breath and found to have large right pleural effusion, s/p drainage with cytology showing metastatic adenocarcinoma. IHC pending. TAVR cancelled and oncology consulted for further evaluation.    #Metastatic Adenocarcinoma   -Has been having abdominal bloating and discomfort with decreased appetite, unclear amount of time. No weight loss   -CTA Heart 4/7/21: There is an ill-defined hypodense mass in the splenic hilum measuring approximate 3 cm. This may be a pancreatic tail mass or a splenic mass. Associated focal area of hypoperfusion the spleen is noted. Indeterminate left adrenal nodule measures 1.6 cm. Hepatic hypodensity is too small to characterize. Both kidneys enhance symmetrically without stones or hydronephrosis  -Chest Tube inserted on 4/13 for large right pleural effusion draining >1L   -cytopathology of pleural fluid: metastatic adenocarcinoma. IHC pending   -At baseline patient has relatively good PS, able to care for herself. Lives with her son, Gino. Has 4 children.   -Son aware of diagnosis and he told the patient as well   -CT C/A/P: Pancreatic tail mass measuring 5.4 x 3.7 cm with direct extension to the splenic hilum and colon, and thrombosis of the splenic vein. Left adrenal gland nodularity measuring up to 2.1 x 1.6 cm, consistent with metastases and/or direct extension of pancreatic mass. Hepatic metastatic disease. Small amount of free fluid around the spleen and in the lower pelvis.  -IHC staining pending on path to have a better idea of origin of cancer     #Afib   -c/w amio  -c/w ac     #Severe AS   -CT Sx no longer planning procedure given recent findings of metastatic adenocarcinoma     WILL DISCUSS WITH ATTENDING. 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 Denies any other symptoms including fever, cough, N/V/D, LE edema/pain. Found to have severe aortic stenosis. During the work up she was found to have 3cm ill defined hypodense mass in the splenic hilum which may be the pancreatic tail. Also indeterminate left adrenal nodule and hepatic hypodensity. She also complained of shortness of breath and found to have large right pleural effusion, s/p drainage with cytology showing metastatic adenocarcinoma. IHC pending. TAVR cancelled and oncology consulted for further evaluation.    #Metastatic Adenocarcinoma   -Has been having abdominal bloating and discomfort with decreased appetite, unclear amount of time. No weight loss   -CTA Heart 4/7/21: There is an ill-defined hypodense mass in the splenic hilum measuring approximate 3 cm. This may be a pancreatic tail mass or a splenic mass. Associated focal area of hypoperfusion the spleen is noted. Indeterminate left adrenal nodule measures 1.6 cm. Hepatic hypodensity is too small to characterize. Both kidneys enhance symmetrically without stones or hydronephrosis  -Chest Tube inserted on 4/13 for large right pleural effusion draining >1L   -cytopathology of pleural fluid: metastatic adenocarcinoma. IHC pending   -At baseline patient has relatively good PS, able to care for herself. Lives with her son, Gino. Has 4 children.   -Son aware of diagnosis and he told the patient as well   -CT C/A/P: Pancreatic tail mass measuring 5.4 x 3.7 cm with direct extension to the splenic hilum and colon, and thrombosis of the splenic vein. Left adrenal gland nodularity measuring up to 2.1 x 1.6 cm, consistent with metastases and/or direct extension of pancreatic mass. Hepatic metastatic disease. Small amount of free fluid around the spleen and in the lower pelvis.  -IHC staining pending on path to have a better idea of origin of cancer   -Patient can be discharged from oncology perspective to follow up outpatient early next week with Dr. Austin. Discussed today with the son possible treatment options including single agent Gemzar.     #Afib   -c/w amio  -c/w ac     #Severe AS   -CT Sx no longer planning procedure given recent findings of metastatic adenocarcinoma       Michael Kang MD   Hematology/Oncology Fellow   pager [phone]    Terbinafine Counseling: Patient counseling regarding adverse effects of terbinafine including but not limited to headache, diarrhea, rash, upset stomach, liver function test abnormalities, itching, taste/smell disturbance, nausea, abdominal pain, and flatulence.  There is a rare possibility of liver failure that can occur when taking terbinafine.  The patient understands that a baseline LFT and kidney function test may be required. The patient verbalized understanding of the proper use and possible adverse effects of terbinafine.  All of the patient's questions and concerns were addressed.

## 2021-04-19 NOTE — PROGRESS NOTE ADULT - ATTENDING COMMENTS
The patient is clinically doing a little bit better today with less shortness of breath.  Discussed with patient upcoming TAVR procedure.  Indications and details of the procedure reviewed.  Benefit and risk were gone over.  Risk include but not limited to infection, bleeding, arrhythmia, TIA/stroke, need to consider, vascular injury, need for urgent surgery and death.  The patient is aware and understand that she has multiple issues going on and is not clear if the transcatheter aortic valve replacement procedure will alleviate all of her symptoms/complaints.  Despite this fact she is agreeable to proceed.  There is concern that she has some underlying issue that has not yet been elucidated.  This has been discussed with both the patient and her son.    All questions concerns of the patient were addressed.  Finding plan were discussed with cardiac surgery/Dr. Lai and structural heart team.
The patient is clinically not doing well.  She is experiencing significant shortness of breath at rest and dyspneic upon minimal exertion.  She feels as if she is going to die.  Also noted to have abdominal discomfort which is unrelenting.  Suffers from nausea and abdominal fullness.  No fevers, chills or sweats.  Discussion was had with the patient that is unclear what is the etiology of all of her symptoms.  She was informed that some of her symptoms may be secondary to her aortic valve stenosis.  Indications and details for transcatheter aortic valve replacement reviewed.  Benefit and risk were gone over.  Risk include but not limited to infection, bleeding, arrhythmia, TIA/stroke, who actively, vascular injury, need for urgent surgery and death.  The patient is leaning towards proceeding.  She has been started on Synthroid after reassessment of her TSH and free T4.    All questions and concerns of the patient were addressed.    Findings and plan were discussed with cardiac surgery/Dr. Lai and structural heart team.
The patient is not having any cardiopulmonary complaints at rest.  Is experiencing dyspnea upon exertion when ambulating with PT.  Received 40mg of IV lasix yesterday with increase in creatinine.  She is orthostatic.  Encouraged good PO intake.  She is not eating or drink that much and would likely benefit from gentle IV fluids.  The patient is also experiencing abdominal discomfort after eating oily noodles.  Feels constipated.  If she continues to feel constipated stool softeners will be administered.    All questions and concerns were addressed.    Findings and plan discussed with cardiac surgery and structural heart team.
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 Pt is clinically improved. discussed diagnosis, stage of disease, and goals of treatment and possibility of using single agent Gemzar.   Pt to f/u with me upon d/c at [facility] [place] early next week.   Will discuss the details with pt in person in the office.
No acute events reported overnight.  The patient denies any pain in her right groin site.  She feels short of breath at rest which is made worse upon exertion.  Due to her shortness of breath she has requested to be placed on 2 L via nasal cannula.  At the time of examination she is comfortable.  Telemetry demonstrates atrial fibrillation with rates in the 60s.  Denies any chest pain/tightness or discomfort, lightheaded sensation, dizziness or palpitations.  No pain in her groin site.  The patient is getting gentle IV hydration.  Plan for TAVR CTA to be performed in 4/7/2021.  Based upon how the patient is doing clinically will determine timing of the TAVR procedure.    Out of bed to chair as tolerated.  Daily PT.  The patient will be seen by the rehabilitation services.    All questions and concerns of the patient were addressed.    Findings and plan were discussed with cardiac surgery/Dr. Lai and structural heart team.
No acute events reported overnight.  The patient reports that she continues to feel short of breath and dyspneic upon minimal exertion.  No change in her abdominal pain.  She did have a small bowel movement yesterday.  No bowel movement today.  She is currently laying flat in the bed.  Denies any chest pain/tightness or discomfort.  No fevers, chills or sweats.  Creatinine is up compared to the prior day.  Noted to be orthostatic but asymptomatic.  Would recommend the patient be given an additional 250 cc normal saline bolus.  250 cc normal saline bolus was administered earlier today.  Continue heparin drip with close monitoring of PTT and signs/symptoms of bleeding.    Based upon how the patient clinically does will determine timing of her transcatheter aortic valve replacement procedure.    All questions and concerns of the patient were addressed.    Findings and plan were discussed the patient's nurse, cardiac surgery and structural heart team.
No acute events reported overnight.  The patient reports that she is clinically doing well.  Continues to feel shortness of breath at rest or upon exertion.  Plan is for TAVR CTA to be performed later today.  Following TAVR CT the patient's work-up will be completed for her severe symptomatic aortic valve stenosis.  We will need to closely monitor the patient's kidney function.    The patient is being evaluated by the physical therapy team.  Appreciate nephrology consultation/assistance.    Based upon the patient's clinical status will determine timing of the TAVR procedure.    All questions and concerns of the patient were addressed.    Findings and plan were discussed with cardiac surgery/Dr. Lai and structural heart team.
No acute events reported overnight.  The patient was seen sitting in a chair.  She reports pain upon inspiration/expiration.  Continues to feel short of breath which is mildly improved compared to the prior day.  Notes abdominal discomfort/distention.  No fevers, chills or sweats.  She feels fatigue and impending doom.  Concern for worsening hyponatremia and kidney function.  Continue to monitor chest tube output which appears to have decreased following placement of tube.  Cytology is pending.  Based upon how the patient does will determine if she will undergo TAVR procedure later this week.  Have asked for the GI service to please see the patient for further assessment of abdominal discomfort/findings on CTA of chest/abdomen/pelvis.    All questions and concerns of the patient were addressed.    Findings and plan were discussed with cardiac surgery/Dr. Lai and structural heart team.
The patient continues to experience an impending doom sensation.  She is short of breath at rest and upon minimal exertion.  Denies any chest pain/tightness discomfort.  Has increasing abdominal discomfort/distention.  No fevers, chills or sweats.  Does not feel lightheaded or dizzy.  Due to worsening shortness of breath a chest x-ray was ordered that demonstrated a large right pleural effusion.  Recommend that the patient be assessed for placement of pigtail catheter to drain fluid which should be sent for analysis/cytology.  The patient is also be giving stool softeners.  Based upon how the patient clinically does will determine if she will proceed with TAVR procedure later this week.  Indications and details of procedure were gone over with the patient.  All questions and concerns of the patient were addressed.    Findings and plan were discussed with cardiac surgery/Dr. Lai and structural heart team.    Time spent greater than 35 minutes.
The patient reports that she is having left sided chest discomfort that has been persistent since she had a transthoracic echocardiogram study which was performed yesterday.  Hot pad has been applied.  Upon examination there is no rash.  Denies any shortness of breath.  Continues to be on a heparin drip.  Ambulates short distances without any significant cardiopulmonary complaints.    Further conversations were had with the patient (at the end of the visit also with her son who was contacted on the phone) using  services concerning her hospital stay and finding on transthoracic echocardiogram study and carotid ultrasounds.  The patient has severe aortic valve stenosis.  Reviewed with the patient what is severe aortic valve stenosis and the natural pathophysiology of left untreated.  Different treatment options were gone over.  Due to the patient's age and comorbidities she is considered to be an appropriate candidate to undergo work-up for transcatheter aortic valve replacement.    Discussion was had with nephrology team plan is for patient to be prehydrated and to hopefully undergo TAVR CTA which will be done later today.  If the patient continues to clinically do well plan is on Monday for her to undergo angiogram.  Indications and details of the angiogram procedure were reviewed.  Benefit and risk were gone over.  Risk include but not limited to infection, bleeding, arrhythmia, TIA/stroke, head neck instability, vascular tree, need for urgent surgery and death.    Findings and plan were discussed with cardiac surgery team and structural heart team.    Time spent greater than 40 minutes during which time  services was used.    All questions and concerns were addressed.

## 2021-04-19 NOTE — PROGRESS NOTE ADULT - SUBJECTIVE AND OBJECTIVE BOX
NEPHROLOGY-[facility] [phone]        Patient seen and examined in bed.  She was about the same         MEDICATIONS  (STANDING):  aMIOdarone    Tablet 200 milliGRAM(s) Oral daily  amLODIPine   Tablet 5 milliGRAM(s) Oral daily  aspirin enteric coated 81 milliGRAM(s) Oral daily  atorvastatin 20 milliGRAM(s) Oral at bedtime  ferrous    sulfate 325 milliGRAM(s) Oral daily  heparin  Infusion 700 Unit(s)/Hr (7 mL/Hr) IV Continuous <Continuous>  levothyroxine 100 MICROGram(s) Oral daily  metoprolol succinate ER 12.5 milliGRAM(s) Oral daily  polyethylene glycol 3350 17 Gram(s) Oral two times a day  senna 2 Tablet(s) Oral at bedtime  sodium chloride 0.9% lock flush 3 milliLiter(s) IV Push every 8 hours  sodium chloride 0.9%. 1000 milliLiter(s) (50 mL/Hr) IV Continuous <Continuous>      VITAL:  T(C): , Max: 36.9 (04-19-21 @ 11:43)  T(F): , Max: 98.5 (04-19-21 @ 11:43)  HR: 67 (04-19-21 @ 11:43)  BP: 117/72 (04-19-21 @ 11:43)  BP(mean): --  RR: 18 (04-19-21 @ 11:43)  SpO2: 95% (04-19-21 @ 11:43)  Wt(kg): --    I and O's:    04-18 @ 07:01  -  04-19 @ 07:00  --------------------------------------------------------  IN: 1044 mL / OUT: 100 mL / NET: 944 mL    04-19 @ 07:01  -  04-19 @ 15:31  --------------------------------------------------------  IN: 720 mL / OUT: 0 mL / NET: 720 mL          PHYSICAL EXAM:    Constitutional: NAD; cachetic   Neck:  No JVD  Respiratory: CTAB/L  Cardiovascular: S1 and S2  Gastrointestinal: BS+, soft, NT/ND  Extremities: No peripheral edema  Neurological: A/O x 3, no focal deficits  Psychiatric: Normal mood, normal affect  : No Guzman  Skin: No rashes  Access: Not applicable    LABS:                        8.8    6.43  )-----------( 185      ( 19 Apr 2021 06:52 )             27.1     04-19    138  |  100  |  33<H>  ----------------------------<  110<H>  4.4   |  26  |  1.70<H>    Ca    9.4      19 Apr 2021 06:53            Urine Studies:          RADIOLOGY & ADDITIONAL STUDIES:

## 2021-04-19 NOTE — PROGRESS NOTE ADULT - SUBJECTIVE AND OBJECTIVE BOX
Chief complaint  Patient is a 89y old  Female who presents with a chief complaint of SOB/CP (19 Apr 2021 09:35)   Review of systems  Patient in bed, looks comfortable.    Medications  MEDICATIONS  (STANDING):  aMIOdarone    Tablet 200 milliGRAM(s) Oral daily  amLODIPine   Tablet 5 milliGRAM(s) Oral daily  aspirin enteric coated 81 milliGRAM(s) Oral daily  atorvastatin 20 milliGRAM(s) Oral at bedtime  ferrous    sulfate 325 milliGRAM(s) Oral daily  heparin  Infusion 700 Unit(s)/Hr (7 mL/Hr) IV Continuous <Continuous>  levothyroxine 100 MICROGram(s) Oral daily  metoprolol succinate ER 12.5 milliGRAM(s) Oral daily  polyethylene glycol 3350 17 Gram(s) Oral two times a day  senna 2 Tablet(s) Oral at bedtime  sodium chloride 0.9% lock flush 3 milliLiter(s) IV Push every 8 hours  sodium chloride 0.9%. 1000 milliLiter(s) (50 mL/Hr) IV Continuous <Continuous>      Physical Exam  General: Patient comfortable in bed  Vital Signs Last 12 Hrs  T(F): 98.2 (04-19-21 @ 04:52), Max: 98.2 (04-19-21 @ 04:52)  HR: 64 (04-19-21 @ 04:52) (64 - 64)  BP: 123/75 (04-19-21 @ 04:52) (123/75 - 123/75)  BP(mean): --  RR: 18 (04-19-21 @ 04:52) (18 - 18)  SpO2: 94% (04-19-21 @ 04:52) (94% - 94%)    Diagnostics    Free Thyroxine, Serum: AM Sched. Collection: 15-Apr-2021 06:00 (04-13 @ 13:35)         Chief complaint  Patient is a 89y old  Female who presents with a chief complaint of SOB/CP (19 Apr 2021 09:35)   Review of systems  Patient in bed, looks comfortable.    Medications  MEDICATIONS  (STANDING):  aMIOdarone    Tablet 200 milliGRAM(s) Oral daily  amLODIPine   Tablet 5 milliGRAM(s) Oral daily  aspirin enteric coated 81 milliGRAM(s) Oral daily  atorvastatin 20 milliGRAM(s) Oral at bedtime  ferrous    sulfate 325 milliGRAM(s) Oral daily  heparin  Infusion 700 Unit(s)/Hr (7 mL/Hr) IV Continuous <Continuous>  levothyroxine 100 MICROGram(s) Oral daily  metoprolol succinate ER 12.5 milliGRAM(s) Oral daily  polyethylene glycol 3350 17 Gram(s) Oral two times a day  senna 2 Tablet(s) Oral at bedtime  sodium chloride 0.9% lock flush 3 milliLiter(s) IV Push every 8 hours  sodium chloride 0.9%. 1000 milliLiter(s) (50 mL/Hr) IV Continuous <Continuous>      Physical Exam  General: Patient comfortable in bed  Vital Signs Last 12 Hrs  T(F): 98.2 (04-19-21 @ 04:52), Max: 98.2 (04-19-21 @ 04:52)  HR: 64 (04-19-21 @ 04:52) (64 - 64)  BP: 123/75 (04-19-21 @ 04:52) (123/75 - 123/75)  BP(mean): --  RR: 18 (04-19-21 @ 04:52) (18 - 18)  SpO2: 94% (04-19-21 @ 04:52) (94% - 94%)    Diagnostics    Free Thyroxine, Serum: AM Sched. Collection: 15-Apr-2021 06:00 (04-13 @ 13:35)

## 2021-04-19 NOTE — PROGRESS NOTE ADULT - SUBJECTIVE AND OBJECTIVE BOX
Patient is a 89y old  Female who presents with a chief complaint of SOB/CP (19 Apr 2021 11:26)      Vital Signs Last 24 Hrs  T(C): 36.9 (04-19-21 @ 11:43), Max: 36.9 (04-19-21 @ 11:43)  T(F): 98.5 (04-19-21 @ 11:43), Max: 98.5 (04-19-21 @ 11:43)  HR: 67 (04-19-21 @ 11:43) (64 - 70)  BP: 117/72 (04-19-21 @ 11:43) (117/72 - 146/76)  RR: 18 (04-19-21 @ 11:43) (17 - 18)  SpO2: 95% (04-19-21 @ 11:43) (94% - 99%)            04-18-21 @ 07:01  -  04-19-21 @ 07:00  --------------------------------------------------------  IN: 1044 mL / OUT: 100 mL / NET: 944 mL                          8.8    6.43  )-----------( 185      ( 19 Apr 2021 06:52 )             27.1     138  |  100  |  33<H>  ----------------------------<  110<H>  4.4   |  26  |  1.70<H>          PHYSICAL EXAM  Neurology: A&Ox3, NAD  CV : RRR+S1S2  Lungs: Respirations non-labored, B/L BS clear, diminished at bases  +Right pigtail with serosanguinous drainage to water seal, no air leak  Abdomen: Soft, NT/ND, +BSx4Q  Extremities: B/L LE warm, no edema, +PP          MEDICATIONS  acetaminophen   Tablet .. 650 milliGRAM(s) Oral every 6 hours PRN  ALBUTerol    90 MICROgram(s) HFA Inhaler 1 Puff(s) Inhalation every 4 hours PRN  aluminum hydroxide/magnesium hydroxide/simethicone Suspension 30 milliLiter(s) Oral every 4 hours PRN  aMIOdarone    Tablet 200 milliGRAM(s) Oral daily  amLODIPine   Tablet 5 milliGRAM(s) Oral daily  aspirin enteric coated 81 milliGRAM(s) Oral daily  atorvastatin 20 milliGRAM(s) Oral at bedtime  ferrous    sulfate 325 milliGRAM(s) Oral daily  heparin  Infusion 700 Unit(s)/Hr IV Continuous <Continuous>  levothyroxine 100 MICROGram(s) Oral daily  lidocaine   Patch 1 Patch Transdermal daily PRN  metoprolol succinate ER 12.5 milliGRAM(s) Oral daily  polyethylene glycol 3350 17 Gram(s) Oral two times a day  senna 2 Tablet(s) Oral at bedtime  sodium chloride 0.9% lock flush 3 milliLiter(s) IV Push every 8 hours  sodium chloride 0.9%. 1000 milliLiter(s) IV Continuous <Continuous>

## 2021-04-19 NOTE — PROGRESS NOTE ADULT - PROBLEM SELECTOR PLAN 2
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

## 2021-04-19 NOTE — PROGRESS NOTE ADULT - PROBLEM SELECTOR PLAN 10
- discuss Ct findings of pancreatic tail mass and this cancer has spread to liver, adrenal glands and lungs, they are processing the information and will let their mom know as per her wishes/ goal is to get their mom home, stronger  full code - discuss Ct findings of pancreatic tail mass and this cancer has spread to liver, adrenal glands and lungs, they are processing the information and will let their mom know as per her wishes/ goal is to get their mom home, stronger  full code    son updated Gino

## 2021-04-20 NOTE — PROGRESS NOTE ADULT - PROBLEM SELECTOR PLAN 10
- discuss Ct findings of pancreatic tail mass and this cancer has spread to liver, adrenal glands and lungs, they are processing the information and will let their mom know as per her wishes/ goal is to get their mom home, stronger  full code    pending DC home today w home PT  left VM for Aster

## 2021-04-20 NOTE — PROGRESS NOTE ADULT - SUBJECTIVE AND OBJECTIVE BOX
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 Denies any other symptoms including fever, cough, N/V/D, LE edema/pain.  (31 Mar 2021 23:26)      PAST MEDICAL & SURGICAL HISTORY:  HTN (hypertension)    CAD (coronary artery disease)  coronary stents x 2, 2007    HLD (hyperlipidemia)    Asthma    PAF (paroxysmal atrial fibrillation)    Scoliosis    History of cholecystectomy    H/O tubal ligation    History of bilateral knee replacement    History of cataract surgery  bilateral    S/P cholecystectomy        MEDICATIONS  (STANDING):  aMIOdarone    Tablet 200 milliGRAM(s) Oral daily  amLODIPine   Tablet 5 milliGRAM(s) Oral daily  aspirin enteric coated 81 milliGRAM(s) Oral daily  atorvastatin 20 milliGRAM(s) Oral at bedtime  ferrous    sulfate 325 milliGRAM(s) Oral daily  heparin  Infusion 700 Unit(s)/Hr (7 mL/Hr) IV Continuous <Continuous>  levothyroxine 100 MICROGram(s) Oral daily  metoprolol succinate ER 12.5 milliGRAM(s) Oral daily  polyethylene glycol 3350 17 Gram(s) Oral two times a day  senna 2 Tablet(s) Oral at bedtime  sodium chloride 0.9% lock flush 3 milliLiter(s) IV Push every 8 hours  sodium chloride 0.9%. 1000 milliLiter(s) (50 mL/Hr) IV Continuous <Continuous>      Allergies    No Known Allergies    Intolerances        SOCIAL HISTORY:  Denies ETOh,Smoking,     FAMILY HISTORY:  No pertinent family history in first degree relatives        REVIEW OF SYSTEMS:    CONSTITUTIONAL: No weakness, fevers or chills  EYES/ENT: No visual changes;  No vertigo or throat pain   NECK: No pain or stiffness  RESPIRATORY: No cough, wheezing, hemoptysis; No shortness of breath  CARDIOVASCULAR: No chest pain or palpitations  GASTROINTESTINAL: No abdominal or epigastric pain. No nausea, vomiting, or hematemesis; No diarrhea or constipation. No melena or hematochezia.  GENITOURINARY: No dysuria, frequency or hematuria  NEUROLOGICAL: No numbness or weakness  SKIN: No itching, burning, rashes, or lesions   All other review of systems is negative unless indicated above.    VITAL:  T(C): , Max: 36.9 (04-19-21 @ 11:43)  T(F): , Max: 98.5 (04-19-21 @ 11:43)  HR: 65 (04-20-21 @ 04:16)  BP: 129/60 (04-20-21 @ 04:16)  BP(mean): --  RR: 18 (04-20-21 @ 04:16)  SpO2: 94% (04-20-21 @ 04:16)  Wt(kg): --    I and O's:    04-18 @ 07:01  -  04-19 @ 07:00  --------------------------------------------------------  IN: 1044 mL / OUT: 100 mL / NET: 944 mL    04-19 @ 07:01  -  04-20 @ 07:00  --------------------------------------------------------  IN: 720 mL / OUT: 0 mL / NET: 720 mL          PHYSICAL EXAM:    Constitutional: NAD  HEENT: PERRLA,   Neck: No JVD  Respiratory: CTA B/L  Cardiovascular: S1 and S2  Gastrointestinal: BS+, soft, NT/ND  Extremities: No peripheral edema  Neurological: A/O x 3, no focal deficits  Psychiatric: Normal mood, normal affect  : No Guzman  Skin: No rashes  Access: Not applicable  Back: No CVA tenderness    LABS:                        9.2    6.17  )-----------( 197      ( 20 Apr 2021 06:51 )             29.0     04-20    136  |  101  |  28<H>  ----------------------------<  115<H>  4.3   |  22  |  1.61<H>    Ca    9.6      20 Apr 2021 06:51            RADIOLOGY & ADDITIONAL STUDIES:

## 2021-04-20 NOTE — PROGRESS NOTE ADULT - PROBLEM SELECTOR PLAN 5
Cr fluctuating from 1.1-1.6 since 2019, likely CKD stage 3  - renal following  - trend Cr, UOP  - avoid NSAIDS/nephrotoxins
Hx of hypothyroidism on synthroid 50 mcg TSH 17 and repeat in 20s  - this admission increased to 100 mcg  - continue for now  - repeat TFT in mid May 2020
Cr fluctuating from 1.1-1.6 since 2019, likely CKD stage 3  - renal following  - trend Cr, UOP  - avoid NSAIDS/nephrotoxins
Suggest to continue medications, monitoring, FU primary team recommendations.
Hx of hypothyroidism on synthroid 50 mcg TSH 17 and repeat in 20s  - this admission increased to 100 mcg  - continue for now  - repeat TFT in mid May 2020
Cr fluctuating from 1.1-1.6 since 2019, likely CKD stage 3  - renal following  - trend Cr, UOP  - improving  - avoid NSAIDS/nephrotoxins
Suggest to continue medications, monitoring, FU primary team recommendations.
Maintain pigtail  Thoracic surgery will follow tube  metastatic adenocarcinoma

## 2021-04-20 NOTE — PROGRESS NOTE ADULT - SUBJECTIVE AND OBJECTIVE BOX
NEPHROLOGY-NSN [phone]        Patient seen and examined in bed.    She was about the same         MEDICATIONS  (STANDING):  aMIOdarone    Tablet 200 milliGRAM(s) Oral daily  amLODIPine   Tablet 5 milliGRAM(s) Oral daily  aspirin enteric coated 81 milliGRAM(s) Oral daily  atorvastatin 20 milliGRAM(s) Oral at bedtime  ferrous    sulfate 325 milliGRAM(s) Oral daily  heparin  Infusion 700 Unit(s)/Hr (7 mL/Hr) IV Continuous <Continuous>  levothyroxine 100 MICROGram(s) Oral daily  metoprolol succinate ER 12.5 milliGRAM(s) Oral daily  polyethylene glycol 3350 17 Gram(s) Oral two times a day  senna 2 Tablet(s) Oral at bedtime  sodium chloride 0.9% lock flush 3 milliLiter(s) IV Push every 8 hours  sodium chloride 0.9%. 1000 milliLiter(s) (50 mL/Hr) IV Continuous <Continuous>      VITAL:  T(C): , Max: 36.9 (04-20-21 @ 09:02)  T(F): , Max: 98.4 (04-20-21 @ 09:02)  HR: 97 (04-20-21 @ 10:32)  BP: 121/64 (04-20-21 @ 10:32)  BP(mean): --  RR: 16 (04-20-21 @ 10:32)  SpO2: 97% (04-20-21 @ 10:32)  Wt(kg): --    I and O's:    04-19 @ 07:01  -  04-20 @ 07:00  --------------------------------------------------------  IN: 720 mL / OUT: 0 mL / NET: 720 mL          PHYSICAL EXAM:    Constitutional: NAD  Neck:  No JVD  Respiratory: CTAB/L  Cardiovascular: S1 and S2  Gastrointestinal: BS+, soft, NT/ND  Extremities: No peripheral edema  Neurological: A/O x 3, no focal deficits  Psychiatric: Normal mood, normal affect  : No Guzman  Skin: No rashes  Access: Not applicable    LABS:                        9.2    6.17  )-----------( 197      ( 20 Apr 2021 06:51 )             29.0     04-20    136  |  101  |  28<H>  ----------------------------<  115<H>  4.3   |  22  |  1.61<H>    Ca    9.6      20 Apr 2021 06:51            Urine Studies:          RADIOLOGY & ADDITIONAL STUDIES:

## 2021-04-20 NOTE — DISCHARGE NOTE NURSING/CASE MANAGEMENT/SOCIAL WORK - NSDCPEFALRISK_GEN_ALL_CORE
ICU End of Shift Summary. See flowsheets for vital signs and detailed assessment.    Changes this shift: Pt to the chair x2 today, but requiring sling/ceiling lift or 3 staff to assist. Vit K given this evening. Diet changed, drips discontinued, pt has transfer orders, waiting for open bed.      Plan:  FFP in am, followed by INR recheck at 0600 in prep for IR tomorrow for tunneled line removal once INR is checked, see IR note. Then plan to discharge home on hospice when able.             Problem: Adult Inpatient Plan of Care  Goal: Optimal Comfort and Wellbeing  9/23/2019 1843 by Shelley Wilson, RN  Outcome: No Change     Problem: Adult Inpatient Plan of Care  Goal: Absence of Hospital-Acquired Illness or Injury  9/23/2019 1843 by Shelley Wilson, RN  Outcome: No Change       Problem: Gastrointestinal Condition Comorbidity  Goal: Gastrointestinal Condition  Description  Patient comorbidity will be monitored for signs and symptoms of Gastrointestinal condition.  Problems will be absent, minimized or managed by discharge/transition of care.  9/23/2019 1843 by Shelley Wilson, RN  Outcome: No Change         Patient information on fall and injury prevention

## 2021-04-20 NOTE — PROVIDER CONTACT NOTE (OTHER) - ASSESSMENT
patient is alert, oriented x 4, vital signs stable, no signs/symptoms of bleeding noted
Pt. alert & oriented. + orthostatic blood pressure - see VS flowsheet.

## 2021-04-20 NOTE — PROGRESS NOTE ADULT - PROBLEM SELECTOR PROBLEM 1
Aortic stenosis, severe
Hypothyroidism
Aortic stenosis, severe
Hypothyroidism
Hypothyroidism
Pleural effusion, malignant
Aortic stenosis, severe
Hypothyroidism
Aortic stenosis, severe
Aortic stenosis, severe
Pancreatic cancer
Pleural effusion, malignant
Aortic stenosis, severe
Hypothyroidism
Hypothyroidism
Aortic stenosis, severe
Aortic stenosis, severe
Pleural effusion, malignant
Aortic stenosis, severe
Aortic stenosis, severe
Pleural effusion, malignant
Aortic stenosis, severe
Aortic stenosis, severe
Pancreatic cancer
Aortic stenosis, severe
Hypothyroidism
no
Aortic stenosis, severe
Pancreatic cancer
Pleural effusion, malignant

## 2021-04-20 NOTE — PROGRESS NOTE ADULT - PROBLEM SELECTOR PROBLEM 6
NELY (acute kidney injury)
NELY (acute kidney injury)
Hypothyroidism
Coronary artery disease involving native coronary artery of native heart without angina pectoris
Coronary artery disease involving native coronary artery of native heart without angina pectoris
Hypothyroidism
Hypothyroidism

## 2021-04-20 NOTE — PROGRESS NOTE ADULT - PROBLEM SELECTOR PLAN 1
Ct abd pelvis 4/17 with Pancreatic tail mass measuring 5.4 x 3.7 cm with direct extension to the splenic hilum and colon, and thrombosis of the splenic vein.  Left adrenal gland nodularity measuring up to 2.1 x 1.6 cm, consistent with metastases and/or direct extension of pancreatic mass. Hepatic metastatic disease.  - discuss these findings with the patients son Gino and daughter- they would like to relay this final dx to the patient,   - heme onc follow up- re: chemotherapy inpt v opt  - CT surgery removed CT this 4/19, pending CXR, had small PTX 4/19 on room air,   - family wants patient home soon so she can process this information/ and are amenable to chemotherapy Ct abd pelvis 4/17 with Pancreatic tail mass measuring 5.4 x 3.7 cm with direct extension to the splenic hilum and colon, and thrombosis of the splenic vein.  Left adrenal gland nodularity measuring up to 2.1 x 1.6 cm, consistent with metastases and/or direct extension of pancreatic mass. Hepatic metastatic disease.  - discuss these findings with the patients son Gino and daughter- they would like to relay this final dx to the patient,   - heme onc follow up- re: chemotherapy inpt v opt  - CT surgery removed CT this 4/19, pending CXR, had small PTX 4/19 on room air,   - family wants patient home soon so she can process this information/ and are amenable to chemotherapy    # episode of black stool- not melanotic/ Hb stable x 2 days likely maalox

## 2021-04-20 NOTE — DISCHARGE NOTE NURSING/CASE MANAGEMENT/SOCIAL WORK - PATIENT PORTAL LINK FT
You can access the FollowMyHealth Patient Portal offered by [facility] by registering at the following website: [email or web address]. By joining [employer]’s FollowMyHealth portal, you will also be able to view your health information using other applications (apps) compatible with our system.

## 2021-04-20 NOTE — DISCHARGE NOTE PROVIDER - CARE PROVIDERS DIRECT ADDRESSES
[email or web address]

## 2021-04-20 NOTE — DISCHARGE NOTE PROVIDER - NSDCCPCAREPLAN_GEN_ALL_CORE_FT
PRINCIPAL DISCHARGE DIAGNOSIS  Diagnosis: Chest pain  Assessment and Plan of Treatment:       SECONDARY DISCHARGE DIAGNOSES  Diagnosis: Aortic stenosis, severe  Assessment and Plan of Treatment: Aortic stenosis, severe    Diagnosis: HTN (hypertension)  Assessment and Plan of Treatment: HTN (hypertension)    Diagnosis: Pancreatic cancer  Assessment and Plan of Treatment: Pancreatic cancer    Diagnosis: Stage 3b chronic kidney disease  Assessment and Plan of Treatment: Stage 3b chronic kidney disease    Diagnosis: PAF (paroxysmal atrial fibrillation)  Assessment and Plan of Treatment: PAF (paroxysmal atrial fibrillation)     PRINCIPAL DISCHARGE DIAGNOSIS  Diagnosis: Chest pain  Assessment and Plan of Treatment: Patient found to have malignant pleural effusion s/p pigtail catheter on 4/13 - cytopath with adencarcinoma.  CT abdomen/pelvis 4/18 confirming pancreatic tail mass with metastasis to the adrenal gland, liver, lung.   Patient to follow up with Dr. Rebekah Austin within 1 week from discharge      SECONDARY DISCHARGE DIAGNOSES  Diagnosis: Aortic stenosis, severe  Assessment and Plan of Treatment: Please follow up with your PCP for further workup.  TAVR workup on hold given newly diagnosed cancer.    Diagnosis: HTN (hypertension)  Assessment and Plan of Treatment: Continue to follow a low salt/sodium diet.  Perform physical activities as tolerated in consultation with your Primary Care Provider and physical therapist.  Take all medications as prescribed.  Follow up with your medical doctor for routine blood pressure monitoring at your next visit.  Notify your doctor if you have any of the following symptoms:  Dizziness, lightheadedness, blurry vision, headache, chest pain, or shortness of breath.      Diagnosis: Pancreatic cancer  Assessment and Plan of Treatment: CT findings of pancreatic tail mass and this cancer has spread to liver, adrenal glands and lungs,   Please follow up with PCP and oncologist within 1 week from discharge.    Diagnosis: Stage 3b chronic kidney disease  Assessment and Plan of Treatment: Avoid taking (NSAIDs) - (ex: Ibuprofen, Advil, Celebrex, Naprosyn)  Avoid taking any nephrotoxic agents (can harm kidneys) - Intravenous contrast for diagnostic testing, combination cold medications.  Have all medications adjusted for your renal function by your Health Care Provider.  Blood pressure control is important.  Take all medication as prescribed.      Diagnosis: Hypothyroidism  Assessment and Plan of Treatment: Please take medication as prescribed.    Diagnosis: Aneurysm artery, iliac  Assessment and Plan of Treatment: Incidentally found Atheromatous plaque noted within the abdominal aorta as well as partially thrombosed focal aneurysm of the left internal iliac artery   Please follow up your PCP for outpatient vasular surgery follow up.    Diagnosis: PAF (paroxysmal atrial fibrillation)  Assessment and Plan of Treatment: Atrial fibrillation is a common heart rhythm problem which increases the risk of stroke and heat attack.  It helps if you control your blood pressure, avoid alcohol, cut down on caffeine, get treatment for your thyroid if it is overactive, and perform moderate exercise in consultation with your Primary Care Provider.  Call your doctor if you experience chest tightness/pain, lightheadedness, loss of consciousness, shortness of breath (especially with exercise), feel your heart racing or beating unusually, frequent or abnormal bleeding.  It is important to take all your heart medications as prescribed.

## 2021-04-20 NOTE — DISCHARGE NOTE PROVIDER - PROVIDER TOKENS
PROVIDER:[TOKEN:[[id number]],FOLLOWUP:[1 week]],FREE:[LAST:[Primary Care Provider],PHONE:[(   )    -],FAX:[(   )    -],FOLLOWUP:[1 week]]

## 2021-04-20 NOTE — PROGRESS NOTE ADULT - PROBLEM SELECTOR PROBLEM 4
Aortic stenosis, severe
Spleen anomaly
Chronic atrial fibrillation
NELY (acute kidney injury)
Hypothyroidism
NELY (acute kidney injury)
Spleen anomaly
Chronic atrial fibrillation
NELY (acute kidney injury)
Chronic atrial fibrillation
Aortic stenosis, severe
Hypothyroidism
Stage 3b chronic kidney disease
Spleen anomaly
Stage 3b chronic kidney disease

## 2021-04-20 NOTE — PROGRESS NOTE ADULT - ASSESSMENT
Assessment  Hypothyroidism: 89y Female with history of hypothyroidism, was taking synthroid 50mcg po daily per discussion with her son Gino, compliant with intake, found to be in hypothyroid state, increased dose to synthroid 100mcg po daily, FT4 improving. Patient with new diagnosis of metastatic adenocarcinoma, appears comfortable, DC planning per primary team.  Adrenal Nodule: Incidental finding on CT imaging 4/17: "Left adrenal gland nodularity measuring up to 2.1 x 1.6 cm, consistent with metastases and/or direct extension of pancreatic mass." AM cortisol slightly elevated, remaining adrenal labs pending.  Pancreatic CA: metastatic adenocarcinoma, monitored, heme/onc on board.  AS:  on medications, no chest pain, stable, monitored.  HTN: Controlled,  on antihypertensive medications.  NELY: Monitor labs, BMP.      Trace Beatty MD  Cell: [phone]  Office: [phone]       Assessment  Hypothyroidism: 89y Female with history of hypothyroidism, was taking synthroid 50mcg po daily per discussion with her son Gino, compliant with intake, found to be in hypothyroid state, increased dose to synthroid 100mcg po daily, FT4 improving. Patient with new diagnosis of metastatic adenocarcinoma, appears comfortable, DC planning per primary team.  Adrenal Nodule: Incidental finding on CT imaging 4/17: "Left adrenal gland nodularity measuring up to 2.1 x 1.6 cm, consistent with metastases and/or direct extension  of pancreatic mass." AM cortisol slightly elevated, remaining adrenal labs pending.  Pancreatic CA: metastatic adenocarcinoma, monitored, heme/onc on board.  AS:  on medications, no chest pain, stable, monitored.  HTN: Controlled,  on antihypertensive medications.  NELY: Monitor labs, BMP.      Trace Beatty MD  Cell: [phone]  Office: [phone]

## 2021-04-20 NOTE — PROGRESS NOTE ADULT - PROBLEM SELECTOR PROBLEM 7
Aneurysm artery, iliac
Aneurysm artery, iliac
Coronary artery disease involving native coronary artery of native heart without angina pectoris

## 2021-04-20 NOTE — PROVIDER CONTACT NOTE (OTHER) - ACTION/TREATMENT ORDERED:
Per PA, encourage oral hydration. No other intervention @ this time. Continue to monitor.
COntinue current rate of 7 ml/hr, continue to monitor

## 2021-04-20 NOTE — DISCHARGE NOTE PROVIDER - NSDCMRMEDTOKEN_GEN_ALL_CORE_FT
albuterol 90 mcg/inh inhalation aerosol: 1 puff(s) inhaled every 4 hours, As needed, Shortness of Breath and/or Wheezing  aluminum hydroxide-magnesium hydroxide 200 mg-200 mg/5 mL oral suspension: 30 milliliter(s) orally every 4 hours, As Needed - for Dyspepsia  amiodarone 200 mg oral tablet: 1 tab(s) orally once a day  amLODIPine 5 mg oral tablet: 1 tab(s) orally once a day  aspirin 81 mg oral delayed release tablet: 1 tab(s) orally once a day  atorvastatin 20 mg oral tablet: 1 tab(s) orally once a day  Eliquis 2.5 mg oral tablet: 1 tab(s) orally 2 times a day  ferrous sulfate 325 mg (65 mg elemental iron) oral tablet: 1 tab(s) orally once a day  levothyroxine 100 mcg (0.1 mg) oral tablet: 1 tab(s) orally once a day  Please take on an empty stomach at least 1 hour before meal  levothyroxine 100 mcg (0.1 mg) oral tablet: 1 tab(s) orally once a day  lidocaine 5% topical film: Apply topically to affected area once a day, As Needed for lower back pain  Patch may stay in place for no more than 12 hours in any 24-hour period  Metoprolol Succinate ER 25 mg oral tablet, extended release: 0.5 tab(s) orally once a day   senna oral tablet: 2 tab(s) orally once a day (at bedtime)

## 2021-04-20 NOTE — PROVIDER CONTACT NOTE (OTHER) - BACKGROUND
Admitted for TAVR workup. HX paroxysmal afib, severe AS.
Patient is admitted with chest pain, history of CAD, hypertension, Afib

## 2021-04-20 NOTE — PROGRESS NOTE ADULT - PROBLEM SELECTOR PROBLEM 10
Advanced care planning/counseling discussion

## 2021-04-20 NOTE — PROGRESS NOTE ADULT - REASON FOR ADMISSION
SOB/CP
AS
SOB/CP
SOB/CP   preop  TAVR
SOB/CP

## 2021-04-20 NOTE — PROGRESS NOTE ADULT - PROBLEM SELECTOR PLAN 8
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  - monitor- opt vasc surgery follow up
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  - clarify lesion on CT pelvis with contrast  - if concerning findings, will consider vascular input
Patient with scoliosis hx of prior back surgery  - tylenok PRN  - lidoderm patch
Patient with scoliosis hx of prior back surgery  - tylenok PRN  - lidoderm patch
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  - monitor- opt vasc surgery follow up

## 2021-04-20 NOTE — PROGRESS NOTE ADULT - PROBLEM SELECTOR PROBLEM 5
HTN (hypertension)
Pleural effusion, malignant
Stage 3b chronic kidney disease
Stage 3b chronic kidney disease
HTN (hypertension)
Hypothyroidism
Hypothyroidism
Stage 3b chronic kidney disease

## 2021-04-20 NOTE — DISCHARGE NOTE PROVIDER - DETAILS OF MALNUTRITION DIAGNOSIS/DIAGNOSES
This patient has been assessed with a concern for Malnutrition and was treated during this hospitalization for the following Nutrition diagnosis/diagnoses:     -  04/08/2021: Moderate protein-calorie malnutrition

## 2021-04-20 NOTE — CHART NOTE - NSCHARTNOTEFT_GEN_A_CORE
Request from ___ to facilitate patient discharge. Medication reconciliation reviewed, revised, and resolved with ___ who had medically cleared patient for discharge with follow-up as advised. Please refer to discharge note for detailed hospital course. Patient is currently stable for discharge to ___ at this time.    Contact #___ Request from Dr. Major to facilitate patient discharge. Medication reconciliation reviewed, revised, and resolved with Dr. Major who had medically cleared patient for discharge with follow-up as advised. Please refer to discharge note for detailed hospital course. Patient is currently stable for discharge to home with home PT at this time.    Contact #[phone]  Radha HENLEY  Dept of Medicine

## 2021-04-20 NOTE — PROGRESS NOTE ADULT - NSICDXPILOT_GEN_ALL_CORE
[place]

## 2021-04-20 NOTE — PROGRESS NOTE ADULT - PROVIDER SPECIALTY LIST ADULT
CT Surgery
Gastroenterology
Gastroenterology
Nephrology
CT Surgery
Endocrinology
Nephrology
Structural Heart
CT Surgery
CT Surgery
Gastroenterology
Heme/Onc
Nephrology
Structural Heart
CT Surgery
Endocrinology
Gastroenterology
Nephrology
Nephrology
Structural Heart
Endocrinology
Gastroenterology
Gastroenterology
Nephrology
Nephrology
CT Surgery
Gastroenterology
Gastroenterology
Thoracic Surgery
CT Surgery
Internal Medicine
CT Surgery
CT Surgery
Endocrinology
Endocrinology
CT Surgery
Endocrinology
Endocrinology
CT Surgery
Endocrinology
Endocrinology
Hospitalist

## 2021-04-20 NOTE — PROGRESS NOTE ADULT - PROBLEM SELECTOR PLAN 1
Will continue synthroid 100mcg po daily for now.  Suggest FU outpatient, repeat TFTs in 4 weeks.  Discussed plan with patient and primary team.

## 2021-04-20 NOTE — PROVIDER CONTACT NOTE (OTHER) - SITUATION
Aptt is 73, heparin gtts infusing at 7 ml/hr, 700 units/hr. Patient specific nomogram, within therapeutic range.
Confirming patient is allowed off tele to the OR.
Pt. c/o dizziness when getting OOB to chair.

## 2021-04-20 NOTE — PROGRESS NOTE ADULT - SUBJECTIVE AND OBJECTIVE BOX
Chief complaint  Patient is a 89y old  Female who presents with a chief complaint of SOB/CP (20 Apr 2021 11:49)   Review of systems  Patient awake in bed, looks comfortable.    Medications  MEDICATIONS  (STANDING):  aMIOdarone    Tablet 200 milliGRAM(s) Oral daily  amLODIPine   Tablet 5 milliGRAM(s) Oral daily  aspirin enteric coated 81 milliGRAM(s) Oral daily  atorvastatin 20 milliGRAM(s) Oral at bedtime  ferrous    sulfate 325 milliGRAM(s) Oral daily  levothyroxine 100 MICROGram(s) Oral daily  metoprolol succinate ER 12.5 milliGRAM(s) Oral daily  polyethylene glycol 3350 17 Gram(s) Oral two times a day  senna 2 Tablet(s) Oral at bedtime  sodium chloride 0.9% lock flush 3 milliLiter(s) IV Push every 8 hours  sodium chloride 0.9%. 1000 milliLiter(s) (50 mL/Hr) IV Continuous <Continuous>      Physical Exam  General: Patient comfortable in bed  Vital Signs Last 12 Hrs  T(F): 97.5 (04-20-21 @ 10:32), Max: 98.4 (04-20-21 @ 09:02)  HR: 97 (04-20-21 @ 10:32) (65 - 97)  BP: 121/64 (04-20-21 @ 10:32) (121/64 - 142/84)  BP(mean): --  RR: 16 (04-20-21 @ 10:32) (16 - 18)  SpO2: 97% (04-20-21 @ 10:32) (94% - 97%)      Diagnostics    Metanephrine, Plasma: AM Sched. Collection: 20-Apr-2021 06:00 (04-19 @ 11:05)  Aldosterone, Serum: AM Sched. Collection: 20-Apr-2021 06:00 (04-19 @ 11:05)  Cortisol AM, Serum: AM Sched. Collection: 20-Apr-2021 06:00 (04-19 @ 11:05)  Free Thyroxine, Serum: AM Sched. Collection: 15-Apr-2021 06:00 (04-13 @ 13:35)         Chief complaint  Patient is a 89y old  Female who presents with a chief complaint of SOB/CP (20 Apr 2021 11:49)   Review of systems  Patient awake in bed, looks comfortable.    Medications  MEDICATIONS  (STANDING):  aMIOdarone    Tablet 200 milliGRAM(s) Oral daily  amLODIPine   Tablet 5 milliGRAM(s) Oral daily  aspirin enteric coated 81 milliGRAM(s) Oral daily  atorvastatin 20 milliGRAM(s) Oral at bedtime  ferrous    sulfate 325 milliGRAM(s) Oral daily  levothyroxine 100 MICROGram(s) Oral daily  metoprolol succinate ER 12.5 milliGRAM(s) Oral daily  polyethylene glycol 3350 17 Gram(s) Oral two times a day  senna 2 Tablet(s) Oral at bedtime  sodium chloride 0.9% lock flush 3 milliLiter(s) IV Push every 8 hours  sodium chloride 0.9%. 1000 milliLiter(s) (50 mL/Hr) IV Continuous <Continuous>      Physical Exam  General: Patient comfortable in bed  Vital Signs Last 12 Hrs  T(F): 97.5 (04-20-21 @ 10:32), Max: 98.4 (04-20-21 @ 09:02)  HR: 97 (04-20-21 @ 10:32) (65 - 97)  BP: 121/64 (04-20-21 @ 10:32) (121/64 - 142/84)  BP(mean): --  RR: 16 (04-20-21 @ 10:32) (16 - 18)  SpO2: 97% (04-20-21 @ 10:32) (94% - 97%)      Diagnostics    Metanephrine, Plasma: AM Sched. Collection: 20-Apr-2021 06:00 (04-19 @ 11:05)  Aldosterone, Serum: AM Sched. Collection: 20-Apr-2021 06:00 (04-19 @ 11:05)  Cortisol AM, Serum: AM Sched. Collection: 20-Apr-2021 06:00 (04-19 @ 11:05)  Free Thyroxine, Serum: AM Sched. Collection: 15-Apr-2021 06:00 (04-13 @ 13:35)

## 2021-04-20 NOTE — PROGRESS NOTE ADULT - PROBLEM SELECTOR PROBLEM 2
Aortic stenosis, severe
PAF (paroxysmal atrial fibrillation)
Aortic stenosis, severe
PAF (paroxysmal atrial fibrillation)
Pleural effusion, malignant
PAF (paroxysmal atrial fibrillation)
PAF (paroxysmal atrial fibrillation)
Aortic stenosis, severe
Aortic stenosis, severe
PAF (paroxysmal atrial fibrillation)
PAF (paroxysmal atrial fibrillation)
Aortic stenosis, severe
PAF (paroxysmal atrial fibrillation)
Pleural effusion, malignant
PAF (paroxysmal atrial fibrillation)
PAF (paroxysmal atrial fibrillation)
Adrenal nodule
Severe aortic stenosis
Adrenal nodule
PAF (paroxysmal atrial fibrillation)
Severe aortic stenosis
Pleural effusion, malignant

## 2021-04-20 NOTE — PROGRESS NOTE ADULT - PROBLEM SELECTOR PLAN 4
On medications,  no chest pain, stable, monitored and followed up by primary cardiothoracic team/cardiology team.
Monitor labs, Renal FU.
Monitor labs, Renal FU.
hx of was on synthroid 50 at home increased dose to 75 mcg
Monitor labs, Renal FU.
Monitor labs, Renal FU.
On medications,  no chest pain, stable, monitored and followed up by primary cardiothoracic team/cardiology team.
HX of afib on amiodarone, Toprol and Eliquis at home, now on heparin gtt   - per CTSX no further intervention  - if no further biopsies planned, will switch back to Eliquis 2.5 BID   - c/w Toprol 12.5 daily  - c/w amiodarone 200daily given long standing med
Monitor labs, Renal FU.
Cr fluctuating from 1.1-1.6 since 2019, likely CKD stage 3  - renal following  - trend Cr, UOP  - avoid NSAIDS/nephrotoxins
GI Consulted called to further evaluate findings of CT Angio Heart Structural w/ IV Cont (04.07.21 @ 13:58)  ill-defined hypodense mass in the splenic hilum measuring approximate 3 cm. This may be a pancreatic tail mass or a splenic mass. Associated focal area of hypoperfusion the spleen is noted, Awaiting for further reccs.
Monitor labs, Renal FU.
GI Consult called to further evaluate findings of CT Angio Heart Structural w/ IV Cont (04.07.21 @ 13:58)  ill-defined hypodense mass in the splenic hilum measuring approximate 3 cm. This may be a pancreatic tail mass or a splenic mass. Associated focal area of hypoperfusion the spleen is noted, Awaiting for further reccs.
Cr fluctuating from 1.1-1.6 since 2019, likely CKD stage 3  - renal following, receiving IVF   - trend Cr, UOP  - avoid NSAIDS/nephrotoxins
hx of was on synthroid 50 at home increased dose to 75 mcg
GI Consult called to further evaluate findings of CT Angio Heart Structural w/ IV Cont (04.07.21 @ 13:58)  ill-defined hypodense mass in the splenic hilum measuring approximate 3 cm. This may be a pancreatic tail mass or a splenic mass. Associated focal area of hypoperfusion the spleen is noted,    will f/up as outpt
Monitor labs, Renal FU.
HX of afib on amiodarone, Toprol and Eliquis at home, now on heparin gtt   - per CTSX no further intervention  - if no further biopsies planned, will switch back to Eliquis 2.5 BID   - c/w Toprol 12.5 daily  - c/w amiodarone 200daily given long standing med
HX of afib on amiodarone, Toprol and Eliquis at home, now on heparin gtt   - per CTSX no further intervention  - if no further biopsies planned, will switch back to Eliquis 2.5 BID   - c/w Toprol 12.5 daily  - c/w amiodarone 200daily given long standing med

## 2021-04-20 NOTE — PROGRESS NOTE ADULT - PROBLEM SELECTOR PLAN 2
with acute hypoxemic respiratory failure on 2L NC 02  Patient admitted on 3/31 due to severe dyspnea, initially attributed to severe AS however now found to have malignant pleural effusion s/p pigtail catheter on 4/13  cytopath with adenoCA, stains pending  - per limited CT coronaries ill defined mass noted in pancreatic tail vs spleen  - CT abd pelvis 4/18 confirming pancreatic tail mass with mets to the adrenal gland, liver, lung  - oncology and GI consulted, recs appreciated  - chest tube removed 4/19 will monitor

## 2021-04-20 NOTE — PROGRESS NOTE ADULT - NUTRITIONAL ASSESSMENT
This patient has been assessed with a concern for Malnutrition and has been determined to have a diagnosis/diagnoses of Moderate protein-calorie malnutrition.    This patient is being managed with:   Diet DASH/TLC-  Sodium & Cholesterol Restricted     Special Instructions for Nursing:  Sodium & Cholesterol Restricted  Entered: Apr 1 2021  4:21AM    
This patient has been assessed with a concern for Malnutrition and has been determined to have a diagnosis/diagnoses of Moderate protein-calorie malnutrition.    This patient is being managed with:   Diet DASH/TLC-  Sodium & Cholesterol Restricted  Supplement Feeding Modality:  Oral  Ensure Enlive Cans or Servings Per Day:  2       Frequency:  Daily     Special Instructions for Nursing:  Sodium & Cholesterol Restricted  Entered: Apr 12 2021  1:19PM    
This patient has been assessed with a concern for Malnutrition and has been determined to have a diagnosis/diagnoses of Moderate protein-calorie malnutrition.    This patient is being managed with:   Diet Regular-  Low Fat (LOWFAT)  Supplement Feeding Modality:  Oral  Ensure Enlive Cans or Servings Per Day:  2       Frequency:  Daily  Entered: Apr 18 2021  1:29PM    
This patient has been assessed with a concern for Malnutrition and has been determined to have a diagnosis/diagnoses of Moderate protein-calorie malnutrition.    This patient is being managed with:   Diet Regular-  Low Fat (LOWFAT)  Supplement Feeding Modality:  Oral  Ensure Enlive Cans or Servings Per Day:  2       Frequency:  Daily  Entered: Apr 18 2021  1:29PM    
This patient has been assessed with a concern for Malnutrition and has been determined to have a diagnosis/diagnoses of Moderate protein-calorie malnutrition.    This patient is being managed with:   Diet DASH/TLC-  Sodium & Cholesterol Restricted  Supplement Feeding Modality:  Oral  Ensure Enlive Cans or Servings Per Day:  2       Frequency:  Daily     Special Instructions for Nursing:  Sodium & Cholesterol Restricted  Entered: Apr 12 2021  1:19PM    
This patient has been assessed with a concern for Malnutrition and has been determined to have a diagnosis/diagnoses of Moderate protein-calorie malnutrition.    This patient is being managed with:   Diet DASH/TLC-  Sodium & Cholesterol Restricted  Supplement Feeding Modality:  Oral  Ensure Enlive Cans or Servings Per Day:  2       Frequency:  Daily     Special Instructions for Nursing:  Sodium & Cholesterol Restricted  Entered: Apr 12 2021  1:19PM    
This patient has been assessed with a concern for Malnutrition and has been determined to have a diagnosis/diagnoses of Moderate protein-calorie malnutrition.    This patient is being managed with:   Diet DASH/TLC-  Sodium & Cholesterol Restricted     Special Instructions for Nursing:  Sodium & Cholesterol Restricted  Entered: Apr 1 2021  4:21AM    
This patient has been assessed with a concern for Malnutrition and has been determined to have a diagnosis/diagnoses of Moderate protein-calorie malnutrition.    This patient is being managed with:   Diet DASH/TLC-  Sodium & Cholesterol Restricted     Special Instructions for Nursing:  Sodium & Cholesterol Restricted  Entered: Apr 1 2021  4:21AM    
This patient has been assessed with a concern for Malnutrition and has been determined to have a diagnosis/diagnoses of Moderate protein-calorie malnutrition.    This patient is being managed with:   Diet DASH/TLC-  Sodium & Cholesterol Restricted  Supplement Feeding Modality:  Oral  Ensure Enlive Cans or Servings Per Day:  2       Frequency:  Daily     Special Instructions for Nursing:  Sodium & Cholesterol Restricted  Entered: Apr 12 2021  1:19PM    
This patient has been assessed with a concern for Malnutrition and has been determined to have a diagnosis/diagnoses of Moderate protein-calorie malnutrition.    This patient is being managed with:   Diet NPO after Midnight-     NPO Start Date: 15-Apr-2021   NPO Start Time: 23:59  Entered: Apr 15 2021  2:21PM    Diet DASH/TLC-  Sodium & Cholesterol Restricted  Supplement Feeding Modality:  Oral  Ensure Enlive Cans or Servings Per Day:  2       Frequency:  Daily     Special Instructions for Nursing:  Sodium & Cholesterol Restricted  Entered: Apr 12 2021  1:19PM    
This patient has been assessed with a concern for Malnutrition and has been determined to have a diagnosis/diagnoses of Moderate protein-calorie malnutrition.    This patient is being managed with:   Diet Regular-  Low Fat (LOWFAT)  Supplement Feeding Modality:  Oral  Ensure Enlive Cans or Servings Per Day:  2       Frequency:  Daily  Entered: Apr 18 2021  1:29PM    
This patient has been assessed with a concern for Malnutrition and has been determined to have a diagnosis/diagnoses of Moderate protein-calorie malnutrition.    This patient is being managed with:   Diet DASH/TLC-  Sodium & Cholesterol Restricted  Supplement Feeding Modality:  Oral  Ensure Enlive Cans or Servings Per Day:  2       Frequency:  Daily     Special Instructions for Nursing:  Sodium & Cholesterol Restricted  Entered: Apr 12 2021  1:19PM    
This patient has been assessed with a concern for Malnutrition and has been determined to have a diagnosis/diagnoses of Moderate protein-calorie malnutrition.    This patient is being managed with:   Diet Regular-  Low Fat (LOWFAT)  Supplement Feeding Modality:  Oral  Ensure Enlive Cans or Servings Per Day:  2       Frequency:  Daily  Entered: Apr 18 2021  1:29PM    
This patient has been assessed with a concern for Malnutrition and has been determined to have a diagnosis/diagnoses of Moderate protein-calorie malnutrition.    This patient is being managed with:   Diet DASH/TLC-  Sodium & Cholesterol Restricted     Special Instructions for Nursing:  Sodium & Cholesterol Restricted  Entered: Apr 1 2021  4:21AM    
This patient has been assessed with a concern for Malnutrition and has been determined to have a diagnosis/diagnoses of Moderate protein-calorie malnutrition.    This patient is being managed with:   Diet DASH/TLC-  Sodium & Cholesterol Restricted  Supplement Feeding Modality:  Oral  Ensure Enlive Cans or Servings Per Day:  2       Frequency:  Daily     Special Instructions for Nursing:  Sodium & Cholesterol Restricted  Entered: Apr 12 2021  1:19PM      This patient has been assessed with a concern for Malnutrition and has been determined to have a diagnosis/diagnoses of Moderate protein-calorie malnutrition.    This patient is being managed with:   Diet DASH/TLC-  Sodium & Cholesterol Restricted  Supplement Feeding Modality:  Oral  Ensure Enlive Cans or Servings Per Day:  2       Frequency:  Daily     Special Instructions for Nursing:  Sodium & Cholesterol Restricted  Entered: Apr 12 2021  1:19PM    
This patient has been assessed with a concern for Malnutrition and has been determined to have a diagnosis/diagnoses of Moderate protein-calorie malnutrition.    This patient is being managed with:   Diet DASH/TLC-  Sodium & Cholesterol Restricted  Supplement Feeding Modality:  Oral  Ensure Enlive Cans or Servings Per Day:  2       Frequency:  Daily     Special Instructions for Nursing:  Sodium & Cholesterol Restricted  Entered: Apr 12 2021  1:19PM    
This patient has been assessed with a concern for Malnutrition and has been determined to have a diagnosis/diagnoses of Moderate protein-calorie malnutrition.    This patient is being managed with:   Diet DASH/TLC-  Sodium & Cholesterol Restricted  Supplement Feeding Modality:  Oral  Ensure Enlive Cans or Servings Per Day:  2       Frequency:  Daily     Special Instructions for Nursing:  Sodium & Cholesterol Restricted  Entered: Apr 12 2021  1:19PM
This patient has been assessed with a concern for Malnutrition and has been determined to have a diagnosis/diagnoses of Moderate protein-calorie malnutrition.    This patient is being managed with:   Diet DASH/TLC-  Sodium & Cholesterol Restricted     Special Instructions for Nursing:  Sodium & Cholesterol Restricted  Entered: Apr 1 2021  4:21AM    
This patient has been assessed with a concern for Malnutrition and has been determined to have a diagnosis/diagnoses of Moderate protein-calorie malnutrition.    This patient is being managed with:   Diet DASH/TLC-  Sodium & Cholesterol Restricted     Special Instructions for Nursing:  Sodium & Cholesterol Restricted  Entered: Apr 1 2021  4:21AM    
This patient has been assessed with a concern for Malnutrition and has been determined to have a diagnosis/diagnoses of Moderate protein-calorie malnutrition.    This patient is being managed with:   Diet DASH/TLC-  Sodium & Cholesterol Restricted  Supplement Feeding Modality:  Oral  Ensure Enlive Cans or Servings Per Day:  2       Frequency:  Daily     Special Instructions for Nursing:  Sodium & Cholesterol Restricted  Entered: Apr 12 2021  1:19PM
This patient has been assessed with a concern for Malnutrition and has been determined to have a diagnosis/diagnoses of Moderate protein-calorie malnutrition.    This patient is being managed with:   Diet Regular-  Low Fat (LOWFAT)  Supplement Feeding Modality:  Oral  Ensure Enlive Cans or Servings Per Day:  2       Frequency:  Daily  Entered: Apr 18 2021  1:29PM    
This patient has been assessed with a concern for Malnutrition and has been determined to have a diagnosis/diagnoses of Moderate protein-calorie malnutrition.    This patient is being managed with:   Diet DASH/TLC-  Sodium & Cholesterol Restricted  Supplement Feeding Modality:  Oral  Ensure Enlive Cans or Servings Per Day:  2       Frequency:  Daily     Special Instructions for Nursing:  Sodium & Cholesterol Restricted  Entered: Apr 12 2021  1:19PM    
This patient has been assessed with a concern for Malnutrition and has been determined to have a diagnosis/diagnoses of Moderate protein-calorie malnutrition.    This patient is being managed with:   Diet Regular-  Low Fat (LOWFAT)   Tube Feed Start Time: 23:00  Supplement Feeding Modality:  Oral  Ensure Enlive Cans or Servings Per Day:  2       Frequency:  Three Times a day  Entered: Apr 18 2021 12:58PM

## 2021-04-20 NOTE — DISCHARGE NOTE PROVIDER - NSFOLLOWUPCLINICS_GEN_ALL_ED_FT
[facility] General Internal Medicine  General Internal Medicine  [address] [id number]  Phone: [phone]  Fax:   Follow Up Time: 1 week

## 2021-04-20 NOTE — PROGRESS NOTE ADULT - PROBLEM SELECTOR PLAN 7
CAD s/p PCI last in 2018 s/p cardiac cath 4/5 with no obstructive disease  - EKG on admission reviewed a paced with no STT changes  - c/w ASA and statin and Toprol
CAD s/p PCI last in 2018 s/p cardiac cath 4/5 with no obstructive disease  - EKG on admission reviewed a paced with no STT changes  - c/w ASA and statin and Toprol
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  - clarify lesion on CT pelvis with contrast  - if concerning findings, will consider vascular input
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  - clarify lesion on CT pelvis with contrast  - if concerning findings, will consider vascular input
CAD s/p PCI last in 2018 s/p cardiac cath 4/5 with no obstructive disease  - EKG on admission reviewed a paced with no STT changes  - c/w ASA and statin and Toprol

## 2021-04-20 NOTE — DISCHARGE NOTE PROVIDER - CARE PROVIDER_API CALL
Rebekah Austin (DO)  HematologyOncology; Internal Medicine  [address] [place] [address]  Phone: [phone]  Fax: [phone]  Follow Up Time: 1 week    Primary Care Provider,   Phone: (   )    -  Fax: (   )    -  Follow Up Time: 1 week

## 2021-04-20 NOTE — PROGRESS NOTE ADULT - PROBLEM SELECTOR PLAN 6
Monitor labs, Renal FU.
CAD s/p PCI last in 2018 s/p cardiac cath 4/5 with no obstructive disease  - EKG on admission reviewed a paced with no STT changes  - c/w ASA and statin and Toprol
Hx of hypothyroidism on synthroid 50 mcg TSH 17 and repeat in 20s  - this admission increased to 100 mcg  - continue for now  - repeat TFT in mid May 2020
Monitor labs, Renal FU.
CAD s/p PCI last in 2018 s/p cardiac cath 4/5 with no obstructive disease  - EKG on admission reviewed a paced with no STT changes  - c/w ASA and statin and Toprol
Hx of hypothyroidism on synthroid 50 mcg TSH 17 and repeat in 20s  - this admission increased to 100 mcg  - continue for now  - repeat TFT in mid May 2020
Hx of hypothyroidism on synthroid 50 mcg TSH 17 and repeat in 20s  - this admission increased to 100 mcg  - continue for now  - repeat TFT in mid May 2020

## 2021-04-20 NOTE — PROGRESS NOTE ADULT - ASSESSMENT
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 hopsice care.  no acute gi complaints,

## 2021-04-20 NOTE — PROGRESS NOTE ADULT - SUBJECTIVE AND OBJECTIVE BOX
[facility] Division of Hospital Medicine  Dylan Major DO  Pager ([facility], 2N-6K): [phone]  Other Times:  [phone]    Patient is a 89y old  Female who presents with a chief complaint of SOB/CP (20 Apr 2021 08:50)      SUBJECTIVE / OVERNIGHT EVENTS:    patient seen and examined at bedside.  feels ok.  Mandarin # [id number]  c/o of some abdominal fullness.      MEDICATIONS  (STANDING):  aMIOdarone    Tablet 200 milliGRAM(s) Oral daily  amLODIPine   Tablet 5 milliGRAM(s) Oral daily  aspirin enteric coated 81 milliGRAM(s) Oral daily  atorvastatin 20 milliGRAM(s) Oral at bedtime  ferrous    sulfate 325 milliGRAM(s) Oral daily  heparin  Infusion 700 Unit(s)/Hr (7 mL/Hr) IV Continuous <Continuous>  levothyroxine 100 MICROGram(s) Oral daily  metoprolol succinate ER 12.5 milliGRAM(s) Oral daily  polyethylene glycol 3350 17 Gram(s) Oral two times a day  senna 2 Tablet(s) Oral at bedtime  sodium chloride 0.9% lock flush 3 milliLiter(s) IV Push every 8 hours  sodium chloride 0.9%. 1000 milliLiter(s) (50 mL/Hr) IV Continuous <Continuous>    MEDICATIONS  (PRN):  acetaminophen   Tablet .. 650 milliGRAM(s) Oral every 6 hours PRN Mild Pain (1 - 3)  ALBUTerol    90 MICROgram(s) HFA Inhaler 1 Puff(s) Inhalation every 4 hours PRN Shortness of Breath and/or Wheezing  aluminum hydroxide/magnesium hydroxide/simethicone Suspension 30 milliLiter(s) Oral every 4 hours PRN Dyspepsia  lidocaine   Patch 1 Patch Transdermal daily PRN low back pain      CAPILLARY BLOOD GLUCOSE        I&O's Summary    19 Apr 2021 07:01  -  20 Apr 2021 07:00  --------------------------------------------------------  IN: 720 mL / OUT: 0 mL / NET: 720 mL        PHYSICAL EXAM:  Vital Signs Last 24 Hrs  T(C): 36.4 (20 Apr 2021 10:32), Max: 36.9 (19 Apr 2021 11:43)  T(F): 97.5 (20 Apr 2021 10:32), Max: 98.5 (19 Apr 2021 11:43)  HR: 97 (20 Apr 2021 10:32) (62 - 97)  BP: 121/64 (20 Apr 2021 10:32) (110/68 - 142/84)  BP(mean): --  RR: 16 (20 Apr 2021 10:32) (16 - 18)  SpO2: 97% (20 Apr 2021 10:32) (94% - 97%)    GENERAL: No acute distress, well-developed  HEAD:  Atraumatic, Normocephalic  EYES: EOMI, PERRL, conjunctiva and sclera ruiz  CHEST/LUNG: CTAB; No wheezes, rales, or rhonchi  HEART: Regular rate and rhythm; + loud systolic murmur  ABDOMEN: Soft, non-tender, non-distended; normal bowel sounds, no organomegaly  EXTREMITIES:  2+ peripheral pulses b/l, No r edema  NEUROLOGY: A&O x 3, no focal deficits  SKIN: No rashes or lesions    LABS:                        9.2    6.17  )-----------( 197      ( 20 Apr 2021 06:51 )             29.0     04-20    136  |  101  |  28<H>  ----------------------------<  115<H>  4.3   |  22  |  1.61<H>    Ca    9.6      20 Apr 2021 06:51      PTT - ( 20 Apr 2021 07:28 )  PTT:73.0 sec            RADIOLOGY & ADDITIONAL TESTS:  Results Reviewed:   Imaging Personally Reviewed:  Electrocardiogram Personally Reviewed:    COORDINATION OF CARE:  Care Discussed with Consultants/Other Providers [Y/N]:  Prior or Outpatient Records Reviewed [Y/N]:  davidson Garcia

## 2021-04-20 NOTE — DISCHARGE NOTE PROVIDER - HOSPITAL COURSE
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 Patient found to have malignant pleural effusion s/p pigtail catheter on 4/13 - cytopath with adenoCA. Per limited CT coronaries ill defined mass noted in pancreatic tail vs spleen. 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 Oncology and GI consulted, recs appreciated. 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 Chest tube removed 4/19 will monitor.Patient  also with severe AS initially planned for TAVR however procedure held off in light of malignancy workup. Patient was on heparin gtt for a-fib however to continue home dose of Eliquis on discharge. Patient also with hx of hypothyroidism - synthroid increased to 100mcg. Patient noted to have episode of black stool- not melanotic/ Hb stable x 2 days likely maalox.PT saw patient recommending home PT.       Discharge/Dispo/Med rec discussed with attending. Patient medically cleared for discharge home with home PT and outpatient follow up with PCP and hematologist Dr. Norman Welch              .

## 2021-04-27 PROBLEM — Z86.79 HISTORY OF AORTIC VALVE STENOSIS: Status: RESOLVED | Noted: 2021-01-01 | Resolved: 2021-01-01

## 2021-04-27 PROBLEM — Z86.39 HISTORY OF HYPERLIPIDEMIA: Status: RESOLVED | Noted: 2021-01-01 | Resolved: 2021-01-01

## 2021-04-27 PROBLEM — Z87.898 HISTORY OF HOARSENESS: Status: RESOLVED | Noted: 2018-05-19 | Resolved: 2021-01-01

## 2021-04-27 PROBLEM — Z87.898 HISTORY OF URINARY FREQUENCY: Status: RESOLVED | Noted: 2019-11-18 | Resolved: 2021-01-01

## 2021-04-27 PROBLEM — J91.0 MALIGNANT PLEURAL EFFUSION: Status: ACTIVE | Noted: 2021-01-01

## 2021-04-27 PROBLEM — Z87.898 HISTORY OF INSOMNIA: Status: RESOLVED | Noted: 2019-09-26 | Resolved: 2021-01-01

## 2021-04-27 PROBLEM — G45.9 TIA (TRANSIENT ISCHEMIC ATTACK): Status: RESOLVED | Noted: 2021-01-01 | Resolved: 2021-01-01

## 2021-04-27 NOTE — REVIEW OF SYSTEMS
[Fatigue] : fatigue [Recent Change In Weight] : ~T recent weight change [Abdominal Pain] : abdominal pain [Vomiting] : vomiting [Negative] : Allergic/Immunologic

## 2021-04-27 NOTE — ASSESSMENT
[FreeTextEntry1] : Ms. ESME PARIKH, 89 year old female, never smoker, w/ hx of aortic stenosis, spinal stenosis, OA s/p b/l knee replacement, CRI, CAD s/p LAD and Diag stents in 2009 at [facility] s/p 2 LAD stents in 9/2018, severe AS (0.9 cm2 in 2019), PPM ([facility]) placed 4/9/2019 with Dr. Day, asthma, PAF on Eliquis, HTN and HLD. She had TTE on 3/26/2021 that demonstrated calcified trileaflet aortic valve with decreased opening, PVG 27 mmHg, estimated MERCEDES 0.7 cm2, mild AR and EF 70%.  \par \par Patient was admitted to [facility] for chest pain and dyspnea x 4 days, found to have pleural effusion, s/p pigtail placement on 4/13/21. Chest tube was removed on 4/19/21. Path of Rt pleural effusion revealed metastatic AdenoCA, favor mets mammary CA. +Moc31 and CK7, +GATA3 (weak focal), +CK 19 and CK 17.\par \par CT C/A/P w/ contrast on 4/17/21:\par - interlobular septal thickening and ggo w/in the RML and RLL\par - nonspecific patchy opacities w/in the upper lobes bilaterally\par - small Rt pleural effusion w/ pigtail catheter; small Lt pleural effusion\par - enlarged heart; Lt chest wall PPM\par - multiple liver lesions c/w mets and cysts, largest measuring 1.4cm in the Lt hepatic lobe (3:62)\par - wedge-shaped hypodensities in the spleen, likely splenic infarcts\par - a 5.4 x 3.7cm heterogeneous pancreatic tail mass, extending to the splenic hilum and to the adjacent colon (5:71)\par - irregular nodularity in the Lt adrenal gland measuring up to 2.1 x 1.6cm, c/w mets and/or direct extension of the pancreatic mass\par \par CXR on 4/20/21 showed small bilateral pleural effusions.\par \par I have reviewed the patient's medical records and diagnostic images at time of this office consultation and have made the following recommendation:\par 1. CT scan and CXR reviewed, I recommended a repeat CT Chest w/o contrast to re-evaluate pleural effusion.\par 2. I recommended a Flex Bronch Rt VATS, possible Lt VATS, drainage of pleural effusion, possible bilateral PleurX placement on 5/3/21. Risks and benefits and alternatives explained to patient, all questions answered, patient agreed to proceed with surgery.\par 3. Medical clearance, PST on admit, COVID-19 \par 4. HOLD Eliquis x 3 days starting 4/30/21.\par \par \par I personally performed the services described in the documentation, reviewed the documentation recorded by the scribe in my presence and it accurately and completely records my words and actions.\par \par I, Loc Linder NP, am scribing for and the presence of DMITRY OsegueraIM, the following sections HISTORY OF PRESENT ILLNESS, PAST MEDICAL/FAMILY/SOCIAL HISTORY; REVIEW OF SYSTEMS; VITAL SIGNS; PHYSICAL EXAM; DISPOSITION.\par \par

## 2021-04-27 NOTE — HISTORY OF PRESENT ILLNESS
[Home] : at home, [unfilled] , at the time of the visit. [Medical Office: ([facility])___] : at the medical office located in  [Verbal consent obtained from patient] : the patient, [unfilled] [FreeTextEntry1] : Ms. ESME PARIKH, 89 year old female, never smoker, w/ hx of aortic stenosis, spinal stenosis, OA s/p b/l knee replacement, CRI, CAD s/p LAD and Diag stents in 2009 at [facility] s/p 2 LAD stents in 9/2018, severe AS (0.9 cm2 in 2019), PPM ([facility]) placed 4/9/2019 with Dr. Day, asthma, PAF on Eliquis, HTN and HLD. She had TTE on 3/26/2021 that demonstrated calcified trileaflet aortic valve with decreased opening, PVG 27 mmHg, estimated MERCEDES 0.7 cm2, mild AR and EF 70%.  \par \par Patient was admitted to [facility] for chest pain and dyspnea x 4 days, found to have pleural effusion, s/p pigtail placement on 4/13/21. Chest tube was removed on 4/19/21. Path of Rt pleural effusion revealed metastatic AdenoCA, favor mets mammary CA. +Moc31 and CK7, +GATA3 (weak focal), +CK 19 and CK 17.\par \par CT C/A/P w/ contrast on 4/17/21:\par - interlobular septal thickening and ggo w/in the RML and RLL\par - nonspecific patchy opacities w/in the upper lobes bilaterally\par - small Rt pleural effusion w/ pigtail catheter; small Lt pleural effusion\par - enlarged heart; Lt chest wall PPM\par - multiple liver lesions c/w mets and cysts, largest measuring 1.4cm in the Lt hepatic lobe (3:62)\par - wedge-shaped hypodensities in the spleen, likely splenic infarcts\par - a 5.4 x 3.7cm heterogeneous pancreatic tail mass, extending to the splenic hilum and to the adjacent colon (5:71)\par - irregular nodularity in the Lt adrenal gland measuring up to 2.1 x 1.6cm, c/w mets and/or direct extension of the pancreatic mass\par \par CXR on 4/20/21 showed small bilateral pleural effusions.\par \par Of note, TAVR cancelled by Dr. Lai due to metastatic disease.\par Patient is here today for CT Sx consultation, referred by Dr. Austin. Admits to SOB.

## 2021-04-27 NOTE — DATA REVIEWED
[FreeTextEntry1] : I have independently reviewed all the following:\par Path of Rt pleural effusion revealed metastatic AdenoCA, favor mets mammary CA. +Moc31 and CK7, +GATA3 (weak focal), +CK 19 and CK 17.\par \par CT C/A/P w/ contrast on 4/17/21:\par - interlobular septal thickening and ggo w/in the RML and RLL\par - nonspecific patchy opacities w/in the upper lobes bilaterally\par - small Rt pleural effusion w/ pigtail catheter; small Lt pleural effusion\par - enlarged heart; Lt chest wall PPM\par - multiple liver lesions c/w mets and cysts, largest measuring 1.4cm in the Lt hepatic lobe (3:62)\par - wedge-shaped hypodensities in the spleen, likely splenic infarcts\par - a 5.4 x 3.7cm heterogeneous pancreatic tail mass, extending to the splenic hilum and to the adjacent colon (5:71)\par - irregular nodularity in the Lt adrenal gland measuring up to 2.1 x 1.6cm, c/w mets and/or direct extension of the pancreatic mass\par \par CXR on 4/20/21 showed small bilateral pleural effusions.

## 2021-04-27 NOTE — CONSULT LETTER
[Consult Letter:] : I had the pleasure of evaluating your patient, [unfilled]. [( Thank you for referring [unfilled] for consultation for _____ )] : Thank you for referring [unfilled] for consultation for [unfilled] [Please see my note below.] : Please see my note below. [Consult Closing:] : Thank you very much for allowing me to participate in the care of this patient.  If you have any questions, please do not hesitate to contact me. [Sincerely,] : Sincerely, [FreeTextEntry2] : Dr. Rebekah Austin (Hem/Onc/Referring)\par Dr. Jazmin Humphrey (Cardiology)\par Dr. Brian Abel (PCP) [FreeTextEntry3] : Roberto Zheng MD, FACS \par Chief, Division of Thoracic Surgery \par Director, Minimally Invasive Thoracic Surgery \par Department of Cardiovascular and Thoracic Surgery \par [facility] \par , Cardiovascular and Thoracic Surgery\par  [DrGeorge  ___] : Dr. FAGAN

## 2021-04-29 NOTE — ED PROVIDER NOTE - PHYSICAL EXAMINATION
Gen: Midly tachypneic but not in resp distress  Head: NCAT  HEENT: PERRL, MMM, normal conjunctiva, anicteric, neck supple  Lung: b/l crackles w/ dec breath sounds up to mid lung fields R>L  CV: RRR, no murmurs  Abd: soft, NTND, no rebound or guarding, no CVAT  MSK: No edema, no visible deformities  Neuro: Moving all extremity grossly  Skin: Warm and dry, no evidence of rash    SHAILESH: Gross brown stool

## 2021-04-29 NOTE — ED PROVIDER NOTE - CLINICAL SUMMARY MEDICAL DECISION MAKING FREE TEXT BOX
Concern of worsening end stage aortic valve dz vs recurrent effusion. Labs, xr, and CT surg eval and o2/respiratory support prn.

## 2021-04-29 NOTE — ED ADULT NURSE NOTE - CHIEF COMPLAINT QUOTE
Pt h/o stage 4 pancreatic ca, not currently receiving treatment c/o shortness of breath, was told she has fluid in her rt lung. Pt breathing even and mildly labored, spo2 in the 80's on room air, placed on 3L NC, afebrile. Pt denies any present pain.    Gino(son)-[phone]

## 2021-04-29 NOTE — ED PROVIDER NOTE - OBJECTIVE STATEMENT
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 Was being planned for AVR inpt? but was held off d/t new pancreatic dx and plural effusions per family. Had tele health appt with dr. martin and sent in for CT surg eval. Of note also having darker stools since discharge. Hypoxic to 80s on RA improved on 3L NC.

## 2021-04-29 NOTE — H&P ADULT - NSHPSOCIALHISTORY_GEN_ALL_CORE
Social History:  Social History (marital status, living situation, occupation, tobacco use, alcohol and drug use, and sexual history): Patient states she ambulated independently without assistance. Denies smoking, ETOH, or drug use.    Tobacco Screening:  · Core Measure Site	No  · Has the patient used tobacco in the past 30 days?	No

## 2021-04-29 NOTE — H&P ADULT - HISTORY OF PRESENT ILLNESS
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 Patient found to have malignant pleural effusion s/p pigtail catheter on 4/13 by Thoracic surgery - cytopath with adenoCA. Per limited CT coronaries ill defined mass noted in pancreatic tail vs spleen. 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 Oncology and GI consulted. 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 Chest tube removed 4/19.Patient  also with severe AS initially planned for TAVR however procedure held off in light of malignancy workup. Patient also with hx of hypothyroidism - synthroid increased to 100mcg. Pt was d/c'd to home on 4/20/21 on Eliquis with planned outpt FU with Oncology. Pt presents to [facility] ER today with severe worsening SOB over past several days. Per pt's daughter pt unable to tolerate any physical activity without significant MOULTON. Has productive cough and c/o occasional abd pain w nausea/vomitting. Pt has been loosing weight. Thought to be reaccumulating pleural effusion so pt stopped eliquis yesterday morning in case procedure needed. Per daughter, no plans for Chemo at this time due to pt's weakened state. Would want Pleurx for palliative reasons. Pt denies HA, dizziness, chest pain, fever, chills, LE edema. Pt resting comfortably at rest wearing 3LNC. O2 sat 95%.

## 2021-04-29 NOTE — ED ADULT TRIAGE NOTE - CHIEF COMPLAINT QUOTE
Pt h/o stage 4 pancreatic ca, not currently receiving treatment c/o shortness of breath, was told she has fluid in her rt lung. Pt breathing even and mildly labored, spo2 in the 80's on room air, placed on 3L NC, afebrile. Pt denies any present pain.    Gino(son)-[phone]

## 2021-04-29 NOTE — ED PROVIDER NOTE - NS ED ROS FT
CONSTITUTIONAL: No fevers, no chills, no lightheadedness, no dizziness  EYES: no visual changes, no eye pain  EARS: no ear drainage, no ear pain, no change in hearing  NOSE: no nasal congestion  MOUTH/THROAT: no sore throat  CV: No chest pain, no palpitations  RESP: no cough  GI: No n/v/d, no abd pain  : no dysuria, no hematuria, no flank pain  MSK: no back pain, no extremity pain  SKIN: no rashes  NEURO: no headache, no focal weakness, no decreased sensation/paresthesias

## 2021-04-29 NOTE — H&P ADULT - NSICDXPASTSURGICALHX_GEN_ALL_CORE_FT
PAST SURGICAL HISTORY:  Cardiac pacemaker     H/O tubal ligation     History of bilateral knee replacement     History of cataract surgery bilateral    History of cholecystectomy     S/P cholecystectomy

## 2021-04-29 NOTE — ED ADULT NURSE NOTE - NSFALLRSKASSESSDT_ED_ALL_ED
ED / Discharge Outreach Protocol    Patient Contact    Attempt # 1    Was call answered?  No.  Left message on voicemail with information to call me back.    SARIAH OconnellN, RN  [employer]     29-Apr-2021 14:55

## 2021-04-29 NOTE — ED ADULT NURSE NOTE - OBJECTIVE STATEMENT
Facilitator RN- Pt primarily Mandarin speaking, daughter at bedside to translate. Pt recent dx of stage 4 pancreatic CA (scheduled to start chemo therapy) presenting to ED for trouble breathing. Pt has appointment scheduled for Thoracentesis on Monday but as per daughter pt breathing has gotten worse. Pt breathing even and unlabored in ED, sating at 100% on 2L 02 via NC. As per daughter breathing is worse on exertion. Pt also c/o dark stools.  Pt placed on monitor NSR noted. Denies cp, dizziness, fevers. Area RN made aware.

## 2021-04-29 NOTE — H&P ADULT - PROBLEM SELECTOR PLAN 1
Will admit to 8T Dr. Zheng  NPO after 12mn tonight  Rt VATS/Pleurx tomm  Continue oxygen, monitor  Hold Eliquis

## 2021-04-29 NOTE — H&P ADULT - ASSESSMENT
89yo F with multiple complicated medical issues with recurrent malignant pleural effusion and worsening SOB.

## 2021-04-29 NOTE — H&P ADULT - NSHPPHYSICALEXAM_GEN_ALL_CORE
General: WN/WD NAD  Neurology: A&Ox3, nonfocal, CABRERA x 4  Eyes: PERRLA/ EOMI, Gross vision intact  ENT/Neck: Neck supple, trachea midline, No JVD, Gross hearing intact  Respiratory: Dec BS right mid to base. Slight dec BS left LL. Crackles to Rt base  CV: RRR, S1S2, +loud sys  murmurs, rubs or gallops  Abdominal: Soft, NT, ND +BS, BM yesterday but very dark per pt's daughter  Extremities: No edema, + peripheral pulses  Skin: No Rashes, Hematoma, Ecchymosis  Incisions: Abrasion at Rt back/flank from previous PTC site.

## 2021-04-29 NOTE — ED ADULT NURSE REASSESSMENT NOTE - NS ED NURSE REASSESS COMMENT FT1
pt noted to be MOULTON. 2L NC maintained. pt assisted to turn and position, prima fit applied. 20G IV WNL. CT and XR completed. Pt admitted to CT-ICU. awaiting bed assignment. No acute distress noted.

## 2021-04-29 NOTE — ED PROVIDER NOTE - ATTENDING CONTRIBUTION TO CARE
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 Was being planned for AVR inpt? but was held off d/t new pancreatic dx and plural effusions per family. Had tele health appt with dr. martin and sent in for CT surg eval. Of note also having darker stools since discharge. Hypoxic to 80s on RA improved on 3L NC."    Per daughter stage IV pancreatic CA diagnosed on last admission as pt was being prepared for Aortic valve replacement where pleural effusion was found to be malignant.  Daughter states over last few days more short of breath and "drowning"    PE: at rest comfortable with 2L O2; on minimal exertion SOB; crackles, rales at bases; irregular irregular abd soft/NT/ND     Imp: likely reaccumulation of pleural fluid; followed by Ct surgery; will get CXR, diurese as necessary; stable at rest

## 2021-04-30 NOTE — CHART NOTE - NSCHARTNOTEFT_GEN_A_CORE
Patient s/p right vats, pleurx cath insertion.  Patient resting comfortably.  Incision with dressing clean and dry.  Pleurx connected to pleurovac to suction, no air leak noted.  Patient tolerating po, voiding.   Vital Signs Last 24 Hrs  T(C): 36.3 (30 Apr 2021 16:56), Max: 36.8 (30 Apr 2021 11:06)  T(F): 97.4 (30 Apr 2021 16:56), Max: 98.2 (30 Apr 2021 11:06)  HR: 68 (30 Apr 2021 16:56) (64 - 74)  BP: 120/70 (30 Apr 2021 16:56) (112/53 - 158/62)  BP(mean): 75 (30 Apr 2021 15:30) (67 - 82)  RR: 16 (30 Apr 2021 16:56) (12 - 19)  SpO2: 98% (30 Apr 2021 16:56) (94% - 100%)    I&O's Detail    29 Apr 2021 07:01  -  30 Apr 2021 07:00  --------------------------------------------------------  IN:  Total IN: 0 mL    OUT:    Voided (mL): 250 mL  Total OUT: 250 mL    Total NET: -250 mL      30 Apr 2021 07:01  -  30 Apr 2021 19:32  --------------------------------------------------------  IN:  Total IN: 0 mL    OUT:    Chest Tube (mL): 150 mL    Voided (mL): 200 mL  Total OUT: 350 mL    Total NET: -350 mL      MEDICATIONS  (STANDING):  aMIOdarone    Tablet 200 milliGRAM(s) Oral daily  amLODIPine   Tablet 5 milliGRAM(s) Oral daily  atorvastatin 20 milliGRAM(s) Oral at bedtime  heparin   Injectable 2500 Unit(s) SubCutaneous every 12 hours  levothyroxine 100 MICROGram(s) Oral daily  megestrol 20 milliGRAM(s) Oral daily  metoprolol succinate ER 12.5 milliGRAM(s) Oral daily  senna 2 Tablet(s) Oral at bedtime    A/P: S/P Right vats, pleurx cath insertion  Continue pleurx to suction  Follow up labs and cxr in am   Chest PT, ambulation and incentive spirometer  Pain management

## 2021-04-30 NOTE — BRIEF OPERATIVE NOTE - OPERATION/FINDINGS
Insertion of right pleurex cathter under local and conscious sedation; approx 500cc of serous sanguinous fluid drained

## 2021-05-01 NOTE — DISCHARGE NOTE PROVIDER - CARE PROVIDER_API CALL
Roberto Zheng)  Surgery; Thoracic Surgery  [phone] [place] [address], [place] [address]  Phone: [phone]  Fax: [phone]  Follow Up Time:

## 2021-05-01 NOTE — DIETITIAN INITIAL EVALUATION ADULT. - REASON FOR ADMISSION
Per chart, pt is 89 year old Mandarin speaking female PMHx asthma, HTN, afib on Eliquis/amio, severe aortic stenosis, CAD s/p last 9/2018, bradycardia s/p PPM (4/2019) initially admitted to [facility] (3/31) with concern for chest pain and dyspnea x4 days found to have malignant pleural effusion s/p pigtail catheter (4/13), cytopath with adenoCA. CT abd pelvis (4/18) confirming pancreatic tail mass with mets to the adrenal gland, liver, lung. Chest tube removed (4/19) Also with severe AS, initially planned for TAVR however procedure held off in light of malignancy workup. Discharged home (4/20). Now presenting to [facility] (4/29) with severe worsening SOB, productive cough. Now s/p R vats, pleurx cath insertion (4/30).

## 2021-05-01 NOTE — DISCHARGE NOTE PROVIDER - NSDCCPCAREPLAN_GEN_ALL_CORE_FT
PRINCIPAL DISCHARGE DIAGNOSIS  Diagnosis: Pleural effusion  Assessment and Plan of Treatment: s/p Drainage device

## 2021-05-01 NOTE — DISCHARGE NOTE PROVIDER - NSDCFUADDAPPT_GEN_ALL_CORE_FT
Call Dr. Zheng's office to schedule follow up appointment in 2 weeks and obtain Chest Xray the day before your appointment date.

## 2021-05-01 NOTE — DISCHARGE NOTE PROVIDER - NSDCFUADDINST_GEN_ALL_CORE_FT
Please call the office at [phone] if you have fever's chills, worsening shortness of breath, chest pain, warmth, redness or purulent discharge from the insertion site.

## 2021-05-01 NOTE — DISCHARGE NOTE PROVIDER - NSDCMRMEDTOKEN_GEN_ALL_CORE_FT
amiodarone 200 mg oral tablet: 1 tab(s) orally once a day  amLODIPine 5 mg oral tablet: 1 tab(s) orally once a day  atorvastatin 20 mg oral tablet: 1 tab(s) orally once a day  Eliquis 2.5 mg oral tablet: 1 tab(s) orally 2 times a day  levothyroxine 100 mcg (0.1 mg) oral tablet: 1 tab(s) orally once a day  Please take on an empty stomach at least 1 hour before meal  levothyroxine 100 mcg (0.1 mg) oral tablet: 1 tab(s) orally once a day  Metoprolol Succinate ER 25 mg oral tablet, extended release: 0.5 tab(s) orally once a day   senna oral tablet: 2 tab(s) orally once a day (at bedtime)  Zofran 8 mg oral tablet: 1 tab(s) orally 3 times a day, As Needed   amiodarone 200 mg oral tablet: 1 tab(s) orally once a day  amLODIPine 5 mg oral tablet: 1 tab(s) orally once a day  atorvastatin 20 mg oral tablet: 1 tab(s) orally once a day  Eliquis 2.5 mg oral tablet: 1 tab(s) orally 2 times a day  Home O2: Home o2  Portable and concentrator   2l/min vi Nasal Cannula continuous  Dx: J91  levothyroxine 100 mcg (0.1 mg) oral tablet: 1 tab(s) orally once a day  levothyroxine 100 mcg (0.1 mg) oral tablet: 1 tab(s) orally once a day  Please take on an empty stomach at least 1 hour before meal  Metoprolol Succinate ER 25 mg oral tablet, extended release: 0.5 tab(s) orally once a day   senna oral tablet: 2 tab(s) orally once a day (at bedtime)  Zofran 8 mg oral tablet: 1 tab(s) orally 3 times a day, As Needed   acetaminophen 325 mg oral tablet: 2 tab(s) orally every 8 hours, As Needed -for moderate pain   amiodarone 200 mg oral tablet: 1 tab(s) orally once a day  amLODIPine 5 mg oral tablet: 1 tab(s) orally once a day  atorvastatin 20 mg oral tablet: 1 tab(s) orally once a day  Augmentin 875 mg-125 mg oral tablet: 1 milligram(s) orally 2 times a day   CXR: CXR PA/Lateral  s/p Pleurx  Please give copy of imaging to patient, and fax results to Dr. Zheng&#x27;s office at [phone]  Eliquis 2.5 mg oral tablet: 1 tab(s) orally 2 times a day  Home O2: Home o2  Portable and concentrator   2l/min vi Nasal Cannula continuous  Dx: J91  levothyroxine 100 mcg (0.1 mg) oral tablet: 1 tab(s) orally once a day  Metoprolol Succinate ER 25 mg oral tablet, extended release: 0.5 tab(s) orally once a day   senna oral tablet: 2 tab(s) orally once a day (at bedtime)  Zofran 8 mg oral tablet: 1 tab(s) orally 3 times a day, As Needed

## 2021-05-01 NOTE — DISCHARGE NOTE PROVIDER - NSDCHHNEEDSERVICEOTHER_GEN_ALL_CORE_FT
Drainage of PleurX catheter device. Patient requires 3x per week drainage on MWF of no more than 1000cc per session.

## 2021-05-01 NOTE — DIETITIAN NUTRITION RISK NOTIFICATION - ADDITIONAL COMMENTS/DIETITIAN RECOMMENDATIONS
Please see Dietitian Initial Assessment for complete recommendations.  Tierra Goodman, DELROYN, CDN #[id number]

## 2021-05-01 NOTE — DIETITIAN INITIAL EVALUATION ADULT. - ENERGY INTAKE
Breakfast tray visible with >50% PO intake at time of visit. Pt declining Ensure Enlive, amenable to trial of Ensure Clear. States she is sad regarding inability to consume adequate PO intake. Encouraged PO intake, small portions as tolerated.                              Pt with complaint of nausea, heartburn at time of visit- RN and team aware. Ordered for zofran PRN.

## 2021-05-01 NOTE — DIETITIAN INITIAL EVALUATION ADULT. - ETIOLOGY
inadequate PO intake, inability to meet increased needs increased demand for nutrients with poor skin integrity, malignancy

## 2021-05-01 NOTE — DISCHARGE NOTE PROVIDER - NSDCCPTREATMENT_GEN_ALL_CORE_FT
PRINCIPAL PROCEDURE  Procedure: Insertion of tunneled pleural catheter  Findings and Treatment: Right

## 2021-05-01 NOTE — DISCHARGE NOTE PROVIDER - DETAILS OF MALNUTRITION DIAGNOSIS/DIAGNOSES
12-Sep-2019 15:48 This patient has been assessed with a concern for Malnutrition and was treated during this hospitalization for the following Nutrition diagnosis/diagnoses:     -  05/01/2021: Moderate protein-calorie malnutrition   -  05/01/2021: Underweight (BMI < 19)

## 2021-05-01 NOTE — DISCHARGE NOTE PROVIDER - HOSPITAL COURSE
89yo F with multiple complicated medical issues including afib on Eliquis (holding), HTN, PPM, Severe AS s/p failed TAVR candidate, Pancreatic mass with liver and lung mets presenting with recurrent malignant pleural effusion and worsening SOB.     5/1 POD #1 from RVATS, right pleurX catheter insertion and placement. Planning for possible left sided pleurX placement on Monday. 89yo F with multiple complicated medical issues including afib on Eliquis (holding), HTN, PPM, Severe AS s/p failed TAVR candidate, Pancreatic mass with liver and lung mets presenting with recurrent malignant pleural effusion and worsening SOB. She underwent Pleurx on placement on 4/30. On 5/2 while on floor, RRT called for patient saturation in 80s on 100% nonrebreather. She was transferred on floor and placed on bipap. Bipap successfully weaned but patient still requiring oxygen via nasal cannula. She is for discharge home once oxygen set up. 89yo F with multiple complicated medical issues including afib on Eliquis (holding), HTN, PPM, Severe AS s/p failed TAVR candidate, Pancreatic mass with liver and lung mets presenting with recurrent malignant pleural effusion and worsening SOB. She underwent Pleurx on placement on 4/30. On 5/2 while on floor, RRT called for patient saturation in 80s on 100% nonrebreather, suspected secondary to aspiration. She was transferred on floor and placed on bipap. Patient failed bedside swallow eval and speech& swallow recommended CINE. Patient had CINE and was recommended for Puree and Honey thick liquid diet. Bipap successfully weaned but patient still requiring oxygen via nasal cannula. She is for discharge home once oxygen set up. 89yo F with multiple complicated medical issues including afib on Eliquis (holding), HTN, PPM, Severe AS s/p failed TAVR candidate, Pancreatic mass with liver and lung mets presenting with recurrent malignant pleural effusion and worsening SOB. She underwent Pleurx on placement on 4/30. On 5/2 while on floor, RRT called for patient saturation in 80s on 100% nonrebreather, suspected secondary to aspiration. She was transferred on floor and placed on bipap. Patient failed bedside swallow eval and speech& swallow recommended CINE. Patient had CINE and was recommended for Puree and Honey thick liquid diet. Bipap successfully weaned but patient still requiring oxygen via nasal cannula. She is for discharge home.

## 2021-05-01 NOTE — DIETITIAN INITIAL EVALUATION ADULT. - OTHER INFO
Provided pt. With turkey sandwich and water. Ok'd by MD.  
Appears pt with ~4 lb (4.1%) weight loss x2 weeks, per history obtained via chart: (4/30) 103.6 lbs, (4/15) 108 lbs.  Last BM 4/29 per flowsheets, +bowel regimen (senna).

## 2021-05-01 NOTE — DIETITIAN INITIAL EVALUATION ADULT. - ADD RECOMMEND
3) Anti-emetics per MD as indicated.                                                                 4) Consider addition of multivitamin to provide micronutrient coverage and assist with wound healing.                                                                               5) Encouraged PO intake as tolerated. Discussed alternative menu items, obtained food preferences and will provide additional tray items as able. Pt made aware RDN remains available PRN.                                                      6) Monitor PO intake, tolerance to supplement, weight trends, BMs/GI distress, skin integrity, hydration status.

## 2021-05-01 NOTE — PROGRESS NOTE ADULT - SUBJECTIVE AND OBJECTIVE BOX
Subjective: Spoke to patient via  phone.   Patient is well, no acute events overnight. Right now patient is found sitting up in chair and is in NAD. Pt c/o nausea following pain medication but is otherwise feeling well. She is coughing less than before and feel a little better. Right PleurX is in place to sxn.    Vital Signs:  Vital Signs Last 24 Hrs  T(C): 36.6 (05-01-21 @ 05:52), Max: 36.8 (04-30-21 @ 11:06)  T(F): 97.9 (05-01-21 @ 05:52), Max: 98.2 (04-30-21 @ 11:06)  HR: 66 (05-01-21 @ 05:52) (62 - 74)  BP: 135/55 (05-01-21 @ 05:52) (104/44 - 151/53)  RR: 16 (05-01-21 @ 05:52) (12 - 19)  SpO2: 99% (05-01-21 @ 05:52) (94% - 100%) on (O2)    Pertinent Physical Exam:  Telemetry/Alarms: Afib  General: WN/WD NAD  Neck: Neck supple, trachea midline, No JVD,   Respiratory: CTA B/L, No wheezing, rales, rhonchi  CV: Afib  Abdominal: Soft, NT, ND +BS,   Extremities: No edema, + peripheral pulses  Skin: No Rashes, Hematoma, Ecchymosis  Incisions: CDI  Tubes: Right PleurX in place to PleurEvac on suction with no AL    Chest Tube: Right Side  Air Leak: Yes[] / No[x]    Drainage: 240/24hrs serosangenous    I&O's Summary    30 Apr 2021 07:01  -  01 May 2021 07:00  --------------------------------------------------------  IN: 480 mL / OUT: 1090 mL / NET: -610 mL        Relevant labs, radiology and Medications reviewed                        9.1    7.33  )-----------( 181      ( 01 May 2021 07:00 )             28.3     05-01    134<L>  |  99  |  18  ----------------------------<  94  3.7   |  24  |  1.19    Ca    8.2<L>      01 May 2021 07:00    TPro  6.4  /  Alb  4.1  /  TBili  0.7  /  DBili  x   /  AST  29  /  ALT  36<H>  /  AlkPhos  110  04-29    PT/INR - ( 29 Apr 2021 14:43 )   PT: 13.4 sec;   INR: 1.18 ratio         PTT - ( 29 Apr 2021 14:43 )  PTT:33.0 sec  MEDICATIONS  (STANDING):  aMIOdarone    Tablet 200 milliGRAM(s) Oral daily  amLODIPine   Tablet 5 milliGRAM(s) Oral daily  atorvastatin 20 milliGRAM(s) Oral at bedtime  heparin   Injectable 2500 Unit(s) SubCutaneous every 12 hours  levothyroxine 100 MICROGram(s) Oral daily  megestrol 20 milliGRAM(s) Oral daily  metoprolol succinate ER 12.5 milliGRAM(s) Oral daily  senna 2 Tablet(s) Oral at bedtime    MEDICATIONS  (PRN):  ondansetron    Tablet 8 milliGRAM(s) Oral three times a day PRN Nausea and/or Vomiting      Assessment  89yo F with multiple complicated medical issues including afib on Eliquis (holding), HTN, PPM, Severe AS s/p failed TAVR candidate, Pancreatic mass with liver and lung mets presenting with recurrent malignant pleural effusion and worsening SOB.     5/1 POD #1 from RVATS, right pleurX catheter insertion and placement. Planning for possible left sided pleurX placement on Monday.  PLAN  Neuro: Pain is currently well controlled. No pain meds on board, will adjust as needed.   Pulm: Encourage coughing, deep breathing and use of incentive spirometry. Wean off supplemental oxygen as able. May need supplemental O2 but will work with IS and chest PT. Daily CXR. PleurX in place, monitoring output and on suction at this time.   Cardio: Monitor telemetry/alarms. Hx of afib - Amio and toprol on board. Eliquis cont to be on hold. HTN cont norvas and toprol. Lipitor for HLD.  GI: Tolerating diet but c/o nausea with pain meds when given and eating. Will monitor if pt cont to tolerate. Cont with Megace and ensure for nutrition. Zofran PRN  Renal: monitor urine output, supplement electrolytes as needed  Vasc: Heparin SC/SCDs for DVT prophylaxis  Heme: Slight drop in HCT, with c/o of dark stool yesterday. Repeat CBC this afternoon. Otherwise pt asymptomatic with no c/o dizziness or weakness. Vitals stable. Guaiac   ID: Off antibiotics. Stable.  Therapy: OOB/ambulate  Tubes: Monitor Chest tube output and maintain to sxn  Disposition: Possible OR monday for left PleurX. Monitor cbc and guaiac.  Discussed with Cardiothoracic Team at AM rounds.   Subjective: Spoke to patient via  phone. ID [id number]  Patient is well, no acute events overnight. Right now patient is found sitting up in chair and is in NAD. Pt c/o nausea, states this is her usual at home which causes her to not be hungry but is otherwise feeling the same. She also wants her medication for constipation which I assured her she has been getting. She is coughing less than before and feel a little better. Right PleurX is in place to sxn.    Of note, spoke with son lyla over the phone who also explained to pt everything going on.  Vital Signs:  Vital Signs Last 24 Hrs  T(C): 36.6 (05-01-21 @ 05:52), Max: 36.8 (04-30-21 @ 11:06)  T(F): 97.9 (05-01-21 @ 05:52), Max: 98.2 (04-30-21 @ 11:06)  HR: 66 (05-01-21 @ 05:52) (62 - 74)  BP: 135/55 (05-01-21 @ 05:52) (104/44 - 151/53)  RR: 16 (05-01-21 @ 05:52) (12 - 19)  SpO2: 99% (05-01-21 @ 05:52) (94% - 100%) on (O2)    Pertinent Physical Exam:  Telemetry/Alarms: Afib  General: WN/WD NAD  Neck: Neck supple, trachea midline, No JVD,   Respiratory: CTA B/L, No wheezing, rales, rhonchi  CV: Afib  Abdominal: Soft, NT, ND +BS,   Extremities: No edema, + peripheral pulses  Skin: No Rashes, Hematoma, Ecchymosis  Incisions: CDI  Tubes: Right PleurX in place to PleurEvac on suction with no AL    Chest Tube: Right Side  Air Leak: Yes[] / No[x]    Drainage: 240/24hrs serosangenous    I&O's Summary    30 Apr 2021 07:01  -  01 May 2021 07:00  --------------------------------------------------------  IN: 480 mL / OUT: 1090 mL / NET: -610 mL        Relevant labs, radiology and Medications reviewed                        9.1    7.33  )-----------( 181      ( 01 May 2021 07:00 )             28.3     05-01    134<L>  |  99  |  18  ----------------------------<  94  3.7   |  24  |  1.19    Ca    8.2<L>      01 May 2021 07:00    TPro  6.4  /  Alb  4.1  /  TBili  0.7  /  DBili  x   /  AST  29  /  ALT  36<H>  /  AlkPhos  110  04-29    PT/INR - ( 29 Apr 2021 14:43 )   PT: 13.4 sec;   INR: 1.18 ratio         PTT - ( 29 Apr 2021 14:43 )  PTT:33.0 sec  MEDICATIONS  (STANDING):  aMIOdarone    Tablet 200 milliGRAM(s) Oral daily  amLODIPine   Tablet 5 milliGRAM(s) Oral daily  atorvastatin 20 milliGRAM(s) Oral at bedtime  heparin   Injectable 2500 Unit(s) SubCutaneous every 12 hours  levothyroxine 100 MICROGram(s) Oral daily  megestrol 20 milliGRAM(s) Oral daily  metoprolol succinate ER 12.5 milliGRAM(s) Oral daily  senna 2 Tablet(s) Oral at bedtime    MEDICATIONS  (PRN):  ondansetron    Tablet 8 milliGRAM(s) Oral three times a day PRN Nausea and/or Vomiting      Assessment  89yo F with multiple complicated medical issues including afib on Eliquis (holding), HTN, PPM, Severe AS s/p failed TAVR candidate, Pancreatic mass with liver and lung mets presenting with recurrent malignant pleural effusion and worsening SOB.     5/1 POD #1 from RVATS, right pleurX catheter insertion and placement. Planning for possible left sided pleurX placement on Monday.  PLAN  Neuro: Pain is currently well controlled. No pain meds on board, will adjust as needed.   Pulm: Encourage coughing, deep breathing and use of incentive spirometry. Wean off supplemental oxygen as able. May need supplemental O2 but will work with IS and chest PT. Daily CXR. PleurX in place, monitoring output and on suction at this time.   Cardio: Monitor telemetry/alarms. Hx of afib - Amio and toprol on board. Eliquis cont to be on hold. HTN cont norvas and toprol. Lipitor for HLD.  GI: Tolerating diet but c/o nausea with pain meds when given and eating. Will monitor if pt cont to tolerate. Cont with Megace and ensure for nutrition. Zofran PRN  Renal: monitor urine output, supplement electrolytes as needed  Vasc: Heparin SC/SCDs for DVT prophylaxis  Heme: Slight drop in HCT, with c/o of dark stool yesterday. Repeat CBC this afternoon. Otherwise pt asymptomatic with no c/o dizziness or weakness. Vitals stable. Guaiac   ID: Off antibiotics. Stable.  Therapy: OOB/ambulate  Tubes: Monitor Chest tube output and maintain to sxn  Disposition: Possible OR monday for left PleurX. Monitor cbc and guaiac.  Discussed with Cardiothoracic Team at AM rounds.

## 2021-05-01 NOTE — PROGRESS NOTE ADULT - SUBJECTIVE AND OBJECTIVE BOX
Name of Patient : ESME PARIKH  MRN: [id number]  DATE OF SERVICE: 05-01-21 @ 19:17    Subjective: Patient seen and examined. No new events except as noted.     MEDICATIONS:  MEDICATIONS  (STANDING):  aMIOdarone    Tablet 200 milliGRAM(s) Oral daily  amLODIPine   Tablet 5 milliGRAM(s) Oral daily  atorvastatin 20 milliGRAM(s) Oral at bedtime  heparin   Injectable 2500 Unit(s) SubCutaneous every 12 hours  levothyroxine 100 MICROGram(s) Oral daily  megestrol 20 milliGRAM(s) Oral daily  metoprolol succinate ER 12.5 milliGRAM(s) Oral daily  senna 2 Tablet(s) Oral at bedtime      PHYSICAL EXAM:  T(C): 36.4 (05-01-21 @ 16:34), Max: 36.7 (05-01-21 @ 00:29)  HR: 58 (05-01-21 @ 16:34) (58 - 70)  BP: 113/56 (05-01-21 @ 16:34) (104/44 - 135/55)  RR: 18 (05-01-21 @ 16:34) (16 - 18)  SpO2: 95% (05-01-21 @ 16:34) (95% - 99%)  Wt(kg): --  I&O's Summary    30 Apr 2021 07:01  -  01 May 2021 07:00  --------------------------------------------------------  IN: 480 mL / OUT: 1090 mL / NET: -610 mL    01 May 2021 07:01  -  01 May 2021 19:17  --------------------------------------------------------  IN: 0 mL / OUT: 850 mL / NET: -850 mL          Appearance: Normal	  HEENT:  PERRLA   Lymphatic: No lymphadenopathy   Cardiovascular: Normal S1 S2, no JVD  Respiratory: normal effort , clear  Gastrointestinal:  Soft, Non-tender  Skin: No rashes,  warm to touch  Psychiatry:  Mood & affect appropriate  Musculuskeletal: No edema      All labs, Imaging and EKGs personally reviewed         04-30-21 @ 07:01  -  05-01-21 @ 07:00  --------------------------------------------------------  IN: 480 mL / OUT: 1090 mL / NET: -610 mL    05-01-21 @ 07:01  -  05-01-21 @ 19:17  --------------------------------------------------------  IN: 0 mL / OUT: 850 mL / NET: -850 mL                            10.4   9.13  )-----------( 199      ( 01 May 2021 17:53 )             31.9               05-01    134<L>  |  99  |  18  ----------------------------<  94  3.7   |  24  |  1.19    Ca    8.2<L>      01 May 2021 07:00

## 2021-05-01 NOTE — DIETITIAN INITIAL EVALUATION ADULT. - SIGNS/SYMPTOMS
metastatic disease, sacrum unstageable pressure injury moderate muscle wasting, predicted PO intake <75% of estimated needs for >/=1 month

## 2021-05-01 NOTE — DIETITIAN INITIAL EVALUATION ADULT. - ORAL NUTRITION SUPPLEMENTS
2) Add Ensure Clear 1 PO 2x daily (provides 240 kcal, 8 gm protein per 8oz serving); may d/c Ensure Enlive.

## 2021-05-01 NOTE — DIETITIAN INITIAL EVALUATION ADULT. - ORAL INTAKE PTA/DIET HISTORY
ID #[id number].  Pt confirms NKFA, no noted micronutrient supplementation PTA per H&P.  Pt with poor appetite/PO intake x1 month PTA, endorses unintentional weight loss.  Pt with recent [facility] hospitalization, during which time she met criteria for malnutrition, per RDN note (4/8/21).

## 2021-05-01 NOTE — DISCHARGE NOTE PROVIDER - CARE PROVIDERS DIRECT ADDRESSES
[email or web address]

## 2021-05-02 NOTE — PROGRESS NOTE ADULT - SUBJECTIVE AND OBJECTIVE BOX
THORACIC SURGERY PROGRESS NOTE    s/p Right VATS & PleurX Placement 4/30, POD #2    Subjective: states she doesn't feel well today, that he stomach is bothering her and the she "vomited" but appears to be some spit up, not a true emesis episode.       PMHx: Afib, HTN, PPM, Severe AS, Pancreatic Mass with mets to Liver, Lung & Adrenal   Social Hx: none      VITAL SIGNS:  T(C): 36.4 (05-02-21 @ 07:49), Max: 36.9 (05-02-21 @ 00:36)  HR: 73 (05-02-21 @ 07:49) (58 - 73)  BP: 145/52 (05-02-21 @ 07:49) (109/51 - 145/52)  RR: 17 (05-02-21 @ 07:49) (17 - 18)  SpO2: 97% (05-02-21 @ 07:49) (95% - 99%)      LABS:                        9.4    8.86  )-----------( 208      ( 02 May 2021 07:39 )             29.4     05-01    134<L>  |  99  |  18  ----------------------------<  94  3.7   |  24  |  1.19    Ca    8.2<L>      01 May 2021 07:00      MEDICATIONS:  MEDICATIONS  (STANDING):  aMIOdarone    Tablet 200 milliGRAM(s) Oral daily  amLODIPine   Tablet 5 milliGRAM(s) Oral daily  atorvastatin 20 milliGRAM(s) Oral at bedtime  dronabinol 2.5 milliGRAM(s) Oral every 6 hours  heparin   Injectable 2500 Unit(s) SubCutaneous every 12 hours  levothyroxine 100 MICROGram(s) Oral daily  megestrol 20 milliGRAM(s) Oral daily  metoprolol succinate ER 12.5 milliGRAM(s) Oral daily  ondansetron Injectable 4 milliGRAM(s) IV Push once  senna 2 Tablet(s) Oral at bedtime    MEDICATIONS  (PRN):  acetaminophen   Tablet .. 650 milliGRAM(s) Oral every 6 hours PRN Temp greater or equal to 38C (100.4F), Mild Pain (1 - 3)  HYDROmorphone  Injectable 0.5 milliGRAM(s) IV Push every 4 hours PRN break through pain  ondansetron    Tablet 8 milliGRAM(s) Oral three times a day PRN Nausea and/or Vomiting  oxyCODONE    IR 5 milliGRAM(s) Oral every 4 hours PRN Moderate Pain (4 - 6)  oxyCODONE    IR 10 milliGRAM(s) Oral every 4 hours PRN Severe Pain (7 - 10)        PHYSICAL EXAM:   General: well developed, well nourished, NAD  Neuro: awake, alert, responds to commands, nonfocal, CABRERA x 4  Eyes: scleras clear b/l, PERRLA/ EOMI, Gross vision intact  ENT: Gross hearing intact, grossly patent pharynx, no stridor  Neck: Neck supple, trachea midline, No JVD  Respiratory: CTA B/L, No wheezing, rales, rhonchi, poor cough, PleurX is to suction  CV: RRR, +S1 +S2, no murmurs, rubs or gallops  Abdominal: Soft, NT, mild distention, no rebound, no guarding, + bowel sounds, tympanic to percussion in the LLQ  Extremities: No edema, + peripheral pulses  Skin: No Rashes, Hematoma, Ecchymosis  Lymphatic: No Neck, axilla, groin LAD  Psych: Oriented x 3, normal affect      Incisions: clean, healing well, no skin erythema or discharge    Tubes: PleurX to suction, drained 170cc

## 2021-05-02 NOTE — RAPID RESPONSE TEAM SUMMARY - NSOTHERINTERVENTIONSRRT_GEN_ALL_CORE
Patient started on BiPAP 15/5 with recovery of Spo2 to >90%. Transferred to CTICU for higher level of care.     RRT run by thoracic surgery team / CTICU attending.

## 2021-05-02 NOTE — PROGRESS NOTE ADULT - ASSESSMENT
89yo F with multiple complicated medical issues including afib on Eliquis (holding), HTN, PPM, Severe AS s/p failed TAVR candidate, Pancreatic mass with liver and lung mets presenting with recurrent malignant pleural effusion and worsening SOB s/p RVATS, right pleurX catheter placement.       PLAN  1. Pain management PRN  2. Encourage coughing, deep breathing & use of incentive spirometry. Wean off supplemental oxygen as able. Daily CXR. Chest PT  3. Tolerating diet. Continue bowel regimen  4. Continue DVT PPx w/ Heparin SC & Venodynes for DVT prophylaxis  5. OOB/ambulate  6. Monitor PleurX output  7. Non-contrast Chest CT today to evaluate Left pleural space & if effusion is present  8. If CT negative then no need for Left PleurX & patient can be discharged home tomorrow 5/3, if there is an effusion present then patient will go to the OR monday 5/3 for a Left sided PleurX catheter placement.   9. Possible NPO after midnight      Discussed with Cardiothoracic Team at AM rounds.    Leroy Leija PA-C, MPAS  Thoracic Surgery   Spectra [phone]

## 2021-05-02 NOTE — PROGRESS NOTE ADULT - SUBJECTIVE AND OBJECTIVE BOX
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 Patient found to have malignant pleural effusion s/p pigtail catheter on 4/13 by Thoracic surgery - cytopath with adenoCA. Per limited CT coronaries ill defined mass noted in pancreatic tail vs spleen. 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 Oncology and GI consulted. 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 Chest tube removed 4/19.Patient  also with severe AS initially planned for TAVR however procedure held off in light of malignancy workup. Patient also with hx of hypothyroidism - synthroid increased to 100mcg. Pt was d/c'd to home on 4/20/21 on Eliquis with planned outpt FU with Oncology. Pt presents to [facility] ER today with severe worsening SOB over past several days. Per pt's daughter pt unable to tolerate any physical activity without significant MOULTON. Has productive cough and c/o occasional abd pain w nausea/vomitting. Pt has been loosing weight. Thought to be reaccumulating pleural effusion so pt stopped eliquis yesterday morning in case procedure needed. Per daughter, no plans for Chemo at this time due to pt's weakened state. Would want Pleurx for palliative reasons. Pt denies HA, dizziness, chest pain, fever, chills, LE edema. Pt resting comfortably at rest wearing 3LNC. O2 sat 95%.  (29 Apr 2021 17:01)    Procedure : RVATS, right pleurX catheter insertion and placement. Planning for possible left sided pleurX placement on Monday.    Issues:  Acute respiratory failure, hypoxia, on BiPAP  Pleural effusion  Metastatic cancer  CAD  Severe AS  HTN      PAST MEDICAL & SURGICAL HISTORY:  HTN (hypertension)    CAD (coronary artery disease)  coronary stents x 2, 2007    HLD (hyperlipidemia)    Asthma    PAF (paroxysmal atrial fibrillation)    Scoliosis    History of cholecystectomy    H/O tubal ligation    History of bilateral knee replacement    History of cataract surgery  bilateral    S/P cholecystectomy    Cardiac pacemaker              VITAL SIGNS:  Vital Signs Last 24 Hrs  T(C): 37 (02 May 2021 16:18), Max: 37.1 (02 May 2021 10:09)  T(F): 98.6 (02 May 2021 16:18), Max: 98.7 (02 May 2021 10:09)  HR: 75 (02 May 2021 16:18) (60 - 80)  BP: 135/61 (02 May 2021 16:18) (106/47 - 145/52)  BP(mean): --  RR: 17 (02 May 2021 16:18) (16 - 18)  SpO2: 99% (02 May 2021 16:18) (96% - 99%)    I/Os:   I&O's Detail    01 May 2021 07:01  -  02 May 2021 07:00  --------------------------------------------------------  IN:  Total IN: 0 mL    OUT:    Chest Tube (mL): 200 mL    Voided (mL): 1050 mL  Total OUT: 1250 mL    Total NET: -1250 mL      02 May 2021 07:01  -  02 May 2021 17:29  --------------------------------------------------------  IN:    Oral Fluid: 300 mL  Total IN: 300 mL    OUT:    Chest Tube (mL): 50 mL    Voided (mL): 400 mL  Total OUT: 450 mL    Total NET: -150 mL          CAPILLARY BLOOD GLUCOSE          =======================MEDICATIONS===================  MEDICATIONS  (STANDING):  aMIOdarone    Tablet 200 milliGRAM(s) Oral daily  amLODIPine   Tablet 5 milliGRAM(s) Oral daily  atorvastatin 20 milliGRAM(s) Oral at bedtime  dronabinol 2.5 milliGRAM(s) Oral every 6 hours  heparin   Injectable 2500 Unit(s) SubCutaneous every 12 hours  levothyroxine 100 MICROGram(s) Oral daily  megestrol 20 milliGRAM(s) Oral daily  metoprolol succinate ER 12.5 milliGRAM(s) Oral daily  ondansetron Injectable 4 milliGRAM(s) IV Push once  senna 2 Tablet(s) Oral at bedtime    MEDICATIONS  (PRN):  acetaminophen   Tablet .. 650 milliGRAM(s) Oral every 6 hours PRN Temp greater or equal to 38C (100.4F), Mild Pain (1 - 3)  HYDROmorphone  Injectable 0.5 milliGRAM(s) IV Push every 4 hours PRN break through pain  ondansetron    Tablet 8 milliGRAM(s) Oral three times a day PRN Nausea and/or Vomiting  oxyCODONE    IR 5 milliGRAM(s) Oral every 4 hours PRN Moderate Pain (4 - 6)  oxyCODONE    IR 10 milliGRAM(s) Oral every 4 hours PRN Severe Pain (7 - 10)      PHYSICAL EXAM============================  General:                         Awake, alert, in acute resp distress, on BiPAP  Neuro:                            Moving all extremities to commands.   Respiratory:	Air entry fair and  bilateral conducted sounds                                           Effort even and unlabored.  CV:		Regular rate and rhythm. Normal S1/S2, tachycardia                                          Distal pulses present.  Abdomen:	                     Soft, non-distended. Bowel sounds present   Skin:		No rash.  Extremities:	Warm, no cyanosis or edema.  Palpable pulses    ============================LABS=========================                        9.8    x     )-----------( 241      ( 02 May 2021 17:15 )             31.1     05-02    134<L>  |  98  |  20  ----------------------------<  123<H>  3.9   |  27  |  1.15    Ca    9.0      02 May 2021 07:39    TPro  5.5<L>  /  Alb  3.0<L>  /  TBili  0.4  /  DBili  x   /  AST  24  /  ALT  24  /  AlkPhos  88  05-02    LIVER FUNCTIONS - ( 02 May 2021 07:39 )  Alb: 3.0 g/dL / Pro: 5.5 g/dL / ALK PHOS: 88 U/L / ALT: 24 U/L / AST: 24 U/L / GGT: x                   ============================IMAGING STUDIES=========================  < from: CT Chest No Cont (05.02.21 @ 14:00) >    FINDINGS:    LUNGS AND AIRWAYS: Interval improvement in aeration of the right lower lobe.Right lower lobe interlobular septal thickening with some superimposed groundglass predominantly new since April 29, 2021 may be secondary to be expansion pulmonary edema. Redemonstrated multiple bilateral small nodular opacities, majority of which are without significant interval change since April 29, 2021 given superimposed motion artifact and a few of which within the left upper lobe are new since April 17, 2021. A left upper lobe 1 cm nodular opacity on image 53 of series 2 may have slightlyprogressed since April 29, 2021.  PLEURA: Interval placement of right-sided chest tube with interval decrease in size of lower portions of the right-sided pleural effusion in the region of the right-sided chest tube. Additional smaller unchanged loculated portions of the right pleural effusion within the mid to upper right hemithorax. Unchanged small left pleural effusion.  MEDIASTINUM AND TASIA: Redemonstrated multiple small mediastinal lymph nodes.  VESSELS: Atherosclerotic changes.  HEART: Cardiomegaly. No pericardial effusion. Coronary artery and aortic valvular calcifications.  CHEST WALL AND LOWER NECK: Left chest wall pacemaker with lead tips in the right atrium and ventricle. Redemonstrated enlarged heterogeneous thyroid gland, right greater than left. Small amount of right lower chest wall subcutaneous emphysema.  VISUALIZED UPPER ABDOMEN: Hyperdense liver. Redemonstrated multiple small scattered ill-defined hypodense hepatic lesions. Redemonstrated left adrenal gland nodule. Cholecystectomy. Redemonstrated heterogeneous pancreatic tail mass, again extending to the splenic hilum, better evaluated on prior abdominal CT imaging.  BONES: Degenerative changes.    IMPRESSION:  Interval placement of right-sided chest tube with interval decrease in size of of the lower portions of the right-sided pleural effusion. Additional smaller unchanged loculated portions of the right pleural effusion. Unchanged small left pleural effusion.    Right lower lung interlobular septal thickeningwith some superimposed groundglass appears new since April 29, 2021 may be related to reexpansion pulmonary edema.    Multiple bilateral small nodular opacities, majority of which are unchanged since April 29, 2021. A 1 cm left upper lobe nodular opacity may have slightly progressed 20/9/2021 suggestive of infectious/inflammatory etiology.            =============================NEUROLOGY============================  Pain control with Tylenol IV   Nonfocal  ==============================RESPIRATORY========================  Pt acutely decompesated on floor with acute hypoxic resp failure, not responding to 100% NRB, now on BiPAP  A line, ABG monitoring  CT chest reviewed  ? RLL PNA  Will MARIE cx, start IV abxs  Using incentive spirometry  Monitor chest tube output  Chest tube to suction 	  Continue bronchodilators, pulmonary toilet  Aggressive chest PT  ============================CARDIOVASCULAR======================  Continue hemodynamic monitoring.  Not on any pressors    =====================RENAL===================  Continue LR   Monitor I/Os and electrolytes    ====================GASTROINTESTINAL===================  NPOntinue GI prophylaxis with Pepcid   Continue Zofran / Reglan for nausea - PRN	    ========================HEMATOLOGIC/ONCOLOGIC====================  Monitor chest tube output. No signs of active bleeding.   Follow CBC in AM    ============================INFECTIOUS DISEASE========================  Monitor for fever / leukocytosis.  All surgical incision / chest tube  sites look clean      Pt is on GI & DVT prophylaxis  OOB & ambulate       Pertinent clinical, laboratory, radiographic, hemodynamic, echocardiographic, respiratory data, microbiologic data and chart were reviewed and analyzed frequently throughout the course of the day and night  Patient seen, examined and plan discussed with CT Surgery / CTICU team during rounds.    Pt's status discussed with family at bedside, updated status, pt is full code   I spent 30 minutes at bedside providing critical care from 5pm to 11pm       Nalini Reyes DO, FACEP

## 2021-05-02 NOTE — CHART NOTE - NSCHARTNOTEFT_GEN_A_CORE
Medical RRT called for hypoxia. Pt evaluated by medical team, found to have SpO2 80% on NRB. Respiratory called stat. Pt found to be a CT surgical pt, surgical RRT subsequently called and care handed off to surgical team.     Karen Valle PGY3  MAR

## 2021-05-03 NOTE — SWALLOW BEDSIDE ASSESSMENT ADULT - ADDITIONAL RECOMMENDATIONS
1. Oral care to prevent the colonization of oral cavity bacteria  2. Consideration for GI consult given patient's report of globus sensation in the mid chest area

## 2021-05-03 NOTE — PROGRESS NOTE ADULT - SUBJECTIVE AND OBJECTIVE BOX
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 Patient found to have malignant pleural effusion s/p pigtail catheter on 4/13 by Thoracic surgery - cytopath with adenoCA. Per limited CT coronaries ill defined mass noted in pancreatic tail vs spleen. 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 Oncology and GI consulted. 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 Chest tube removed 4/19.Patient  also with severe AS initially planned for TAVR however procedure held off in light of malignancy workup. Patient also with hx of hypothyroidism - synthroid increased to 100mcg. Pt was d/c'd to home on 4/20/21 on Eliquis with planned outpt FU with Oncology. Pt presents to [facility] ER today with severe worsening SOB over past several days. Per pt's daughter pt unable to tolerate any physical activity without significant MOULTON. Has productive cough and c/o occasional abd pain w nausea/vomitting. Pt has been loosing weight. Thought to be reaccumulating pleural effusion so pt stopped eliquis yesterday morning in case procedure needed. Per daughter, no plans for Chemo at this time due to pt's weakened state. Would want Pleurx for palliative reasons. Pt denies HA, dizziness, chest pain, fever, chills, LE edema. Pt resting comfortably at rest wearing 3LNC. O2 sat 95%.  (29 Apr 2021 17:01)    Pt was brought to ICU from floor  for SOB, possible aspiration pneumonia      Procedure: Insertion of right pleurex cathter under local and conscious sedation; approx 500cc of serous sanguinous fluid drained   4/30/2021                 Issues:   Pleural effusion  Metastatic cancer  CAD  Severe AS  HTN  PAF  Asthma  Hypothyroidism                           Home Medications:  amiodarone 200 mg oral tablet: 1 tab(s) orally once a day (16 Apr 2021 17:10)  amLODIPine 5 mg oral tablet: 1 tab(s) orally once a day (16 Apr 2021 17:10)  atorvastatin 20 mg oral tablet: 1 tab(s) orally once a day (16 Apr 2021 17:10)  Eliquis 2.5 mg oral tablet: 1 tab(s) orally 2 times a day (16 Apr 2021 17:10)  levothyroxine 100 mcg (0.1 mg) oral tablet: 1 tab(s) orally once a day (20 Apr 2021 13:24)  Zofran 8 mg oral tablet: 1 tab(s) orally 3 times a day, As Needed (29 Apr 2021 16:53)      PAST MEDICAL & SURGICAL HISTORY:  HTN (hypertension)  CAD (coronary artery disease)  coronary stents x 2, 2007  HLD (hyperlipidemia)  Asthma  PAF (paroxysmal atrial fibrillation)  Scoliosis  History of cholecystectomy  H/O tubal ligation  History of bilateral knee replacement  History of cataract surgery  bilateral  S/P cholecystectomy  Cardiac pacemaker      Vital Signs Last 24 Hrs  T(C): 36.8 (03 May 2021 04:00), Max: 37.3 (02 May 2021 18:00)  T(F): 98.2 (03 May 2021 04:00), Max: 99.1 (02 May 2021 18:00)  HR: 72 (03 May 2021 09:00) (60 - 86)  BP: 118/53 (03 May 2021 09:00) (103/51 - 153/81)  BP(mean): 70 (03 May 2021 09:00) (63 - 98)  RR: 18 (03 May 2021 09:00) (16 - 30)  SpO2: 98% (03 May 2021 09:00) (92% - 100%)  I&O's Detail    02 May 2021 07:01  -  03 May 2021 07:00  --------------------------------------------------------  IN:    IV PiggyBack: 350 mL    Lactated Ringers: 700 mL    Oral Fluid: 300 mL  Total IN: 1350 mL    OUT:    Chest Tube (mL): 90 mL    Voided (mL): 650 mL  Total OUT: 740 mL    Total NET: 610 mL      03 May 2021 07:01  -  03 May 2021 09:25  --------------------------------------------------------  IN:    Lactated Ringers: 50 mL    sodium chloride 0.9%: 50 mL  Total IN: 100 mL    OUT:    Voided (mL): 300 mL  Total OUT: 300 mL    Total NET: -200 mL      CAPILLARY BLOOD GLUCOSE      Home Medications:  amiodarone 200 mg oral tablet: 1 tab(s) orally once a day (16 Apr 2021 17:10)  amLODIPine 5 mg oral tablet: 1 tab(s) orally once a day (16 Apr 2021 17:10)  atorvastatin 20 mg oral tablet: 1 tab(s) orally once a day (16 Apr 2021 17:10)  Eliquis 2.5 mg oral tablet: 1 tab(s) orally 2 times a day (16 Apr 2021 17:10)  levothyroxine 100 mcg (0.1 mg) oral tablet: 1 tab(s) orally once a day (20 Apr 2021 13:24)  Zofran 8 mg oral tablet: 1 tab(s) orally 3 times a day, As Needed (29 Apr 2021 16:53)      MEDICATIONS  (STANDING):  aMIOdarone    Tablet 200 milliGRAM(s) Oral daily  amLODIPine   Tablet 5 milliGRAM(s) Oral daily  atorvastatin 20 milliGRAM(s) Oral at bedtime  dronabinol 2.5 milliGRAM(s) Oral every 6 hours  heparin   Injectable 2500 Unit(s) SubCutaneous every 12 hours  levothyroxine Injectable 50 MICROGram(s) IV Push <User Schedule>  megestrol 20 milliGRAM(s) Oral daily  metoprolol succinate ER 12.5 milliGRAM(s) Oral daily  piperacillin/tazobactam IVPB.. 3.375 Gram(s) IV Intermittent every 12 hours  saline laxative (FLEET) Rectal Enema 1 Enema Rectal once  senna 2 Tablet(s) Oral at bedtime  sodium chloride 0.9%. 1000 milliLiter(s) (50 mL/Hr) IV Continuous <Continuous>    MEDICATIONS  (PRN):  acetaminophen   Tablet .. 650 milliGRAM(s) Oral every 6 hours PRN Temp greater or equal to 38C (100.4F), Mild Pain (1 - 3)  HYDROmorphone  Injectable 0.5 milliGRAM(s) IV Push every 4 hours PRN break through pain  ondansetron    Tablet 8 milliGRAM(s) Oral three times a day PRN Nausea and/or Vomiting  oxyCODONE    IR 5 milliGRAM(s) Oral every 4 hours PRN Moderate Pain (4 - 6)  oxyCODONE    IR 10 milliGRAM(s) Oral every 4 hours PRN Severe Pain (7 - 10)      Physical exam:                             General:               Pt is awake, alert,  appears to be in pain but not in  distress                                                  Neuro:                  Nonfocal                             Cardiovascular:   S1 & S2, regular / irregular                          Respiratory:         Air entry is fair and equal on both sides, has bilateral conducted sounds                           GI:                          Soft, nondistended and nontender, Bowel sounds active                            Ext:                        No cyanosis or edema     Labs:                                                                           8.9    14.04 )-----------( 192      ( 03 May 2021 04:28 )             27.0             05-03    130<L>  |  94<L>  |  21  ----------------------------<  143<H>  4.0   |  25  |  1.26    Ca    8.7      03 May 2021 04:28  Phos  2.3     05-02  Mg     1.9     05-02    TPro  5.3<L>  /  Alb  3.0<L>  /  TBili  0.6  /  DBili  x   /  AST  19  /  ALT  22  /  AlkPhos  77  05-03                    LIVER FUNCTIONS - ( 03 May 2021 04:28 )  Alb: 3.0 g/dL / Pro: 5.3 g/dL / ALK PHOS: 77 U/L / ALT: 22 U/L / AST: 19 U/L / GGT: x             Culture - Sputum (collected 03 May 2021 00:00)  Source: .Sputum Sputum  Gram Stain (03 May 2021 02:56):    Rare polymorphonuclear leukocytes per low power field    Rare Squamous epithelial cells per low power field    Rare Gram Negative Coccobacilli per oil power field    Few Gram Negative Rods per oil power field    Moderate Gram positive cocci in pairs per oil power field        CXR:  < from: CT Chest No Cont (05.02.21 @ 14:00) >  Interval placement of right-sided chest tube with interval decrease in size of of the lower portions of the right-sided pleural effusion. Additional smaller unchanged loculated portions of the right pleural effusion. Unchanged small left pleural effusion.    Right lower lung interlobular septal thickeningwith some superimposed groundglass appears new since April 29, 2021 may be related to reexpansion pulmonary edema.    Multiple bilateral small nodular opacities, majority of which are unchanged since April 29, 2021. A 1 cm left upper lobe nodular opacity may have slightly progressed 20/9/2021 suggestive of infectious/inflammatory etiology.        Plan:    General: 89yFemale s/p Insertion of right pleurex cathter under local and conscious sedation; approx 500cc of serous sanguinous fluid drained   4/30/2021, experiencing  pain with deep breathing.                             Neuro:                                         Pain control with   Tylenol PRN                            Cardiovascular:                                          HTN: Continue hemodynamic monitoring and Lopressor / Norvasc    PAF: Rate control with Lopressor   Hold anticoagulation for now                            Respiratory:                                         Pt is on 2L  nasal canula, wean off as tolerated                                          Comfortable, not in any distress.                                         Encourage incentive spirometry                                          Monitor chest tube output                                                          Continue bronchodilators, pulmonary toilet                            GI                                         NPO for now     Possible aspiration, awaiting swallow eval today                                         Continue Zofran / Reglan for nausea - PRN	                                                                 Renal:                                         Hyponatremia - most likely dilutional. Continue NS  50cc/hr                                         Monitor I/Os and electrolytes                                                                                        Hem/ Onc:                                                                                  Monitor chest tube output &  signs of bleeding.                                          Follow CBC in AM                           Infectious disease:      ? Aspiration pneumonia - Continue Zosyn                                          Monitor for fever / leukocytosis. Follow cultures                                          All surgical incision / chest tube  sites look clean                            Endocrine      Hypothyroidism: On Synthroid                                          Continue Accu-Checks with coverage.     Pt is on SQ Heparin and Venodyne boots for DVT prophylaxis.     Pertinent clinical, laboratory, radiographic, hemodynamic, echocardiographic, respiratory data, microbiologic data and chart were reviewed and analyzed frequently throughout the course of the day and night  Patient seen, examined and plan discussed with CT Surgeon Dr. Zheng  / CTICU team during rounds.    Status discussed with patient /  updated plan of care.           Ori Thomas MD

## 2021-05-03 NOTE — SWALLOW BEDSIDE ASSESSMENT ADULT - H & P REVIEW
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 Patient found to have malignant pleural effusion s/p pigtail catheter on 4/13 by Thoracic surgery - cytopath with adenoCA. Per limited CT coronaries ill defined mass noted in pancreatic tail vs spleen. 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 Oncology and GI consulted. 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 Chest tube removed 4/19.Patient  also with severe AS initially planned for TAVR however procedure held off in light of malignancy workup. Patient also with hx of hypothyroidism - synthroid increased to 100mcg. Pt was d/c'd to home on 4/20/21 on Eliquis with planned outpt FU with Oncology. Pt presents to [facility] ER today with severe worsening SOB over past several days. Per pt's daughter pt unable to tolerate any physical activity without significant MOULTON. Has productive cough and c/o occasional abd pain w nausea/vomitting. Pt has been loosing weight. Thought to be reaccumulating pleural effusion so pt stopped eliquis yesterday morning in case procedure needed. Per daughter, no plans for Chemo at this time due to pt's weakened state. Would want Pleurx for palliative reasons. Pt denies HA, dizziness, chest pain, fever, chills, LE edema. Pt resting comfortably at rest wearing 3LNC. O2 sat 95%./yes

## 2021-05-03 NOTE — SWALLOW BEDSIDE ASSESSMENT ADULT - SWALLOW EVAL: DIAGNOSIS
Patient consumed trials of puree, solids, honey thick liquids, nectar thick liquids and thin liquids. Oral phase characterized  by adequate acceptance, adequate anterior bolus containment, functional mastication and manipulation, anterior to posterior transport and adequate oral cavity clearance. Pharyngeal phase characterized by initiation of the pharyngeal swallow and hyolaryngeal elevation and excursion noted upon palpation.  No clinically overt signs or symptoms of aspiration across trials.  However, patient reporting “the water is still in my throat” after thin liquids trials.  Patient reports globus sensation with all consistencies while pointing to her midchest area.  Given reports of recent choking incident, aforementioned presentation, and recent chest CT results, recommend cinesophagram before initiation of PO diet consistency.

## 2021-05-03 NOTE — SWALLOW BEDSIDE ASSESSMENT ADULT - SWALLOW EVAL: RECOMMENDED DIET
1. PO is contraindicated, consideration for short term alternate means of nutrition/hydration/medication 2. Cinesophagram given recent choking event, and aforementioned presentation

## 2021-05-03 NOTE — SWALLOW BEDSIDE ASSESSMENT ADULT - COMMENTS
Chest CT: nterval improvement in aeration of the right lower lobe. Right lower lobe interlobular septal thickening with some superimposed groundglass predominantly new since April 29, 2021 may be secondary to be expansion pulmonary edema. Redemonstrated multiple bilateral small nodular opacities, majority of which are without significant interval change since April 29, 2021 given superimposed motion artifact and a few of which within the left upper lobe are new since April 17, 2021. A left upper lobe 1 cm nodular opacity on image 53 of series 2 may have slightly progressed since April 29, 2021.    SLP spoke with RN who reported patient had choking incident yesterday which preceded her hypoxia RRT.      SLP received patient sitting upright in bed, awake, alert, able to follow directions, answer questions and engage in conversation.  Language Line Used for English-Mandarin translation acct# [id number].  Patient reports she feels as though there is a “blockage” while pointing to her anterior neck, which caused her choking incident yesterday when attempting to drink and eat.  Patient reported she drank water and then began to feel it was stuck in her throat and she could not breathe.  Patient with nasal cannula in place for supplemental O2 support.

## 2021-05-04 NOTE — SWALLOW VFSS/MBS ASSESSMENT ADULT - PHARYNGEAL PHASE COMMENTS
There is reduced base of tongue retraction, reduced hyolaryngeal elevation and excursion, reduced pharyngeal contraction and reduced laryngeal vestibule closure.

## 2021-05-04 NOTE — SWALLOW VFSS/MBS ASSESSMENT ADULT - DIAGNOSTIC IMPRESSIONS
Patient with Mild –Moderate Oral Dysphagia and Mild-Moderate Pharyngeal Dysphagia.    Oral phase characterized by adequate acceptance, adequate anterior bolus containment, slow bolus manipulation, reduced tongue to palate seal with moderate premature spillage of honey thick liquids, nectar thick liquids and thin liquids to the hypopharynx, delayed anterior to posterior transport with repetitive lingual movement, piecemeal transport resulting in two swallows with puree and solids and adequate oral cavity clearance.  Pharyngeal phase characterized by delayed pharyngeal swallow initiating at the pyriforms for thin liquids.  There is reduced base of tongue retraction, reduced hyolaryngeal elevation and excursion, reduced pharyngeal contraction and reduced laryngeal vestibule closure.  Mild Pharyngeal clearance deficits evidenced by mild vallecula, mild lateral pharyngeal wall residue and mild pyriform residue (puree/solids>liquids).  There is no Laryngeal Penetration or Aspiration visualized before, during or after the swallow with puree, honey thick liquids and solids.  There is Laryngeal Penetration with complete retrieval with thin liquids during the swallow.  There is one instance of deep Laryngeal Penetration during the swallow with Nectar thick liquids without complete retrieval leaving trace residue in the laryngeal vestibule above the level of the vocal folds.  Cued cough did not completely clear the upper airway/laryngeal vestibule.  Small Sips eliminates Laryngeal Penetration.      Given aforementioned presentation, acuity of multiple diagnoses and recent choking episode, recommend initiating a more conservative diet consistency of puree and honey thick liquids.

## 2021-05-04 NOTE — PROGRESS NOTE ADULT - SUBJECTIVE AND OBJECTIVE BOX
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 Patient found to have malignant pleural effusion s/p pigtail catheter on 4/13 by Thoracic surgery - cytopath with adenoCA. Per limited CT coronaries ill defined mass noted in pancreatic tail vs spleen. 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 Oncology and GI consulted. 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 Chest tube removed 4/19.Patient  also with severe AS initially planned for TAVR however procedure held off in light of malignancy workup. Patient also with hx of hypothyroidism - synthroid increased to 100mcg. Pt was d/c'd to home on 4/20/21 on Eliquis with planned outpt FU with Oncology. Pt presents to [facility] ER today with severe worsening SOB over past several days. Per pt's daughter pt unable to tolerate any physical activity without significant MOULTON. Has productive cough and c/o occasional abd pain w nausea/vomitting. Pt has been loosing weight. Thought to be reaccumulating pleural effusion so pt stopped eliquis yesterday morning in case procedure needed. Per daughter, no plans for Chemo at this time due to pt's weakened state. Would want Pleurx for palliative reasons. Pt denies HA, dizziness, chest pain, fever, chills, LE edema. Pt resting comfortably at rest wearing 3LNC. O2 sat 95%.  (29 Apr 2021 17:01)        Procedure: Insertion of right pleurex cathter under local and conscious sedation; approx 500cc of serous sanguinous fluid drained   4/30/2021                 Issues:   Hypoxia on O2  Pleural effusion  Metastatic cancer  CAD  Severe AS  HTN  PAF  Asthma  Hypothyroidism                           Home Medications:  amiodarone 200 mg oral tablet: 1 tab(s) orally once a day (16 Apr 2021 17:10)  amLODIPine 5 mg oral tablet: 1 tab(s) orally once a day (16 Apr 2021 17:10)  atorvastatin 20 mg oral tablet: 1 tab(s) orally once a day (16 Apr 2021 17:10)  Eliquis 2.5 mg oral tablet: 1 tab(s) orally 2 times a day (16 Apr 2021 17:10)  levothyroxine 100 mcg (0.1 mg) oral tablet: 1 tab(s) orally once a day (20 Apr 2021 13:24)  Zofran 8 mg oral tablet: 1 tab(s) orally 3 times a day, As Needed (29 Apr 2021 16:53)    PAST MEDICAL & SURGICAL HISTORY:  HTN (hypertension)    CAD (coronary artery disease)  coronary stents x 2, 2007    HLD (hyperlipidemia)    Asthma    PAF (paroxysmal atrial fibrillation)    Scoliosis    History of cholecystectomy    H/O tubal ligation    History of bilateral knee replacement    History of cataract surgery  bilateral    S/P cholecystectomy    Cardiac pacemaker              VITAL SIGNS:  Vital Signs Last 24 Hrs  T(C): 36.5 (04 May 2021 08:00), Max: 36.9 (03 May 2021 20:00)  T(F): 97.7 (04 May 2021 08:00), Max: 98.4 (03 May 2021 20:00)  HR: 78 (04 May 2021 11:00) (66 - 85)  BP: 85/61 (03 May 2021 19:00) (85/61 - 136/59)  BP(mean): 66 (03 May 2021 19:00) (66 - 78)  RR: 31 (04 May 2021 11:00) (16 - 31)  SpO2: 96% (04 May 2021 11:00) (87% - 100%)    I/Os:   I&O's Detail    03 May 2021 07:01  -  04 May 2021 07:00  --------------------------------------------------------  IN:    IV PiggyBack: 270 mL    Lactated Ringers: 50 mL    sodium chloride 0.9%: 1150 mL  Total IN: 1470 mL    OUT:    Chest Tube (mL): 95 mL    Voided (mL): 1250 mL  Total OUT: 1345 mL    Total NET: 125 mL      04 May 2021 07:01  -  04 May 2021 12:01  --------------------------------------------------------  IN:    IV PiggyBack: 200 mL  Total IN: 200 mL    OUT:    Chest Tube (mL): 15 mL    Voided (mL): 250 mL  Total OUT: 265 mL    Total NET: -65 mL          CAPILLARY BLOOD GLUCOSE          =======================MEDICATIONS===================  MEDICATIONS  (STANDING):  ALBUTerol    0.083% 2.5 milliGRAM(s) Nebulizer every 6 hours  aMIOdarone    Tablet 200 milliGRAM(s) Oral daily  amLODIPine   Tablet 5 milliGRAM(s) Oral daily  atorvastatin 20 milliGRAM(s) Oral at bedtime  dornase susy Solution 2.5 milliGRAM(s) Inhalation daily  dronabinol 2.5 milliGRAM(s) Oral every 6 hours  heparin   Injectable 2500 Unit(s) SubCutaneous every 12 hours  levothyroxine Injectable 75 MICROGram(s) IV Push <User Schedule>  megestrol 20 milliGRAM(s) Oral daily  metoprolol succinate ER 12.5 milliGRAM(s) Oral daily  ondansetron Injectable 4 milliGRAM(s) IV Push every 6 hours  piperacillin/tazobactam IVPB.. 3.375 Gram(s) IV Intermittent every 12 hours  senna 2 Tablet(s) Oral at bedtime    MEDICATIONS  (PRN):  acetaminophen  IVPB .. 700 milliGRAM(s) IV Intermittent once PRN Temp greater or equal to 38C (100.4F), Mild Pain (1 - 3)  HYDROmorphone  Injectable 0.5 milliGRAM(s) IV Push every 4 hours PRN break through pain  ondansetron    Tablet 8 milliGRAM(s) Oral three times a day PRN Nausea and/or Vomiting  oxyCODONE    IR 5 milliGRAM(s) Oral every 4 hours PRN Moderate Pain (4 - 6)  oxyCODONE    IR 10 milliGRAM(s) Oral every 4 hours PRN Severe Pain (7 - 10)        PHYSICAL EXAM============================  General:                         Awake, alert, not in any distress  Neuro:                            Moving all extremities to commands.   Respiratory:	Air entry fair and  bilateral conducted sounds                                          Decreased BS on Right   CV:		Regular rate and rhythm. Normal S1/S2                                          Distal pulses present.  Abdomen:	                     Soft, non-distended. Bowel sounds present   Skin:		No rash.  Extremities:	Warm, no cyanosis or edema.  Palpable pulses    ============================LABS=========================                        8.7    8.07  )-----------( 169      ( 04 May 2021 04:29 )             26.6     05-04    136  |  99  |  16  ----------------------------<  95  3.3<L>   |  26  |  1.12    Ca    8.6      04 May 2021 04:29  Phos  3.2     05-04  Mg     2.1     05-04    TPro  5.3<L>  /  Alb  3.0<L>  /  TBili  0.6  /  DBili  x   /  AST  19  /  ALT  22  /  AlkPhos  77  05-03    LIVER FUNCTIONS - ( 03 May 2021 04:28 )  Alb: 3.0 g/dL / Pro: 5.3 g/dL / ALK PHOS: 77 U/L / ALT: 22 U/L / AST: 19 U/L / GGT: x             ABG - ( 03 May 2021 04:28 )  pH, Arterial: 7.42  pH, Blood: x     /  pCO2: 42    /  pO2: 96    / HCO3: 26    / Base Excess: 2.2   /  SaO2: 98.0        89yFemale s/p Insertion of right pleurex cathter under local and conscious sedation; approx 500cc of serous sanguinous fluid drained   4/30/2021, experiencing  pain with deep breathing.                             Neuro:                                         Pain control with   Tylenol PRN                            Cardiovascular:                                          HTN: Continue hemodynamic monitoring and Lopressor / Norvasc    PAF: Rate control with Lopressor   Hold anticoagulation for now                            Respiratory:                                         Pt is on 2L-3L  nasal canula, wean off as tolerated , will need home home                              Hypoxia: Sats below 87% when RA                                         Comfortable, not in any distress.                                         Encourage incentive spirometry                                          Monitor chest tube output                                                          Continue bronchodilators, pulmonary toilet                            GI                                         NPO for now     Possible aspiration, awaiting swallow eval result today                                         Continue Zofran / Reglan for nausea - PRN	                                                                 Renal:                                         Hyponatremia - most likely dilutional. Continue NS  50cc/hr                                         Monitor I/Os and electrolytes                                                                                        Hem/ Onc:                                                                                  Monitor chest tube output &  signs of bleeding.                                          Follow CBC in AM                           Infectious disease:      ? Aspiration pneumonia - Continue Zosyn                                          Monitor for fever / leukocytosis. Follow cultures                                          All surgical incision / chest tube  sites look clean                            Endocrine      Hypothyroidism: On Synthroid                                          Continue Accu-Checks with coverage.     Pt is on SQ Heparin and Venodyne boots for DVT prophylaxis.     Pertinent clinical, laboratory, radiographic, hemodynamic, echocardiographic, respiratory data, microbiologic data and chart were reviewed and analyzed frequently throughout the course of the day and night  Patient seen, examined and plan discussed with CT Surgeon Dr. Zheng  / CTICU team during rounds.    Status discussed with patient and son /  updated plan of care. Family wants pt home and prefer to  take care of her at home,         Nalini Reyes DO FACEP

## 2021-05-04 NOTE — SWALLOW VFSS/MBS ASSESSMENT ADULT - ADDITIONAL RECOMMENDATIONS
1. Monitor tolerance  2. Swallow therapy pending discharge plans (rehab versus home care versus out patient at [facility] [phone]), this service will attempt to follow up as schedule permits

## 2021-05-04 NOTE — SWALLOW VFSS/MBS ASSESSMENT ADULT - H & P REVIEW
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 Patient found to have malignant pleural effusion s/p pigtail catheter on 4/13 by Thoracic surgery - cytopath with adenoCA. Per limited CT coronaries ill defined mass noted in pancreatic tail vs spleen. 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 Oncology and GI consulted. 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 Chest tube removed 4/19.Patient  also with severe AS initially planned for TAVR however procedure held off in light of malignancy workup. Patient also with hx of hypothyroidism - synthroid increased to 100mcg. Pt was d/c'd to home on 4/20/21 on Eliquis with planned outpt FU with Oncology. Pt presents to [facility] ER today with severe worsening SOB over past several days. Per pt's daughter pt unable to tolerate any physical activity without significant MOULTON. Has productive cough and c/o occasional abd pain w nausea/vomitting. Pt has been loosing weight. Thought to be reaccumulating pleural effusion so pt stopped eliquis yesterday morning in case procedure needed. Per daughter, no plans for Chemo at this time due to pt's weakened state. Would want Pleurx for palliative reasons. Pt denies HA, dizziness, chest pain, fever, chills, LE edema. Pt resting comfortably at rest wearing 3LNC. O2 sat 95%./yes

## 2021-05-04 NOTE — SWALLOW VFSS/MBS ASSESSMENT ADULT - COMMENTS
Chest CT: nterval improvement in aeration of the right lower lobe. Right lower lobe interlobular septal thickening with some superimposed groundglass predominantly new since April 29, 2021 may be secondary to be expansion pulmonary edema. Redemonstrated multiple bilateral small nodular opacities, majority of which are without significant interval change since April 29, 2021 given superimposed motion artifact and a few of which within the left upper lobe are new since April 17, 2021. A left upper lobe 1 cm nodular opacity on image 53 of series 2 may have slightly progressed since April 29, 2021.    Patient seen for clinical swallow evaluation on 5/3/21, please refer to report for full details.      SLP received patient seated upright in holsted chair, awake, alert, able to follow simple directions and answer simple questions in English.  Patient with nasal cannula in place for supplemental O2.

## 2021-05-04 NOTE — CHART NOTE - NSCHARTNOTEFT_GEN_A_CORE
89 year old female, hx afib, htn, PPM, severe AS, Pancreatic mass with mets to liver, lung, adrenals. s/p RVATS, pleurx   Patient on room air with O2 sat 87%  Patient on 2L NC with sat 97%  Dx: J91 89 year old female, hx afib, htn, PPM, severe AS, Pancreatic mass with mets to liver, lung, adrenals. s/p RVATS, pleurx   Patient on room air at rest with O2 sat 87%   Dx: J91

## 2021-05-05 NOTE — PROGRESS NOTE ADULT - PROVIDER SPECIALTY LIST ADULT
Critical Care
Thoracic Surgery
Critical Care
Internal Medicine
Thoracic Surgery
Critical Care
Critical Care

## 2021-05-05 NOTE — DISCHARGE NOTE NURSING/CASE MANAGEMENT/SOCIAL WORK - PATIENT PORTAL LINK FT
You can access the FollowMyHealth Patient Portal offered by [facility] by registering at the following website: [email or web address]. By joining [facility]’s FollowMyHealth portal, you will also be able to view your health information using other applications (apps) compatible with our system.

## 2021-05-05 NOTE — PROGRESS NOTE ADULT - NUTRITIONAL ASSESSMENT
This patient has been assessed with a concern for Malnutrition and has been determined to have a diagnosis/diagnoses of Moderate protein-calorie malnutrition and Underweight (BMI < 19).    This patient is being managed with:   Diet NPO-  Entered: May  3 2021  8:22AM    
This patient has been assessed with a concern for Malnutrition and has been determined to have a diagnosis/diagnoses of Moderate protein-calorie malnutrition and Underweight (BMI < 19).    This patient is being managed with:   Diet Regular-  Supplement Feeding Modality:  Oral  Ensure Clear Cans or Servings Per Day:  1       Frequency:  Two Times a day  Entered: May  1 2021  4:00PM    Diet Regular-  Supplement Feeding Modality:  Oral  Ensure Enlive Cans or Servings Per Day:  3       Frequency:  Three Times a day  Entered: Apr 29 2021  4:55PM    The following pending diet order is being considered for treatment of Moderate protein-calorie malnutrition and Underweight (BMI < 19):null
This patient has been assessed with a concern for Malnutrition and has been determined to have a diagnosis/diagnoses of Moderate protein-calorie malnutrition and Underweight (BMI < 19).    This patient is being managed with:   Diet Dysphagia 1 Pureed-Honey Consistency Fluid-  Entered: May  4 2021 10:57AM    
This patient has been assessed with a concern for Malnutrition and has been determined to have a diagnosis/diagnoses of Moderate protein-calorie malnutrition and Underweight (BMI < 19).    This patient is being managed with:   Diet Dysphagia 1 Pureed-Honey Consistency Fluid-  Entered: May  4 2021 10:57AM    
This patient has been assessed with a concern for Malnutrition and has been determined to have a diagnosis/diagnoses of Moderate protein-calorie malnutrition and Underweight (BMI < 19).    This patient is being managed with:   Diet Regular-  Supplement Feeding Modality:  Oral  Ensure Clear Cans or Servings Per Day:  1       Frequency:  Two Times a day  Entered: May  1 2021  4:00PM    Diet Regular-  Supplement Feeding Modality:  Oral  Ensure Enlive Cans or Servings Per Day:  3       Frequency:  Three Times a day  Entered: Apr 29 2021  4:55PM    The following pending diet order is being considered for treatment of Moderate protein-calorie malnutrition and Underweight (BMI < 19):null

## 2021-05-05 NOTE — PROGRESS NOTE ADULT - SUBJECTIVE AND OBJECTIVE BOX
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 Patient found to have malignant pleural effusion s/p pigtail catheter on 4/13 by Thoracic surgery - cytopath with adenoCA. Per limited CT coronaries ill defined mass noted in pancreatic tail vs spleen. 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 Oncology and GI consulted. 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 Chest tube removed 4/19.Patient  also with severe AS initially planned for TAVR however procedure held off in light of malignancy workup. Patient also with hx of hypothyroidism - synthroid increased to 100mcg. Pt was d/c'd to home on 4/20/21 on Eliquis with planned outpt FU with Oncology. Pt presents to [facility] ER today with severe worsening SOB over past several days. Per pt's daughter pt unable to tolerate any physical activity without significant MOULTON. Has productive cough and c/o occasional abd pain w nausea/vomitting. Pt has been loosing weight. Thought to be reaccumulating pleural effusion so pt stopped eliquis yesterday morning in case procedure needed. Per daughter, no plans for Chemo at this time due to pt's weakened state. Would want Pleurx for palliative reasons. Pt denies HA, dizziness, chest pain, fever, chills, LE edema. Pt resting comfortably at rest wearing 3LNC. O2 sat 95%.  (29 Apr 2021 17:01)    Pt was brought to ICU from floor  for SOB, possible aspiration pneumonia      Procedure: Insertion of right pleurex cathter under local and conscious sedation; approx 500cc of serous sanguinous fluid drained   4/30/2021                 Issues:   Pleural effusion  Metastatic cancer  CAD  Severe AS  HTN  PAF  Asthma  Hypothyroidism                 Home Medications:  amiodarone 200 mg oral tablet: 1 tab(s) orally once a day (16 Apr 2021 17:10)  amLODIPine 5 mg oral tablet: 1 tab(s) orally once a day (16 Apr 2021 17:10)  atorvastatin 20 mg oral tablet: 1 tab(s) orally once a day (16 Apr 2021 17:10)  Eliquis 2.5 mg oral tablet: 1 tab(s) orally 2 times a day (16 Apr 2021 17:10)  levothyroxine 100 mcg (0.1 mg) oral tablet: 1 tab(s) orally once a day (20 Apr 2021 13:24)  Zofran 8 mg oral tablet: 1 tab(s) orally 3 times a day, As Needed (29 Apr 2021 16:53)      PAST MEDICAL & SURGICAL HISTORY:  HTN (hypertension)    CAD (coronary artery disease)  coronary stents x 2, 2007    HLD (hyperlipidemia)    Asthma    PAF (paroxysmal atrial fibrillation)    Scoliosis    History of cholecystectomy    H/O tubal ligation    History of bilateral knee replacement    History of cataract surgery  bilateral    S/P cholecystectomy    Cardiac pacemaker      Vital Signs Last 24 Hrs  T(C): 36.8 (05 May 2021 04:00), Max: 36.8 (05 May 2021 04:00)  T(F): 98.3 (05 May 2021 04:00), Max: 98.3 (05 May 2021 04:00)  HR: 74 (05 May 2021 07:00) (70 - 89)  BP: --  BP(mean): --  RR: 18 (05 May 2021 07:00) (15 - 31)  SpO2: 99% (05 May 2021 07:00) (87% - 100%)  I&O's Detail    04 May 2021 07:01  -  05 May 2021 07:00  --------------------------------------------------------  IN:    IV PiggyBack: 300 mL  Total IN: 300 mL    OUT:    Chest Tube (mL): 50 mL    Voided (mL): 850 mL  Total OUT: 900 mL    Total NET: -600 mL    CAPILLARY BLOOD GLUCOSE    Home Medications:  amiodarone 200 mg oral tablet: 1 tab(s) orally once a day (16 Apr 2021 17:10)  amLODIPine 5 mg oral tablet: 1 tab(s) orally once a day (16 Apr 2021 17:10)  atorvastatin 20 mg oral tablet: 1 tab(s) orally once a day (16 Apr 2021 17:10)  Eliquis 2.5 mg oral tablet: 1 tab(s) orally 2 times a day (16 Apr 2021 17:10)  levothyroxine 100 mcg (0.1 mg) oral tablet: 1 tab(s) orally once a day (20 Apr 2021 13:24)  Zofran 8 mg oral tablet: 1 tab(s) orally 3 times a day, As Needed (29 Apr 2021 16:53)      MEDICATIONS  (STANDING):  ALBUTerol    0.083% 2.5 milliGRAM(s) Nebulizer every 6 hours  aMIOdarone    Tablet 200 milliGRAM(s) Oral daily  amLODIPine   Tablet 5 milliGRAM(s) Oral daily  atorvastatin 20 milliGRAM(s) Oral at bedtime  dornase susy Solution 2.5 milliGRAM(s) Inhalation daily  dronabinol 2.5 milliGRAM(s) Oral every 6 hours  heparin   Injectable 2500 Unit(s) SubCutaneous every 12 hours  levothyroxine Injectable 75 MICROGram(s) IV Push <User Schedule>  megestrol 20 milliGRAM(s) Oral daily  metoprolol succinate ER 12.5 milliGRAM(s) Oral daily  ondansetron Injectable 4 milliGRAM(s) IV Push every 6 hours  piperacillin/tazobactam IVPB.. 3.375 Gram(s) IV Intermittent every 12 hours  senna 2 Tablet(s) Oral at bedtime    MEDICATIONS  (PRN):  acetaminophen  IVPB .. 700 milliGRAM(s) IV Intermittent once PRN Temp greater or equal to 38C (100.4F), Mild Pain (1 - 3)  HYDROmorphone  Injectable 0.5 milliGRAM(s) IV Push every 4 hours PRN break through pain  ondansetron    Tablet 8 milliGRAM(s) Oral three times a day PRN Nausea and/or Vomiting  oxyCODONE    IR 5 milliGRAM(s) Oral every 4 hours PRN Moderate Pain (4 - 6)  oxyCODONE    IR 10 milliGRAM(s) Oral every 4 hours PRN Severe Pain (7 - 10)      Physical exam:           General:               Pt is awake, alert,  appears to be in pain but not in  distress                                                  Neuro:                  Nonfocal                             Cardiovascular:   S1 & S2, regular / irregular                          Respiratory:         Air entry is fair and equal on both sides, has bilateral conducted sounds                           GI:                          Soft, nondistended and nontender, Bowel sounds active                            Ext:                        No cyanosis or edema                             Labs:                                                                           8.2    8.14  )-----------( 178      ( 05 May 2021 05:03 )             25.8             05-05    134<L>  |  99  |  15  ----------------------------<  126<H>  3.9   |  27  |  1.05    Ca    8.7      05 May 2021 05:03  Phos  3.2     05-04  Mg     2.0     05-05                      Culture - Blood (collected 02 May 2021 23:07)  Source: .Blood Blood-Arterial  Preliminary Report (04 May 2021 01:02):    No growth to date.    Culture - Sputum (collected 02 May 2021 20:00)  Source: .Sputum Sputum  Gram Stain (03 May 2021 02:56):    Rare polymorphonuclear leukocytes per low power field    Rare Squamous epithelial cells per low power field    Rare Gram Negative Coccobacilli per oil power field    Few Gram Negative Rods per oil power field    Moderate Gram positive cocci in pairs per oil power field  Final Report (04 May 2021 17:28):            CXR:  < from: Xray Cinesophagram Swallow Function w/ Contrast (05.04.21 @ 08:51) >  There is evidence of penetration with the tested consistencies.    For further information and recommendations, please refer to the speech pathologist final report which is available for review in the electronic medical record.      Plan:    General: 89yFemale s/p Insertion of right pleurex cathter under local and conscious sedation; approx 500cc of serous sanguinous fluid drained   4/30/2021, experiencing  pain with deep breathing.                             Neuro:                                         Pain control with   Tylenol PRN                            Cardiovascular:                                          HTN: Continue hemodynamic monitoring and Lopressor / Norvasc    PAF: Rate control with Lopressor   Restart anticoagulation                             Respiratory:                                         Pt is on 2L  nasal canula, desaturated on RA  Pt will be discharged on Home O2                                         Comfortable, not in any distress.                                         Encourage incentive spirometry                                          PleurX capped                                                     Continue bronchodilators, pulmonary toilet                            GI                                         Continue Dysphagia diet     Continue Zofran / Reglan for nausea - PRN	                                                                 Renal:                                         Hyponatremia - most likely dilutional. Continue NS  50cc/hr                                         Monitor I/Os and electrolytes                                                                                        Hem/ Onc:                                                                                  No active issues                                                                  Infectious disease:      ? Aspiration pneumonia - On Zosyn, switch to Augmentin x 5 days                                          No fever / leukocytosis. Cultures NGTD                                          All surgical incision / chest tube  sites look clean                            Endocrine      Hypothyroidism: On Synthroid                                          Continue Accu-Checks with coverage.     Pt is on SQ Heparin and Venodyne boots for DVT prophylaxis.     Pertinent clinical, laboratory, radiographic, hemodynamic, echocardiographic, respiratory data, microbiologic data and chart were reviewed and analyzed frequently throughout the course of the day and night  Patient seen, examined and plan discussed with CT Surgeon Dr. Zheng  / CTICU team during rounds.    Pt will be discharged home today on home O2    Status discussed with patient /  updated plan of care.           Ori Thomas MD

## 2021-05-05 NOTE — HOSPICE CARE NOTE - CONVESATION DETAILS
After discussion with son Gino with plans to sign hospice consents Gino decided to consider hospice at a later date when patient is home in the community.  He prefers alternative DC plan at this time. This writer spoke to medical staff re same plan.

## 2021-05-05 NOTE — CHART NOTE - NSCHARTNOTEFT_GEN_A_CORE
Direct referral to hospice.  Left message for hospice nurse.  Primary team to have  place order.  Will hold on consult.  Apoorva Richardson DO

## 2021-05-07 NOTE — ED PROVIDER NOTE - PMH
Atrial fibrillation, unspecified type    Constipation, unspecified constipation type    Coronary artery disease, angina presence unspecified, unspecified vessel or lesion type, unspecified whether native or transplanted heart    Hypertension, unspecified type    Hypotension, unspecified hypotension type    Malignant neoplasm of pancreas, unspecified location of malignancy  Stage 4

## 2021-05-07 NOTE — ED PROVIDER NOTE - CLINICAL SUMMARY MEDICAL DECISION MAKING FREE TEXT BOX
89F w/ acute respiratory failure likely 2/2 acute pulmonary edema secondary to metastatic pleural effusions, ?CHF; r/o infectious etiology although low suspicion, r/o anemia, r/o electrolyte abnormalities, r/o ACS; complex patient with multiple differentials; will do labs, imaging, symptomatic treatment, BiPAP, re-eval 89F w/ acute respiratory failure likely 2/2 acute pulmonary edema secondary to metastatic pleural effusions, ?CHF; r/o infectious etiology although low suspicion, r/o anemia, r/o electrolyte abnormalities, r/o ACS; complex patient with multiple differentials; will do labs, imaging, symptomatic treatment, BiPAP, re-eval; extensive discussion with family regarding patient's status/disposition.

## 2021-05-07 NOTE — ED PROVIDER NOTE - NS ED ROS FT
ROS:  GEN: (-) fevers/chills  HEENT: (-) visual change  NECK: (-) swelling  RESP: (+) shortness of breath, (+) cough, (-) sputum, (-) hemoptysis, (+) MOULTON  CV: (-) chest pain, (-) palpitations, (-) PND/orthopnea  GI: (-) nausea, (-) vomiting, (-) pain, (-) constipation, (-) diarrhea  : (-) frequency/urgency, (-) hematuria, (-) dysuria  EXT: (+) edema  ENDO: (-) polyuria  NEURO: (-) paresthesias, (-) weakness, (-) headache, (-) dizziness, (-) syncope  MSK: no muscle pain   SKIN: (-) rash

## 2021-05-07 NOTE — ED PROVIDER NOTE - PROGRESS NOTE DETAILS
Patient appears well on BiPAP VS improved, attempted to trial patient of BiPAP without success desaturated to 85% with increase in respiratory effort, will continue BiPAP, admit to ICU.

## 2021-05-07 NOTE — ED ADULT NURSE NOTE - PSH
H/O chest tube placement    H/O heart artery stent  history of 4 stents and pacemaker as per daughter

## 2021-05-07 NOTE — ED PROVIDER NOTE - OBJECTIVE STATEMENT
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 Per daughter patient was discharged from [facility] 2 days ago and was supposed to have a visiting nurse drain fluid from her chest which did not occur. Patient in the ED states that she feels very short of breath, speaking 1-2 word sentences, nods/yes no; alleviated when she sits up and BiPAP, exacerbated with position changes and lying flat. No sick contacts at home, no fevers.

## 2021-05-07 NOTE — ED PROVIDER NOTE - PRIOR HOSPITAL/ED VISITS SUMMARY FREE TEXT FOR MDM OBTAINED AND REVIEWED OLD RECORDS QUESTION
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 Patient found to have malignant pleural effusion s/p pigtail catheter on 4/13 by Thoracic surgery - cytopath with adenoCA. Per limited CT coronaries ill defined mass noted in pancreatic tail vs spleen. 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 Oncology and GI consulted. 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 Chest tube removed 4/19.Patient  also with severe AS initially planned for TAVR however procedure held off in light of malignancy workup. Patient also with hx of hypothyroidism - synthroid increased to 100mcg. Pt was d/c'd to home on 4/20/21 on Eliquis with planned outpt FU with Oncology. Pt presents to [facility] ER today with severe worsening SOB over past several days. Per pt's daughter pt unable to tolerate any physical activity without significant MOULTON. Has productive cough and c/o occasional abd pain w nausea/vomitting. Pt has been loosing weight. Thought to be reaccumulating pleural effusion so pt stopped eliquis yesterday morning in case procedure needed. Per daughter, no plans for Chemo at this time due to pt's weakened state. Would want Pleurx for palliative reasons. Pt denies HA, dizziness, chest pain, fever, chills, LE edema. Pt resting comfortably at rest wearing 3LNC. O2 sat 95%. 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 Patient found to have malignant pleural effusion s/p pigtail catheter on 4/13 by Thoracic surgery - cytopath with adenoCA. Per limited CT coronaries ill defined mass noted in pancreatic tail vs spleen. 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 Oncology and GI consulted. 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 Chest tube removed 4/19.Patient  also with severe AS initially planned for TAVR however procedure held off in light of malignancy workup. Patient also with hx of hypothyroidism - synthroid increased to 100mcg. Pt was d/c'd to home on 4/20/21 on Eliquis with planned outpt FU with Oncology. Pt presents to [facility] ER today with severe worsening SOB over past several days. Per pt's daughter pt unable to tolerate any physical activity without significant MOULTON. Has productive cough and c/o occasional abd pain w nausea/vomitting. Pt has been loosing weight. Thought to be reaccumulating pleural effusion so pt stopped eliquis yesterday morning in case procedure needed. Per daughter, no plans for Chemo at this time due to pt's weakened state. Would want Pleurx for palliative reasons. Pt denies HA, dizziness, chest pain, fever, chills, LE edema. Pt resting comfortably at rest wearing 3LNC. O2 sat 95%. Patient failed bedside swallow eval and speech& swallow recommended CINE. Patient had CINE and was recommended for Puree and Honey thick liquid diet. Bipap successfully weaned but patient still requiring oxygen via nasal cannula. She is for discharge home.

## 2021-05-07 NOTE — ED PROVIDER NOTE - PHYSICAL EXAMINATION
PHYSICAL EXAMINATION:  VITALS REVIEWED.  Hypertensive, tachypneic, afebrile, otherwise normal  GENERALIZED APPEARANCE:  Uncomfortable appearing, chronically ill appearing female   SKIN:  Warm, dry, no cyanosis  HEAD:  No obvious scalp lesions  EYES:  Conjunctiva pink, no icterus  ENMT:  Mucus membranes moist, no stridor  NECK:  Supple, non-tender  CHEST AND RESPIRATORY:  Coarse breath sounds bilaterally, crackles at lung bases LEFT > RIGHT  HEART AND CARDIOVASCULAR:  Regular rate, +murmur  ABDOMEN AND GI:  Soft, non-tender, non-distended.  No rebound, no guarding  EXTREMITIES:  No deformity or calf tenderness, (+) 2 pitting edema B/L  NEURO: AAOx3, gross motor and sensory intact

## 2021-05-07 NOTE — ED PROVIDER NOTE - CARE PLAN
Principal Discharge DX:	Acute pulmonary edema   Principal Discharge DX:	Acute pulmonary edema  Secondary Diagnosis:	Acute respiratory failure

## 2021-05-07 NOTE — ED ADULT NURSE NOTE - OBJECTIVE STATEMENT
Pt came in by ambulance on CPAP. Pt appears to be in respiratory distress, tachypneic, labored breathing. As per EMS, Pt's SpO2 was in the 80s on room air. Pt recently admitted to the ICU for 4 days for aspiration pneumonia. Pt has right sided pleural drain. Pt has clear secretions that she is able to expectorate. Pos frequent coughing. Lower extremities with mild edema. Sacral area looks like there was an old bed sore that is now healing.    Pt's daughter states a visiting nurse came today at 1030am to evaluate and assess patient and drain. Drain was not touched by visiting nurse as per daughter.

## 2021-05-07 NOTE — ED ADULT NURSE NOTE - CHIEF COMPLAINT QUOTE
Pt BIB EMS from home c/o respiratory distress. Pt in 80s on RA, presents to ED on BiPAP.  ISAR positive

## 2021-05-07 NOTE — ED ADULT NURSE NOTE - NSIMPLEMENTINTERV_GEN_ALL_ED
Implemented All Fall with Harm Risk Interventions:  [place] to call system. Call bell, personal items and telephone within reach. Instruct patient to call for assistance. Room bathroom lighting operational. Non-slip footwear when patient is off stretcher. Physically safe environment: no spills, clutter or unnecessary equipment. Stretcher in lowest position, wheels locked, appropriate side rails in place. Provide visual cue, wrist band, yellow gown, etc. Monitor gait and stability. Monitor for mental status changes and reorient to person, place, and time. Review medications for side effects contributing to fall risk. Reinforce activity limits and safety measures with patient and family. Provide visual clues: red socks.

## 2021-05-07 NOTE — ED ADULT TRIAGE NOTE - CHIEF COMPLAINT QUOTE
Pt BIB EMS from home c/o respiratory distress. Pt in 80s on RA, presents to ED BiPAP. Pt BIB EMS from home c/o respiratory distress. Pt in 80s on RA, presents to ED on BiPAP.  ISAR positive

## 2021-05-07 NOTE — ED ADULT NURSE REASSESSMENT NOTE - NS ED NURSE REASSESS COMMENT FT1
Pt trial off BiPAP, at MD Swain's, request unsuccessful. Pt desat to 81% within 2 minutes on room air. Pt placed on NRB at 15LPM, SpO2 improved to 91%, RR 28. With minor movements in bed, Pt's SpO2 decreased to 87%. Pt placed back on BiPAP. MD Swain made aware.

## 2021-05-07 NOTE — GOALS OF CARE CONVERSATION - ADVANCED CARE PLANNING - CONVERSATION DETAILS
Spoke with daughter who states patient for hospice referral but given patient currently has normal mental status they do not wish to discuss GOC, currently wish for patient to remain full code; per daughter if patient's mental status declines or becomes worse and patient becomes sicker they may be read to have GOC conversation but currently per daughter "do everything."

## 2021-05-08 NOTE — H&P ADULT - ASSESSMENT
89 yo F pmhx asthma, HTN, Afib on Eliquis, severe AS, CAD, PPM recent admission to [facility] with dyspnea found to have malignant pleural effusions and metastatic Pancreatic CA w/ mets to adrenals, liver, and lung. Presented to ED tonight with worsening SOB and MOULTON. Admit for acute hypoxic respiratory failure secondary to pulmonary edema and pleural effussions    -Acute hypoxic/hypercapnic respiratory failure; NIV for increased WOB. Attempted weaning off however became acutely dyspneic. Currently on 12/7 60% FiO2. Actively titrating for O2 sat >90%  -Acute pulmonary edema; ordered for Lasix 40mg IV. Guzman for strict I's and O's. Maintain net negative balance. Continue to diurese as tolerated  -Hx malignant pleural effusions s/p right pleurex catheter placement. Appears to have accumulation of fluid on CXR. Will place pleurex catheter to suction in attempt to drain  -Afib rate control w/ Amiodarone and Lopressor  -Known severe AS; monitor fluid status closely. Previously evaluated for TAVR  -Full AC w/ Eliquis    Admit to ICU. Discussed w/ E-ICU attending    CODE STATUS: Full  GOC discussion: Y  Critical care time spent (mins): 38 minutes including time spent reviewing chart, ordering tests/labs, discussing with interdisciplinary team. Not including time spent performing procedures.

## 2021-05-08 NOTE — H&P ADULT - HISTORY OF PRESENT ILLNESS
Patient is a 89y old  Female who presents with a chief complaint of SOB    HPI:  89 yo F pmhx asthma, HTN, Afib on Eliquis, severe AS, CAD, PPM recent admission to [facility] with dyspnea found to have malignant pleural effusions and metastatic Pancreatic CA w/ mets to adrenals, liver, and lung. Presented to ED tonight with worsening SOB and MOULTON. Upon arrival to ED patient noted to be in respiratory distress with WOB requiring NIV. Ordered for Lasix and Nitroglycerin for acute pulmonary edema. Attempted to titrate off NIV by ED attending however patient quickly desaturated with increased WOB

## 2021-05-08 NOTE — PROVIDER CONTACT NOTE (EICU) - RECOMMENDATIONS
- Admit to MICU   - NIV for night to be transitioned to HFNC in AM  - Attach pleurx catheter to suction -20 for now to drain pleural fluid capping at 500-1000 cc  - Needs Aggressive GOC with consideration for palliative care; DNR, DNi and perhaps hospice   - One more dose of Lasix overnight as needed and morphine as needed for pain/SOB/anxiety  - NPO for now, speech eval   - Grim prognosis

## 2021-05-08 NOTE — PROGRESS NOTE ADULT - SUBJECTIVE AND OBJECTIVE BOX
Addendum to H&P/ICU Consult note  - Patient seen and examined today    ICU CONSULT  Location of Patient : [facility]U1 [id number] ( [facility]U1)  Attending requesting Consult:Navi Conley  Chief Complaint : SOB  Reason For consult : SOB      Initial HPI on admission:    89 year old female with pmhx asthma, HTN, Afib on Eliquis, severe AS, CAD, PPM recent admission to [facility] with dyspnea found to have malignant pleural effusions and metastatic Pancreatic CA w/ mets to adrenals, liver, and lung.     Presented to ED tonight with worsening SOB and MOULTON. Upon arrival to ED patient noted to be in respiratory distress with WOB requiring NIV. Ordered for Lasix and Nitroglycerin for acute pulmonary edema. Attempted to titrate off NIV by ED attending however patient quickly desaturated with increased WOB brought to ICU For further care and managment.     (08 May 2021 00:32)      BRIEF HOSPITAL COURSE:   Pleurx drained very little  now disconnected from suction  Patient placed on n/c o2 and appears to tolerate  Unable to provide accurate history or ROS   patient received ntg and lasix by overnight team      PAST MEDICAL & SURGICAL HISTORY:  Coronary artery disease, angina presence unspecified, unspecified vessel or lesion type, unspecified whether native or transplanted heart    Atrial fibrillation, unspecified type    Hypertension, unspecified type    Hypotension, unspecified hypotension type    Constipation, unspecified constipation type    Malignant neoplasm of pancreas, unspecified location of malignancy  Stage 4    H/O chest tube placement    H/O heart artery stent  history of 4 stents and pacemaker as per daughter      Allergies    No Known Allergies    Intolerances    Due to current medical status patient unable to provide medical, social, family history.  History obtained from available medical record.       Medications:  MEDICATIONS  (STANDING):  aMIOdarone    Tablet 200 milliGRAM(s) Oral daily  amLODIPine   Tablet 5 milliGRAM(s) Oral daily  atorvastatin 20 milliGRAM(s) Oral at bedtime  chlorhexidine 2% Cloths 1 Application(s) Topical <User Schedule>  chlorhexidine 4% Liquid 1 Application(s) Topical <User Schedule>  levothyroxine 100 MICROGram(s) Oral daily  metoprolol succinate ER 25 milliGRAM(s) Oral daily  senna 8.6 milliGRAM(s) Oral Tablet - Peds 2 Tablet(s) Oral at bedtime    MEDICATIONS  (PRN):      Home Medications:  Last Order Reconciliation Date: 21 (Admission Reconciliation)  amiodarone 200 mg oral tablet: 1 tab(s) orally once a day (21)  amLODIPine 5 mg oral tablet: 1 tab(s) orally once a day (21)  atorvastatin 20 mg oral tablet: 1 tab(s) orally once a day (21)  Augmentin 875 mg-125 mg oral tablet: 1 tab(s) orally every 12 hours (21)  Eliquis 2.5 mg oral tablet: 1 tab(s) orally 2 times a day (21)  levothyroxine 100 mcg (0.1 mg) oral tablet: 1 tab(s) orally once a day (21)  Metoprolol Succinate ER 25 mg oral tablet, extended release: 0.5 tab(s) orally once a day (21)  Senna 8.6 mg oral tablet: 2 tab(s) orally once a day (at bedtime) (21)  Zofran ODT 8 mg oral tablet, disintegratin tab(s) orally 3 times a day, As Needed (21)        LABS:                        9.6    9.56  )-----------( 159      ( 08 May 2021 05:00 )             31.8     08    135  |  98  |  24<H>  ----------------------------<  112<H>  3.8   |  29  |  1.36<H>    Ca    8.9      08 May 2021 05:00  Phos  3.5       Mg     1.9         TPro  5.6<L>  /  Alb  2.5<L>  /  TBili  0.5  /  DBili  x   /  AST  24  /  ALT  23  /  AlkPhos  80  0508         Trend Cardiac Enzymes  21 @ 00:41  [id number]-CPKMM/Trop I - -- - --  - --  -  --  /  .041     WBC Trend  21 @ 05:00   -  9.56  21 @ 22:30   -  12.31<H>    H/H Trend  21 @ 05:00   -  9.6<L> / 31.8<L>  21 @ 22:30   -  9.6<L> / 31.1<L>    Platelet Trend  21 @ 05:00   -  159  21 @ 22:30   -  272    Trend Sodium  21 @ 05:00   -  135  21 @ 22:30   -  136    Trend Potassium  21 @ 05:00   -  3.8  21 @ 22:30   -  4.0    Trend Bun/Cr  21 @ 05:00  BUN/CR -  24<H> / 1.36<H>  21 @ 22:30  BUN/CR -  22 / 1.32<H>     Glucose Trend  21 @ 05:00   -  -- 112<H> --  21 @ 23:23   -  -- -- 144<H>  21 @ 22:30   -  -- 136<H> --          Color: Yellow / Appearance: Clear / S.020 / pH: x  Gluc: x / Ketone: Trace  / Bili: Negative / Urobili: Negative   Blood: x / Protein: 30 mg/dL / Nitrite: Negative   Leuk Esterase: Negative / RBC: 0-4 /HPF / WBC Negative /HPF   Sq Epi: x / Non Sq Epi: Neg.-Few / Bacteria: Negative /HPF       RADIOLOGY  CXR:    Abnormal right chest, compared to [facility] remains abnormal    CT:  EXAM: CT CHEST   PROCEDURE DATE: May 2 2021        INTERPRETATION: CLINICAL INFORMATION: Metastatic pancreatic cancer, evaluate pleural effusion    COMPARISON: CT chest 2021, CT chest/abdomen/pelvis 2021.    CONTRAST/COMPLICATIONS:  IV Contrast: None  Oral Contrast: None  Complications: None    PROCEDURE:  CT of the Chest was performed.  Sagittal and coronal reformats were performed.    FINDINGS:    LUNGS AND AIRWAYS: Interval improvement in aeration of the right lower lobe. Right lower lobe interlobular septal thickening with some superimposed groundglass predominantly new since 2021 may be secondary to be expansion pulmonary edema. Redemonstrated multiple bilateral small nodular opacities, majority of which are without significant interval change since 2021 given superimposed motion artifact and a few of which within the left upper lobe are new since 2021. A left upper lobe 1 cm nodular opacity on image 53 of series 2 may have slightly progressed since 2021.  PLEURA: Interval placement of right-sided chest tube with interval decrease in size of lower portions of the right-sided pleural effusion in the region of the right-sided chest tube. Additional smaller unchanged loculated portions of the right pleural effusion within the mid to upper right hemithorax. Unchanged small left pleural effusion.  MEDIASTINUM AND TASIA: Redemonstrated multiple small mediastinal lymph nodes.  VESSELS: Atherosclerotic changes.  HEART: Cardiomegaly. No pericardial effusion. Coronary artery and aortic valvular calcifications.  CHEST WALL AND LOWER NECK: Left chest wall pacemaker with lead tips in the right atrium and ventricle. Redemonstrated enlarged heterogeneous thyroid gland, right greater than left. Small amount of right lower chest wall subcutaneous emphysema.  VISUALIZED UPPER ABDOMEN: Hyperdense liver. Redemonstrated multiple small scattered ill-defined hypodense hepatic lesions. Redemonstrated left adrenal gland nodule. Cholecystectomy. Redemonstrated heterogeneous pancreatic tail mass, again extending to the splenic hilum, better evaluated on prior abdominal CT imaging.  BONES: Degenerative changes.    IMPRESSION:  Interval placement of right-sided chest tube with interval decrease in size of of the lower portions of the right-sided pleural effusion. Additional smaller unchanged loculated portions of the right pleural effusion. Unchanged small left pleural effusion.    Right lower lung interlobular septal thickening with some superimposed groundglass appears new since 2021 may be related to reexpansion pulmonary edema.    Multiple bilateral small nodular opacities, majority of which are unchanged since 2021. A 1 cm left upper lobe nodular opacity may have slightly progressed 2021 suggestive of infectious/inflammatory etiology.        ECHO:21  ------------------------------------------------------------------------  Conclusions:  1. Calcified trileaflet aortic valve with decreased  opening. Peak transaortic valve gradient equals 43 mm Hg,mean transaortic valve gradient equals 26 mm Hg, estimated aortic valve area equals0.8 sqcm (by continuity equation),aortic valve velocity time integral equals 77 cm, consistent with severe aortic stenosis. Mild aortic regurgitation.  2. Severely dilated left atrium.  LA volume index = 56cc/m2.  3. Mild concentric left ventricular hypertrophy.  4. Normal left ventricular systolic function. No segmental wall motion abnormalities.  5. Normal tricuspid valve. Moderate tricuspid regurgitation.  6. Estimated pulmonary artery systolic pressure equals 57 mm Hg, assuming rightatrial pressure equals 8 mm Hg, consistent with moderate pulmonary pressures.    EF (Heard Rule): 59 %Doppler Peak Velocity (m/sec): AoV=3.3      VITALS:  T(C): 37.3 (21 @ 04:00), Max: 37.4 (21 @ 01:40)  T(F): 99.1 (21 @ 04:00), Max: 99.3 (21 @ 01:40)  HR: 78 (21 @ 10:13) (69 - 95)  BP: 128/62 (21 @ 09:00) (94/56 - 151/60)  BP(mean): 79 (21 @ 09:00) (59 - 87)  ABP: --  ABP(mean): --  RR: 24 (21 @ 10:13) (14 - 24)  SpO2: 92% (21 @ 10:13) (91% - 99%)  CVP(mm Hg): --  CVP(cm H2O): --    Ins and Outs     21 @ 07:01  -  21 @ 07:00  --------------------------------------------------------  IN: 0 mL / OUT: 1035 mL / NET: -1035 mL    21 @ 07:01  -  21 @ 11:00  --------------------------------------------------------  IN: 0 mL / OUT: 16 mL / NET: -16 mL        Height (cm): 152 (21 @ 02:00)  Weight (kg): 50.8 (21 @ 02:00)  BMI (kg/m2): 22 (21 @ 02:00)        I&O's Detail    07 May 2021 07:  -  08 May 2021 07:00  --------------------------------------------------------  IN:  Total IN: 0 mL    OUT:    Chest Tube (mL): 35 mL    Voided (mL): 1000 mL  Total OUT: 1035 mL    Total NET: -1035 mL      08 May 2021 07:  -  08 May 2021 11:00  --------------------------------------------------------  IN:  Total IN: 0 mL    OUT:    T-Tube (mL): 16 mL  Total OUT: 16 mL    Total NET: -16 mL

## 2021-05-08 NOTE — H&P ADULT - NSHPPHYSICALEXAM_GEN_ALL_CORE
Gen: Critically iill appearing, +NIV  CV: +S1S2  Resp; Decreased right base. +Right pleurex catheter  GI: Soft, NT, ND  Endo: Warm, dry, +LE edema

## 2021-05-08 NOTE — PATIENT PROFILE ADULT - VISION (WITH CORRECTIVE LENSES IF THE PATIENT USUALLY WEARS THEM):
Add 69672 Cpt? (Important Note: In 2017 The Use Of 07059 Is Being Tracked By Cms To Determine Future Global Period Reimbursement For Global Periods): yes Detail Level: Detailed Additional Comments: Both wounds are healing well but progressing slowly. Fibracol was applied to the wounds then a bandage was applied. Patient was advised to continue wound care. Follow up in 1 week. Additional Comments: Both wounds are healing well but progressing slowly. Fibracol was applied to the wounds then a bandage was applied. Patient was advised to continue wound care. Follow up in 1 week. Partially impaired: cannot see medication labels or newsprint, but can see obstacles in path, and the surrounding layout; can count fingers at arm's length

## 2021-05-08 NOTE — PROVIDER CONTACT NOTE (EICU) - BACKGROUND
89F Mandarin speaking asthma, HTN, afib on Eliquis/ amio, severe aortic  stenosis, CAD s/p last 9/2018, and bradycardia s/p PPM with 2021 diagnosis fo metastatic pancreatic cancer to adrenal, pleural cavity, liver with malignant right pleural effusion with recent discharge from [facility] with pleur X catheter with possible home hospice plan comes to  Ed with acute hypoxic respiratory failure

## 2021-05-08 NOTE — PROVIDER CONTACT NOTE (EICU) - ASSESSMENT
1: Acute hypoxic respiratory failure   2: Possible reaccumulation of malignant  right pleural effusion with underlying atelectasis versus pulmonary edema unlikely pneumonia (just treated with 5 days of abx)  3:  Metastatic pancreatic cancer  4: Possible recurrent aspiration pneumonitis

## 2021-05-08 NOTE — H&P ADULT - NSHPLABSRESULTS_GEN_ALL_CORE
9.6    12.31 )-----------( 272      ( 07 May 2021 22:30 )             31.1           136  |  97  |  22  ----------------------------<  136<H>  4.0   |  33<H>  |  1.32<H>    Ca    9.4      07 May 2021 22:30    TPro  6.2  /  Alb  2.8<L>  /  TBili  0.7  /  DBili  x   /  AST  23  /  ALT  30  /  AlkPhos  86      Lactate 1.0            @ 22:30          PT/INR - ( 07 May 2021 22:30 )   PT: 16.1 sec;   INR: 1.35 ratio         PTT - ( 07 May 2021 22:30 )  PTT:29.6 sec  Urinalysis Basic - ( 07 May 2021 22:15 )    Color: Yellow / Appearance: Clear / S.020 / pH: x  Gluc: x / Ketone: Trace  / Bili: Negative / Urobili: Negative   Blood: x / Protein: 30 mg/dL / Nitrite: Negative   Leuk Esterase: Negative / RBC: 0-4 /HPF / WBC Negative /HPF   Sq Epi: x / Non Sq Epi: Neg.-Few / Bacteria: Negative /HPF            ABG - ( 07 May 2021 22:00 )  pH, Arterial: 7.28  pH, Blood: x     /  pCO2: 59    /  pO2: 137   / HCO3: x     / Base Excess: x     /  SaO2: 98

## 2021-05-08 NOTE — PROGRESS NOTE ADULT - ASSESSMENT
Physical Examination:  GENERAL:               Alert, verbal , No acute distress. when taken off bipap    HEENT:                   Frail  No JVD, dry MM  PULM:                     Bilateral air entry, course to auscultation on right , no significant sputum production, No Rales, No Rhonchi, No Wheezing  CVS:                         S1, S2,  ++ Murmur  ABD:                        Soft, nondistended, nontender, normoactive bowel sounds,   EXT:                         No edema, nontender, No Cyanosis or Clubbing   NEURO:                  Alert, Confused, interactive, nonfocal, follows commands  PSYC:                      Calm, unsure Insight.        Assessment  Acute Respiratory Distress / Dyspnea suspect from underlying cancer Right effusion and Acute on chronic diastolic CHF     post obstructive PNA not ruled out   NELY in pt with normal baseline,   Underlying Coronary artery disease, angina presence unspecified, unspecified vessel or lesion type, Atrial fibrillation, Chronic, Hypertension, Malignant neoplasm of pancreas, unspecified location of malignancy Stage 4, CAD with history of 4 stents and pacemaker        Plan  Check CT to evaluate pl effusion  as cause of hypoxia   check LE Duplex to r/o DVT    Acute on chronic diastolic chf could be playing a role as got better with lasix and ntg  n/c o2  bipap prn  OOB,   diet as tolerated  GI/DVT ppx  continue eliquis   hold diuretic  Palliative care f/u and ? hospice eval as pt was on home hospice but also full code.     PMD:				                   Notified(Date):  Family Updated: 	Gino [phone]	                                 Date: 5/8/21    States was independent and normal ~ 2 months ago    found to have AS and was having workup for TAVR    During workup found to have Right pl effusion found to be malignant    Deemed poor candidate for chemo    pleurex placed and     Has Visiting nurse and was planning to start home hospice care.  Sedation & Analgesia:	as above  Diet/Nutrition:		 dysphagia diet - pruee with honey  GI PPx:			PPI    DVT Ppx:		Eliquis  	RISK                                                          Points  	[ ] Previous VTE                                           	3  	[ ] Thrombophilia                                        	2  	[ ] Lower limb paralysis                              	2   	x[ ] Current Cancer                                       	2   	[ ] Immobilization > 24 hrs                        	1  	[ x] ICU/CCU stay > 24 hours                       	1  	[ ] Age > 60                                                   	1  		Total:[ ]      Activity:		  as tolerated               Head of Bed:               35-45  Glycemic Control:        n/a    Lines: PIV  CENTRAL LINE: 	[ ] YES [x ] NO	                    LOCATION:   	                       DATE INSERTED:   	                    REMOVE:  [ ] YES [ ] NO    A-LINE:  	                [ ] YES [x ] NO                      LOCATION:   	                       DATE INSERTED: 		            REMOVE:  [ ] YES [ ] NO    ALEJO: 		        [ ] YES [x ] NO  		                                       DATE INSERTED:		            REMOVE:  [ ] YES [ ] NO      Restraints were deemed necessary to prevent removal of life-sustaining devices [  ] YES   [  x  ]  NO    Disposition: ICU care for another day    Goals of Care: d/w Son Full code

## 2021-05-09 NOTE — DIETITIAN NUTRITION RISK NOTIFICATION - TREATMENT: THE FOLLOWING DIET HAS BEEN RECOMMENDED
Diet, Dysphagia 1 Pureed-Honey Consistency Fluid:   DASH/TLC {Sodium & Cholesterol Restricted}  Supplement Feeding Modality:  Oral  Ensure Enlive Servings Per Day:  1       Frequency:  Daily (05-09-21 @ 14:14) [Pending Verification By Attending]  Diet, Dysphagia 1 Pureed-Honey Consistency Fluid:   DASH/TLC {Sodium & Cholesterol Restricted} (05-08-21 @ 16:38) [Active]

## 2021-05-09 NOTE — DIETITIAN INITIAL EVALUATION ADULT. - ORAL INTAKE PTA/DIET HISTORY
Pt provided brief diet hx. Reports that she has no appetite and nothing tastes good. Attempted to speak with pt's son (Gino Barreto listed in contacts) but no response.

## 2021-05-09 NOTE — PROGRESS NOTE ADULT - SUBJECTIVE AND OBJECTIVE BOX
CC: Patient is a 89y old  Female who presents with a chief complaint of SOB (08 May 2021 11:00)      S: No f/c/n/v/pain. Utilized BIPAP overnight, no acute events/     Patient seen and examined at bedside.    ALLERGIES:  No Known Allergies      MEDICATIONS:  aMIOdarone    Tablet 200 milliGRAM(s) Oral daily  amLODIPine   Tablet 5 milliGRAM(s) Oral daily  apixaban 2.5 milliGRAM(s) Oral every 12 hours  atorvastatin 20 milliGRAM(s) Oral at bedtime  chlorhexidine 2% Cloths 1 Application(s) Topical <User Schedule>  chlorhexidine 4% Liquid 1 Application(s) Topical <User Schedule>  levothyroxine 100 MICROGram(s) Oral daily  metoprolol succinate ER 25 milliGRAM(s) Oral daily  senna 8.6 milliGRAM(s) Oral Tablet - Peds 2 Tablet(s) Oral at bedtime        Vital Signs Last 24 Hrs  T(F): 98.5 (09 May 2021 08:00), Max: 98.8 (09 May 2021 03:00)  HR: 75 (09 May 2021 08:00) (68 - 85)  BP: 146/60 (09 May 2021 08:00) (102/64 - 146/60)  RR: 21 (09 May 2021 08:00) (17 - 23)  SpO2: 96% (09 May 2021 08:00) (94% - 100%)  I&O's Summary    08 May 2021 07:01  -  09 May 2021 07:00  --------------------------------------------------------  IN: 540 mL / OUT: 1166 mL / NET: -626 mL        PHYSICAL EXAM:  General: Cachetic elderly woman in NAD  ENT: MMM  Neck: Supple, No JVD  Lungs: Coarse breath sounds, right pleurx cath in place.  Clear to auscultation bilaterally  Cardio: RRR, S1/S2, ++murmur  Abdomen: Soft, Nontender, Nondistended; Bowel sounds present  Extremities: No cyanosis, No edema  Neuro: no new deficits  Skin: no rashes  Psych: Awake    LABS:                        9.8    9.80  )-----------( 205      ( 09 May 2021 06:42 )             30.6         139  |  99  |  24  ----------------------------<  101  3.9   |  33  |  1.27    Ca    9.2      09 May 2021 06:42  Phos  2.6       Mg     2.1         TPro  5.5  /  Alb  2.4  /  TBili  0.5  /  DBili  x   /  AST  23  /  ALT  22  /  AlkPhos  78      eGFR if Non African American: 37 mL/min/1.73M2 (21 @ 06:42)  eGFR if : 43 mL/min/1.73M2 (21 @ 06:42)    PT/INR - ( 07 May 2021 22:30 )   PT: 16.1 sec;   INR: 1.35 ratio         PTT - ( 07 May 2021 22:30 )  PTT:29.6 sec  Lactate, Blood: 1.0 mmol/L ( @ 22:30)    CARDIAC MARKERS ( 08 May 2021 00:41 )  .041 ng/mL / x     / x     / x     / x            TSH 5.77   TSH with FT4 reflex --  Total T3 --      ABG - ( 07 May 2021 22:00 )  pH, Arterial: 7.28  pH, Blood: x     /  pCO2: 59    /  pO2: 137   / HCO3: x     / Base Excess: x     /  SaO2: 98                          Urinalysis Basic - ( 07 May 2021 22:15 )    Color: Yellow / Appearance: Clear / S.020 / pH: x  Gluc: x / Ketone: Trace  / Bili: Negative / Urobili: Negative   Blood: x / Protein: 30 mg/dL / Nitrite: Negative   Leuk Esterase: Negative / RBC: 0-4 /HPF / WBC Negative /HPF   Sq Epi: x / Non Sq Epi: Neg.-Few / Bacteria: Negative /HPF        Culture - Blood (collected 07 May 2021 22:30)  Source: .Blood Blood-Peripheral  Preliminary Report (09 May 2021 06:00):    No growth to date.    Culture - Blood (collected 07 May 2021 22:30)  Source: .Blood Blood-Peripheral  Preliminary Report (09 May 2021 06:00):    No growth to date.    Culture - Urine (collected 07 May 2021 22:15)  Source: .Urine Clean Catch (Midstream)  Final Report (09 May 2021 08:05):    <10,000 CFU/mL Normal Urogenital Sara        RADIOLOGY & ADDITIONAL TESTS:    Care Discussed with Consultants/Other Providers:    CC: Patient is a 89y old  Female who presents with a chief complaint of SOB (08 May 2021 11:00)    S: No f/c/n/v/pain. Utilized BIPAP overnight, no acute events/     Patient seen and examined at bedside.    ALLERGIES:  No Known Allergies      MEDICATIONS:  aMIOdarone    Tablet 200 milliGRAM(s) Oral daily  amLODIPine   Tablet 5 milliGRAM(s) Oral daily  apixaban 2.5 milliGRAM(s) Oral every 12 hours  atorvastatin 20 milliGRAM(s) Oral at bedtime  chlorhexidine 2% Cloths 1 Application(s) Topical <User Schedule>  chlorhexidine 4% Liquid 1 Application(s) Topical <User Schedule>  levothyroxine 100 MICROGram(s) Oral daily  metoprolol succinate ER 25 milliGRAM(s) Oral daily  senna 8.6 milliGRAM(s) Oral Tablet - Peds 2 Tablet(s) Oral at bedtime        Vital Signs Last 24 Hrs  T(F): 98.5 (09 May 2021 08:00), Max: 98.8 (09 May 2021 03:00)  HR: 75 (09 May 2021 08:00) (68 - 85)  BP: 146/60 (09 May 2021 08:00) (102/64 - 146/60)  RR: 21 (09 May 2021 08:00) (17 - 23)  SpO2: 96% (09 May 2021 08:00) (94% - 100%)  I&O's Summary    08 May 2021 07:01  -  09 May 2021 07:00  --------------------------------------------------------  IN: 540 mL / OUT: 1166 mL / NET: -626 mL        PHYSICAL EXAM:  General: Cachetic elderly woman in NAD  ENT: MMM  Neck: Supple, No JVD  Lungs: Coarse breath sounds, right pleurx cath in place.  Clear to auscultation bilaterally  Cardio: RRR, S1/S2, ++murmur  Abdomen: Soft, Nontender, Nondistended; Bowel sounds present  Extremities: No cyanosis, No edema  Neuro: no new deficits  Skin: no rashes  Psych: Awake    LABS:                        9.8    9.80  )-----------( 205      ( 09 May 2021 06:42 )             30.6         139  |  99  |  24  ----------------------------<  101  3.9   |  33  |  1.27    Ca    9.2      09 May 2021 06:42  Phos  2.6       Mg     2.1         TPro  5.5  /  Alb  2.4  /  TBili  0.5  /  DBili  x   /  AST  23  /  ALT  22  /  AlkPhos  78      eGFR if Non African American: 37 mL/min/1.73M2 (21 @ 06:42)  eGFR if : 43 mL/min/1.73M2 (21 @ 06:42)    PT/INR - ( 07 May 2021 22:30 )   PT: 16.1 sec;   INR: 1.35 ratio         PTT - ( 07 May 2021 22:30 )  PTT:29.6 sec  Lactate, Blood: 1.0 mmol/L ( @ 22:30)    CARDIAC MARKERS ( 08 May 2021 00:41 )  .041 ng/mL / x     / x     / x     / x            TSH 5.77   TSH with FT4 reflex --  Total T3 --      ABG - ( 07 May 2021 22:00 )  pH, Arterial: 7.28  pH, Blood: x     /  pCO2: 59    /  pO2: 137   / HCO3: x     / Base Excess: x     /  SaO2: 98               Urinalysis Basic - ( 07 May 2021 22:15 )    Color: Yellow / Appearance: Clear / S.020 / pH: x  Gluc: x / Ketone: Trace  / Bili: Negative / Urobili: Negative   Blood: x / Protein: 30 mg/dL / Nitrite: Negative   Leuk Esterase: Negative / RBC: 0-4 /HPF / WBC Negative /HPF   Sq Epi: x / Non Sq Epi: Neg.-Few / Bacteria: Negative /HPF        Culture - Blood (collected 07 May 2021 22:30)  Source: .Blood Blood-Peripheral  Preliminary Report (09 May 2021 06:00):    No growth to date.    Culture - Blood (collected 07 May 2021 22:30)  Source: .Blood Blood-Peripheral  Preliminary Report (09 May 2021 06:00):    No growth to date.    Culture - Urine (collected 07 May 2021 22:15)  Source: .Urine Clean Catch (Midstream)  Final Report (09 May 2021 08:05):    <10,000 CFU/mL Normal Urogenital Sara        RADIOLOGY & ADDITIONAL TESTS:    Care Discussed with Consultants/Other Providers:

## 2021-05-09 NOTE — PROGRESS NOTE ADULT - ASSESSMENT
Assessment:  1. Acute Respiratory Distress / Dyspnea suspect from underlying cancer Right effusion and Acute on chronic diastolic CHF     post obstructive PNA not ruled out  2. NELY in pt with normal baseline,   3. Underlying Coronary artery disease, angina presence unspecified, unspecified vessel or lesion type, Atrial fibrillation, Chronic, Hypertension, Malignant neoplasm of pancreas, unspecified location of malignancy Stage 4, CAD with history of 4 stents and pacemaker        Plan:  - Follow up results of LE Duplex to r/o DVT  - Supplemental O2 via NC w/ BIPAP PRN  - Improved breathing with Lasix and Ntg, monitor for signs of CHF  - OOB to chair as tolerated   - Dysphagia diet, SLP eval  - Renal dose meds, avoid nephrotoxins, monitor I&Os  - PPX: Eliquis and Protonix   - GOC: Full CODE but was on home hospice. Patient would benefit from palliative. Assessment:  1. Acute Respiratory Distress / Dyspnea suspect from underlying cancer Right effusion and Acute on chronic diastolic CHF  2. NELY in pt with normal baseline,   3. Underlying Coronary artery disease, angina presence unspecified, unspecified vessel or lesion type, Atrial fibrillation, Chronic, Hypertension, Malignant neoplasm of pancreas, unspecified location of malignancy Stage 4, CAD with history of 4 stents and pacemaker        Plan:  - Follow up results of LE Duplex to r/o DVT  - Supplemental O2 via NC w/ BIPAP PRN  - Improved breathing with Lasix and Ntg, monitor for signs of CHF  - OOB to chair as tolerated   - Dysphagia diet, SLP eval  - Renal dose meds, avoid nephrotoxins, monitor I&Os  - PPX: Eliquis and Protonix   - GOC: Full CODE but was on home hospice. Patient would benefit from palliative.

## 2021-05-09 NOTE — DIETITIAN INITIAL EVALUATION ADULT. - ORAL NUTRITION SUPPLEMENTS
Recommend trial of Ensure Enlive 8 oz 1x/day (provides 350 kcal, 20 g protein). Will also trial Magic Cup fortified ice cream 4 oz (provides 290 kcal, 9 g protein)

## 2021-05-09 NOTE — DIETITIAN NUTRITION RISK NOTIFICATION - ADDITIONAL COMMENTS/DIETITIAN RECOMMENDATIONS
Will also trial acceptance of Magic Cup 4 oz daily (provides 290 kcal, 9 g protein). Obtain food preferences and honor as able.

## 2021-05-10 PROBLEM — R11.2 NAUSEA & VOMITING: Status: ACTIVE | Noted: 2021-01-01

## 2021-05-10 PROBLEM — G89.3 PAIN OF METASTATIC MALIGNANCY: Status: ACTIVE | Noted: 2021-01-01

## 2021-05-10 PROBLEM — I35.0 AORTIC STENOSIS, SEVERE: Status: ACTIVE | Noted: 2019-03-19

## 2021-05-10 PROBLEM — C79.9 METASTASIS FROM PANCREATIC CANCER: Status: ACTIVE | Noted: 2021-01-01

## 2021-05-10 PROBLEM — K59.00 CONSTIPATION: Status: ACTIVE | Noted: 2017-02-06

## 2021-05-10 NOTE — HISTORY OF PRESENT ILLNESS
[Home] : at home, [unfilled] , at the time of the visit. [Medical Office: ([facility])___] : at the medical office located in  [Family Member] : family member [Verbal consent obtained from patient] : the patient, [unfilled] [AJCC Stage: ____] : AJCC Stage: [unfilled] [Disease: _____________________] : Disease: [unfilled] [de-identified] : 89 F w/ h/o severe AS, CAD s/p stent placements, TIA, PAF on Eliquis presents for management of metastatic pancreatic cancer. \par \par Lynette was hospitalized 3/31-4/20/21 initially for TAVR evaluation but then on routine pre-op CT Chest w/c noted to have  a large R pleural effusion. Thoracentesis performed and cytology positive for adenocarcinoma. CT A/P with a pancreatic mass, liver mets. Pt had a chest tube placed which was eventually removed. Overall pt noted slightly decrease in appetite, small amount of weight loss, increasing fatigue. At baseline she is an active woman who would take the bus alone to run errands. Family noticed that she had been slowing down over the past few months. Was d/c from the hospital after CT removed. TAVR surgery indefinitely delayed. \par \par Referred to medical oncology for further evaluation. \par \par  [de-identified] : adenocarcinoma  [de-identified] : + worsening SOB with exertion. Has to stop when moving from bathroom to bedroom. This seems to be getting worse since hospital d/c. \par + nausea, dry heaves. Occurs before and after eating. Feels build up of saliva and feels the need to vomit. Happens for much of the day. Has Zofran at home, only took 1 or 2 doses. \par constant pressure in the abdomen. Taking Maalox PRN which does not help. \par + dark stool, on ferrous sulfate daily \par + constipation, takes Senna daily. \par

## 2021-05-10 NOTE — REASON FOR VISIT
[Initial Consultation] : an initial consultation [Family Member] : family member [FreeTextEntry2] : Stage IV pancreatic cancer

## 2021-05-10 NOTE — CONSULT NOTE ADULT - PROBLEM SELECTOR RECOMMENDATION 9
[facility] against the train. Patient was visibly intoxicated and due to his inability to care for himself patient was brought to the ED for evaluation.   
Patient with clinically advanced/stage IV pancreatic cancer metastatic to adrenal, liver and lungs.  Given this diagnosis, with her advanced age and comorbidities, best supportive care/hospice is reasonable–plans noted.  Hospice care network to follow-up.

## 2021-05-10 NOTE — PHYSICAL EXAM
[Ambulatory and capable of all self care but unable to carry out any work activities] : Status 2- Ambulatory and capable of all self care but unable to carry out any work activities. Up and about more than 50% of waking hours [Thin] : thin [Normal] : affect appropriate [de-identified] : appears tired  [de-identified] : breathing does not appear labored at rest

## 2021-05-10 NOTE — CONSULT NOTE ADULT - SUBJECTIVE AND OBJECTIVE BOX
ESME PARIKH  89y  Female  Admitting: TEE Conley    HPI:  89 yo F pmhx asthma, HTN, Afib on Eliquis, severe AS, CAD, PPM with recent admission to [facility] for dyspnea found to have malignant pleural effusions and metastatic Pancreatic CA w/ mets to adrenals, liver, and lung. Presented to ED on this occasion with worsening SOB and MOULTON. Upon arrival to ED patient noted to be in respiratory distress. Given Lasix and Nitroglycerin for acute pulmonary edema.    Oncology f/u was planned as outpatient with Dr. Rebekah Austin.     PAST MEDICAL & SURGICAL HISTORY:  Coronary artery disease, angina presence unspecified, unspecified vessel or lesion type, unspecified whether native or transplanted heart    Atrial fibrillation, unspecified type    Hypertension, unspecified type    Hypotension, unspecified hypotension type    Constipation, unspecified constipation type    Malignant neoplasm of pancreas, unspecified location of malignancy  Stage 4    H/O chest tube placement    H/O heart artery stent  history of 4 stents and pacemaker as per daughter      MEDICATIONS  (STANDING):  aMIOdarone    Tablet 200 milliGRAM(s) Oral daily  amLODIPine   Tablet 5 milliGRAM(s) Oral daily  apixaban 2.5 milliGRAM(s) Oral every 12 hours  atorvastatin 20 milliGRAM(s) Oral at bedtime  chlorhexidine 2% Cloths 1 Application(s) Topical <User Schedule>  chlorhexidine 4% Liquid 1 Application(s) Topical <User Schedule>  levothyroxine 100 MICROGram(s) Oral daily  metoprolol succinate ER 25 milliGRAM(s) Oral daily  senna 8.6 milliGRAM(s) Oral Tablet - Peds 2 Tablet(s) Oral at bedtime    MEDICATIONS  (PRN):    Allergies    No Known Allergies    FAMILY HISTORY: no pertinent FH in first degree relatives    SOCIAL HISTORY: No EtOH, no tobacco    REVIEW OF SYSTEMS:    CONSTITUTIONAL: No fevers or chills  EYES/ENT: No visual changes;  No vertigo or throat pain   NECK: No pain or stiffness  RESPIRATORY: +productive cough  CARDIOVASCULAR: No chest pain or palpitations  GASTROINTESTINAL: No abdominal or epigastric pain. No hematemesis; No melena or hematochezia.  GENITOURINARY: No dysuria, frequency or hematuria  NEUROLOGICAL: No numbness  SKIN: No itching, burning, rashes, or lesions   All other review of systems is negative unless indicated above.    Height (cm): 154.9 (05-09 @ 13:40)  Weight (kg): 50.8 (05-09 @ 13:40)  BMI (kg/m2): 21.2 (05-09 @ 13:40)  BSA (m2): 1.48 (05-09 @ 13:40)    T(F): 98 (05-10-21 @ 04:42), Max: 98 (05-10-21 @ 04:42)  HR: 78 (05-10-21 @ 04:42)  BP: 137/69 (05-10-21 @ 04:42)  RR: 20 (05-10-21 @ 04:42)  SpO2: 94% (05-10-21 @ 04:42)    GENERAL: NAD, well-developed, thin  HEAD:  Atraumatic, Normocephalic  EYES: EOMI, PERRLA, conjunctiva and sclera clear  NECK: Supple, No JVD  CHEST/LUNG: decreased BS at bases ant.  HEART: Regular rate and rhythm; No murmurs, rubs, or gallops  ABDOMEN: Soft, Nontender, Nondistended; Bowel sounds present  EXTREMITIES:  no calf tenderness  NEUROLOGY: awake, alert  SKIN: No rashes or lesions    Labs:             9.8    9.80  )-----------( 205      ( 05-09 @ 06:42 )             30.6                9.6    9.56  )-----------( 159      ( 05-08 @ 05:00 )             31.8                9.6    12.31 )-----------( 272      ( 05-07 @ 22:30 )             31.1       05-09    139  |  99  |  24<H>  ----------------------------<  101<H>  3.9   |  33<H>  |  1.27    Ca    9.2      09 May 2021 06:42  Phos  2.6     05-09  Mg     2.1     05-09    TPro  5.5<L>  /  Alb  2.4<L>  /  TBili  0.5  /  DBili  x   /  AST  23  /  ALT  22  /  AlkPhos  78  05-09    Culture - Blood (collected 07 May 2021 22:30)  Source: .Blood Blood-Peripheral  Preliminary Report (09 May 2021 06:00):    No growth to date.    Culture - Blood (collected 07 May 2021 22:30)  Source: .Blood Blood-Peripheral  Preliminary Report (09 May 2021 06:00):    No growth to date.    Culture - Urine (collected 07 May 2021 22:15)  Source: .Urine Clean Catch (Midstream)  Final Report (09 May 2021 08:05):    <10,000 CFU/mL Normal Urogenital Sara    The overall clinical history of a pancreatic tail mass and the  absence of any history of a prior mammary carcinoma (confirmed  with clinician), in conjunction with the  immunophenotype, are  consistent with the final diagnosis of a metastatic  adenocarcinoma of primary pancreatic origin.      Consultant notes reviewed : YES [x ] ; NO [ ]    Radiology and additional tests:  < from: US Duplex Venous Lower Ext Complete, Bilateral (05.09.21 @ 11:46) >  IMPRESSION:  No evidence of deep venous thrombosis in either lower extremity.    < end of copied text >  < from: CT Chest No Cont (05.08.21 @ 13:39) >  IMPRESSION:  Moderate sized loculated right pleural effusion. Right-sided chest tube.    Extensive right lower lobe scattered ill-defined consolidation with scattered patchy opacities in the right upper lobe and throughout the left lung. These findings are indeterminate.    Partially visualized splenic hypodensity is indeterminate.  1 cm left sided hepatic hypodensity measures above fluid density.  MRI abdomen recommended for complete evaluation.    < end of copied text >      
HPI:  89 yo F pmhx asthma, HTN, Afib on Eliquis, severe AS, CAD, PPM recent admission to [facility] with dyspnea found to have malignant pleural effusions and metastatic Pancreatic CA w/ mets to adrenals, liver, and lung. Presented to ED  with worsening SOB and MOULTON. Upon arrival to ED patient noted to be in respiratory distress with WOB requiring NIV. Ordered for Lasix and Nitroglycerin for acute pulmonary edema. Attempted to titrate off NIV by ED attending however patient quickly desaturated with increased WOB. Now on N/C in ccu              PAST MEDICAL & SURGICAL HISTORY:  Coronary artery disease, angina presence unspecified, unspecified vessel or lesion type, unspecified whether native or transplanted heart    Atrial fibrillation, unspecified type    Hypertension, unspecified type    Hypotension, unspecified hypotension type    Constipation, unspecified constipation type    Malignant neoplasm of pancreas, unspecified location of malignancy  Stage 4    H/O chest tube placement    H/O heart artery stent  history of 4 stents and pacemaker as per daughter        SOCIAL HISTORY:    Admitted from:  home   Substance abuse history:              Tobacco hx:                  Alcohol hx:              Home Opioid hx:  Mormonism:                                    Preferred Language:    Surrogate/HCP/:    Son : Gino         Phone#:   [phone]      FAMILY HISTORY:    Baseline ADLs (prior to admission):    Allergies    No Known Allergies    Intolerances      Present Symptoms:   Dyspnea: mild  Nausea/Vomiting:   Anxiety:  Depressed   Fatigue: yes  Loss of appetite: yes  Pain:           yes                     location:  chest/ upper  abdomen       Review of Systems: MEDICATIONS  (STANDING):  aMIOdarone    Tablet 200 milliGRAM(s) Oral daily  amLODIPine   Tablet 5 milliGRAM(s) Oral daily  apixaban 2.5 milliGRAM(s) Oral every 12 hours  atorvastatin 20 milliGRAM(s) Oral at bedtime  chlorhexidine 2% Cloths 1 Application(s) Topical <User Schedule>  chlorhexidine 4% Liquid 1 Application(s) Topical <User Schedule>  levothyroxine 100 MICROGram(s) Oral daily  metoprolol succinate ER 25 milliGRAM(s) Oral daily  senna 8.6 milliGRAM(s) Oral Tablet - Peds 2 Tablet(s) Oral at bedtime    MEDICATIONS  (PRN):      PHYSICAL EXAM:    Vital Signs Last 24 Hrs  T(C): 36.5 (10 May 2021 11:01), Max: 36.7 (10 May 2021 04:42)  T(F): 97.7 (10 May 2021 11:01), Max: 98 (10 May 2021 04:42)  HR: 66 (10 May 2021 11:05) (66 - 78)  BP: 145/64 (10 May 2021 11:01) (113/56 - 145/64)  BP(mean): --  RR: 20 (10 May 2021 11:01) (20 - 20)  SpO2: 98% (10 May 2021 11:05) (94% - 98%)    General: alert  oriented x 2/ 3 sl lethargic , verbal, very frail in  appearance       Karnofsky Performance Score/Palliative Performance Status Version2:  20-30   %  PPSV: 20%  HEENT: normal w/ dry mouth    Lungs: sl labored breathing , dim to bases,  R pleurex cath   CV: normal rate   GI: abd flat, soft, n/t    : normal   Musculoskeletal: normal  w/ weakness  no edema   Skin: normal , w/d  fragile   Neuro: is awake, alert, oriented , and converses,   Oral intake ability: minimal moderate capability, needs assist   Diet: soft, as eduardo      LABS:                        9.8    9.80  )-----------( 205      ( 09 May 2021 06:42 )             30.6     05-09    139  |  99  |  24<H>  ----------------------------<  101<H>  3.9   |  33<H>  |  1.27    Ca    9.2      09 May 2021 06:42  Phos  2.6     05-09  Mg     2.1     05-09    TPro  5.5<L>  /  Alb  2.4<L>  /  TBili  0.5  /  DBili  x   /  AST  23  /  ALT  22  /  AlkPhos  78  05-09        RADIOLOGY & ADDITIONAL STUDIES:< from: CT Chest No Cont (05.08.21 @ 13:39) >    EXAM:  CT CHEST      PROCEDURE DATE:  05/08/2021        INTERPRETATION:  EXAMINATION: CT CHEST    CLINICAL INDICATION: Sepsis, pancreatic cancer.    TECHNIQUE: Noncontrast CT of the chest was obtained.    COMPARISON: None.    FINDINGS:    AIRWAYS, PLEURA, AND LUNGS: The central tracheobronchial tree is patent.  Small left pleural effusion. Moderate sized loculated right pleural effusion. Right-sided chest tube. Extensive right lower lobe scattered ill-defined consolidation with scattered patchyopacities in the right upper lobe and throughout the left lung.    MEDIASTINUM : There are no enlarged mediastinal, hilar or axillary lymph nodes. The visualized portion of the thyroid gland is heterogeneous. There is no pneumothorax.    HEART AND VESSELS: There is cardiomegaly.  There are atherosclerotic calcifications of the aorta and coronary arteries.  There is no pericardial effusion. Aortic valve calcification. Left-sided defibrillator.    UPPER ABDOMEN: Images of the upper abdomen demonstrate partially visualized splenic hypodensity.. 1 cm left sided hepatic hypodensity measures above fluid density.    BONES AND SOFT TISSUES: The bones are unremarkable.  The soft tissues are unremarkable.    TUBES/LINES: None.    IMPRESSION:  Moderate sized loculated right pleural effusion. Right-sided chest tube.    Extensive right lower lobe scattered ill-defined consolidation with scattered patchy opacities in the right upper lobe and throughout the left lung. These findings are indeterminate.    Partially visualized splenic hypodensity is indeterminate.  1 cm left sided hepatic hypodensity measures above fluid density.  MRI abdomen recommended for complete evaluation.    < from: US Duplex Venous Lower Ext Complete, Bilateral (05.09.21 @ 11:46) >  EXAM:  US DPLX LWR EXT VEINS COMPL BI      PROCEDURE DATE:  05/09/2021        INTERPRETATION:  CLINICAL INFORMATION: Acute respiratory failure. Shortness of breath.    COMPARISON: None available.    TECHNIQUE: Duplex sonography of the BILATERAL LOWER extremity veins with color and spectral Doppler, with and without compression.    FINDINGS:    RIGHT:  Normal compressibility of the RIGHT common femoral, femoral and popliteal veins.  Doppler examination shows normal spontaneous and phasic flow.  No RIGHT calf vein thrombosis is detected.    LEFT:  Normal compressibility of the LEFT common femoral, femoral and popliteal veins.  Doppler examination shows normal spontaneous and phasic flow.  No LEFT calf vein thrombosis is detected.    IMPRESSION:  No evidence of deep venous thrombosis in either lower extremity.              ADVANCE DIRECTIVES: full code   Advanced Care Planning discussion total time spent:

## 2021-05-10 NOTE — CONSULT NOTE ADULT - ASSESSMENT
A/P Patient is a 89y old  Female who presents with a chief complaint of SOB,  suspect from underlying cancer w/ Right effusion and Acute on chronic diastolic CHF      · per ccu: 	  Assessment:  1. Acute Respiratory Distress / Dyspnea suspect from underlying cancer Right effusion and Acute on chronic diastolic CHF  2. NELY in pt with normal baseline,   3. Underlying Coronary artery disease, angina presence unspecified, unspecified vessel or lesion type, Atrial fibrillation, Chronic, Hypertension, Malignant neoplasm of pancreas, unspecified location of malignancy Stage 4, CAD with history of 4 stents and pacemaker        Plan:  - Follow up results of LE Duplex to r/o DVT  - Supplemental O2 via NC w/ BIPAP PRN  - Improved breathing with Lasix and Ntg, monitor for signs of CHF  - OOB to chair as tolerated   - Dysphagia diet, SLP eval  - Renal dose meds, avoid nephrotoxins, monitor I&Os  - PPX: Eliquis and Protonix hypoxia and distress improved now on n/c not using bipap  CT chest reviewed noted b/l effusions, with right sided loculations    Pleurex not draining effusions   Heme/onc consult -  noted    over all prognosis is poor        Palliative :    asked for GOC assist, case reviewed w/ ccu team, and chart reviewed. Pt seen bedside, she is awake, alert, talks, w/ few words. She is very weakened and frail overall, was on 02 via N/C .   She had c/o pain to upper abd and sternum area, some relief w/ change of position.  Reports fair appetite, and was  trying to  drink supplements.    She asked I talk with her son Gino regarding plans for home and care taking decisions.      I called and had lengthy discussion w/ Gino and his sister was on line as well.  See GOC note above.    Plan per son  is for pt to go home  w/ home hospice services, and be comfortable. CM aware , hcn also  notified .   GOC for Cpr /intubation fully reviewed w/ son and daughter, they are continuing to think about this , and have my contact info for questions.   Will follow w/ ccu team, plan for d/c to home w/ hospice as soon as able.            A/P Patient is a 89y old  Female who presents with a chief complaint of SOB,  suspect from underlying cancer w/ Right effusion and Acute on chronic diastolic CHF      · per ccu: 	  Assessment:  1. Acute Respiratory Distress / Dyspnea suspect from underlying cancer Right effusion and Acute on chronic diastolic CHF  2. NELY in pt with normal baseline,   3. Underlying Coronary artery disease, angina presence unspecified, unspecified vessel or lesion type, Atrial fibrillation, Chronic, Hypertension, Malignant neoplasm of pancreas, unspecified location of malignancy Stage 4, CAD with history of 4 stents and pacemaker        Plan:  - Follow up results of LE Duplex to r/o DVT  - Supplemental O2 via NC w/ BIPAP PRN  - Improved breathing with Lasix and Ntg, monitor for signs of CHF  - OOB to chair as tolerated   - Dysphagia diet, SLP eval  - Renal dose meds, avoid nephrotoxins, monitor I&Os  - PPX: Eliquis and Protonix hypoxia and distress improved now on n/c not using bipap  CT chest reviewed noted b/l effusions, with right sided loculations    Pleurex not draining effusions   Heme/onc consult -  noted    over all prognosis is poor        Palliative :    asked for GOC assist, case reviewed w/ ccu team, and chart reviewed. Pt seen bedside, she is awake, alert, talks, w/ few words. She is very weakened and frail overall, was on 02 via N/C .   She had c/o pain to upper abd and sternum area, some relief w/ change of position.  Reports fair appetite, and was  trying to  drink supplements.    She asked I talk with her son Gino regarding plans for home and care taking decisions.      I called and had lengthy discussion w/ Gino and his sister was on line as well.  See GOC note above.    Plan per son  is for pt to go home  w/ home hospice services, and be comfortable. CM aware , hcn also  notified .   GOC for Cpr /intubation fully reviewed w/ son and daughter, they are continuing to think about this , and have my contact info for questions.   Will follow w/ ccu team, plan for d/c to home w/ hospice as soon as able.     ADD: pts children called back , reviewed molst, Molst completed today for Dnr/Dni  .   CCU team made aware

## 2021-05-10 NOTE — PROGRESS NOTE ADULT - ASSESSMENT
Assessment:  1. Acute Respiratory Distress / Dyspnea suspect from underlying cancer Right effusion and Acute on chronic diastolic CHF  2. NELY in pt with normal baseline,   3. Underlying Coronary artery disease, angina presence unspecified, unspecified vessel or lesion type, Atrial fibrillation, Chronic, Hypertension, Malignant neoplasm of pancreas, unspecified location of malignancy Stage 4, CAD with history of 4 stents and pacemaker        Plan:  - LE duplex negative for acute finding  - Supplemental O2 via NC w/ BIPAP PRN  - Improved breathing with Lasix and Ntg, monitor for signs of CHF  - OOB to chair as tolerated   - Dysphagia diet, SLP eval  - Renal dose meds, avoid nephrotoxins, monitor I&Os  - PPX: Eliquis and Protonix   - GOC: DNR/DNI, pending home hospice. Palliative

## 2021-05-10 NOTE — CONSULT NOTE ADULT - FAMILY/CHILD(REN)
son and daughter   geneva Christian Protopic Counseling: Patient may experience a mild burning sensation during topical application. Protopic is not approved in children less than 2 years of age. There have been case reports of hematologic and skin malignancies in patients using topical calcineurin inhibitors although causality is questionable.

## 2021-05-10 NOTE — CONSULT NOTE ADULT - ASSESSMENT
89 yo F pmhx asthma, HTN, Afib on Eliquis, severe AS, CAD, PPM with recent admission to [facility] for dyspnea found to have malignant pleural effusions and metastatic Pancreatic CA w/ mets to adrenals, liver, and lung. Presented to ED on this occasion with worsening SOB and MOULTON. Upon arrival to ED patient noted to be in respiratory distress. Given Lasix and Nitroglycerin for acute pulmonary edema.    Oncology f/u was planned as outpatient with Dr. Rebekah Austin.

## 2021-05-10 NOTE — CONSULT NOTE ADULT - CONVERSATION DETAILS
Called son Gino at pt request , and daughter was on phone as well. Had very lengthy conversation regarding mothers current condition, including her age, her multiple underlying co-morbidities, including her pancreatic  cancer with mets. Son very involved with pt care and aware of all conditions. Son says at this time, they want her home, with help and also wish for home hospice services to begin. He says they wish to focus on her comfort, and is aware at this point,  there is are no tx options for her cancer being offered .  I had further discussion with them regarding cpr and intubation. I fully reviewed what cpr and intubation means, and shared with them the low survival rates for a person of her age and condition. We spoke about how this may cause more pain, possible broken ribs,  and suffering . Son stated he is having difficulty in making this decision and currently pt is full code. He did say he was not aware of the pain it may cause and the low survival rates for his mothers age . He wishes to continue to think more about this and will speak with his sister further.  He has my contact info for questions and he was thankful for the discussion. He stated he will contact me by end of today. Called son Gino at pt request , and daughter was on phone as well. Had very lengthy conversation regarding mothers current condition, including her age, her multiple underlying co-morbidities, including her pancreatic  cancer with mets. Son very involved with pt care and aware of all conditions. Son says at this time, they want her home, with help and also wish for home hospice services to begin. He says they wish to focus on her comfort, and is aware at this point,  there is are no tx options for her cancer being offered .  I had further discussion with them regarding cpr and intubation. I fully reviewed what cpr and intubation means, and shared with them the low survival rates for a person of her age and condition. We spoke about how this may cause more pain, possible broken ribs,  and suffering . Son stated he is having difficulty in making this decision and currently pt is full code. He did say he was not aware of the pain it may cause and the low survival rates for his mothers age . He wishes to continue to think more about this and will speak with his sister further.  He has my contact info for questions and he was thankful for the discussion. He stated he will contact me by end of today.  ADD: pt son and daughter called back and say pt should be Dnr / Dni at this time.  Reviewed molst with them and completed today for dnr/dni.

## 2021-05-10 NOTE — PROGRESS NOTE ADULT - NUTRITIONAL ASSESSMENT
This patient has been assessed with a concern for Malnutrition and has been determined to have a diagnosis/diagnoses of Severe protein-calorie malnutrition.    This patient is being managed with:   Diet Dysphagia 1 Pureed-Honey Consistency Fluid-  DASH/TLC {Sodium & Cholesterol Restricted}  Supplement Feeding Modality:  Oral  Ensure Enlive Servings Per Day:  1       Frequency:  Daily  Entered: May  9 2021  2:13PM    Diet Dysphagia 1 Pureed-Honey Consistency Fluid-  DASH/TLC {Sodium & Cholesterol Restricted}  Entered: May  8 2021  4:38PM    The following pending diet order is being considered for treatment of Severe protein-calorie malnutrition:null

## 2021-05-11 NOTE — PROGRESS NOTE ADULT - PROBLEM SELECTOR PLAN 2
Widely metastatic.  Patient evaluated and accepted to home hospice, awaiting for confirmation of availability of support systems before discharge

## 2021-05-11 NOTE — PROGRESS NOTE ADULT - ASSESSMENT
A/P 89 year old female with PMH HTN, atrial fibrillation, CAD, stage 4 pancreatic cancer who was admitted with SOB.      per CCU:  Problem/Plan - 1:  ·  Problem: SOB (shortness of breath).  Plan: Likely a combination of acute chronic heart failure with malignant pleural effusion, patient improved after nitrates and diuresis, minimal output noted from  Pleurex catheter. Continue supplemental oxygen PRN, PRN  Pleurex  drainage, and beta blocker.     Problem/Plan - 2:  ·  Problem: Pancreatic cancer.  Plan: Widely metastatic.  Patient evaluated and accepted to home hospice, awaiting for confirmation of availability of support systems before discharge.     Problem/Plan - 3:  ·  Problem: Atrial fibrillation, unspecified type.  Plan: Rate controlled on beta blocker, continue anticoagulation with apixaban.     Problem/Plan - 4:  ·  Problem: Dyslipidemia.  Plan: Continue statin therapy.     Problem/Plan - 5:  ·  Problem: Hypothyroidism.  Plan: Continue synthroid.     Problem/Plan - 6:  Problem: Hypertension, unspecified type. Plan: Continue Norvasc.    Problem/Plan - 7:  ·  Problem: Prophylactic measure.  Plan: On full dose AC for DVT, DVT studies negative  Diet and activity as tolerated  For discharge home with hospice services.      Palliative :   as f/u pt w/ panc CA w/ mets. Reviewed w/ ccu team this Am. Pt alert/awake this AM, looked comfortable, and was asking to get oob.  As d/w family, would like home hospice services  initiated.    Rita completed this admission for DNR/DNI   Pt approved for home hospice services. Discharge pending consents and DME delivery .

## 2021-05-11 NOTE — PROGRESS NOTE ADULT - PROBLEM SELECTOR PLAN 7
On full dose AC for DVT, DVT studies negative  Diet and activity as tolerated  For discharge home with hospice services

## 2021-05-11 NOTE — CHART NOTE - NSCHARTNOTEFT_GEN_A_CORE
-patient has  at this time, found apneic, and pulseless in bed. Patient with metastatic pancreatic CA. DNR/DNI, [facility]      -I have updated ICU attending Dr. Conley of event  -I have tried to call patient's son Gino Barreto ([phone]) to update of events. He is HCP and name on MOLST form. He did nopt answer phone, left voicemail to return call. Did not leave news expiration on voicemail will wait until he calls back or I am able to reach him to tell him.

## 2021-05-11 NOTE — CHART NOTE - NSCHARTNOTEFT_GEN_A_CORE
-I have spoken with patient's son magdalena, updated him of his mother's passing at 915PM this evening.    -he wanted to know if him and his 3 siblings could come visit to say goodbye    -I have confirmed with [facility] supervisor Ally, that this will be ok given circumstances.    -they will arrive in roughly 15-20 minutes.

## 2021-05-11 NOTE — PROGRESS NOTE ADULT - SUBJECTIVE AND OBJECTIVE BOX
Offers no complaints today.  Tolerating PO when fed    `progVital Signs Last 24 Hrs  T(C): 36.3 (11 May 2021 05:41), Max: 36.7 (10 May 2021 20:22)  T(F): 97.4 (11 May 2021 05:41), Max: 98 (10 May 2021 20:22)  HR: 70 (11 May 2021 05:41) (69 - 75)  BP: 111/83 (11 May 2021 05:41) (110/46 - 119/68)  RR: 17 (11 May 2021 05:41) (17 - 20)  SpO2: 97% 2l NC (11 May 2021 05:41) (97% - 98%)    No new labs or imaging    MEDICATIONS  (STANDING):  ALPRAZolam 0.5 milliGRAM(s) Oral every 8 hours  aMIOdarone    Tablet 200 milliGRAM(s) Oral daily  amLODIPine   Tablet 5 milliGRAM(s) Oral daily  apixaban 2.5 milliGRAM(s) Oral every 12 hours  atorvastatin 20 milliGRAM(s) Oral at bedtime  levothyroxine 100 MICROGram(s) Oral daily  metoprolol succinate ER 25 milliGRAM(s) Oral daily  polyethylene glycol 3350 17 Gram(s) Oral daily  senna 2 Tablet(s) Oral at bedtime    Physical Exam  Gen:  WN/WD Female resting in bed, NAD  ENT:  NC/AT, no JVD noted  Thorax:  Symmetric, no retractions  Lung:  Diminished at bases bilaterally  CV:  S1, S2. RRR  Abd:  Soft, NT/ND.  BS normoactive, no masses to palp  Extrem:  No C/C/E, DP/radial pulses +2  Neuro:  A&O x 3, no gross motor/sensory deficits

## 2021-05-11 NOTE — HOSPICE CARE NOTE - CONVESATION DETAILS
Several attempts made to reach son Gino today, call back received this afternoon stating he is at work and in meetings, plans to call back before 4pm to review consents and discuss discharge.   Collaboration with KURT Ashby regarding discharge planning, informed of the above. Plan to follow up tomorrow.     HCN RN will continue to follow    ANNMARIE Palm RN [phone]
Re-referral received for home hospice. pt approved. This writer reviewed hospice services with pts son Gino. DME ordered for delivery tonight. Consents to be reviewed tomorrow.  Pt has a medicaid aide with  [employer] agency per the son and only receives 2 days x 6 hours. Family looking to increase hours. Per Son, faimly willing to care for the patient in the meantime. Tentative d/c tomorrow.

## 2021-05-11 NOTE — DISCHARGE NOTE FOR THE EXPIRED PATIENT - HOSPITAL COURSE
89F w/ underlying pancreatic CA, metastatic disease. Admitted with seizures/AMS/hypoxia.  -made DNR.DNI today with transition to home hospice.  -this evening patient noted to become hypoxic, likely aspirated, no mental status.   -She was found without a pulse, as she was DNR/DNi no interventions performed.  -family was updated, and they were allowed to come see aptient, say their goodbyes this evening.  -ICU attending updated   -no autoipsy wanted, no further questions  89 year old female with h/o metastatic panccreatic cancer who was admited due to Altered mental status due to Acute hypoxic hyercarbic respiratory failure.   w/ underlying pancreatic CA, metastatic disease. Admitted with seizures/AMS/hypoxia.  -made DNR.DNI today with transition to home hospice.  -this evening patient noted to become hypoxic, likely aspirated, no mental status.   -She was found without a pulse, as she was DNR/DNi no interventions performed.  -family was updated, and they were allowed to come see aptient, say their goodbyes this evening.  -ICU attending updated   -no autoipsy wanted, no further questions  89 year old female with h/o metastatic pancreatic cancer with mets to adrenals, liver and lung and malignant loculated pleural effusion s/p Pleurex , Valvular Heart disease, Afib on eliquis, HTN, Asthma  who was admitted due to Altered mental status due to Acute hypoxic hypercarbic respiratory failure.  Treated for possible acute diastolic CHF with Diuretics, NTG and Bipap. Clinically improved.  During hospital course patient was made DNR.DNI today with transition to home hospice.  on 5/11/21 patient noted to become hypoxic, likely aspirated, no mental status.   -She was found without a pulse, as she was DNR/DNi no interventions performed.  -family was updated, and they were allowed to come see patient say their goodbyes this evening.  -ICU attending updated   -no autoipsy wanted, no further questions     For full account of hospital course please refer to actual medical records. As this is a brief summery.

## 2021-05-11 NOTE — PROGRESS NOTE ADULT - ATTENDING COMMENTS
Patient seen and examined  hypoxia and distress improved now on n/c not using bipap  CT chest reviewed noted b/l effusions, with right sided loculations    Pleurex not draining effusions   over all prognosis is poor  await palliative care input and hospice input with decision with family as goal to take home but family still has patient full code
patient being set up for home hospice  now dnr/i  respiratory status better  started on xanax for anxiety  patient wants laxatives and enema.   d/w palliative care.
pt seen and examined  respiratory status appears better  plan to dispo home on home hospice when equipment arrives

## 2021-05-11 NOTE — PROGRESS NOTE ADULT - PROBLEM SELECTOR PLAN 1
Likely a combination of acute chronic heart failure with malignant pleural effusion, patient improved after nitrates and diuresis, minimal output noted from Plurx catheter. Continue supplemental oxygen PRN, PRN Plurx drainage, and beta blocker

## 2021-05-11 NOTE — PROGRESS NOTE ADULT - ASSESSMENT
89 year old female with PMH HTN, atrial fibrillation, CAD, stage 4 pancreatic cancer who was admitted with SOB

## 2021-05-11 NOTE — PROGRESS NOTE ADULT - SUBJECTIVE AND OBJECTIVE BOX
Progress: more awake today ,was comfortable this AM     Present Symptoms:   Dyspnea:   Nausea/Vomiting:   Anxiety:    Depressed Mood:   Fatigue:   Loss of appetite:   Pain:   sometimes                location: upper abdomen area   Review of Systems: [MEDICATIONS  (STANDING):  ALPRAZolam 0.5 milliGRAM(s) Oral every 8 hours  aMIOdarone    Tablet 200 milliGRAM(s) Oral daily  amLODIPine   Tablet 5 milliGRAM(s) Oral daily  apixaban 2.5 milliGRAM(s) Oral every 12 hours  atorvastatin 20 milliGRAM(s) Oral at bedtime  chlorhexidine 2% Cloths 1 Application(s) Topical <User Schedule>  chlorhexidine 4% Liquid 1 Application(s) Topical <User Schedule>  levothyroxine 100 MICROGram(s) Oral daily  metoprolol succinate ER 25 milliGRAM(s) Oral daily  polyethylene glycol 3350 17 Gram(s) Oral daily  senna 2 Tablet(s) Oral at bedtime    MEDICATIONS  (PRN):      PHYSICAL EXAM:  Vital Signs Last 24 Hrs  T(C): 36.3 (11 May 2021 05:41), Max: 36.7 (10 May 2021 20:22)  T(F): 97.4 (11 May 2021 05:41), Max: 98 (10 May 2021 20:22)  HR: 70 (11 May 2021 05:41) (69 - 75)  BP: 111/83 (11 May 2021 05:41) (110/46 - 119/68)  BP(mean): --  RR: 17 (11 May 2021 05:41) (17 - 20)  SpO2: 97% (11 May 2021 05:41) (97% - 98%)  General: alert  oriented 2-3 , says few words       HEENT: normal    Lungs: dim to bases    CV: normal  rate  GI: abd flat,  tender     Musculoskeletal: nml/ weakened     Skin: w/d fragile     Neuro: awake, alert, oriented    Oral intake ability:  oral feeding w/ assist   Diet: soft, as eduardo      LABS:                RADIOLOGY & ADDITIONAL STUDIES:    ADVANCE DIRECTIVES:  HCP  Molst DNR/DNI   Advanced Care Planning discussion total time spent:

## 2021-05-13 LAB
CULTURE RESULTS: SIGNIFICANT CHANGE UP
CULTURE RESULTS: SIGNIFICANT CHANGE UP
SPECIMEN SOURCE: SIGNIFICANT CHANGE UP
SPECIMEN SOURCE: SIGNIFICANT CHANGE UP

## 2021-05-27 NOTE — HISTORY OF PRESENT ILLNESS
[Was the patient reviewed at [facility]?] : The patient reviewed at [facility] [1] : 1 [de-identified] : Ms. ESME PARIKH is a 89 year old female, Mandarin speaking who presents via Telehealth for PMDC visit.  \par Patient's son Gino provided information during history interview. Patient was admitted to [facility] on 3/31/21 with chief complaint of shortness of breath and chest pain. 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 She was found during hospital work-up to have pleural effusions and is s/p pigtail catheter placement. The fluid cytology was consistent with metastatic adenocarcinoma. CT a/p 4/17/21 revealed pancreatic tail mass with liver, lung and adrenal gland. Prior to hospital stay she was planning for TAVR procedure which was subsequently cancelled.  Son states that she is frail and physically weaker after being hospitalized. She has dyspnea on exertion and needs assistant with ambulation. Prior to this she was independent with her mobility. She has a poor appetite and early satiety, is eating about 30% of what she used to eat. She complains of post prandial nausea. She has lost weight, unsure of amount. She reports mid-waist abdominal dull/tightness pain.  \par No family history of cancer. \par  [TextBox_4] : 3/31/21 [TextBox_16] : shortness of breath [TextBox_21] : 6 [TextBox_41] : Pleural effusion cytology:  metastatic adenocarcinoma  [TextBox_43] : 4/13/21 [Pancreatic ductal adenocarcinoma] : Pancreatic ductal adenocarcinoma [Metastatic] : metastatic [tail] : tail [Chemotherapy] : chemotherapy [Palliation without chemotherapy] : palliation without chemotherapy [TextBox_5] : 4/27/21 [TextBox_38] : 6 [TextBox_74] : Consider single agent gemcitabine  vs palliative care w/o chemo  [TextBox_7] : 4/27/21: \par PDAC, stage IV\par -Infiltrative TOP mass to splenic hilum, adrenal met, lung mets, left lobe liver lesion\par  [TextBox_9] : 4/27/21\par -Consider chemo w/ single agent [place] \par -Liver bx if more tissue needed for stains\par -Possible pleurX cath placement for symptom management

## 2021-05-27 NOTE — ASSESSMENT
[FreeTextEntry1] : 4/27/21  Med Onc Dr. Austin\par \par Metastasis from pancreatic cancer (199.1,157.9) (C79.9,C25.9)\par  · 89 F w/ metastatic pancreatic cancer c/b malignant pleural effusion, severe AS, poor\par     functional status\par     \par     - discussed the diagnosis, extent of disease, stage and goals of care. Reviewed\par     standard approach to palliative treatment which would include chemotherapy in the fit\par     patient. I discussed that even single agent Gemzar can have significant AEs and result\par     in more harm than benefit especially in a frail person. \par     - reviewed alterntive to DMT, supportive care alone with hospice services at home. \par     - will add on MMR just in case \par     - family in agreement that is not a candidate for DMT. Would like to focus on her comfort \par     - will plan to eval pt for pleurix drain and then hospice referral \par     - all ques answered. Emitional support provided.\par Shortness of breath (786.05) (R06.02)\par  · recommend pleurix placement for help with SOB \par     will contact CT surgery to set up apt ASAP\par     may need admission to hospital if symptoms get any worse\par Aortic stenosis, severe (424.1) (I35.0)\par  · any surgical management has been put on hold at this time\par Pain of metastatic malignancy (338.3) (G89.3)\par  · Morphine sulfate PRN pain prescribed. will help with SOB as well\par Nausea & vomiting (787.01) (R11.2)\par  · recommend zofran ATC if nausea persistent\par Constipation (564.00) (K59.00)\par  · senna QHS and miralax daily PRN\par \par Palliative/Supportive Care Options: Goals of care discussed with patient: Supportive . Patient was apprised of incurable nature of disease. Hospice and Palliative care options discussed. \par  \par

## 2022-01-03 NOTE — DISCHARGE NOTE NURSING/CASE MANAGEMENT/SOCIAL WORK - NSSCNAMETXT_GEN_ALL_CORE
Detail Level: Zone
Detail Level: Generalized
[facility] at home
Detail Level: Detailed
Quality 137: Melanoma: Continuity Of Care - Recall System: Patient information entered into a recall system that includes: target date for the next exam specified AND a process to follow up with patients regarding missed or unscheduled appointments
When Should The Patient Follow-Up For Their Next Full-Body Skin Exam?: 3 Months
Sunscreen Recommendations: Advised daily sunscreen and use of hats when outdoors.\\nMonitor for recurrence

## 2022-01-27 NOTE — DISCHARGE NOTE PROVIDER - NSDCHHASSISTOTHER_GEN_ALL_CORE_FT
1/27/2022    Santiago Guevara, DO  [address] [place] [address]    RE: Arturo Mejia       Dear Colleague,     I had the pleasure of seeing Arturo Mejia in the [facility].  HPI and Plan:   See dictation      Orders Placed This Encounter   Procedures     Case Request Cath Lab: Coronary Angiogram     Case Request EP: EP Comprehensive EP Study       No orders of the defined types were placed in this encounter.      There are no discontinued medications.      Encounter Diagnoses   Name Primary?     Paroxysmal ventricular tachycardia (H)      Paroxysmal atrial fibrillation (H)        CURRENT MEDICATIONS:    ALLERGIES     Allergies   Allergen Reactions     Bee Venom      No Known Drug Allergies        PAST MEDICAL HISTORY:  Past Medical History:   Diagnosis Date     Alcohol abuse, unspecified     sober since      Arthritis 05/16/2019     Asthma     as a child     COPD (chronic obstructive pulmonary disease) (H)     Severe COPD per Patient     Depressive disorder      Esophageal reflux      Healthcare-associated pneumonia      Hypothyroidism      LLL Community acquired pneumonia      Mitral regurgitation     moderate to severe     NONSPECIFIC MEDICAL HISTORY     trauma to nasal bridge & associated fx     Other convulsions     Seizure      Other, mixed, or unspecified nondependent drug abuse, unspecified      Paroxysmal ventricular tachycardia (H)      Pneumonia      Pneumonia, organism unspecified(486)      Sleep apnea 01/03/2013    using c-pap machine at night     Smoking     quit in 2015       PAST SURGICAL HISTORY:  Past Surgical History:   Procedure Laterality Date     ARTHROPLASTY SHOULDER Right 5/15/2019    Procedure: Hemiarthroplasty Of Right Shoulder, Distal Clavicle Excision;  Surgeon: Cheo Antony MD;  [facility]     ARTHROSCOPY SHOULDER SUPERIOR LABRUM ANTERIOR TO POSTERIOR REPAIR Right 3/2/2016    Procedure: ARTHROSCOPY SHOULDER SUPERIOR  LABRUM ANTERIOR TO POSTERIOR REPAIR;  Surgeon: Sacha Maharaj MD;  [facility]     ARTHROSCOPY SHOULDER, OPEN BICEP TENODESIS REPAIR, COMBINED Right 3/2/2016    Procedure: COMBINED ARTHROSCOPY SHOULDER, OPEN BICEP TENODESIS REPAIR;  Surgeon: Sacha Maharaj MD;  [facility]     COLONOSCOPY N/A 2018    Procedure: COMBINED COLONOSCOPY, SINGLE OR MULTIPLE BIOPSY/POLYPECTOMY BY BIOPSY;  colonoscopy with polypectomy via forcep;  Surgeon: Anthony Gonzalez MD;  [facility]       FAMILY HISTORY:  Family History   Problem Relation Age of Onset     Cancer Father         lung       SOCIAL HISTORY:  Social History     Socioeconomic History     Marital status:      Spouse name: Not on file     Number of children: Not on file     Years of education: Not on file     Highest education level: Not on file   Occupational History     Not on file   Tobacco Use     Smoking status: Former Smoker     Packs/day: 0.00     Years: 31.00     Pack years: 0.00     Types: Cigarettes, Cigars     Quit date: 2016     Years since quittin.2     Smokeless tobacco: Never Used   Vaping Use     Vaping Use: Former   Substance and Sexual Activity     Alcohol use: Yes     Alcohol/week: 0.0 standard drinks     Comment: occ     Drug use: No     Sexual activity: Yes     Partners: Female   Other Topics Concern     Parent/sibling w/ CABG, MI or angioplasty before 65F 55M? No   Social History Narrative     Not on file     Social Determinants of Health     Financial Resource Strain: High Risk     Difficulty of Paying Living Expenses: Hard   Food Insecurity: Unknown     Worried About Running Out of Food in the Last Year: Never true     Ran Out of Food in the Last Year: Not on file   Transportation Needs: No Transportation Needs     Lack of Transportation (Medical): No     Lack of Transportation (Non-Medical): No   Physical Activity: Inactive     Days of Exercise per Week: 0 days     Minutes of Exercise per Session: 0  "min   Stress: Not on file   Social Connections: Socially Isolated     Frequency of Communication with Friends and Family: Never     Frequency of Social Gatherings with Friends and Family: Never     Attends Yazidism Services: Never     Active Member of Clubs or Organizations: No     Attends Club or Organization Meetings: Never     Marital Status:    Intimate Partner Violence: Not At Risk     Fear of Current or Ex-Partner: No     Emotionally Abused: No     Physically Abused: No     Sexually Abused: No   Housing Stability: Not on file       Review of Systems:  Skin:  Negative       Eyes:  Negative      ENT:  Negative      Respiratory:  Positive for dyspnea on exertion     Cardiovascular:  Negative for;palpitations Positive for;chest pain;edema;lightheadedness;dizziness;fatigue    Gastroenterology: Negative      Genitourinary:  Negative      Musculoskeletal:  Positive for   right shoulder pain - will be seeing ortho for this  Neurologic:  Negative      Psychiatric:  Negative      Heme/Lymph/Imm:  Positive for allergies    Endocrine:  Negative        Physical Exam:  Vitals: /78 (BP Location: Right arm, Patient Position: Sitting, Cuff Size: Adult Regular)   Pulse 90   Ht 1.676 m (5' 6\")   Wt 76.7 kg (169 lb 3.2 oz)   SpO2 96%   BMI 27.31 kg/m      Constitutional:           Skin:             Head:           Eyes:           Lymph:      ENT:           Neck:           Respiratory:            Cardiac:                                                           GI:           Extremities and Muscular Skeletal:                 Neurological:           Psych:           CC  Tigist Monet MD  [address] [place] [phone]                HPI and Plan:   See dictation        Orders Placed This Encounter   Procedures     Case Request Cath Lab: Coronary Angiogram     Case Request EP: EP Comprehensive EP Study       No orders of the defined types were placed in this encounter.      There are no discontinued " medications.      Encounter Diagnoses   Name Primary?     Paroxysmal ventricular tachycardia (H)      Paroxysmal atrial fibrillation (H)        CURRENT MEDICATIONS:    ALLERGIES     Allergies   Allergen Reactions     Bee Venom      No Known Drug Allergies        PAST MEDICAL HISTORY:  Past Medical History:   Diagnosis Date     Alcohol abuse, unspecified     sober since      Arthritis 05/16/2019     Asthma     as a child     COPD (chronic obstructive pulmonary disease) (H)     Severe COPD per Patient     Depressive disorder      Esophageal reflux      Healthcare-associated pneumonia      Hypothyroidism      LLL Community acquired pneumonia      Mitral regurgitation     moderate to severe     NONSPECIFIC MEDICAL HISTORY     trauma to nasal bridge & associated fx     Other convulsions     Seizure      Other, mixed, or unspecified nondependent drug abuse, unspecified      Paroxysmal ventricular tachycardia (H)      Pneumonia      Pneumonia, organism unspecified(486)      Sleep apnea 01/03/2013    using c-pap machine at night     Smoking     quit in 2015       PAST SURGICAL HISTORY:  Past Surgical History:   Procedure Laterality Date     ARTHROPLASTY SHOULDER Right 5/15/2019    Procedure: Hemiarthroplasty Of Right Shoulder, Distal Clavicle Excision;  Surgeon: Cheo Antony MD;  [facility]     ARTHROSCOPY SHOULDER SUPERIOR LABRUM ANTERIOR TO POSTERIOR REPAIR Right 3/2/2016    Procedure: ARTHROSCOPY SHOULDER SUPERIOR LABRUM ANTERIOR TO POSTERIOR REPAIR;  Surgeon: Sacha Maharaj MD;  [facility]     ARTHROSCOPY SHOULDER, OPEN BICEP TENODESIS REPAIR, COMBINED Right 3/2/2016    Procedure: COMBINED ARTHROSCOPY SHOULDER, OPEN BICEP TENODESIS REPAIR;  Surgeon: Sacha Maharaj MD;  [facility]     COLONOSCOPY N/A 2/9/2018    Procedure: COMBINED COLONOSCOPY, SINGLE OR MULTIPLE BIOPSY/POLYPECTOMY BY BIOPSY;  colonoscopy with polypectomy via forcep;  Surgeon: Anthony Gonzalez MD;   Location:  GI       FAMILY HISTORY:  Family History   Problem Relation Age of Onset     Cancer Father         lung       SOCIAL HISTORY:  Social History     Socioeconomic History     Marital status:      Spouse name: Not on file     Number of children: Not on file     Years of education: Not on file     Highest education level: Not on file   Occupational History     Not on file   Tobacco Use     Smoking status: Former Smoker     Packs/day: 0.00     Years: 31.00     Pack years: 0.00     Types: Cigarettes, Cigars     Quit date: 2016     Years since quittin.2     Smokeless tobacco: Never Used   Vaping Use     Vaping Use: Former   Substance and Sexual Activity     Alcohol use: Yes     Alcohol/week: 0.0 standard drinks     Comment: occ     Drug use: No     Sexual activity: Yes     Partners: Female   Other Topics Concern     Parent/sibling w/ CABG, MI or angioplasty before 65F 55M? No   Social History Narrative     Not on file     Social Determinants of Health     Financial Resource Strain: High Risk     Difficulty of Paying Living Expenses: Hard   Food Insecurity: Unknown     Worried About Running Out of Food in the Last Year: Never true     Ran Out of Food in the Last Year: Not on file   Transportation Needs: No Transportation Needs     Lack of Transportation (Medical): No     Lack of Transportation (Non-Medical): No   Physical Activity: Inactive     Days of Exercise per Week: 0 days     Minutes of Exercise per Session: 0 min   Stress: Not on file   Social Connections: Socially Isolated     Frequency of Communication with Friends and Family: Never     Frequency of Social Gatherings with Friends and Family: Never     Attends Hindu Services: Never     Active Member of Clubs or Organizations: No     Attends Club or Organization Meetings: Never     Marital Status:    Intimate Partner Violence: Not At Risk     Fear of Current or Ex-Partner: No     Emotionally Abused: No     Physically Abused: No  "    Sexually Abused: No   Housing Stability: Not on file       Review of Systems:  Skin:  Negative       Eyes:  Negative      ENT:  Negative      Respiratory:  Positive for dyspnea on exertion     Cardiovascular:  Negative for;palpitations Positive for;chest pain;edema;lightheadedness;dizziness;fatigue    Gastroenterology: Negative      Genitourinary:  Negative      Musculoskeletal:  Positive for   right shoulder pain - will be seeing ortho for this  Neurologic:  Negative      Psychiatric:  Negative      Heme/Lymph/Imm:  Positive for allergies    Endocrine:  Negative        Physical Exam:  Vitals: /78 (BP Location: Right arm, Patient Position: Sitting, Cuff Size: Adult Regular)   Pulse 90   Ht 1.676 m (5' 6\")   Wt 76.7 kg (169 lb 3.2 oz)   SpO2 96%   BMI 27.31 kg/m      Constitutional:           Skin:             Head:           Eyes:           Lymph:      ENT:           Neck:           Respiratory:            Cardiac:                                                           GI:           Extremities and Muscular Skeletal:                 Neurological:           Psych:           CC  Tigist Monet MD  [address] [place] [phone]    Thank you for allowing me to participate in the care of your patient.      Sincerely,     Cameron Morgan MD     [facility]  cc:   Tigist Monet MD  [address] [place] [phone]    " No restriction

## 2022-02-23 NOTE — PROGRESS NOTE ADULT - SUBJECTIVE AND OBJECTIVE BOX
Spoke to ayana Montalvo. Family is agreement. [facility] is the hospice that they would like to use.   CC: Patient is a 89y old  Female who presents with a chief complaint of SOB (10 May 2021 11:29)      S: No acute events overnight. Patient diet advanced and  has been OOB to chair. Daughter reports during visit that patient looks the best currently than she has looked in 1-2 months.     Patient seen and examined at bedside.    ALLERGIES:  No Known Allergies      MEDICATIONS:  ALPRAZolam 0.5 milliGRAM(s) Oral every 8 hours  aMIOdarone    Tablet 200 milliGRAM(s) Oral daily  amLODIPine   Tablet 5 milliGRAM(s) Oral daily  apixaban 2.5 milliGRAM(s) Oral every 12 hours  atorvastatin 20 milliGRAM(s) Oral at bedtime  chlorhexidine 2% Cloths 1 Application(s) Topical <User Schedule>  chlorhexidine 4% Liquid 1 Application(s) Topical <User Schedule>  levothyroxine 100 MICROGram(s) Oral daily  metoprolol succinate ER 25 milliGRAM(s) Oral daily  polyethylene glycol 3350 17 Gram(s) Oral daily  senna 2 Tablet(s) Oral at bedtime        Vital Signs Last 24 Hrs  T(F): 97.7 (10 May 2021 11:01), Max: 98 (10 May 2021 04:42)  HR: 66 (10 May 2021 11:05) (66 - 78)  BP: 145/64 (10 May 2021 11:01) (113/56 - 145/64)  RR: 20 (10 May 2021 11:01) (20 - 20)  SpO2: 98% (10 May 2021 11:05) (94% - 98%)  I&O's Summary    10 May 2021 07:01  -  10 May 2021 14:20  --------------------------------------------------------  IN: 50 mL / OUT: 0 mL / NET: 50 mL      PHYSICAL EXAM:   General: Cachetic elderly woman in NAD  ENT: MMM  Neck: Supple, No JVD  Lungs: Coarse breath sounds, right pleurx cath in place.  Clear to auscultation bilaterally  Cardio: RRR, S1/S2, ++murmur  Abdomen: Soft, Nontender, Nondistended; Bowel sounds present  Extremities: No cyanosis, No edema  Neuro: no new deficits  Skin: no rashes  Psych: Awake      LABS:                        9.8    9.80  )-----------( 205      ( 09 May 2021 06:42 )             30.6         139  |  99  |  24  ----------------------------<  101  3.9   |  33  |  1.27    Ca    9.2      09 May 2021 06:42  Phos  2.6       Mg     2.1         TPro  5.5  /  Alb  2.4  /  TBili  0.5  /  DBili  x   /  AST  23  /  ALT  22  /  AlkPhos  78      eGFR if Non African American: 37 mL/min/1.73M2 (21 @ 06:42)  eGFR if : 43 mL/min/1.73M2 (21 @ 06:42)    PT/INR - ( 07 May 2021 22:30 )   PT: 16.1 sec;   INR: 1.35 ratio         PTT - ( 07 May 2021 22:30 )  PTT:29.6 sec  Lactate, Blood: 1.0 mmol/L ( @ 22:30)    CARDIAC MARKERS ( 08 May 2021 00:41 )  .041 ng/mL / x     / x     / x     / x            TSH 5.77   TSH with FT4 reflex --  Total T3 --      ABG - ( 07 May 2021 22:00 )  pH, Arterial: 7.28  pH, Blood: x     /  pCO2: 59    /  pO2: 137   / HCO3: x     / Base Excess: x     /  SaO2: 98                          Urinalysis Basic - ( 07 May 2021 22:15 )    Color: Yellow / Appearance: Clear / S.020 / pH: x  Gluc: x / Ketone: Trace  / Bili: Negative / Urobili: Negative   Blood: x / Protein: 30 mg/dL / Nitrite: Negative   Leuk Esterase: Negative / RBC: 0-4 /HPF / WBC Negative /HPF   Sq Epi: x / Non Sq Epi: Neg.-Few / Bacteria: Negative /HPF        Culture - Blood (collected 07 May 2021 22:30)  Source: .Blood Blood-Peripheral  Preliminary Report (09 May 2021 06:00):    No growth to date.    Culture - Blood (collected 07 May 2021 22:30)  Source: .Blood Blood-Peripheral  Preliminary Report (09 May 2021 06:00):    No growth to date.    Culture - Urine (collected 07 May 2021 22:15)  Source: .Urine Clean Catch (Midstream)  Final Report (09 May 2021 08:05):    <10,000 CFU/mL Normal Urogenital Sara        RADIOLOGY & ADDITIONAL TESTS:    Care Discussed with Consultants/Other Providers:

## 2022-04-02 NOTE — ED ADULT NURSE NOTE - ALCOHOL PRE SCREEN (AUDIT - C)
.  - POD day 4 CABG x3,   - Continue 325mg ASA daily  - Continue 75mg Plavix daily  - primary management per CTSx. Statement Selected

## 2022-05-03 NOTE — H&P ADULT - HISTORY OF PRESENT ILLNESS
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 Denies any other symptoms including fever, cough, N/V/D, LE edema/pain.  Statement Selected

## 2023-01-31 NOTE — PROGRESS NOTE ADULT - PROBLEM SELECTOR PROBLEM 3
[FreeTextEntry1] : From a cardiac standpoint, Mrs. Sharma denies exertional chest pain, shortness of breath, or other cardiac symptoms.  She states that her blood pressure has been running high at times at home.  Blood pressure today appears to be well controlled. \par \par  HTN (hypertension)

## 2023-02-21 NOTE — PROGRESS NOTE ADULT - ASSESSMENT
Seen and examined. On room air.  Denies n/v/d,cp  Heparin gtt    aluminum hydroxide/magnesium hydroxide/simethicone Suspension 30 milliLiter(s) Oral every 4 hours PRN  aMIOdarone    Tablet 200 milliGRAM(s) Oral daily  aspirin enteric coated 81 milliGRAM(s) Oral daily  atorvastatin 20 milliGRAM(s) Oral at bedtime  ferrous    sulfate 325 milliGRAM(s) Oral daily  heparin  Infusion 1000 Unit(s)/Hr IV Continuous <Continuous>  metoprolol succinate ER 25 milliGRAM(s) Oral daily  polyethylene glycol 3350 17 Gram(s) Oral daily  senna 2 Tablet(s) Oral at bedtime  sodium chloride 2 Gram(s) Oral two times a day  sodium chloride 0.9% lock flush 3 milliLiter(s) IV Push every 8 hours      VITAL:  T(C): , Max: 36.6 (04-10-21 @ 20:06)  T(F): , Max: 97.9 (04-10-21 @ 20:06)  HR: 60 (04-11-21 @ 10:51)  BP: 129/73 (04-11-21 @ 10:51)  BP(mean): 101 (04-10-21 @ 20:06)  RR: 18 (04-11-21 @ 10:51)  SpO2: 98% (04-11-21 @ 10:51)  Wt(kg): --    04-10-21 @ 07:01  -  04-11-21 @ 07:00  --------------------------------------------------------  IN: 1322 mL / OUT: 1200 mL / NET: 122 mL    04-11-21 @ 07:01  -  04-11-21 @ 13:33  --------------------------------------------------------  IN: 138 mL / OUT: 150 mL / NET: -12 mL        PHYSICAL EXAM:  Constitutional: NAD  Neck:  No JVD  Respiratory: CTAB/L  Cardiovascular: S1 and S2  Gastrointestinal: BS+, soft, NT/ND  Extremities: No peripheral edema  Neurological: A/O x 3, no focal deficits  Psychiatric: Normal mood, normal affect  : No Guzman  Skin: No rashes  Access: Not applicable        LABS:                          9.8    8.50  )-----------( 190      ( 11 Apr 2021 07:08 )             30.6     Na(130)/K(4.4)/Cl(95)/HCO3(23)/BUN(30)/Cr(1.37)Glu(112)/Ca(9.2)/Mg(--)/PO4(--)    04-11 @ 07:07  Na(129)/K(4.6)/Cl(94)/HCO3(22)/BUN(36)/Cr(1.86)Glu(111)/Ca(9.4)/Mg(--)/PO4(--)    04-10 @ 05:34  Na(130)/K(4.2)/Cl(95)/HCO3(25)/BUN(36)/Cr(1.67)Glu(112)/Ca(9.9)/Mg(--)/PO4(--)    04-09 @ 05:34      Imaging  EXAM:  XR ABDOMEN 1 VIEW                            PROCEDURE DATE:  04/10/2021            INTERPRETATION:  DATE OF EXAM: 4/10/21    COMPARISON: None.    CLINICAL INDICATION: Preop TAVR. R/O obstruction .    TECHNIQUE: 1 flat abdominal film.    FINDINGS:  L3-L5 lumbar spinal fusion hardware in situ.  Cholecystectomy clips in the right upper quadrant.  Nonspecific, nonobstructive bowel gas pattern.  No gross free intraperitoneal air.  Bilateral hip osteoarthritic changes.    IMPRESSION:  Nonspecific, nonobstructive bowel gas pattern.          EXAM:  XR CHEST PORTABLE URGENT 1V                          PROCEDURE DATE:  04/10/2021      INTERPRETATION:  DATE OF STUDY: 4/1021    PRIOR: 4/7/21    CLINICAL INDICATION: Preop for TAVR    TECHNIQUE: portable chest.    FINDINGS/  IMPRESSION:  Left-sided AICD in situ.  Stable cardiomegaly.  Small bilateral pleural effusions, right more than left, with associated bibasilar atelectasis.  No pneumothorax.  Degenerative changes of the thoracic spine.        ASSESSMENT/PLAN  Assessment:  89F with PMHx of  asthma, HTN, HLD, TIA, severe aortic stenosis, CAD s/p stents LAD and Diag, 2009 s/p 2 stent LAD in 9/2018, symptomatic Pafib on Eliquis pw SOB MOULTON and chest pain   Admitted for TAVR work up       1 Renal- Scr much  improved relative to yesterday ; her scr fluctuates based on hemodynamic status      2 Hyponatremia- Na+ slight improving , on salt tabs.  had 2 gms yesterday . 2gms po BID x 1doses  3 Hyperkalemia resolving/ resolved now    4 CVS- BP acceptable today.  was positive for orthostasis yesterday and was given 250cc NS  bolus.  20mg     TAVR Tentatively sched for next friday and she will stay till the surgery     5 Anemia- hgb improving     6  Misc-Heparin gtt       Dominick Ross NP-BC  [facility]  [phone]   noted

## 2023-02-26 NOTE — ED PROVIDER NOTE - CHIEF COMPLAINT
All other review of systems negative, except as noted in HPI
The patient is a 89y Female complaining of chest pain.

## 2023-07-26 NOTE — PROGRESS NOTE ADULT - ASSESSMENT
Assessment  Hypothyroidism: 89y Female with history of hypothyroidism, was taking synthroid 50mcg po daily per discussion with her son Gino, compliant with intake,  found to be in hypothyroid state, increased dose to synthroid 75mcg po daily, appears comfortable, planning TAVR 4/16.  CAD: on medications, no chest pain, stable, monitored.  HTN: Controlled,  on antihypertensive medications.  NELY: Monitor labs, BMP.      Trace Beatty MD  Cell: [phone]  Office: [phone]       Assessment  Hypothyroidism: 89y Female with history of hypothyroidism, was taking synthroid 50mcg po daily per discussion with her son Gino, compliant with intake,  found to be in hypothyroid state,  increased dose to synthroid 75mcg po daily, appears comfortable, planning TAVR 4/16.  CAD: on medications, no chest pain, stable, monitored.  HTN: Controlled,  on antihypertensive medications.  NELY: Monitor labs, BMP.      Trace Beatty MD  Cell: [phone]  Office: [phone]     [phone]

## 2023-09-08 NOTE — DISCHARGE NOTE PROVIDER - NSDCFUSCHEDAPPT_GEN_ALL_CORE_FT
ESME PARIKH ; 05/02/2021 ; ESME OCONNOR ; 05/03/2021 ; HARVEY [facility] ESME SCOTT ; 05/04/2021 ; CARY Barahona
No

## 2024-05-01 NOTE — PRE-OP CHECKLIST - ORDERS/MEDICATION ADMINISTRATION RECORD ON CHART
Received request for prescription refills for patient.   Patient follows with Dr. Savita Newman MD [facility]     Request is for Losartan  Is patient currently on medication, YES     Last OV 11/20/2023  Next OV 8/19/2024    Pended for signing and sent to provider     done

## 2024-07-02 NOTE — PHYSICAL EXAM
Pt lying in bed, RR even and unlabored. NAD. Sitter at monitor. Call light in reach. Safety needs met. VSS.   [General Appearance - Well Developed] : well developed [Well Groomed] : well groomed [Normal Appearance] : normal appearance [General Appearance - Well Nourished] : well nourished [No Deformities] : no deformities [Normal Conjunctiva] : the conjunctiva exhibited no abnormalities [General Appearance - In No Acute Distress] : no acute distress [Eyelids - No Xanthelasma] : the eyelids demonstrated no xanthelasmas [No Oral Pallor] : no oral pallor [Normal Oral Mucosa] : normal oral mucosa [No Oral Cyanosis] : no oral cyanosis [Normal Oropharynx] : normal oropharynx [Normal Jugular Venous A Waves Present] : normal jugular venous A waves present [Normal Jugular Venous V Waves Present] : normal jugular venous V waves present [No Jugular Venous Osullivan A Waves] : no jugular venous osullivan A waves [Respiration, Rhythm And Depth] : normal respiratory rhythm and effort [Auscultation Breath Sounds / Voice Sounds] : lungs were clear to auscultation bilaterally [Exaggerated Use Of Accessory Muscles For Inspiration] : no accessory muscle use [Bibasilar Rales/Crackles] : bibasilar rales [Heart Rate And Rhythm] : heart rate and rhythm were normal [Heart Sounds] : normal S1 and S2 [Murmurs] : no murmurs present [Arterial Pulses Normal] : the arterial pulses were normal [Regular-Premature Beats] : the rhythm was regular with premature beats [Abdomen Soft] : soft [Abdomen Tenderness] : non-tender [Abdomen Mass (___ Cm)] : no abdominal mass palpated [Nail Clubbing] : no clubbing of the fingernails [Cyanosis, Localized] : no localized cyanosis [Petechial Hemorrhages (___cm)] : no petechial hemorrhages [] : no ischemic changes [Oriented To Time, Place, And Person] : oriented to person, place, and time [Affect] : the affect was normal [Mood] : the mood was normal [No Anxiety] : not feeling anxious [FreeTextEntry1] : limited mobility.

## 2025-01-22 NOTE — PATIENT PROFILE ADULT - NSPROGENANESREACTION_GEN_A_NUR
Total Pulses: 73 Pre-Procedure Text: After consent was obtained and the procedure was explained, all persons present in the exam room put on their protective eyewear. Cold gel was applied to the treatment areas. Laser Type: KTP 532nm Treatment Number (Will Not Render If 0): 1 Procedural Text: The treatment areas where then treated as noted above. Spot Size: 3 Detail Level: Detailed Spot Size: 14 Post-Procedure Text: Following the treatment, ice and broad spectrum sunscreen was applied to the treatment areas.  Post-care instructions were discussed. Temperature: 10 C Consent: Written consent obtained, risks reviewed including but not limited to crusting, scabbing, blistering, scarring, darker or lighter pigmentary change, and/or incomplete removal. Price (Use Numbers Only, No Special Characters Or $): 400 Post-Care Instructions: I reviewed with the patient in detail post-care instructions. Patient is to apply vaseline with a q-tip to all crusted areas, and avoid picking at any scabs. Pt should stay away from the sun and wear sun protection until fully healed. Pulse Width In Msec: 10 External Cooling Fan Speed: 5 Fluence In J: 8.2 Fluence In J: 115 Temperature: 5 C Pulse Width In Msec: 30 Price (Use Numbers Only, No Special Characters Or $): 0 Total Pulses: 42 Pulse Width In Msec: 9 Spot Size: 7 Fluence In J: 8.4 never had anesthesia

## 2025-01-23 NOTE — H&P ADULT - NSICDXFAMHXPERTINENTNEGATIVE_GEN_A_CORE_FT
ANTICOAGULATION MANAGEMENT     Leonela Christianson 74 year old female is on warfarin with supratherapeutic INR result. (Goal INR 2.0-3.0)    Recent labs: (last 7 days)     01/23/25  0743   INR 3.4*       ASSESSMENT     Source(s): Chart Review and Patient/Caregiver Call     Warfarin doses taken: Warfarin taken as instructed  Diet: Decreased greens/vitamin K in diet; ongoing change    Reported cutting down on eating her salads / vegetables.  (Due to increased in price).  Medication/supplement changes: None noted  New illness, injury, or hospitalization: No  Signs or symptoms of bleeding or clotting: No  Previous result: Therapeutic last 2 INR visits  Additional findings: NOTE: patient only wants to check INR 4-6 weeks and not any sooner.        PLAN     Recommended plan for ongoing change(s) affecting INR     Dosing Instructions: decrease your warfarin dose (11.1% change) with next INR in 2 weeks       Summary  As of 1/23/2025      Full warfarin instructions:  6 mg every Wed; 3 mg all other days   Next INR check:  2/6/2025               Telephone call with Leonela who verbalizes understanding and agrees to plan    Lab visit scheduled - INR on 2/13/25 @ [facility]    Education provided: Taking warfarin: Importance of taking warfarin as instructed  Goal range and lab monitoring: goal range and significance of current result  Dietary considerations: impact of vitamin K foods on INR  Symptom monitoring: monitoring for bleeding signs and symptoms    Plan made per [facility] anticoagulation protocol    Jeanne Olsen, RN  1/23/2025  Anticoagulation Clinic  [facility] for routing messages: julian WALDRON  [facility] patient phone line: [phone]        _______________________________________________________________________     Anticoagulation Episode Summary       Current INR goal:  2.0-3.0   TTR:  57.4% (1 y)   Target end date:  Indefinite   Send INR reminders to:  COLBY WALDRON    Indications    History of  none pulmonary embolism [Z86.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 Woodruff on 1/23/23             Anticoagulation Care Providers       Provider Role Specialty Phone number    Miri Woodruff MD Referring Family Medicine [phone]

## 2025-01-23 NOTE — PATIENT PROFILE ADULT - TOBACCO USE
Educated patient on purpose of 4 eyes skin assessment and asked patient if allowed to perform, patient verbalized understanding and agreed to assessment.    4 Eyes Skin Assessment     The patient is being assess for  Admission    I agree that 2 RN's have performed a thorough Head to Toe Skin Assessment on the patient. ALL assessment sites listed below have been assessed.       Areas assessed by both nurses:   [x]   Head, Face, and Ears   [x]   Shoulders, Back, and Chest  [x]   Arms, Elbows, and Hands   [x]   Coccyx, Sacrum, and IschIum  [x]   Legs, Feet, and Heels        Does the Patient have Skin Breakdown?  No         Pablo Prevention initiated:  NA   Wound Care Orders initiated:  NA      WOC nurse consulted for Pressure Injury (Stage 3,4, Unstageable, DTI, NWPT, and Complex wounds), New and Established Ostomies:  NA      Nurse 1 eSignature: Electronically signed by Jovanna Harp RN on 1/23/25 at 10:50 AM EST    **SHARE this note so that the co-signing nurse is able to place an eSignature**    Nurse 2 eSignature: Electronically signed by Wendy Hernandez RN on 1/23/25 at 12:16 PM EST   
Pt anxious about sister's  tomorrow, pt asking to be discharged today. MD aware.   
Never smoker

## 2025-04-22 NOTE — DIETITIAN INITIAL EVALUATION ADULT. - PERTINENT MEDS FT
04/22/25 1701   Living Situation   Current Living Arrangements apartment   Potentially Unsafe Housing Conditions none   Food Insecurity   Within the past 12 months, you worried that your food would run out before you got the money to buy more. Never true   Within the past 12 months, the food you bought just didn't last and you didn't have money to get more. Never true   Transportation Needs   In the past 12 months, has lack of transportation kept you from medical appointments or from getting medications? no   In the past 12 months, has lack of transportation kept you from meetings, work, or from getting things needed for daily living? No   Utilities   In the past 12 months has the electric, gas, oil, or water company threatened to shut off services in your home? No   Abuse Screen (yes response referral indicated)   Feels Unsafe at Home or Work/School no   Feels Threatened by Someone no   Does Anyone Try to Keep You From Having Contact with Others or Doing Things Outside Your Home? no   Physical Signs of Abuse Present no   Financial Resource Strain   How hard is it for you to pay for the very basics like food, housing, medical care, and heating? Not very   Employment   Do you want help finding or keeping work or a job? I do not need or want help   Family and Community Support   If for any reason you need help with day-to-day activities such as bathing, preparing meals, shopping, managing finances, etc., do you get the help you need? I don't need any help   How often do you feel lonely or isolated from those around you? Rarely   Education   Preferred Language English   Do you want help with school or training? For example, starting or completing job training or getting a high school diploma, GED or equivalent No   Physical Activity   On average, how many days per week do you engage in moderate to strenuous exercise (like a brisk walk)? 7 days   On average, how many minutes do you engage in exercise at this level?  40 min   Number of minutes of exercise per week 280   Alcohol Use   Q1: How often do you have a drink containing alcohol? Never   Q2: How many drinks containing alcohol do you have on a typical day when you are drinking? None   Q3: How often do you have six or more drinks on one occasion? Never   Stress   Do you feel stress - tense, restless, nervous, or anxious, or unable to sleep at night because your mind is troubled all the time - these days? Only a littl   Mental Health   Little interest or pleasure in doing things Not at all   Feeling down, depressed, or hopeless Not at all   Disabilities   Difficulty Concentrating, Remembering or Making Decisions no   Difficulty Managing Errands Independently no       Met with patient in room wearing PPE: mask    Maintained distance greater than six feet and spent less than 15 minutes in the room    Christi Lynch RN    Phone [phone]  Fax [phone]    ary [email or web address]